# Patient Record
Sex: MALE | Race: WHITE | NOT HISPANIC OR LATINO | Employment: OTHER | URBAN - METROPOLITAN AREA
[De-identification: names, ages, dates, MRNs, and addresses within clinical notes are randomized per-mention and may not be internally consistent; named-entity substitution may affect disease eponyms.]

---

## 2017-02-02 ENCOUNTER — HOSPITAL ENCOUNTER (INPATIENT)
Facility: HOSPITAL | Age: 62
LOS: 2 days | Discharge: HOME | DRG: 190 | End: 2017-02-04
Attending: EMERGENCY MEDICINE | Admitting: INTERNAL MEDICINE
Payer: MEDICARE

## 2017-02-02 DIAGNOSIS — J96.22 ACUTE ON CHRONIC RESPIRATORY FAILURE WITH HYPOXIA AND HYPERCAPNIA: ICD-10-CM

## 2017-02-02 DIAGNOSIS — J96.02 ACUTE RESPIRATORY FAILURE WITH HYPOXIA AND HYPERCAPNIA: ICD-10-CM

## 2017-02-02 DIAGNOSIS — J44.1 COPD WITH ACUTE EXACERBATION: Primary | ICD-10-CM

## 2017-02-02 DIAGNOSIS — R91.1 PULMONARY NODULE: Chronic | ICD-10-CM

## 2017-02-02 DIAGNOSIS — J96.21 ACUTE ON CHRONIC RESPIRATORY FAILURE WITH HYPOXIA AND HYPERCAPNIA: ICD-10-CM

## 2017-02-02 DIAGNOSIS — J96.01 ACUTE RESPIRATORY FAILURE WITH HYPOXIA AND HYPERCAPNIA: ICD-10-CM

## 2017-02-02 DIAGNOSIS — J44.9 COPD, VERY SEVERE: ICD-10-CM

## 2017-02-02 PROBLEM — Z72.0 TOBACCO ABUSE: Status: ACTIVE | Noted: 2017-02-02

## 2017-02-02 LAB
ALBUMIN SERPL BCP-MCNC: 4 G/DL
ALP SERPL-CCNC: 74 U/L
ALT SERPL W/O P-5'-P-CCNC: 27 U/L
ANION GAP SERPL CALC-SCNC: 15 MMOL/L
APTT BLDCRRT: 25.9 SEC
AST SERPL-CCNC: 26 U/L
BASOPHILS # BLD AUTO: 0.08 K/UL
BASOPHILS NFR BLD: 0.9 %
BILIRUB SERPL-MCNC: 0.5 MG/DL
BNP SERPL-MCNC: 35 PG/ML
BUN SERPL-MCNC: 6 MG/DL
CALCIUM SERPL-MCNC: 9.8 MG/DL
CHLORIDE SERPL-SCNC: 104 MMOL/L
CK MB SERPL-MCNC: 7.4 NG/ML
CK MB SERPL-RTO: 3.4 %
CK SERPL-CCNC: 215 U/L
CK SERPL-CCNC: 215 U/L
CO2 SERPL-SCNC: 25 MMOL/L
CREAT SERPL-MCNC: 0.9 MG/DL
DIFFERENTIAL METHOD: ABNORMAL
EOSINOPHIL # BLD AUTO: 1.2 K/UL
EOSINOPHIL NFR BLD: 13.1 %
ERYTHROCYTE [DISTWIDTH] IN BLOOD BY AUTOMATED COUNT: 14.3 %
EST. GFR  (AFRICAN AMERICAN): >60 ML/MIN/1.73 M^2
EST. GFR  (NON AFRICAN AMERICAN): >60 ML/MIN/1.73 M^2
GLUCOSE SERPL-MCNC: 86 MG/DL
HCT VFR BLD AUTO: 47.7 %
HGB BLD-MCNC: 16.8 G/DL
INR PPP: 1
LACTATE SERPL-SCNC: 1.5 MMOL/L
LYMPHOCYTES # BLD AUTO: 1.5 K/UL
LYMPHOCYTES NFR BLD: 16.6 %
MCH RBC QN AUTO: 35.4 PG
MCHC RBC AUTO-ENTMCNC: 35.2 %
MCV RBC AUTO: 100 FL
MONOCYTES # BLD AUTO: 0.7 K/UL
MONOCYTES NFR BLD: 8.1 %
NEUTROPHILS # BLD AUTO: 5.4 K/UL
NEUTROPHILS NFR BLD: 61.3 %
PLATELET # BLD AUTO: 198 K/UL
PMV BLD AUTO: 9.6 FL
POTASSIUM SERPL-SCNC: 4.2 MMOL/L
PROT SERPL-MCNC: 7.8 G/DL
PROTHROMBIN TIME: 9.9 SEC
RBC # BLD AUTO: 4.75 M/UL
SODIUM SERPL-SCNC: 144 MMOL/L
TROPONIN I SERPL DL<=0.01 NG/ML-MCNC: 0.02 NG/ML
WBC # BLD AUTO: 8.84 K/UL

## 2017-02-02 PROCEDURE — 85730 THROMBOPLASTIN TIME PARTIAL: CPT

## 2017-02-02 PROCEDURE — 99285 EMERGENCY DEPT VISIT HI MDM: CPT | Mod: 25

## 2017-02-02 PROCEDURE — 82553 CREATINE MB FRACTION: CPT

## 2017-02-02 PROCEDURE — 25000242 PHARM REV CODE 250 ALT 637 W/ HCPCS: Performed by: EMERGENCY MEDICINE

## 2017-02-02 PROCEDURE — 20000000 HC ICU ROOM

## 2017-02-02 PROCEDURE — 94640 AIRWAY INHALATION TREATMENT: CPT

## 2017-02-02 PROCEDURE — 84484 ASSAY OF TROPONIN QUANT: CPT

## 2017-02-02 PROCEDURE — 83605 ASSAY OF LACTIC ACID: CPT

## 2017-02-02 PROCEDURE — 85610 PROTHROMBIN TIME: CPT

## 2017-02-02 PROCEDURE — 27000190 HC CPAP FULL FACE MASK W/VALVE

## 2017-02-02 PROCEDURE — 93010 ELECTROCARDIOGRAM REPORT: CPT | Mod: ,,, | Performed by: INTERNAL MEDICINE

## 2017-02-02 PROCEDURE — 27000221 HC OXYGEN, UP TO 24 HOURS

## 2017-02-02 PROCEDURE — 83880 ASSAY OF NATRIURETIC PEPTIDE: CPT

## 2017-02-02 PROCEDURE — 94660 CPAP INITIATION&MGMT: CPT

## 2017-02-02 PROCEDURE — 80053 COMPREHEN METABOLIC PANEL: CPT

## 2017-02-02 PROCEDURE — 85025 COMPLETE CBC W/AUTO DIFF WBC: CPT

## 2017-02-02 RX ORDER — ONDANSETRON 2 MG/ML
4 INJECTION INTRAMUSCULAR; INTRAVENOUS EVERY 8 HOURS PRN
Status: CANCELLED | OUTPATIENT
Start: 2017-02-02

## 2017-02-02 RX ORDER — ACETAMINOPHEN 325 MG/1
650 TABLET ORAL EVERY 6 HOURS PRN
Status: DISCONTINUED | OUTPATIENT
Start: 2017-02-02 | End: 2017-02-04 | Stop reason: HOSPADM

## 2017-02-02 RX ORDER — HYDRALAZINE HYDROCHLORIDE 20 MG/ML
10 INJECTION INTRAMUSCULAR; INTRAVENOUS EVERY 6 HOURS PRN
Status: DISCONTINUED | OUTPATIENT
Start: 2017-02-02 | End: 2017-02-04 | Stop reason: HOSPADM

## 2017-02-02 RX ORDER — ROFLUMILAST 500 UG/1
500 TABLET ORAL DAILY
Status: DISCONTINUED | OUTPATIENT
Start: 2017-02-03 | End: 2017-02-04 | Stop reason: HOSPADM

## 2017-02-02 RX ORDER — FOLIC ACID 1 MG/1
1000 TABLET ORAL DAILY
Status: DISCONTINUED | OUTPATIENT
Start: 2017-02-03 | End: 2017-02-04 | Stop reason: HOSPADM

## 2017-02-02 RX ORDER — ONDANSETRON 2 MG/ML
4 INJECTION INTRAMUSCULAR; INTRAVENOUS EVERY 12 HOURS PRN
Status: DISCONTINUED | OUTPATIENT
Start: 2017-02-02 | End: 2017-02-02

## 2017-02-02 RX ORDER — LORAZEPAM 0.5 MG/1
0.5 TABLET ORAL EVERY 6 HOURS PRN
Status: DISCONTINUED | OUTPATIENT
Start: 2017-02-03 | End: 2017-02-04 | Stop reason: HOSPADM

## 2017-02-02 RX ORDER — ENOXAPARIN SODIUM 100 MG/ML
40 INJECTION SUBCUTANEOUS EVERY 24 HOURS
Status: DISCONTINUED | OUTPATIENT
Start: 2017-02-03 | End: 2017-02-04 | Stop reason: HOSPADM

## 2017-02-02 RX ORDER — ACETAMINOPHEN 325 MG/1
650 TABLET ORAL EVERY 6 HOURS PRN
Status: CANCELLED | OUTPATIENT
Start: 2017-02-02

## 2017-02-02 RX ORDER — MOXIFLOXACIN HYDROCHLORIDE 400 MG/250ML
400 INJECTION, SOLUTION INTRAVENOUS
Status: DISCONTINUED | OUTPATIENT
Start: 2017-02-02 | End: 2017-02-04 | Stop reason: HOSPADM

## 2017-02-02 RX ORDER — MAG HYDROX/ALUMINUM HYD/SIMETH 200-200-20
30 SUSPENSION, ORAL (FINAL DOSE FORM) ORAL EVERY 6 HOURS PRN
Status: DISCONTINUED | OUTPATIENT
Start: 2017-02-02 | End: 2017-02-04 | Stop reason: HOSPADM

## 2017-02-02 RX ORDER — GUAIFENESIN 100 MG/5ML
200 SOLUTION ORAL EVERY 4 HOURS PRN
Status: DISCONTINUED | OUTPATIENT
Start: 2017-02-02 | End: 2017-02-04 | Stop reason: HOSPADM

## 2017-02-02 RX ORDER — IPRATROPIUM BROMIDE AND ALBUTEROL SULFATE 2.5; .5 MG/3ML; MG/3ML
3 SOLUTION RESPIRATORY (INHALATION) EVERY 4 HOURS PRN
Status: DISCONTINUED | OUTPATIENT
Start: 2017-02-02 | End: 2017-02-02

## 2017-02-02 RX ORDER — DIPHENHYDRAMINE HCL 25 MG
25 CAPSULE ORAL EVERY 6 HOURS PRN
Status: DISCONTINUED | OUTPATIENT
Start: 2017-02-02 | End: 2017-02-04 | Stop reason: HOSPADM

## 2017-02-02 RX ORDER — ONDANSETRON 2 MG/ML
4 INJECTION INTRAMUSCULAR; INTRAVENOUS EVERY 8 HOURS PRN
Status: DISCONTINUED | OUTPATIENT
Start: 2017-02-03 | End: 2017-02-04 | Stop reason: HOSPADM

## 2017-02-02 RX ORDER — IPRATROPIUM BROMIDE AND ALBUTEROL SULFATE 2.5; .5 MG/3ML; MG/3ML
3 SOLUTION RESPIRATORY (INHALATION)
Status: COMPLETED | OUTPATIENT
Start: 2017-02-02 | End: 2017-02-02

## 2017-02-02 RX ORDER — IPRATROPIUM BROMIDE AND ALBUTEROL SULFATE 2.5; .5 MG/3ML; MG/3ML
3 SOLUTION RESPIRATORY (INHALATION) EVERY 6 HOURS
Status: DISCONTINUED | OUTPATIENT
Start: 2017-02-03 | End: 2017-02-04 | Stop reason: HOSPADM

## 2017-02-02 RX ADMIN — IPRATROPIUM BROMIDE AND ALBUTEROL SULFATE 3 ML: .5; 3 SOLUTION RESPIRATORY (INHALATION) at 09:02

## 2017-02-02 NOTE — IP AVS SNAPSHOT
[address] [place] [address]           Patient Discharge Instructions     Our goal is to set you up for success. This packet includes information on your condition, medications, and your home care. It will help you to care for yourself so you don't get sicker and need to go back to the hospital.     Please ask your nurse if you have any questions.        There are many details to remember when preparing to leave the hospital. Here is what you will need to do:    1. Take your medicine. If you are prescribed medications, review your Medication List in the following pages. You may have new medications to  at the pharmacy and others that you'll need to stop taking. Review the instructions for how and when to take your medications. Talk with your doctor or nurses if you are unsure of what to do.     2. Go to your follow-up appointments. Specific follow-up information is listed in the following pages. Your may be contacted by a transition nurse or clinical provider about future appointments. Be sure we have all of the phone numbers to reach you, if needed. Please contact your provider's office if you are unable to make an appointment.     3. Watch for warning signs. Your doctor or nurse will give you detailed warning signs to watch for and when to call for assistance. These instructions may also include educational information about your condition. If you experience any of warning signs to your health, call your doctor.               ** Verify the list of medication(s) below is accurate and up to date. Carry this with you in case of emergency. If your medications have changed, please notify your healthcare provider.             Medication List      START taking these medications        Additional Info                      levoFLOXacin 500 MG tablet   Commonly known as:  LEVAQUIN   Quantity:  10 tablet   Refills:  0   Dose:  500 mg    Instructions:  Take 1 tablet  (500 mg total) by mouth once daily.     Begin Date    AM    Noon    PM    Bedtime         CONTINUE taking these medications        Additional Info                      * albuterol 0.63 mg/3 mL Nebu   Commonly known as:  ACCUNEB   Quantity:  90 vial   Refills:  11   Dose:  0.63 mg    Instructions:  Take 3 mLs (0.63 mg total) by nebulization 3 (three) times daily as needed.     Begin Date    AM    Noon    PM    Bedtime       * PROAIR HFA 90 mcg/actuation inhaler   Quantity:  18 g   Refills:  11   Generic drug:  albuterol    Instructions:  INHALE ONCE EVERY 4 HOURS AS NEEDED FOR WHEEZING     Begin Date    AM    Noon    PM    Bedtime       folic acid 1 MG tablet   Commonly known as:  FOLVITE   Refills:  0   Dose:  1000 mcg    Last time this was given:  1,000 mcg on 2/4/2017  9:26 AM   Instructions:  Take 1,000 mcg by mouth once daily.     Begin Date    AM    Noon    PM    Bedtime       lorazepam 0.5 MG tablet   Commonly known as:  ATIVAN   Refills:  0    Instructions:  TAKE ONE TABLET BY MOUTH EVERY SIX HOURS.DO NOT TAKE WITH ALCOHOL.     Begin Date    AM    Noon    PM    Bedtime       predniSONE 10 MG tablet   Commonly known as:  DELTASONE   Quantity:  53 tablet   Refills:  0    Instructions:  Please take: 40 mg daily x 5 days; 30 mg daily x 5 days; 20 mg daily x 5 days; 10 mg daily x 5 days; then 5 mg daily x 5 days     Begin Date    AM    Noon    PM    Bedtime       roflumilast 500 mcg Tab   Quantity:  30 tablet   Refills:  0   Dose:  500 mcg    Last time this was given:  500 mcg on 2/4/2017  9:26 AM   Instructions:  Take 1 tablet (500 mcg total) by mouth once daily.     Begin Date    AM    Noon    PM    Bedtime       umeclidinium-vilanterol 62.5-25 mcg/actuation Dsdv   Commonly known as:  ANORO ELLIPTA   Quantity:  30 each   Refills:  11   Dose:  1 puff    Instructions:  Inhale 1 puff into the lungs once daily.     Begin Date    AM    Noon    PM    Bedtime       VITAMIN D3 400 unit Chew   Refills:  0   Generic drug:   cholecalciferol (vitamin D3)    Instructions:  Take by mouth.     Begin Date    AM    Noon    PM    Bedtime       * Notice:  This list has 2 medication(s) that are the same as other medications prescribed for you. Read the directions carefully, and ask your doctor or other care provider to review them with you.         Where to Get Your Medications      These medications were sent to [facility]/pharmacy #[id number] - [place] - [address] [facility]  [address] [address], [place] [address]     Phone:  [phone]     levoFLOXacin 500 MG tablet    predniSONE 10 MG tablet                  Please bring to all follow up appointments:    1. A copy of your discharge instructions.  2. All medicines you are currently taking in their original bottles.  3. Identification and insurance card.    Please arrive 15 minutes ahead of scheduled appointment time.    Please call 24 hours in advance if you must reschedule your appointment and/or time.        Your Scheduled Appointments     Feb 17, 2017  1:00 PM CST   Complete P[facility] with [facility]    [address] [place] [id number] [phone]            Feb 17, 2017  1:40 PM CST   Established Patient Visit with MD [facility]    [address] [place] [id number] [phone]              Follow-up Information     Follow up with Joni Rey MD In 2 days.    Specialty:  Family Medicine    Contact information:    [address] [facility] [address] [phone]          Follow up with Prabhakar Rodriguez MD.    Specialty:  Pulmonary Disease    Why:  as scheduled on 2/17/17 at 1:40pm    Contact information:    [address]  [facility] [address] [phone]          Discharge Instructions     Future Orders    Activity as tolerated     Call MD for:  difficulty breathing or increased cough     Diet general      "Questions:    Total calories:      Fat restriction, if any:      Protein restriction, if any:      Na restriction, if any:      Fluid restriction:      Additional restrictions:          Primary Diagnosis     Your primary diagnosis was:  Chronic Bronchitis      Admission Information     Date & Time Provider Department CSN    2/2/2017  8:43 PM Nereida Quach MD [facility] -  [id number]      Care Providers     Provider Role Specialty Primary office phone    Nereida Quach MD Attending Provider Internal Medicine [phone]    Arvind Aleman MD Consulting Physician  Pulmonary Disease  [phone]    Nereida Quach MD Team Attending  Internal Medicine [phone]      Your Vitals Were     BP Pulse Temp Resp Height Weight    118/74 (BP Location: Left arm, Patient Position: Lying, BP Method: Automatic) 90 98 °F (36.7 °C) (Oral) 18 5' 11" (1.803 m) 87.5 kg (192 lb 14.4 oz)    SpO2 BMI             93% 26.9 kg/m2         Recent Lab Values     No lab values to display.      Allergies as of 2/4/2017     No Known Allergies      [facility]     [facility] Nurse Care Line - 24/7 Assistance  Unless otherwise directed by your provider, please contact [facility], our nurse care line that is available for 24/7 assistance.     Registered nurses in the [facility] provide clinical advisement, health education, appointment booking, and other advisory services.  Call for this free service at [phone].        Advance Directives     An advance directive is a document which, in the event you are no longer able to make decisions for yourself, tells your healthcare team what kind of treatment you do or do not want to receive, or who you would like to make those decisions for you.  If you do not currently have an advance directive, [facility] encourages you to create one.  For more information call:  [phone] ([phone]), [phone] ([phone]),  or log on to [email or web address].      "   Smoking Cessation     If you would like to quit smoking:   You may be eligible for free services if you are a [place] resident and started smoking cigarettes before September 1, 1988.  Call the Smoking Cessation Trust (SCT) toll free at (485) 497-0987 or (488) 312-6808.   Call 1-800-QUIT-NOW if you do not meet the above criteria.            Language Assistance Services     ATTENTION: Language assistance services are available, free of charge. Please call [phone].      ATENCIÓN: Si habla ryan, tiene a berrios disposición servicios gratuitos de asistencia lingüística. Llame al [phone].     CHÚ Ý: N?u b?n nói Ti?ng Vi?t, có các d?ch v? h? tr? ngôn ng? mi?n phí dành cho b?n. G?i s? [phone].        [facility] Sign-Up     Activating your [facility] account is as easy as 1-2-3!     1) Visit [email or web address], select Sign Up Now, enter this activation code and your date of birth, then select Next.  [email or web address]  Expires: 3/21/2017  2:49 PM      2) Create a username and password to use when you visit [facility] in the future and select a security question in case you lose your password and select Next.    3) Enter your e-mail address and click Sign Up!    Additional Information  If you have questions, please e-mail [email or web address] or call [phone] to talk to our [facility] staff. Remember, [facility] is NOT to be used for urgent needs. For medical emergencies, dial 911.          [facility] complies with applicable Federal civil rights laws and does not discriminate on the basis of race, color, national origin, age, disability, or sex.

## 2017-02-03 LAB
ALBUMIN SERPL BCP-MCNC: 3.7 G/DL
ALLENS TEST: ABNORMAL
ALP SERPL-CCNC: 62 U/L
ALT SERPL W/O P-5'-P-CCNC: 25 U/L
ANION GAP SERPL CALC-SCNC: 9 MMOL/L
AST SERPL-CCNC: 22 U/L
BASOPHILS # BLD AUTO: 0.01 K/UL
BASOPHILS NFR BLD: 0.2 %
BILIRUB SERPL-MCNC: 0.6 MG/DL
BUN SERPL-MCNC: 8 MG/DL
CALCIUM SERPL-MCNC: 9.8 MG/DL
CHLORIDE SERPL-SCNC: 103 MMOL/L
CO2 SERPL-SCNC: 28 MMOL/L
CREAT SERPL-MCNC: 0.9 MG/DL
DELSYS: ABNORMAL
DIFFERENTIAL METHOD: ABNORMAL
EOSINOPHIL # BLD AUTO: 0 K/UL
EOSINOPHIL NFR BLD: 0.3 %
ERYTHROCYTE [DISTWIDTH] IN BLOOD BY AUTOMATED COUNT: 14.3 %
EST. GFR  (AFRICAN AMERICAN): >60 ML/MIN/1.73 M^2
EST. GFR  (NON AFRICAN AMERICAN): >60 ML/MIN/1.73 M^2
FIO2: 32
FLOW: 3
GLUCOSE SERPL-MCNC: 148 MG/DL
HCO3 UR-SCNC: 29.4 MMOL/L (ref 24–28)
HCT VFR BLD AUTO: 45.6 %
HGB BLD-MCNC: 15.5 G/DL
LYMPHOCYTES # BLD AUTO: 0.4 K/UL
LYMPHOCYTES NFR BLD: 6.2 %
MAGNESIUM SERPL-MCNC: 1.9 MG/DL
MCH RBC QN AUTO: 34.4 PG
MCHC RBC AUTO-ENTMCNC: 34 %
MCV RBC AUTO: 101 FL
MODE: ABNORMAL
MONOCYTES # BLD AUTO: 0.1 K/UL
MONOCYTES NFR BLD: 1.3 %
NEUTROPHILS # BLD AUTO: 5.5 K/UL
NEUTROPHILS NFR BLD: 92 %
PCO2 BLDA: 49.6 MMHG (ref 35–45)
PH SMN: 7.38 [PH] (ref 7.35–7.45)
PHOSPHATE SERPL-MCNC: 2.7 MG/DL
PLATELET # BLD AUTO: 197 K/UL
PMV BLD AUTO: 9.9 FL
PO2 BLDA: 64 MMHG (ref 80–100)
POC BE: 4 MMOL/L
POC SATURATED O2: 91 % (ref 95–100)
POTASSIUM SERPL-SCNC: 5 MMOL/L
PROT SERPL-MCNC: 7.2 G/DL
RBC # BLD AUTO: 4.51 M/UL
SAMPLE: ABNORMAL
SITE: ABNORMAL
SODIUM SERPL-SCNC: 140 MMOL/L
WBC # BLD AUTO: 5.93 K/UL

## 2017-02-03 PROCEDURE — 21400001 HC TELEMETRY ROOM

## 2017-02-03 PROCEDURE — 25000003 PHARM REV CODE 250: Performed by: NURSE PRACTITIONER

## 2017-02-03 PROCEDURE — 85025 COMPLETE CBC W/AUTO DIFF WBC: CPT

## 2017-02-03 PROCEDURE — 36600 WITHDRAWAL OF ARTERIAL BLOOD: CPT

## 2017-02-03 PROCEDURE — 27000221 HC OXYGEN, UP TO 24 HOURS

## 2017-02-03 PROCEDURE — 83735 ASSAY OF MAGNESIUM: CPT

## 2017-02-03 PROCEDURE — 94640 AIRWAY INHALATION TREATMENT: CPT

## 2017-02-03 PROCEDURE — 82803 BLOOD GASES ANY COMBINATION: CPT

## 2017-02-03 PROCEDURE — 25000242 PHARM REV CODE 250 ALT 637 W/ HCPCS: Performed by: INTERNAL MEDICINE

## 2017-02-03 PROCEDURE — 84100 ASSAY OF PHOSPHORUS: CPT

## 2017-02-03 PROCEDURE — 63600175 PHARM REV CODE 636 W HCPCS: Performed by: INTERNAL MEDICINE

## 2017-02-03 PROCEDURE — 25000003 PHARM REV CODE 250: Performed by: INTERNAL MEDICINE

## 2017-02-03 PROCEDURE — 63600175 PHARM REV CODE 636 W HCPCS: Performed by: NURSE PRACTITIONER

## 2017-02-03 PROCEDURE — 94660 CPAP INITIATION&MGMT: CPT

## 2017-02-03 PROCEDURE — 80053 COMPREHEN METABOLIC PANEL: CPT

## 2017-02-03 PROCEDURE — 99291 CRITICAL CARE FIRST HOUR: CPT | Mod: ,,, | Performed by: INTERNAL MEDICINE

## 2017-02-03 PROCEDURE — 36415 COLL VENOUS BLD VENIPUNCTURE: CPT

## 2017-02-03 PROCEDURE — 99900035 HC TECH TIME PER 15 MIN (STAT)

## 2017-02-03 RX ORDER — FAMOTIDINE 20 MG/1
20 TABLET, FILM COATED ORAL 2 TIMES DAILY
Status: DISCONTINUED | OUTPATIENT
Start: 2017-02-03 | End: 2017-02-04 | Stop reason: HOSPADM

## 2017-02-03 RX ORDER — BUDESONIDE 0.5 MG/2ML
0.5 INHALANT ORAL 2 TIMES DAILY
Status: DISCONTINUED | OUTPATIENT
Start: 2017-02-03 | End: 2017-02-04 | Stop reason: HOSPADM

## 2017-02-03 RX ORDER — ARFORMOTEROL TARTRATE 15 UG/2ML
15 SOLUTION RESPIRATORY (INHALATION) 2 TIMES DAILY
Status: DISCONTINUED | OUTPATIENT
Start: 2017-02-03 | End: 2017-02-04 | Stop reason: HOSPADM

## 2017-02-03 RX ADMIN — METHYLPREDNISOLONE SODIUM SUCCINATE 125 MG: 125 INJECTION, POWDER, FOR SOLUTION INTRAMUSCULAR; INTRAVENOUS at 05:02

## 2017-02-03 RX ADMIN — IPRATROPIUM BROMIDE AND ALBUTEROL SULFATE 3 ML: .5; 3 SOLUTION RESPIRATORY (INHALATION) at 08:02

## 2017-02-03 RX ADMIN — METHYLPREDNISOLONE SODIUM SUCCINATE 125 MG: 125 INJECTION, POWDER, FOR SOLUTION INTRAMUSCULAR; INTRAVENOUS at 09:02

## 2017-02-03 RX ADMIN — FAMOTIDINE 20 MG: 20 TABLET ORAL at 09:02

## 2017-02-03 RX ADMIN — IPRATROPIUM BROMIDE AND ALBUTEROL SULFATE 3 ML: .5; 3 SOLUTION RESPIRATORY (INHALATION) at 01:02

## 2017-02-03 RX ADMIN — BUDESONIDE 0.5 MG: 0.5 INHALANT RESPIRATORY (INHALATION) at 08:02

## 2017-02-03 RX ADMIN — ENOXAPARIN SODIUM 40 MG: 100 INJECTION SUBCUTANEOUS at 11:02

## 2017-02-03 RX ADMIN — ROFLUMILAST 500 MCG: 500 TABLET ORAL at 08:02

## 2017-02-03 RX ADMIN — FOLIC ACID 1000 MCG: 1 TABLET ORAL at 08:02

## 2017-02-03 RX ADMIN — MOXIFLOXACIN HYDROCHLORIDE 400 MG: 400 INJECTION, SOLUTION INTRAVENOUS at 12:02

## 2017-02-03 RX ADMIN — ARFORMOTEROL TARTRATE 15 MCG: 15 SOLUTION RESPIRATORY (INHALATION) at 08:02

## 2017-02-03 RX ADMIN — MOXIFLOXACIN HYDROCHLORIDE 400 MG: 400 INJECTION, SOLUTION INTRAVENOUS at 11:02

## 2017-02-03 RX ADMIN — METHYLPREDNISOLONE SODIUM SUCCINATE 125 MG: 125 INJECTION, POWDER, FOR SOLUTION INTRAMUSCULAR; INTRAVENOUS at 02:02

## 2017-02-03 NOTE — ED NOTES
Patient is resting comfortably. Reports improvement of sob after receiving breathing treatments. Denies chest pain at present. VSS. Will cont to monitor.

## 2017-02-03 NOTE — PROGRESS NOTES
Problem: Patient Care Overview  Goal: Plan of Care Review  Outcome: Ongoing (interventions implemented as appropriate)  Patient admitted to Tele from ICU. White board updated, dietary procedures explained, tele monitor in place, assessment completed. Bed low and call light within reach. Will continue to monitor.

## 2017-02-03 NOTE — ASSESSMENT & PLAN NOTE
- Home oxygen and CPAP dependant  - Continue Bipap and Solumedrol  - Consider Pulmonary consult ()

## 2017-02-03 NOTE — H&P
[facility] Hospital Medicine  History & Physical    Patient Name: Johnny Perales  MRN: [id number]  Admission Date: 2/2/2017  Attending Physician: Chalino Keita MD  Primary Care Provider: Joni Rey MD         Patient information was obtained from patient, spouse/SO and ER records.     Subjective:     Principal Problem:COPD with acute exacerbation    Chief Complaint:   Chief Complaint   Patient presents with    Shortness of Breath     since yesterday. wheezing. history of COPD.        HPI: Mr. Perales is a 62 y/o  male with h/o severe COPD, chronic hypoxic and hypercapnic respiratory failure, home oxygen and CPAP dependant, continued tobacco user, presents to the ED c/o worsening SOB associated with profuse wheezing since earlier this morning. Multiple rounds of nebulizer therapy therapy at home did not improve his symptoms. He reports compliancy with CPAP at home. He was placed on BiPap in the ED, and given solumedrol 125 mg IV x1 and feels better. Still has profuse wheezing.    Past Medical History   Diagnosis Date    COPD (chronic obstructive pulmonary disease)        History reviewed. No pertinent past surgical history.    Review of patient's allergies indicates:  No Known Allergies    No current facility-administered medications on file prior to encounter.      Current Outpatient Prescriptions on File Prior to Encounter   Medication Sig    albuterol (ACCUNEB) 0.63 mg/3 mL Nebu Take 3 mLs (0.63 mg total) by nebulization 3 (three) times daily as needed.    cholecalciferol, vitamin D3, (VITAMIN D3) 400 unit Chew Take by mouth.    folic acid (FOLVITE) 1 MG tablet Take 1,000 mcg by mouth once daily.    lorazepam (ATIVAN) 0.5 MG tablet TAKE ONE TABLET BY MOUTH EVERY SIX HOURS.DO NOT TAKE WITH ALCOHOL.    predniSONE (DELTASONE) 10 MG tablet Please take: 40 mg daily x 5 days; 30 mg daily x 5 days; 20 mg daily x 5 days; 10 mg daily x 5 days; then 5 mg daily x 5 days    PROAIR  HFA 90 mcg/actuation inhaler INHALE ONCE EVERY 4 HOURS AS NEEDED FOR WHEEZING    roflumilast 500 mcg Tab Take 1 tablet (500 mcg total) by mouth once daily.    umeclidinium-vilanterol (ANORO ELLIPTA) 62.5-25 mcg/actuation DsDv Inhale 1 puff into the lungs once daily.     Family History     Problem Relation (Age of Onset)    Cancer Mother, Father    Heart failure Mother, Father        Social History Main Topics    Smoking status: Current Every Day Smoker     Packs/day: 0.10     Years: 35.00    Smokeless tobacco: Not on file    Alcohol use 1.2 oz/week     2 Cans of beer per week    Drug use: No    Sexual activity: Not on file     Review of Systems   Constitutional: Negative.  Negative for appetite change, fatigue and fever.   HENT: Negative.  Negative for congestion, nosebleeds and sore throat.    Eyes: Negative.  Negative for photophobia, redness and visual disturbance.   Respiratory: Positive for shortness of breath and wheezing. Negative for cough.    Cardiovascular: Negative.  Negative for chest pain, palpitations and leg swelling.   Gastrointestinal: Negative.  Negative for abdominal pain, constipation, diarrhea, nausea and vomiting.   Endocrine: Negative.  Negative for polydipsia, polyphagia and polyuria.   Genitourinary: Negative.  Negative for dysuria, flank pain, frequency and urgency.   Musculoskeletal: Negative.  Negative for arthralgias, back pain and joint swelling.   Skin: Negative.  Negative for color change, pallor and rash.   Allergic/Immunologic: Negative.  Negative for environmental allergies, food allergies and immunocompromised state.   Neurological: Negative.  Negative for dizziness, syncope, weakness, light-headedness, numbness and headaches.   Hematological: Negative.    Psychiatric/Behavioral: Negative for confusion and hallucinations. The patient is nervous/anxious.    All other systems reviewed and are negative.    Objective:     Vital Signs (Most Recent):  Temp: 97.9 °F (36.6 °C)  (02/02/17 2045)  Pulse: (!) 114 (02/02/17 2302)  Resp: 20 (02/02/17 2302)  BP: 126/86 (02/02/17 2302)  SpO2: 95 % (02/02/17 2302) Vital Signs (24h Range):  Temp:  [97.9 °F (36.6 °C)] 97.9 °F (36.6 °C)  Pulse:  [114-120] 114  Resp:  [20-26] 20  SpO2:  [95 %-98 %] 95 %  BP: (126-150)/(80-88) 126/86     Weight: 81.6 kg (180 lb)  Body mass index is 25.1 kg/(m^2).    Physical Exam   Constitutional: He is oriented to person, place, and time. He appears well-developed and well-nourished. He appears distressed.   HENT:   Head: Normocephalic and atraumatic.   Eyes: Conjunctivae and EOM are normal. Pupils are equal, round, and reactive to light. No scleral icterus.   Neck: Normal range of motion. Neck supple. No thyromegaly present.   Cardiovascular: Normal rate, regular rhythm and normal heart sounds.    No murmur heard.  Pulmonary/Chest: He is in respiratory distress. He has wheezes. He exhibits no tenderness.   Abdominal: Soft. Bowel sounds are normal. There is no tenderness.   Musculoskeletal: Normal range of motion. He exhibits no edema or tenderness.   Lymphadenopathy:     He has no cervical adenopathy.   Neurological: He is alert and oriented to person, place, and time. No cranial nerve deficit. He exhibits normal muscle tone. Coordination normal.   Skin: Skin is warm and dry. He is not diaphoretic.   Psychiatric: His behavior is normal. Thought content normal. His mood appears anxious.   Nursing note and vitals reviewed.       Significant Labs:   ABGs: No results for input(s): PH, PCO2, HCO3, POCSATURATED, BE in the last 48 hours.  BMP:   Recent Labs  Lab 02/02/17 2109   GLU 86      K 4.2      CO2 25   BUN 6   CREATININE 0.9   CALCIUM 9.8     CBC:   Recent Labs  Lab 02/02/17 2109   WBC 8.84   HGB 16.8   HCT 47.7        CMP:   Recent Labs  Lab 02/02/17 2109      K 4.2      CO2 25   GLU 86   BUN 6   CREATININE 0.9   CALCIUM 9.8   PROT 7.8   ALBUMIN 4.0   BILITOT 0.5   ALKPHOS 74   AST  26   ALT 27   ANIONGAP 15   EGFRNONAA >60     Cardiac Markers:   Recent Labs  Lab 02/02/17 2109   BNP 35     Troponin:   Recent Labs  Lab 02/02/17 2109   TROPONINI 0.023     Urine Studies: No results for input(s): COLORU, APPEARANCEUA, PHUR, SPECGRAV, PROTEINUA, GLUCUA, KETONESU, BILIRUBINUA, OCCULTUA, NITRITE, UROBILINOGEN, LEUKOCYTESUR, RBCUA, WBCUA, BACTERIA, SQUAMEPITHEL, HYALINECASTS in the last 48 hours.    Invalid input(s): WRIGHTSUR  All pertinent labs within the past 24 hours have been reviewed.    Significant Imaging:   Imaging Results         X-Ray Chest 1 View (Final result) Result time:  02/02/17 22:06:18    Final result by Katelynn Paredes MD (02/02/17 22:06:18)    Impression:     No acute cardiopulmonary disease.            Electronically signed by: KATELYNN PAREDES MD  Date:     02/02/17  Time:    22:06     Narrative:    EXAM:   [id number] CHEST 1 VIEW    CLINICAL HISTORY:  sob    COMPARISON: The December 2016    Findings:     The lungs are clear. The cardiac silhouette is within normal limits.  Bones are stable.                Assessment/Plan:     * COPD with acute exacerbation  - IV steroids  - Bipap  - IV avelox empirically      Acute on chronic respiratory failure with hypoxia and hypercapnia  - Home oxygen and CPAP dependant  - Continue Bipap and Solumedrol  - Consider Pulmonary consult ()          Tobacco abuse  - Nicotine patch  - Uses electronic cigarettes at home      VTE Risk Mitigation     Lovenox        Chalino Keita MD  Department of Hospital Medicine   [facility]

## 2017-02-03 NOTE — ED PROVIDER NOTES
SCRIBE #1 NOTE: I, Benigno Ceron, am scribing for, and in the presence of, Rey Genao MD. I have scribed the entire note.      History      Chief Complaint   Patient presents with    Shortness of Breath     since yesterday. wheezing. history of COPD.       Review of patient's allergies indicates:  No Known Allergies     HPI   HPI    2/2/2017, 9:03 PM   History obtained from the wife and patient      History of Present Illness: Johnny Perales is a 61 y.o. male patient, with PMHx of COPD, who presents to the Emergency Department for shortness of breath which onset gradually 2 days ago. Symptoms are constant and moderate in severity. Wife states the pt has been coughing up a yellow/green sputum. No mitigating or exacerbating factors reported. Associated sxs include diaphoresis, wheezing, and chest tightness. Patient denies any fever, chills, leg swelling, palpitations, n/v/d, HA, lightheadedness, dizziness, abdominal pain, and all other sxs at this time. No further complaints or concerns at this time.         Arrival mode: AASI    PCP: Joni Rey MD       Past Medical History:  Past Medical History   Diagnosis Date    COPD (chronic obstructive pulmonary disease)        Past Surgical History:  History reviewed. No pertinent past surgical history.      Family History:  Family History   Problem Relation Age of Onset    Cancer Mother     Heart failure Mother     Cancer Father     Heart failure Father        Social History:  Social History     Social History Main Topics    Smoking status: Current Every Day Smoker     Packs/day: 0.10     Years: 35.00    Smokeless tobacco: Unknown    Alcohol use 1.2 oz/week     2 Cans of beer per week    Drug use: No    Sexual activity: Unknown       ROS   Review of Systems   Constitutional: Negative for chills and fever.   HENT: Negative for sore throat.    Respiratory: Positive for cough, chest tightness, shortness of breath and wheezing.    Cardiovascular: Negative  for chest pain, palpitations and leg swelling.   Gastrointestinal: Negative for abdominal pain, diarrhea, nausea and vomiting.   Genitourinary: Negative for dysuria.   Musculoskeletal: Negative for back pain.   Skin: Negative for rash.   Neurological: Negative for dizziness, weakness, light-headedness, numbness and headaches.   Hematological: Does not bruise/bleed easily.       Physical Exam    Initial Vitals   BP Pulse Resp Temp SpO2   02/02/17 2045 02/02/17 2045 02/02/17 2045 02/02/17 2045 02/02/17 2045   150/88 114 25 97.9 °F (36.6 °C) 98 %      Physical Exam  Nursing Notes and Vital Signs Reviewed.  Constitutional: Patient is in moderate distress. Awake and alert. Well-developed and well-nourished.  Head: Atraumatic. Normocephalic.  Eyes: PERRL. EOM intact. Conjunctivae are not pale. No scleral icterus.  ENT: Mucous membranes are moist. Oropharynx is clear and symmetric.    Neck: Supple. Full ROM. No lymphadenopathy.  Cardiovascular: Tachycardic. Regular rhythm. No murmurs, rubs, or gallops. Distal pulses are 2+ and symmetric.  Pulmonary/Chest: Tachypnic. Moderate respiratory distress. Coarse breath sounds with diffuse expiatory wheezing.   Abdominal: Soft and non-distended.  There is no tenderness.  No rebound, guarding, or rigidity.  Good bowel sounds.    Musculoskeletal: Moves all extremities. No obvious deformities. No edema. No calf tenderness.  Skin: Warm. Diaphoretic  Neurological:  Alert, awake, and appropriate.  Normal speech.  No acute focal neurological deficits are appreciated.  Psychiatric: Normal affect. Good eye contact. Appropriate in content.    ED Course    Critical Care  Date/Time: 2/2/2017 10:13 PM  Performed by: MARIO BURTON.  Authorized by: MARIO BURTON   Direct patient critical care time: 13 minutes  Additional history critical care time: 8 minutes  Ordering / reviewing critical care time: 7 minutes  Documentation critical care time: 5 minutes  Other critical care time: 6  "minutes  Total critical care time (exclusive of procedural time) : 39 minutes  Critical care time was exclusive of separately billable procedures and treating other patients.  Critical care was necessary to treat or prevent imminent or life-threatening deterioration of the following conditions: respiratory failure.  Critical care was time spent personally by me on the following activities: blood draw for specimens, development of treatment plan with patient or surrogate, interpretation of cardiac output measurements, evaluation of patient's response to treatment, examination of patient, obtaining history from patient or surrogate, ordering and performing treatments and interventions, ordering and review of laboratory studies, ordering and review of radiographic studies, pulse oximetry, re-evaluation of patient's condition and review of old charts.        ED Vital Signs:  Vitals:    02/02/17 2045 02/02/17 2109 02/02/17 2110 02/02/17 2115   BP: (!) 150/88      Pulse: (!) 114 (!) 115 (!) 120 (!) 119   Resp: (!) 25 (!) 26  (!) 26   Temp: 97.9 °F (36.6 °C)      TempSrc: Oral      SpO2: 98% 95%  95%   Weight: 81.6 kg (180 lb)      Height: 5' 11" (1.803 m)       02/02/17 2203 02/02/17 2302 02/02/17 2351 02/03/17 0000   BP: 130/80 126/86 126/73 126/87   Pulse: (!) 115 (!) 114 (!) 115 (!) 115   Resp: 20 20 18 15   Temp:   98 °F (36.7 °C)    TempSrc:   Oral    SpO2: 95% 95% (!) 91% (!) 92%   Weight:   86.8 kg (191 lb 5.8 oz)    Height:        02/03/17 0011 02/03/17 0015 02/03/17 0030 02/03/17 0121   BP:  132/83 134/89    Pulse: 110 110 105 102   Resp: (!) 22 17 20 (!) 26   Temp:       TempSrc:       SpO2: (!) 92% (!) 92% (!) 93% (!) 91%   Weight:       Height:           Abnormal Lab Results:  Labs Reviewed   CBC W/ AUTO DIFFERENTIAL - Abnormal; Notable for the following:        Result Value     (*)     MCH 35.4 (*)     Eos # 1.2 (*)     Lymph% 16.6 (*)     Eosinophil% 13.1 (*)     All other components within normal " limits   CK-MB - Abnormal; Notable for the following:      (*)     CPK MB 7.4 (*)     All other components within normal limits   CK - Abnormal; Notable for the following:      (*)     All other components within normal limits   COMPREHENSIVE METABOLIC PANEL   PROTIME-INR   APTT   TROPONIN I   B-TYPE NATRIURETIC PEPTIDE   LACTIC ACID, PLASMA        All Lab Results:  Results for orders placed or performed during the hospital encounter of 02/02/17   CBC auto differential   Result Value Ref Range    WBC 8.84 3.90 - 12.70 K/uL    RBC 4.75 4.60 - 6.20 M/uL    Hemoglobin 16.8 14.0 - 18.0 g/dL    Hematocrit 47.7 40.0 - 54.0 %     (H) 82 - 98 fL    MCH 35.4 (H) 27.0 - 31.0 pg    MCHC 35.2 32.0 - 36.0 %    RDW 14.3 11.5 - 14.5 %    Platelets 198 150 - 350 K/uL    MPV 9.6 9.2 - 12.9 fL    Gran # 5.4 1.8 - 7.7 K/uL    Lymph # 1.5 1.0 - 4.8 K/uL    Mono # 0.7 0.3 - 1.0 K/uL    Eos # 1.2 (H) 0.0 - 0.5 K/uL    Baso # 0.08 0.00 - 0.20 K/uL    Gran% 61.3 38.0 - 73.0 %    Lymph% 16.6 (L) 18.0 - 48.0 %    Mono% 8.1 4.0 - 15.0 %    Eosinophil% 13.1 (H) 0.0 - 8.0 %    Basophil% 0.9 0.0 - 1.9 %    Differential Method Automated    Comprehensive metabolic panel   Result Value Ref Range    Sodium 144 136 - 145 mmol/L    Potassium 4.2 3.5 - 5.1 mmol/L    Chloride 104 95 - 110 mmol/L    CO2 25 23 - 29 mmol/L    Glucose 86 70 - 110 mg/dL    BUN, Bld 6 6 - 20 mg/dL    Creatinine 0.9 0.5 - 1.4 mg/dL    Calcium 9.8 8.7 - 10.5 mg/dL    Total Protein 7.8 6.0 - 8.4 g/dL    Albumin 4.0 3.5 - 5.2 g/dL    Total Bilirubin 0.5 0.1 - 1.0 mg/dL    Alkaline Phosphatase 74 55 - 135 U/L    AST 26 10 - 40 U/L    ALT 27 10 - 44 U/L    Anion Gap 15 8 - 16 mmol/L    eGFR if African American >60 >60 mL/min/1.73 m^2    eGFR if non African American >60 >60 mL/min/1.73 m^2   Protime-INR   Result Value Ref Range    Prothrombin Time 9.9 9.0 - 12.5 sec    INR 1.0 0.8 - 1.2   APTT   Result Value Ref Range    aPTT 25.9 21.0 - 32.0 sec   Troponin I    Result Value Ref Range    Troponin I 0.023 0.000 - 0.026 ng/mL   Brain natriuretic peptide   Result Value Ref Range    BNP 35 0 - 99 pg/mL   CK-MB   Result Value Ref Range     (H) 20 - 200 U/L    CPK MB 7.4 (H) 0.1 - 6.5 ng/mL    MB% 3.4 0.0 - 5.0 %   CK   Result Value Ref Range     (H) 20 - 200 U/L   Lactic acid, plasma   Result Value Ref Range    Lactate (Lactic Acid) 1.5 0.5 - 2.2 mmol/L         Imaging Results:  Imaging Results         X-Ray Chest 1 View (Final result) Result time:  02/02/17 22:06:18    Final result by Katelynn Paredes MD (02/02/17 22:06:18)    Impression:     No acute cardiopulmonary disease.            Electronically signed by: KATELYNN PAREDES MD  Date:     02/02/17  Time:    22:06     Narrative:    EXAM:   [id number] CHEST 1 VIEW    CLINICAL HISTORY:  sob    COMPARISON: The December 2016    Findings:     The lungs are clear. The cardiac silhouette is within normal limits.  Bones are stable.             The EKG was ordered, reviewed, and independently interpreted by the ED provider.  Interpretation time: 20:53  Rate: 115 BPM  Rhythm: sinus tachycardia  Interpretation: Normal ECG. No STEMI.           The Emergency Provider reviewed the vital signs and test results, which are outlined above.    ED Discussion     9:26 PM: Re-evaluated pt. Pt is tolerating BiPAP. Respirations are decreasing. Work with breathing is decreasing. D/w pt all pertinent results. D/w pt any concerns expressed at this time. Answered all questions. Pt expresses understanding at this time.    10:11 PM: Discussed case with Dr. Keita ([facility]). Dr. Keita agrees with current care and management of pt and accepts admission.   Admitting Service: Hospital medicine   Admitting Physician: Dr. Keita  Admit to: ICU    10:12 PM: Re-evaluated pt. I have discussed test results, shared treatment plan, and the need for admission with patient and family at bedside. Pt and family express understanding  at this time and agree with all information. All questions answered. Pt and family have no further questions or concerns at this time. Pt is ready for admit.          ED Medication(s):  Medications   methylPREDNISolone sod suc(PF) 125 mg/2 mL injection 125 mg (not administered)   folic acid tablet 1,000 mcg (not administered)   guaifenesin 100 mg/5 ml syrup 200 mg (not administered)   aluminum-magnesium hydroxide-simethicone 200-200-20 mg/5 mL suspension 30 mL (not administered)   enoxaparin injection 40 mg (not administered)   diphenhydrAMINE capsule 25 mg (not administered)   acetaminophen tablet 650 mg (not administered)   hydrALAZINE injection 10 mg (not administered)   albuterol-ipratropium 2.5mg-0.5mg/3mL nebulizer solution 3 mL (3 mLs Nebulization Given 2/3/17 0121)   ondansetron injection 4 mg (not administered)   roflumilast tablet 500 mcg (not administered)   lorazepam tablet 0.5 mg (not administered)   moxifloxacin 400 mg/250 mL IVPB 400 mg (400 mg Intravenous New Bag 2/3/17 0002)   albuterol-ipratropium 2.5mg-0.5mg/3mL nebulizer solution 3 mL (3 mLs Nebulization Given 2/2/17 2115)       Current Discharge Medication List                Medical Decision Making    Medical Decision Making:   Clinical Tests:   Lab Tests: Ordered and Reviewed  Radiological Study: Ordered and Reviewed  Medical Tests: Ordered and Reviewed  Other:   I have discussed this case with another health care provider.           Scribe Attestation:   Scribe #1: I performed the above scribed service and the documentation accurately describes the services I performed. I attest to the accuracy of the note.    Attending:   Physician Attestation Statement for Scribe #1: I, Rey Genao MD, personally performed the services described in this documentation, as scribed by Benigno Ceron, in my presence, and it is both accurate and complete.          Clinical Impression       ICD-10-CM ICD-9-CM   1. COPD with acute exacerbation J44.1 491.21   2.  Acute respiratory failure with hypoxia and hypercapnia J96.01 518.81    J96.02    3. COPD, very severe J44.9 496       Disposition:   Disposition: Admitted  Condition: Serious         Rey Genao MD  02/03/17 0135

## 2017-02-03 NOTE — ED NOTES
"Pt resting in bed with eyes closed. States he is "feeling better." Resp e/u. Pulse ox 93% on NC 2L. Tolerating well. Will cont to monitor.    "

## 2017-02-03 NOTE — PLAN OF CARE
Problem: Patient Care Overview  Goal: Plan of Care Review  Outcome: Ongoing (interventions implemented as appropriate)  Patient on 3lnc with a spo2 of 94%. Patient pulled A-line out while sitting in chair. Patient tolerated neb tx well, but seemed a little confused. Will follow.

## 2017-02-03 NOTE — PLAN OF CARE
Problem: Patient Care Overview  Goal: Plan of Care Review  Outcome: Ongoing (interventions implemented as appropriate)  Patient remains on 3lnc with a spo2 of 95%. Patient tolerated bipap during the night with no complaints. Removed and obtained ABG. Will follow.

## 2017-02-03 NOTE — SUBJECTIVE & OBJECTIVE
Past Medical History   Diagnosis Date    COPD (chronic obstructive pulmonary disease)        History reviewed. No pertinent past surgical history.    Review of patient's allergies indicates:  No Known Allergies    No current facility-administered medications on file prior to encounter.      Current Outpatient Prescriptions on File Prior to Encounter   Medication Sig    albuterol (ACCUNEB) 0.63 mg/3 mL Nebu Take 3 mLs (0.63 mg total) by nebulization 3 (three) times daily as needed.    cholecalciferol, vitamin D3, (VITAMIN D3) 400 unit Chew Take by mouth.    folic acid (FOLVITE) 1 MG tablet Take 1,000 mcg by mouth once daily.    lorazepam (ATIVAN) 0.5 MG tablet TAKE ONE TABLET BY MOUTH EVERY SIX HOURS.DO NOT TAKE WITH ALCOHOL.    predniSONE (DELTASONE) 10 MG tablet Please take: 40 mg daily x 5 days; 30 mg daily x 5 days; 20 mg daily x 5 days; 10 mg daily x 5 days; then 5 mg daily x 5 days    PROAIR HFA 90 mcg/actuation inhaler INHALE ONCE EVERY 4 HOURS AS NEEDED FOR WHEEZING    roflumilast 500 mcg Tab Take 1 tablet (500 mcg total) by mouth once daily.    umeclidinium-vilanterol (ANORO ELLIPTA) 62.5-25 mcg/actuation DsDv Inhale 1 puff into the lungs once daily.     Family History     Problem Relation (Age of Onset)    Cancer Mother, Father    Heart failure Mother, Father        Social History Main Topics    Smoking status: Current Every Day Smoker     Packs/day: 0.10     Years: 35.00    Smokeless tobacco: Not on file    Alcohol use 1.2 oz/week     2 Cans of beer per week    Drug use: No    Sexual activity: Not on file     Review of Systems   Constitutional: Negative.  Negative for appetite change, fatigue and fever.   HENT: Negative.  Negative for congestion, nosebleeds and sore throat.    Eyes: Negative.  Negative for photophobia, redness and visual disturbance.   Respiratory: Positive for shortness of breath and wheezing. Negative for cough.    Cardiovascular: Negative.  Negative for chest pain,  palpitations and leg swelling.   Gastrointestinal: Negative.  Negative for abdominal pain, constipation, diarrhea, nausea and vomiting.   Endocrine: Negative.  Negative for polydipsia, polyphagia and polyuria.   Genitourinary: Negative.  Negative for dysuria, flank pain, frequency and urgency.   Musculoskeletal: Negative.  Negative for arthralgias, back pain and joint swelling.   Skin: Negative.  Negative for color change, pallor and rash.   Allergic/Immunologic: Negative.  Negative for environmental allergies, food allergies and immunocompromised state.   Neurological: Negative.  Negative for dizziness, syncope, weakness, light-headedness, numbness and headaches.   Hematological: Negative.    Psychiatric/Behavioral: Negative for confusion and hallucinations. The patient is nervous/anxious.    All other systems reviewed and are negative.    Objective:     Vital Signs (Most Recent):  Temp: 97.9 °F (36.6 °C) (02/02/17 2045)  Pulse: (!) 114 (02/02/17 2302)  Resp: 20 (02/02/17 2302)  BP: 126/86 (02/02/17 2302)  SpO2: 95 % (02/02/17 2302) Vital Signs (24h Range):  Temp:  [97.9 °F (36.6 °C)] 97.9 °F (36.6 °C)  Pulse:  [114-120] 114  Resp:  [20-26] 20  SpO2:  [95 %-98 %] 95 %  BP: (126-150)/(80-88) 126/86     Weight: 81.6 kg (180 lb)  Body mass index is 25.1 kg/(m^2).    Physical Exam   Constitutional: He is oriented to person, place, and time. He appears well-developed and well-nourished. He appears distressed.   HENT:   Head: Normocephalic and atraumatic.   Eyes: Conjunctivae and EOM are normal. Pupils are equal, round, and reactive to light. No scleral icterus.   Neck: Normal range of motion. Neck supple. No thyromegaly present.   Cardiovascular: Normal rate, regular rhythm and normal heart sounds.    No murmur heard.  Pulmonary/Chest: He is in respiratory distress. He has wheezes. He exhibits no tenderness.   Abdominal: Soft. Bowel sounds are normal. There is no tenderness.   Musculoskeletal: Normal range of motion. He  exhibits no edema or tenderness.   Lymphadenopathy:     He has no cervical adenopathy.   Neurological: He is alert and oriented to person, place, and time. No cranial nerve deficit. He exhibits normal muscle tone. Coordination normal.   Skin: Skin is warm and dry. He is not diaphoretic.   Psychiatric: His behavior is normal. Thought content normal. His mood appears anxious.   Nursing note and vitals reviewed.       Significant Labs:   ABGs: No results for input(s): PH, PCO2, HCO3, POCSATURATED, BE in the last 48 hours.  BMP:   Recent Labs  Lab 02/02/17 2109   GLU 86      K 4.2      CO2 25   BUN 6   CREATININE 0.9   CALCIUM 9.8     CBC:   Recent Labs  Lab 02/02/17 2109   WBC 8.84   HGB 16.8   HCT 47.7        CMP:   Recent Labs  Lab 02/02/17 2109      K 4.2      CO2 25   GLU 86   BUN 6   CREATININE 0.9   CALCIUM 9.8   PROT 7.8   ALBUMIN 4.0   BILITOT 0.5   ALKPHOS 74   AST 26   ALT 27   ANIONGAP 15   EGFRNONAA >60     Cardiac Markers:   Recent Labs  Lab 02/02/17 2109   BNP 35     Troponin:   Recent Labs  Lab 02/02/17 2109   TROPONINI 0.023     Urine Studies: No results for input(s): COLORU, APPEARANCEUA, PHUR, SPECGRAV, PROTEINUA, GLUCUA, KETONESU, BILIRUBINUA, OCCULTUA, NITRITE, UROBILINOGEN, LEUKOCYTESUR, RBCUA, WBCUA, BACTERIA, SQUAMEPITHEL, HYALINECASTS in the last 48 hours.    Invalid input(s): WRIGHTSUR  All pertinent labs within the past 24 hours have been reviewed.    Significant Imaging:   Imaging Results         X-Ray Chest 1 View (Final result) Result time:  02/02/17 22:06:18    Final result by Katelynn Paredes MD (02/02/17 22:06:18)    Impression:     No acute cardiopulmonary disease.            Electronically signed by: KATELYNN PAREDES MD  Date:     02/02/17  Time:    22:06     Narrative:    EXAM:   [id number] CHEST 1 VIEW    CLINICAL HISTORY:  sob    COMPARISON: The December 2016    Findings:     The lungs are clear. The cardiac silhouette is within normal  limits.  Bones are stable.

## 2017-02-03 NOTE — PROGRESS NOTES
Mr. Perales is a patient of Dr. Rodriguez in the clinic. He has been seen in the [facility] clinic. He presented to the ER with worsening SOB, wheezing and a productive cough of white sputum. Patient states he is compliant with using his CPAP, oxygen at 3LPM, and breathing medications. He is resting well in bed on 3LPM with a 88% saturation. He still has exp wheezing  Bilateral. He will be started on Pulmicort and Arformoterol nebs today. He will be transferred to tele today.

## 2017-02-03 NOTE — PLAN OF CARE
Discharge plan to home with family discussed with patient. Denies post hospital service needs at this time.  No post hospital needs identified at this time     02/03/17 1512   Discharge Assessment   Assessment Type Discharge Planning Assessment   Confirmed/corrected address and phone number on facesheet? Yes   Assessment information obtained from? Patient   Expected Length of Stay (days) 4   Communicated expected length of stay with patient/caregiver yes   Type of Healthcare Directive Received (none)   If Healthcare Directive is received, is it scanned into Epic? no (comment)   Prior to hospitilization cognitive status: Alert/Oriented   Prior to hospitalization functional status: Independent   Current cognitive status: Alert/Oriented   Current Functional Status: Independent   Arrived From admitted as an inpatient   Lives With sibling(s)   Able to Return to Prior Arrangements yes   Is patient able to care for self after discharge? Yes   How many people do you have in your home that can help with your care after discharge? 2   Patient's perception of discharge disposition admitted as an inpatient;home or selfcare   Readmission Within The Last 30 Days no previous admission in last 30 days   Patient currently being followed by outpatient case management? No   Patient currently receives home health services? No   Equipment Currently Used at Home CPAP;oxygen   Do you have any problems affording any of your prescribed medications? No   Is the patient taking medications as prescribed? yes   Do you have any financial concerns preventing you from receiving the healthcare you need? No   Does the patient have transportation to healthcare appointments? Yes   On Dialysis? No   Does the patient receive services at the Coumadin Clinic? No   Are there any open cases? No   Discharge Plan A Home with family   Discharge Plan B Home with family   Patient/Family In Agreement With Plan yes   .

## 2017-02-03 NOTE — CONSULTS
[facility] -   Critical Care Medicine  Consult Note    Patient Name: Johnny Perales  MRN: [id number]  Admission Date: 2/2/2017  Hospital Length of Stay: 1 days  Code Status: Full Code  Attending Physician: Chalnio Keita MD   Primary Care Provider: Joni Rye MD   Principal Problem: COPD with acute exacerbation    Consults  Subjective: SOB     HPI: Mr. Perales is a 60 y/o  male with h/o severe COPD, chronic hypoxic and hypercapnic respiratory failure, home oxygen and CPAP dependant, continued tobacco user, presents to the ED c/o worsening SOB associated with profuse wheezing after failing outpatient therapy.    Hospital/ICU Course: Improved overnight     Past Medical History   Diagnosis Date    COPD (chronic obstructive pulmonary disease)        History reviewed. No pertinent past surgical history.    Review of patient's allergies indicates:  No Known Allergies    Family History     Problem Relation (Age of Onset)    Cancer Mother, Father    Heart failure Mother, Father          Social History Main Topics    Smoking status: Current Every Day Smoker     Packs/day: 0.10     Years: 35.00    Smokeless tobacco: Not on file    Alcohol use 1.2 oz/week     2 Cans of beer per week    Drug use: No    Sexual activity: Not on file        Review of Systems   Constitutional: Positive for fatigue.   HENT: Positive for hearing loss and postnasal drip.    Respiratory: Positive for cough, chest tightness, shortness of breath and wheezing.    Cardiovascular: Positive for palpitations.     Objective:     Vital Signs (Most Recent):  Temp: 98.2 °F (36.8 °C) (02/03/17 1105)  Pulse: 98 (02/03/17 1317)  Resp: 15 (02/03/17 1317)  BP: 125/80 (02/03/17 1300)  SpO2: (!) 91 % (02/03/17 1317) Vital Signs (24h Range):  Temp:  [97.9 °F (36.6 °C)-98.3 °F (36.8 °C)] 98.2 °F (36.8 °C)  Pulse:  [] 98  Resp:  [15-26] 15  SpO2:  [87 %-98 %] 91 %  BP: (118-150)/(73-96) 125/80     Weight: 87.5 kg (192 lb 14.4  oz)  Body mass index is 26.9 kg/(m^2).      Intake/Output Summary (Last 24 hours) at 02/03/17 1417  Last data filed at 02/03/17 1100   Gross per 24 hour   Intake              250 ml   Output              750 ml   Net             -500 ml       Physical Exam   Constitutional: He is oriented to person, place, and time. He appears well-developed and well-nourished.   HENT:   Head: Normocephalic and atraumatic.   Mouth/Throat: Oropharyngeal exudate present.   Eyes: Conjunctivae are normal. Pupils are equal, round, and reactive to light.   Neck: Neck supple. No JVD present. No tracheal deviation present. No thyromegaly present.   Cardiovascular: Normal rate, regular rhythm and normal heart sounds.    No murmur heard.  Pulmonary/Chest: Effort normal. He has decreased breath sounds. He has wheezes in the right lower field and the left lower field. He has no rhonchi. He has no rales.   Abdominal: Soft. Bowel sounds are normal.   Musculoskeletal: Normal range of motion. He exhibits no edema or tenderness.   Lymphadenopathy:     He has no cervical adenopathy.   Neurological: He is alert and oriented to person, place, and time.   Skin: Skin is warm and dry.   Nursing note and vitals reviewed.      Vents:  Oxygen Concentration (%): 30 (02/03/17 0300)    Lines/Drains/Airways     Peripheral Intravenous Line                 Peripheral IV - Single Lumen 02/02/17 Left Antecubital 1 day         Peripheral IV - Single Lumen 02/02/17 2111 Right Antecubital less than 1 day                Significant Labs:    CBC/Anemia Profile:    Recent Labs  Lab 02/02/17 2109 02/03/17  0403   WBC 8.84 5.93   HGB 16.8 15.5   HCT 47.7 45.6    197   * 101*   RDW 14.3 14.3        Chemistries:    Recent Labs  Lab 02/02/17 2109 02/03/17  0403    140   K 4.2 5.0    103   CO2 25 28   BUN 6 8   CREATININE 0.9 0.9   CALCIUM 9.8 9.8   ALBUMIN 4.0 3.7   PROT 7.8 7.2   BILITOT 0.5 0.6   ALKPHOS 74 62   ALT 27 25   AST 26 22   MG  --  1.9    PHOS  --  2.7       ABGs:   Recent Labs  Lab 02/03/17  0518   PH 7.381   PCO2 49.6*   HCO3 29.4*   POCSATURATED 91*   BE 4     BMP:   Recent Labs  Lab 02/03/17  0403   *      K 5.0      CO2 28   BUN 8   CREATININE 0.9   CALCIUM 9.8   MG 1.9     CMP:   Recent Labs  Lab 02/02/17  2109 02/03/17  0403    140   K 4.2 5.0    103   CO2 25 28   GLU 86 148*   BUN 6 8   CREATININE 0.9 0.9   CALCIUM 9.8 9.8   PROT 7.8 7.2   ALBUMIN 4.0 3.7   BILITOT 0.5 0.6   ALKPHOS 74 62   AST 26 22   ALT 27 25   ANIONGAP 15 9   EGFRNONAA >60 >60       Significant Imaging: CXR: I have reviewed all pertinent results/findings within the past 24 hours and my personal findings are:  HYP    Assessment/Plan:     Active Diagnoses:    Diagnosis Date Noted POA    PRINCIPAL PROBLEM:  COPD with acute exacerbation [J44.1] 02/02/2017 Yes    Tobacco abuse [Z72.0] 02/02/2017 Yes    Acute on chronic respiratory failure with hypoxia and hypercapnia [J96.21, J96.22] 10/30/2016 Yes      Problems Resolved During this Admission:    Diagnosis Date Noted Date Resolved POA          Critical Care Medicine Daily Checklist:    A: Awake: RASS Goal/Actual Goal:    Actual:     B: Spontaneous Breathing Trial Performed?     C: SAT & SBT Coordinated?  na                      D: Delirium: CAM-ICU     E: Early Mobility Performed? Yes   F: Feeding Goal:    Status:     Current Diet Order   Procedures    Diet Adult Regular      AS: Analgesia/Sedation adeauate   T: Thromboembolic Prophylaxis Y   H: HOB > 300 Yes   U: Stress Ulcer Prophylaxis (if needed) Y   G: Glucose Control fair   B: Bowel Function Stool Occurrence: 0   I: Indwelling Catheter (Lines & Watts) Necessity needed   D: De-escalation of Antimicrobials/Pharmacotherapies na    Plan for the day/ETD contineu Noninvasive Positive Pressure Ventilation and jet nebs    Code Status:  Family/Goals of Care: Full Code  discussed     Critical Care Time: 40 min  Critical secondary to Patient has  a condition that poses threat to life and bodily function: Severe Respiratory Distress      Critical care was time spent personally by me on the following activities: development of treatment plan with patient or surrogate and bedside caregivers, discussions with consultants, evaluation of patient's response to treatment, examination of patient, ordering and performing treatments and interventions, ordering and review of laboratory studies, ordering and review of radiographic studies, pulse oximetry, re-evaluation of patient's condition.  This critical care time did not overlap with that of any other provider or involve time for any procedures.    Thank you for your consult. I will follow-up with patient. Please contact us if you have any additional questions.     Arvind Aleman MD  Critical Care Medicine  [facility]

## 2017-02-04 VITALS
HEIGHT: 71 IN | WEIGHT: 192.88 LBS | TEMPERATURE: 98 F | SYSTOLIC BLOOD PRESSURE: 118 MMHG | HEART RATE: 90 BPM | RESPIRATION RATE: 18 BRPM | DIASTOLIC BLOOD PRESSURE: 74 MMHG | BODY MASS INDEX: 27 KG/M2 | OXYGEN SATURATION: 93 %

## 2017-02-04 LAB
ALBUMIN SERPL BCP-MCNC: 3.6 G/DL
ANION GAP SERPL CALC-SCNC: 9 MMOL/L
BASOPHILS # BLD AUTO: 0.01 K/UL
BASOPHILS NFR BLD: 0.1 %
BUN SERPL-MCNC: 18 MG/DL
CALCIUM SERPL-MCNC: 9.9 MG/DL
CHLORIDE SERPL-SCNC: 103 MMOL/L
CO2 SERPL-SCNC: 28 MMOL/L
CREAT SERPL-MCNC: 1 MG/DL
DIFFERENTIAL METHOD: ABNORMAL
EOSINOPHIL # BLD AUTO: 0 K/UL
EOSINOPHIL NFR BLD: 0 %
ERYTHROCYTE [DISTWIDTH] IN BLOOD BY AUTOMATED COUNT: 14.1 %
EST. GFR  (AFRICAN AMERICAN): >60 ML/MIN/1.73 M^2
EST. GFR  (NON AFRICAN AMERICAN): >60 ML/MIN/1.73 M^2
GLUCOSE SERPL-MCNC: 118 MG/DL
HCT VFR BLD AUTO: 48 %
HGB BLD-MCNC: 16.4 G/DL
LYMPHOCYTES # BLD AUTO: 0.6 K/UL
LYMPHOCYTES NFR BLD: 3.4 %
MCH RBC QN AUTO: 34.5 PG
MCHC RBC AUTO-ENTMCNC: 34.2 %
MCV RBC AUTO: 101 FL
MONOCYTES # BLD AUTO: 0.6 K/UL
MONOCYTES NFR BLD: 3.4 %
NEUTROPHILS # BLD AUTO: 16.2 K/UL
NEUTROPHILS NFR BLD: 93.1 %
PHOSPHATE SERPL-MCNC: 2.8 MG/DL
PLATELET # BLD AUTO: 231 K/UL
PMV BLD AUTO: 10.3 FL
POTASSIUM SERPL-SCNC: 4.6 MMOL/L
RBC # BLD AUTO: 4.76 M/UL
SODIUM SERPL-SCNC: 140 MMOL/L
WBC # BLD AUTO: 17.42 K/UL

## 2017-02-04 PROCEDURE — 94640 AIRWAY INHALATION TREATMENT: CPT

## 2017-02-04 PROCEDURE — 25000003 PHARM REV CODE 250: Performed by: NURSE PRACTITIONER

## 2017-02-04 PROCEDURE — 80069 RENAL FUNCTION PANEL: CPT

## 2017-02-04 PROCEDURE — 63600175 PHARM REV CODE 636 W HCPCS: Performed by: NURSE PRACTITIONER

## 2017-02-04 PROCEDURE — 25000003 PHARM REV CODE 250: Performed by: INTERNAL MEDICINE

## 2017-02-04 PROCEDURE — 25500020 PHARM REV CODE 255: Performed by: INTERNAL MEDICINE

## 2017-02-04 PROCEDURE — 27000221 HC OXYGEN, UP TO 24 HOURS

## 2017-02-04 PROCEDURE — 25000242 PHARM REV CODE 250 ALT 637 W/ HCPCS: Performed by: INTERNAL MEDICINE

## 2017-02-04 PROCEDURE — 36415 COLL VENOUS BLD VENIPUNCTURE: CPT

## 2017-02-04 PROCEDURE — 63600175 PHARM REV CODE 636 W HCPCS: Performed by: INTERNAL MEDICINE

## 2017-02-04 PROCEDURE — 85025 COMPLETE CBC W/AUTO DIFF WBC: CPT

## 2017-02-04 RX ORDER — LEVOFLOXACIN 500 MG/1
500 TABLET, FILM COATED ORAL DAILY
Qty: 10 TABLET | Refills: 0 | Status: SHIPPED | OUTPATIENT
Start: 2017-02-04 | End: 2017-02-14

## 2017-02-04 RX ORDER — PREDNISONE 10 MG/1
TABLET ORAL
Qty: 53 TABLET | Refills: 0 | Status: SHIPPED | OUTPATIENT
Start: 2017-02-04 | End: 2017-02-17 | Stop reason: ALTCHOICE

## 2017-02-04 RX ADMIN — ROFLUMILAST 500 MCG: 500 TABLET ORAL at 09:02

## 2017-02-04 RX ADMIN — METHYLPREDNISOLONE SODIUM SUCCINATE 125 MG: 125 INJECTION, POWDER, FOR SOLUTION INTRAMUSCULAR; INTRAVENOUS at 05:02

## 2017-02-04 RX ADMIN — FOLIC ACID 1000 MCG: 1 TABLET ORAL at 09:02

## 2017-02-04 RX ADMIN — IPRATROPIUM BROMIDE AND ALBUTEROL SULFATE 3 ML: .5; 3 SOLUTION RESPIRATORY (INHALATION) at 12:02

## 2017-02-04 RX ADMIN — IOHEXOL 100 ML: 350 INJECTION, SOLUTION INTRAVENOUS at 01:02

## 2017-02-04 RX ADMIN — BUDESONIDE 0.5 MG: 0.5 INHALANT RESPIRATORY (INHALATION) at 07:02

## 2017-02-04 RX ADMIN — ENOXAPARIN SODIUM 40 MG: 100 INJECTION SUBCUTANEOUS at 12:02

## 2017-02-04 RX ADMIN — ARFORMOTEROL TARTRATE 15 MCG: 15 SOLUTION RESPIRATORY (INHALATION) at 07:02

## 2017-02-04 RX ADMIN — IPRATROPIUM BROMIDE AND ALBUTEROL SULFATE 3 ML: .5; 3 SOLUTION RESPIRATORY (INHALATION) at 07:02

## 2017-02-04 RX ADMIN — METHYLPREDNISOLONE SODIUM SUCCINATE 125 MG: 125 INJECTION, POWDER, FOR SOLUTION INTRAMUSCULAR; INTRAVENOUS at 02:02

## 2017-02-04 RX ADMIN — FAMOTIDINE 20 MG: 20 TABLET ORAL at 09:02

## 2017-02-04 NOTE — PLAN OF CARE
Problem: Patient Care Overview  Goal: Plan of Care Review  Outcome: Ongoing (interventions implemented as appropriate)  Patient in bed with eyes opened. Awake and alert. Able to verbally express. No complaints of pain or discomfort at this time. Able to ambulate with assist. Safety maintained. Will continue to monitor.

## 2017-02-04 NOTE — ASSESSMENT & PLAN NOTE
- IV steroids with nebulizer treatments  - Weaned off rescue Bipap.  Should be able to transfer to the floor.  - IV Avelox empirically

## 2017-02-04 NOTE — PROGRESS NOTES
Patient discharge instructions given. Patient verbalized understanding. Patient notified concerning follow up appts, verbalized understanding for proper scheduling. Educated on importance of follow up appts. IV removed, cath tip remains intact. Tele monitor removed and returned. Patient discharged to home.

## 2017-02-04 NOTE — DISCHARGE SUMMARY
[facility] Hospital Medicine  Discharge Summary      Patient Name: Johnny Perales  MRN: [id number]  Admission Date: 2/2/2017  Hospital Length of Stay: 2 days  Discharge Date and Time: 2/4/2017 3:01 PM  Attending Physician: Nereida Quach MD   Discharging Provider: Nereida Quach MD  Primary Care Provider: Joni Rey MD      HPI:   Mr. Perales is a 60 y/o  male with h/o severe COPD, chronic hypoxic and hypercapnic respiratory failure, home oxygen and CPAP dependant, continued tobacco user, presents to the ED c/o worsening SOB associated with profuse wheezing since earlier this morning. Multiple rounds of nebulizer therapy therapy at home did not improve his symptoms. He reports compliancy with CPAP at home. He was placed on BiPap in the ED, and given solumedrol 125 mg IV x1 and feels better. Still has profuse wheezing.    * No surgery found *      Indwelling Lines/Drains at time of discharge:   Lines/Drains/Airways          No matching active lines, drains, or airways        Hospital Course:   Patient was admitted for acute on chronic respiratory failure with hypoxia. He received IV moxifloxacin, IV solumedrol, supplemental oxygen and breathing treatments. CTA showed no evidence of PE (has stable 5mm subpleural nodule); Patient states he is mostly compliant with 3L NC at home. He also uses CPAP. He quit smoking 4 months prior (smoked 2ppd). Encouraged compliance with smoking cessation, oxygen/cpap use.   Patient has outpatient PFT scheduled for 2/17 at 1pm and outpatient follow up with Dr. Rodriguez (Pulmonology) on 2/17 at 1:40pm.   Prescribed long taper of prednisone and levaquin PO for 10 days.     This patient was seen and examined on the date of discharge and determined to be suitable for discharge.      Consults: Pulmonology     Significant Diagnostic Studies:   CXR:  The lungs are clear. The cardiac silhouette is within normal limits.  Bones are stable.    CTA chest:  There is no  evidence of pulmonary embolus. Pulmonary artery caliber is normal. The heart, great vessels, and mediastinal structures are within normal limits. No thoracic adenopathy.  Stable 5 mm subpleural pulmonary nodule in the right lower lobe on axial image 84.  Calcified granulomata in the right upper lobe.  Mild bronchial wall thickening in the bilateral lower lobes. No pleural effusions.  Emphysema.  The upper abdominal visualized organs are within normal limits.  The bones are intact.    Pending Diagnostic Studies:     None        Final Active Diagnoses:    Diagnosis Date Noted POA    PRINCIPAL PROBLEM:  COPD with acute exacerbation [J44.1] 02/02/2017 Yes    Tobacco abuse [Z72.0] 02/02/2017 Yes    Acute on chronic respiratory failure with hypoxia and hypercapnia [J96.21, J96.22] 10/30/2016 Yes      Problems Resolved During this Admission:    Diagnosis Date Noted Date Resolved POA          Discharged Condition: stable    Disposition: Home or Self Care; patient declined  services     Follow Up:  Follow-up Information     Follow up with Joni Rey MD In 2 days.    Specialty:  Family Medicine    Contact information:    [address] [facility] [address] [phone]          Follow up with Prabhakar Rodriguez MD.    Specialty:  Pulmonary Disease    Why:  as scheduled on 2/17/17 at 1:40pm    Contact information:    [address]  [place] [address] [phone]          Patient Instructions:     Diet general     Activity as tolerated     Call MD for:  difficulty breathing or increased cough       Medications:  Reconciled Home Medications:   Current Discharge Medication List      START taking these medications    Details   levoFLOXacin (LEVAQUIN) 500 MG tablet Take 1 tablet (500 mg total) by mouth once daily.  Qty: 10 tablet, Refills: 0         CONTINUE these medications which have CHANGED    Details   predniSONE (DELTASONE) 10 MG tablet Please take: 40 mg daily x 5 days; 30 mg daily x 5 days; 20 mg daily x 5  days; 10 mg daily x 5 days; then 5 mg daily x 5 days  Qty: 53 tablet, Refills: 0         CONTINUE these medications which have NOT CHANGED    Details   albuterol (ACCUNEB) 0.63 mg/3 mL Nebu Take 3 mLs (0.63 mg total) by nebulization 3 (three) times daily as needed.  Qty: 90 vial, Refills: 11    Associated Diagnoses: COPD, very severe      cholecalciferol, vitamin D3, (VITAMIN D3) 400 unit Chew Take by mouth.      folic acid (FOLVITE) 1 MG tablet Take 1,000 mcg by mouth once daily.  Refills: 0      lorazepam (ATIVAN) 0.5 MG tablet TAKE ONE TABLET BY MOUTH EVERY SIX HOURS.DO NOT TAKE WITH ALCOHOL.  Refills: 0      PROAIR HFA 90 mcg/actuation inhaler INHALE ONCE EVERY 4 HOURS AS NEEDED FOR WHEEZING  Qty: 18 g, Refills: 11      roflumilast 500 mcg Tab Take 1 tablet (500 mcg total) by mouth once daily.  Qty: 30 tablet, Refills: 0      umeclidinium-vilanterol (ANORO ELLIPTA) 62.5-25 mcg/actuation DsDv Inhale 1 puff into the lungs once daily.  Qty: 30 each, Refills: 11    Associated Diagnoses: COPD, very severe           Time spent on the discharge of patient: 30 minutes    Nereida Quach MD  Department of Hospital Medicine  [facility]

## 2017-02-04 NOTE — PROGRESS NOTES
[facility] Hospital Medicine  Progress Note    Patient Name: Johnny Perales  MRN: [id number]  Patient Class: IP- Inpatient   Admission Date: 2/2/2017  Length of Stay: 1 days  Attending Physician: Chalino Keita MD  Primary Care Provider: Joni Rey MD        Subjective:     Principal Problem:COPD with acute exacerbation    HPI:  Mr. Perales is a 60 y/o  male with h/o severe COPD, chronic hypoxic and hypercapnic respiratory failure, home oxygen and CPAP dependant, continued tobacco user, presents to the ED c/o worsening SOB associated with profuse wheezing since earlier this morning. Multiple rounds of nebulizer therapy therapy at home did not improve his symptoms. He reports compliancy with CPAP at home. He was placed on BiPap in the ED, and given solumedrol 125 mg IV x1 and feels better. Still has profuse wheezing.    Hospital Course:       Interval History:  Improved since admission last night.  Continues to have intermittent cough and shortness of breath.    Review of Systems   Constitutional: Negative.  Negative for chills and fever.   HENT: Negative.  Negative for congestion and sore throat.    Eyes: Negative.  Negative for visual disturbance.   Respiratory: Positive for cough, shortness of breath and wheezing.    Cardiovascular: Negative.  Negative for chest pain.   Gastrointestinal: Negative for abdominal pain, diarrhea, nausea and vomiting.   Endocrine: Negative.    Genitourinary: Negative.    Musculoskeletal: Negative.  Negative for myalgias and neck stiffness.   Skin: Negative.  Negative for color change and pallor.   Allergic/Immunologic: Negative.    Neurological: Negative.    Hematological: Negative.    Psychiatric/Behavioral: Negative.    All other systems reviewed and are negative.    Objective:     Vital Signs (Most Recent):  Temp: 98.3 °F (36.8 °C) (02/03/17 1505)  Pulse: 97 (02/03/17 1505)  Resp: 20 (02/03/17 1505)  BP: 130/76 (02/03/17 1505)  SpO2: (!) 90 %  (02/03/17 1505) Vital Signs (24h Range):  Temp:  [97.9 °F (36.6 °C)-98.3 °F (36.8 °C)] 98.3 °F (36.8 °C)  Pulse:  [] 97  Resp:  [15-26] 20  SpO2:  [87 %-98 %] 90 %  BP: (118-150)/(73-96) 130/76     Weight: 87.5 kg (192 lb 14.4 oz)  Body mass index is 26.9 kg/(m^2).    Intake/Output Summary (Last 24 hours) at 02/03/17 1818  Last data filed at 02/03/17 1800   Gross per 24 hour   Intake              490 ml   Output             1500 ml   Net            -1010 ml      Physical Exam   Constitutional: He is oriented to person, place, and time. He appears well-developed and well-nourished. No distress.   HENT:   Head: Normocephalic and atraumatic.   Mouth/Throat: Oropharynx is clear and moist.   Eyes: Conjunctivae and EOM are normal. Pupils are equal, round, and reactive to light.   Neck: No JVD present. No thyromegaly present.   Cardiovascular: Normal rate, regular rhythm and normal heart sounds.  Exam reveals no gallop and no friction rub.    No murmur heard.  Pulmonary/Chest: Effort normal. No respiratory distress. He has wheezes. He has no rales.   Reduced breath sounds with wheezing bilaterally.   Abdominal: Soft. Bowel sounds are normal. He exhibits no distension. There is no tenderness. There is no rebound and no guarding.   Musculoskeletal: Normal range of motion. He exhibits no edema or tenderness.   Lymphadenopathy:     He has no cervical adenopathy.   Neurological: He is alert and oriented to person, place, and time. He has normal reflexes. He displays normal reflexes. No cranial nerve deficit.   Skin: Skin is warm and dry. No rash noted. He is not diaphoretic. No erythema.   Psychiatric: He has a normal mood and affect. His behavior is normal. Judgment and thought content normal.       Significant Labs:   CBC:   Recent Labs  Lab 02/02/17 2109 02/03/17  0403   WBC 8.84 5.93   HGB 16.8 15.5   HCT 47.7 45.6    197     CMP:   Recent Labs  Lab 02/02/17 2109 02/03/17  0403    140   K 4.2 5.0   CL  104 103   CO2 25 28   GLU 86 148*   BUN 6 8   CREATININE 0.9 0.9   CALCIUM 9.8 9.8   PROT 7.8 7.2   ALBUMIN 4.0 3.7   BILITOT 0.5 0.6   ALKPHOS 74 62   AST 26 22   ALT 27 25   ANIONGAP 15 9   EGFRNONAA >60 >60       Significant Imaging: I have reviewed all pertinent imaging results/findings within the past 24 hours.    Assessment/Plan:      * COPD with acute exacerbation  - IV steroids with nebulizer treatments  - Weaned off rescue Bipap.  Should be able to transfer to the floor.  - IV Avelox empirically    Acute on chronic respiratory failure with hypoxia and hypercapnia  - Home oxygen and CPAP dependant  - Weaned Bipap overnight.  - Consider Pulmonary consult () depending on course.    Tobacco abuse  - Nicotine patch  - Uses electronic cigarettes at home    VTE Risk Mitigation         Ordered     enoxaparin injection 40 mg  Daily     Route:  Subcutaneous        02/02/17 2348     Medium Risk of VTE  Once      02/02/17 2348     Place sequential compression device  Until discontinued      02/02/17 2348          Bret Pinto MD  Department of Hospital Medicine   [facility]     Critical care time:  30 minutes

## 2017-02-04 NOTE — ASSESSMENT & PLAN NOTE
- Home oxygen and CPAP dependant  - Weaned Bipap overnight.  - Consider Pulmonary consult () depending on course.

## 2017-02-04 NOTE — SUBJECTIVE & OBJECTIVE
Interval History:  Improved since admission last night.  Continues to have intermittent cough and shortness of breath.    Review of Systems   Constitutional: Negative.  Negative for chills and fever.   HENT: Negative.  Negative for congestion and sore throat.    Eyes: Negative.  Negative for visual disturbance.   Respiratory: Positive for cough, shortness of breath and wheezing.    Cardiovascular: Negative.  Negative for chest pain.   Gastrointestinal: Negative for abdominal pain, diarrhea, nausea and vomiting.   Endocrine: Negative.    Genitourinary: Negative.    Musculoskeletal: Negative.  Negative for myalgias and neck stiffness.   Skin: Negative.  Negative for color change and pallor.   Allergic/Immunologic: Negative.    Neurological: Negative.    Hematological: Negative.    Psychiatric/Behavioral: Negative.    All other systems reviewed and are negative.    Objective:     Vital Signs (Most Recent):  Temp: 98.3 °F (36.8 °C) (02/03/17 1505)  Pulse: 97 (02/03/17 1505)  Resp: 20 (02/03/17 1505)  BP: 130/76 (02/03/17 1505)  SpO2: (!) 90 % (02/03/17 1505) Vital Signs (24h Range):  Temp:  [97.9 °F (36.6 °C)-98.3 °F (36.8 °C)] 98.3 °F (36.8 °C)  Pulse:  [] 97  Resp:  [15-26] 20  SpO2:  [87 %-98 %] 90 %  BP: (118-150)/(73-96) 130/76     Weight: 87.5 kg (192 lb 14.4 oz)  Body mass index is 26.9 kg/(m^2).    Intake/Output Summary (Last 24 hours) at 02/03/17 1818  Last data filed at 02/03/17 1800   Gross per 24 hour   Intake              490 ml   Output             1500 ml   Net            -1010 ml      Physical Exam   Constitutional: He is oriented to person, place, and time. He appears well-developed and well-nourished. No distress.   HENT:   Head: Normocephalic and atraumatic.   Mouth/Throat: Oropharynx is clear and moist.   Eyes: Conjunctivae and EOM are normal. Pupils are equal, round, and reactive to light.   Neck: No JVD present. No thyromegaly present.   Cardiovascular: Normal rate, regular rhythm and normal  heart sounds.  Exam reveals no gallop and no friction rub.    No murmur heard.  Pulmonary/Chest: Effort normal. No respiratory distress. He has wheezes. He has no rales.   Reduced breath sounds with wheezing bilaterally.   Abdominal: Soft. Bowel sounds are normal. He exhibits no distension. There is no tenderness. There is no rebound and no guarding.   Musculoskeletal: Normal range of motion. He exhibits no edema or tenderness.   Lymphadenopathy:     He has no cervical adenopathy.   Neurological: He is alert and oriented to person, place, and time. He has normal reflexes. He displays normal reflexes. No cranial nerve deficit.   Skin: Skin is warm and dry. No rash noted. He is not diaphoretic. No erythema.   Psychiatric: He has a normal mood and affect. His behavior is normal. Judgment and thought content normal.       Significant Labs:   CBC:   Recent Labs  Lab 02/02/17 2109 02/03/17  0403   WBC 8.84 5.93   HGB 16.8 15.5   HCT 47.7 45.6    197     CMP:   Recent Labs  Lab 02/02/17 2109 02/03/17  0403    140   K 4.2 5.0    103   CO2 25 28   GLU 86 148*   BUN 6 8   CREATININE 0.9 0.9   CALCIUM 9.8 9.8   PROT 7.8 7.2   ALBUMIN 4.0 3.7   BILITOT 0.5 0.6   ALKPHOS 74 62   AST 26 22   ALT 27 25   ANIONGAP 15 9   EGFRNONAA >60 >60       Significant Imaging: I have reviewed all pertinent imaging results/findings within the past 24 hours.

## 2017-02-17 ENCOUNTER — PROCEDURE VISIT (OUTPATIENT)
Dept: PULMONOLOGY | Facility: CLINIC | Age: 62
End: 2017-02-17
Payer: MEDICARE

## 2017-02-17 ENCOUNTER — OFFICE VISIT (OUTPATIENT)
Dept: PULMONOLOGY | Facility: CLINIC | Age: 62
End: 2017-02-17
Payer: MEDICARE

## 2017-02-17 VITALS
BODY MASS INDEX: 28.42 KG/M2 | DIASTOLIC BLOOD PRESSURE: 70 MMHG | OXYGEN SATURATION: 95 % | RESPIRATION RATE: 18 BRPM | SYSTOLIC BLOOD PRESSURE: 120 MMHG | HEART RATE: 87 BPM | WEIGHT: 203 LBS | HEIGHT: 71 IN

## 2017-02-17 DIAGNOSIS — J96.11 CHRONIC RESPIRATORY FAILURE WITH HYPOXIA: Chronic | ICD-10-CM

## 2017-02-17 DIAGNOSIS — R91.1 PULMONARY NODULE: Chronic | ICD-10-CM

## 2017-02-17 DIAGNOSIS — J44.9 COPD, VERY SEVERE: Primary | Chronic | ICD-10-CM

## 2017-02-17 DIAGNOSIS — J44.9 COPD, VERY SEVERE: Chronic | ICD-10-CM

## 2017-02-17 PROBLEM — Z72.0 TOBACCO ABUSE: Status: RESOLVED | Noted: 2017-02-02 | Resolved: 2017-02-17

## 2017-02-17 PROBLEM — J44.1 COPD WITH ACUTE EXACERBATION: Status: RESOLVED | Noted: 2017-02-02 | Resolved: 2017-02-17

## 2017-02-17 PROCEDURE — 94729 DIFFUSING CAPACITY: CPT | Mod: 26,S$PBB,, | Performed by: INTERNAL MEDICINE

## 2017-02-17 PROCEDURE — 94010 BREATHING CAPACITY TEST: CPT | Mod: 26,S$PBB,, | Performed by: INTERNAL MEDICINE

## 2017-02-17 PROCEDURE — 99999 PR PBB SHADOW E&M-EST. PATIENT-LVL III: CPT | Mod: PBBFAC,,, | Performed by: INTERNAL MEDICINE

## 2017-02-17 PROCEDURE — 94726 PLETHYSMOGRAPHY LUNG VOLUMES: CPT | Mod: 26,S$PBB,, | Performed by: INTERNAL MEDICINE

## 2017-02-17 PROCEDURE — 99215 OFFICE O/P EST HI 40 MIN: CPT | Mod: S$PBB,25,, | Performed by: INTERNAL MEDICINE

## 2017-02-17 PROCEDURE — 99213 OFFICE O/P EST LOW 20 MIN: CPT | Mod: PBBFAC | Performed by: INTERNAL MEDICINE

## 2017-02-17 RX ORDER — BUPROPION HYDROCHLORIDE 150 MG/1
150 TABLET, EXTENDED RELEASE ORAL 2 TIMES DAILY
COMMUNITY
End: 2017-06-08

## 2017-02-17 RX ORDER — BACITRACIN 500 [USP'U]/G
OINTMENT OPHTHALMIC
Refills: 5 | COMMUNITY
Start: 2017-02-01 | End: 2017-06-08

## 2017-02-17 RX ORDER — TIOTROPIUM BROMIDE 18 UG/1
CAPSULE ORAL; RESPIRATORY (INHALATION)
Refills: 4 | COMMUNITY
Start: 2017-02-13 | End: 2017-05-05

## 2017-02-17 NOTE — ASSESSMENT & PLAN NOTE
Continue oxygen supplementation at 3  L/min. DME is [facility]. Continue nocturnal TRIOLOGY ( VIEMED)

## 2017-02-17 NOTE — PATIENT INSTRUCTIONS
Lung Anatomy  Your lungs take air in to give your body oxygen, which the body needs to work. Your lungs, like all the tissues in your body, are made up of billions of tiny specialized cells. Old lung cells die and are replaced by new, identical lung cells. This natural process helps ensure healthy lungs.    Date Last Reviewed: 11/1/2016  © 9715-7064 [employer]. [address] [place] [address]. All rights reserved. This information is not intended as a substitute for professional medical care. Always follow your healthcare professional's instructions.

## 2017-02-17 NOTE — ASSESSMENT & PLAN NOTE
COPD ROS: taking medications as instructed, no medication side effects noted, no significant ongoing wheezing or shortness of breath, using bronchodilator MDI less than twice a week.   New concerns: None.   Exam: appears well, vitals normal, no respiratory distress, acyanotic, normal RR, chest clear to auscultation, no wheezes, rales or rhonchi, symmetric air entry.   Assessment:  COPD reasonably well controlled.   Plan: current treatment plan is effective, no change in therapy. Ambulatory referral to pulmonary rehabilitation. CXR in 6 months.

## 2017-02-17 NOTE — PROGRESS NOTES
Subjective:      Patient ID: Johnny Perales is a 61 y.o. male.    Patient Active Problem List   Diagnosis    COPD, very severe    Pulmonary nodule    Chronic respiratory failure with hypoxia     Problem list has been reviewed.    Chief Complaint: COPD    HPI  The patient does not have symptoms / an exacerbation. He states that he  is at His respiratory baseline and denies any new onset specific pulmonary complaints. He denies  cough,  sputum, hemoptysis, pain with breathing and wheezing .   A full  review of systems, past , family  and social histories was performed except as mentioned in the note above, these are non contributory to the main issues discussed today. His current respiratory regimen: ANORO and ALBUTEROL. : He does use medications compliantly. CAT score today is 32. He is currently on continuous oxygen supplementation at 3  L/min. DME is [employer]. He is on [employer] [facility] home vent. He is complaint with his [employer].    Previous Report Reviewed: office notes, radiology reports and PFT      The following portions of the patient's history were reviewed and updated as appropriate: He  has a past medical history of COPD (chronic obstructive pulmonary disease).  He  has no past surgical history on file.  His family history includes Cancer in his father and mother; Heart failure in his father and mother.  He  reports that he has been smoking Vaping with nicotine.  He has a 3.50 pack-year smoking history. He does not have any smokeless tobacco history on file. He reports that he drinks about 1.2 oz of alcohol per week  He reports that he does not use illicit drugs.  He has a current medication list which includes the following prescription(s): albuterol, bacitracin, bupropion, cholecalciferol (vitamin d3), folic acid, proair hfa, spiriva with handihaler, umeclidinium-vilanterol, and roflumilast.  He has No Known Allergies..    Review of Systems   Constitutional: Positive for fatigue and night  "sweats.   HENT: Negative for nosebleeds and hearing loss.    Eyes: Negative for redness.   Respiratory: Positive for cough, sputum production and dyspnea on extertion. Negative for wheezing and somnolence.    Cardiovascular: Negative for chest pain and leg swelling.   Genitourinary: Negative for hematuria.   Endocrine: Negative for cold intolerance and heat intolerance.    Musculoskeletal: Negative for arthralgias and back pain.   Skin: Negative for rash.   Gastrointestinal: Negative for abdominal pain and abdominal distention.   Neurological: Negative for dizziness and headaches.   Hematological: Negative for adenopathy. Does not bruise/bleed easily.   Psychiatric/Behavioral: The patient is nervous/anxious.    All other systems reviewed and are negative.       Objective:     Visit Vitals    /70    Pulse 87    Resp 18    Ht 5' 11" (1.803 m)    Wt 92.1 kg (203 lb)    SpO2 95%    BMI 28.31 kg/m2     Body mass index is 28.31 kg/(m^2).    Physical Exam   Constitutional: He is oriented to person, place, and time. He appears well-developed. He appears distressed.   HENT:   Head: Normocephalic and atraumatic.   Eyes: EOM are normal. Pupils are equal, round, and reactive to light.   Neck: Normal range of motion. Neck supple.   Cardiovascular: Normal rate and regular rhythm.    Pulmonary/Chest: He has no decreased breath sounds.   Abdominal: Soft. Bowel sounds are normal.   Musculoskeletal: Normal range of motion.   Neurological: He is alert and oriented to person, place, and time.   Skin: Skin is warm and dry.   Psychiatric: He has a normal mood and affect. His behavior is normal.       Personal Diagnostic Review  Pulmonary function tests: FEV1: 1.62  (44 % predicted), FVC:  4.45 (91 % predicted), FEV1/FVC:  36, T.26 (120 % predicted), RV/TLVC: 46 (124 % predicted), DLCO: 14.9 (59 % predicted): Sever obstructive ventilatory defect. Lung volumes reveal both air trapping and hyperventilation. Diffusion " "capacity is moderately reduced.     Assessment:     1. COPD, very severe Stable   2. Chronic respiratory failure with hypoxia Stable   3. Pulmonary nodule Stable     Outpatient Encounter Prescriptions as of 2/17/2017   Medication Sig Dispense Refill    albuterol (ACCUNEB) 0.63 mg/3 mL Nebu Take 3 mLs (0.63 mg total) by nebulization 3 (three) times daily as needed. (Patient taking differently: Take 0.63 mg by nebulization 4 (four) times daily as needed. ) 90 vial 11    bacitracin (AK-TRACIN) ophthalmic ointment APPLY .25" STRIP TO BOTH EYES DAILY AT BEDTIME  5    buPROPion (WELLBUTRIN SR) 150 MG TBSR 12 hr tablet Take 150 mg by mouth 2 (two) times daily.      cholecalciferol, vitamin D3, (VITAMIN D3) 400 unit Chew Take 2 tablets by mouth once daily.       folic acid (FOLVITE) 1 MG tablet Take 1,000 mcg by mouth once daily.  0    PROAIR HFA 90 mcg/actuation inhaler INHALE ONCE EVERY 4 HOURS AS NEEDED FOR WHEEZING 18 g 11    SPIRIVA WITH HANDIHALER 18 mcg inhalation capsule INHALE 1 CAPSULE BY MOUTH ONCE DAILY  4    umeclidinium-vilanterol (ANORO ELLIPTA) 62.5-25 mcg/actuation DsDv Inhale 1 puff into the lungs once daily. 30 each 11    roflumilast 500 mcg Tab Take 1 tablet (500 mcg total) by mouth once daily. 30 tablet 0    [DISCONTINUED] lorazepam (ATIVAN) 0.5 MG tablet TAKE ONE TABLET BY MOUTH EVERY SIX HOURS.DO NOT TAKE WITH ALCOHOL.  0    [DISCONTINUED] predniSONE (DELTASONE) 10 MG tablet Please take: 40 mg daily x 5 days; 30 mg daily x 5 days; 20 mg daily x 5 days; 10 mg daily x 5 days; then 5 mg daily x 5 days 53 tablet 0     No facility-administered encounter medications on file as of 2/17/2017.      Orders Placed This Encounter   Procedures    X-Ray Chest PA And Lateral     Standing Status:   Future     Standing Expiration Date:   3/30/2018    Ambulatory Referral to Pulmonary Rehab     Referral Priority:   Routine     Referral Type:   Consultation     Referral Reason:   Specialty Services Required "     Requested Specialty:   Respiratory Therapy     Number of Visits Requested:   1     Plan:     Discussed diagnosis, its evaluation, treatment and usual course. All questions answered.    Chronic respiratory failure with hypoxia  Continue oxygen supplementation at 3  L/min. DME is [employer]. Continue nocturnal TRIOLOGY ( [facility])    COPD, very severe  COPD ROS: taking medications as instructed, no medication side effects noted, no significant ongoing wheezing or shortness of breath, using bronchodilator MDI less than twice a week.   New concerns: None.   Exam: appears well, vitals normal, no respiratory distress, acyanotic, normal RR, chest clear to auscultation, no wheezes, rales or rhonchi, symmetric air entry.   Assessment:  COPD reasonably well controlled.   Plan: current treatment plan is effective, no change in therapy. Ambulatory referral to pulmonary rehabilitation. CXR in 6 months.    Pulmonary nodule  Radiologic monitoring.       TIME SPENT WITH PATIENT: Time spent: 40 minutes in face to face  discussion concerning diagnosis, prognosis, review of lab and test results, benefits of treatment as well as management of disease, counseling of patient and coordination of care between various health  care providers . Greater than half the time spent was used for coordination of care and counseling of patient.          Return in about 6 months (around 8/17/2017) for COPD, Chronic Respiratory Failure, Lung Nodule.

## 2017-02-21 LAB
PRE DLCO: 14.92 ML/MMHG/MIN (ref 21.2–29.49)
PRE ERV: 1.65 L
PRE FEF 25 75: 0.39 L/S (ref 2.24–3.87)
PRE FET 100: 15.18 S
PRE FEV1 FVC: 36 %
PRE FEV1: 1.62 L (ref 3.32–4.12)
PRE FIF 50: 1.46 L/S
PRE FRC PL: 5.46 L (ref 3.12–4.33)
PRE FVC: 4.45 L (ref 4.44–5.39)
PRE KROGHS K: 2.6 1/MIN
PRE PEF: 5.45 L/S (ref 8.23–10.61)
PRE RV: 3.81 L (ref 2.05–2.84)
PRE SVC: 4.45 L
PRE TLC: 8.26 L (ref 6.4–7.4)
PREDICTED DLCO: 25.35 ML/MMHG/MIN (ref 21.2–29.49)
PREDICTED FEV1 FVC: 75.67 % (ref 70.83–80.51)
PREDICTED FEV1: 3.72 L (ref 3.32–4.12)
PREDICTED FRC N2: 3.72 L (ref 3.12–4.33)
PREDICTED FRC PL: 3.72 L (ref 3.12–4.33)
PREDICTED FVC: 4.92 L (ref 4.44–5.39)
PREDICTED RV: 2.44 L (ref 2.05–2.84)
PREDICTED SVC: 4.62 L
PREDICTED TLC: 6.9 L (ref 6.4–7.4)
PROVOCATION PROTOCOL: ABNORMAL

## 2017-03-15 ENCOUNTER — TELEPHONE (OUTPATIENT)
Dept: PULMONOLOGY | Facility: HOSPITAL | Age: 62
End: 2017-03-15

## 2017-03-18 RX ORDER — PREDNISONE 10 MG/1
TABLET ORAL
Qty: 53 TABLET | Refills: 0 | OUTPATIENT
Start: 2017-03-18

## 2017-04-11 ENCOUNTER — TELEPHONE (OUTPATIENT)
Dept: PULMONOLOGY | Facility: HOSPITAL | Age: 62
End: 2017-04-11

## 2017-04-13 ENCOUNTER — HOSPITAL ENCOUNTER (OUTPATIENT)
Dept: PULMONOLOGY | Facility: HOSPITAL | Age: 62
Discharge: HOME | End: 2017-04-13
Attending: INTERNAL MEDICINE
Payer: MEDICARE

## 2017-04-13 VITALS — WEIGHT: 196.88 LBS | BODY MASS INDEX: 27.56 KG/M2 | HEIGHT: 71 IN

## 2017-04-13 DIAGNOSIS — J44.9 COPD, VERY SEVERE: Chronic | ICD-10-CM

## 2017-04-13 PROCEDURE — G0424 PULMONARY REHAB W EXER: HCPCS

## 2017-04-13 NOTE — PROGRESS NOTES
"[facility]  Pulmonary Rehab  Initial Consult    SUMMARY     Referring Physician: Prabhakar Rodriguez MD       Pt presents to pulmonary rehab appointment for intake appointment. Pt has COPD with shortness of breath on exertion. Pt was referred to rehab by Dr. Rodriguez. Pt has had two recent hospital admissions for COPD exacerbations ( October 2016 and February 2017 ). Pt states that in October he was so short of breath that he turned his oxygen flow to 6 liters per minute.Pt passed out and woke up in the hospital on Bip[facility]. Pt was educated on oxygen safety and encouraged to wear the ordered amount of oxygen. Pt suffers from depression and attributes this to his wife of twelve years leaving him last year. Pt is currently living with his sister. Pt states that he is ready to get strong and wants to learn more about COPD so he can " help himself". Pt appears very motivated. The compliance policy was discussed. Pt is scheduled to begin pulmonary rehab on Wednesday, April 19 at 1300.    Diagnosis:   Problem List as of 4/13/2017  Reviewed: 2/17/2017  2:47 PM by Prabhakar Rodriguez MD       Pulmonary    COPD, very severe    Last Assessment & Plan 2/17/2017 Office Visit Edited 2/17/2017  2:46 PM by Prabhakar Rodriguez MD     COPD ROS: taking medications as instructed, no medication side effects noted, no significant ongoing wheezing or shortness of breath, using bronchodilator MDI less than twice a week.   New concerns: None.   Exam: appears well, vitals normal, no respiratory distress, acyanotic, normal RR, chest clear to auscultation, no wheezes, rales or rhonchi, symmetric air entry.   Assessment:  COPD reasonably well controlled.   Plan: current treatment plan is effective, no change in therapy. Ambulatory referral to pulmonary rehabilitation. CXR in 6 months.         Pulmonary nodule    Last Assessment & Plan 2/17/2017 Office Visit Written 2/17/2017  2:41 PM by Prabhakar Rodriguez MD     Radiologic monitoring.          " "Chronic respiratory failure with hypoxia    Last Assessment & Plan 2/17/2017 Office Visit Edited 2/17/2017  2:45 PM by Prabhakar Rodriguez MD     Continue oxygen supplementation at 3  L/min. DME is [employer]. Continue nocturnal TRIOLOGY ( [facility])               History of Present Illness:     Smoking History:   History   Smoking Status    Current Every Day Smoker    Packs/day: 0.10    Years: 35.00    Types: Vaping with nicotine   Smokeless Tobacco    Not on file       Medications:   Current Outpatient Prescriptions Ordered in [facility]   Medication Sig Dispense Refill    albuterol (ACCUNEB) 0.63 mg/3 mL Nebu Take 3 mLs (0.63 mg total) by nebulization 3 (three) times daily as needed. (Patient taking differently: Take 0.63 mg by nebulization 4 (four) times daily as needed. ) 90 vial 11    bacitracin (AK-TRACIN) ophthalmic ointment APPLY .25" STRIP TO BOTH EYES DAILY AT BEDTIME  5    buPROPion (WELLBUTRIN SR) 150 MG TBSR 12 hr tablet Take 150 mg by mouth 2 (two) times daily.      cholecalciferol, vitamin D3, (VITAMIN D3) 400 unit Chew Take 2 tablets by mouth once daily.       folic acid (FOLVITE) 1 MG tablet Take 1,000 mcg by mouth once daily.  0    PROAIR HFA 90 mcg/actuation inhaler INHALE ONCE EVERY 4 HOURS AS NEEDED FOR WHEEZING 18 g 11    roflumilast 500 mcg Tab Take 1 tablet (500 mcg total) by mouth once daily. 30 tablet 0    SPIRIVA WITH HANDIHALER 18 mcg inhalation capsule INHALE 1 CAPSULE BY MOUTH ONCE DAILY  4    umeclidinium-vilanterol (ANORO ELLIPTA) 62.5-25 mcg/actuation DsDv Inhale 1 puff into the lungs once daily. 30 each 11     No current [facility]-ordered facility-administered medications on file.         Pulmonary History Questionnaire:    Pulmonary History  How many times have you been hospitalized in the last year as a result of lung problems?: 3  How many days were you in the hospital in the last year as a result of breathing difficulty?: 15  How many ER visits have you had in the past year " as a result of breathing difficulty?: 3  Last hospital admission: 02/07/17  Have you ever attended a pulmonary rehabilitation program?: No  Have you ever had any chest injuries or surgeries?: No    Major Symptomatology  What are your symptoms today?: sob on exertion  What were your symptoms last year?: sob on exertion  What were your symptoms 5 years ago?: none  When did you realize that you had lung problems?: 2013    Disease Impact  Do you sleep with your head elevated?: Elevated  If elevated, how high?: 3 pillows  Do you awaken during the night?: No  Do your ankles ever swell up?: No  Do you cough?: Yes  What part of the day?: all day long  Do you cough up sputum?: Yes  When?: all day   Describe: white/clear  Have you ever coughed up blood?: No  Do you use oxygen?: Yes  How often?: all day  Liters per minute: 3 l/min  Type of oxygen delivery system: k tanks, concentrator  Supplier: [employer]  Are you on any home respiratory theraphy?: Yes  Type: Nebulizer and CPAP  How do you clean the equipment?: hot water   Do you exercise?: No  Do you have exercise equipment?: No  Has your physician limited your activities?: No    Depression PHQ:    Depression Patient Health Questionnaire  In the last two weeks how often have you had little interest or pleasure in doing things: Not at all  In the last two weeks how often have you felt down, depressed or hopeless: More than half the days  In the last two weeks how often have you had trouble falling or staying asleep, or sleeping too much: Not at all  In the last two weeks how often have you felt tired or having little energy: Several days  In the last two weeks how often have you had a poor appetite or overeating: Not at all  In the last two weeks how often have you felt bad about yourself - or that you are a failure or have let yourself or your family down: Several days  In the last two weeks how often have you had trouble concentrating on things, such as reading the  "newspaper or watching television: Not at all  In the last two weeks how often have you been moving or speaking so slowly that other people could have noticed. Or the opposite - being so fidgety or restless that you have been moving around a lot more than usual.: Not at all  In the last two weeks how often have you had thoughts that you would be better off dead, or of hurting yourself: Not at all  If you checked off any problems, how difficult have these problems made it for you to do your work, take care of things at home or get along with other people?: Somewhat difficult  Total Score: 4    Questionnaire Score:   Pulmonary Knowledge Test: 63  Rate Your Plate: 33    Pulmonary Function Test Data:     Date: 2/17/17 FEV1: 1.62 (44%) FVC: 4.45 (91%) DLCO: 14.9 (59%)    Six Minute Walk    Height: 5' 10.87" (180 cm) Weight: 89.3 kg (196 lb 13.9 oz)  Performing RT: Ashwin Murguia     Phase Oxygen Assessment Supplemental O2 Heart   Rate Blood Pressure Mika Dyspnea Scale Rating   Resting 97 % 3 L/M 86 bpm 119/83 1   Exercise        Minute        1 94 % 3 L/M 98 bpm     2 96 % 3 L/M 99 bpm     3 95 % 3 L/M 100 bpm     4 97 % 3 L/M 101 bpm     5 94 % 3 L/M 106 bpm     6  94 % 3 L/M 106 bpm (!) 142/100 4   Recovery        Minute        1 97 % 3 L/M 100 bpm     2 98 % 3 L/M 99 bpm     3 98 % 3 L/M 93 bpm     4 98 % 3 L/M 90 bpm 129/85 2       Six Minute Walk Summary      Total Time Walked (Calculated): 360 seconds  Total Distance Meters (Calculated): 304.8 meters  Predicted Distance Meters (Calculated): 590.21 meters  Percentage of Predicted (Calculated): 51.64  Peak VO2 (Calculated): 13.12  Mets: 3.75  Symptoms: dyspnea        Fall Risk:  Fall Risk Assessment (every shift)  History Of Fall (W/I 3 Mos): N  Polypharmacy: N  Central Nervous System/Psychotropic Medication: N  Cardiovascular Medication: N  Age Greater Than 65 Years: N  Altered Elimination: N  Cognitive Deficit: N  Sensory Deficit: N  Dizziness/Vertigo: " N  Depression: N  Mobility Deficit/Weakness: N  Male: Y  Fall Risk Score: 1    Individual Goal Review  Individual Goal Review  Are you exercising on a regular basis at home?: No  Are you a diabetic?: No  Are you better able to manage your daily activities, i.e. grooming, bathing, cooking, etc.?: N/A  Are you smoke free?: Yes  Has your knowledge of stress management increased?: N/A  Do you have a positive support system in place?: Yes  Your short term goal was:: Be able to walk further with decreased shortness of breath  Your long term goal was:: Be able to go fishing    Education: Purse lip breathing, nutritional support, oxygen safety, medication compliance    Short Term Goals: Be able to walk further with decreased shortness of breath  Long Term Goals: Be able to go fishing  Dietary Goals: Weight loss      [facility]  Pulmonary Rehab  Exercise Prescription    SUMMARY     General Guidelines     · Record Pulse, SPO2, and Blood Pressure before during and after exercise   · Hold exercise for: Oxygen saturation <90% despite supplemental Oxygen, S/Sx Exacerbation, Blood Pressure >180/95 or <80/60, Resting pulse > Max target heart rate  · Maintain SPO2>90% with exercise    Outpatient Exercises  Days per week: 2 Days (Light to moderate exercise to achieve the heart rate of  beats per minute)  Minutes: 45    Home Exercise  Days per week: 2 Days  Minutes: 45    Exercise Prescription  Initial Exercise Prescription: Yes      Modality  Modality: Arm Ergometer, Hand Free Weights, Nustep, Recumbent Bike, Treadmill            Physical Health Summary     Your Score 39  Your current score indicates that you are below the general population of similar age and gender.   Compared to this population your health is: Well Below the average in:   * Overall physical functioning   * Performance at work, home, or school due to physical problems   * General health   Below the average in:   * Ability to participate in  activities due to bodily pain     Mental Health Summary     Your Score 26  Your current score indicates that you are well below the general population of similar age and gender.   Compared to this population your health is: Well Below the average in:   * Level of energy   * Ability to participate in social activities   * Performance at work, home, or school due to emotional problems   * Emotional well-being   Progress   Self Evaluated Transition   Compared to one year ago, you rated your health in general as: Much worse           Checklist                                                                           Response  Chronic Stable Pulmonary Impairment                             Yes  Medical Regimen Optimized                                             Yes  PFT within 12 months                                                       Yes  Impaired Function due to Pulmonary Disease                  Yes  Motivated and Physically able to Participate                     Yes  Rehab likely to result in improvement                               Yes      Orders: Begin pulmonary rehabilitation 2 times a week, see exercise prescription.    I certify the patient is physically and psychologically able to participate in pulmonary rehabilitation and is medically stable.

## 2017-04-19 ENCOUNTER — HOSPITAL ENCOUNTER (OUTPATIENT)
Dept: PULMONOLOGY | Facility: HOSPITAL | Age: 62
Discharge: HOME | End: 2017-04-19
Attending: INTERNAL MEDICINE
Payer: MEDICARE

## 2017-04-19 VITALS — BODY MASS INDEX: 27.72 KG/M2 | WEIGHT: 198 LBS

## 2017-04-19 PROCEDURE — G0424 PULMONARY REHAB W EXER: HCPCS

## 2017-04-19 NOTE — PROGRESS NOTES
[facility]  Pulmonary Rehab  Session Summary    SUMMARY     Session Data  Session Number: 2  Time In: 1300  Time Out: 1400  Weight: 89.8 kg (197 lb 15.6 oz)  Target Heart Rate Zone: Minimum: 95 bpm  Target Heart Rate Zone: Maximum: 127 bpm  Patient Motivation: Excellent  Patient Effort: Excellent  Is this patient a diabetic?: No      Pre Exercise Vitals  SpO2: 95 %  Supplemental O2?: Yes  O2 Device: nasal cannula  O2 Flow (L/min): 3  Pulse: 88  BP: 136/87  Mika Dyspnea Rating : 5-6      During Exercise Vitals  SpO2: 94 %  Supplemental O2?: Yes  O2 Device: nasal cannula  O2 Flow (L/min): 3  Pulse: 110  BP: 136/88  Mika Dyspnea Rating : 5-6      Post Exercise Vitals  SpO2: 95 %  Supplemental O2?: Yes  O2 Device: nasal cannula  O2 Flow (L/min): 3  Pulse: 110  BP: 140/86  Mika Dyspnea Rating : 3       Modality  Modality: Nustep, Hand Free Weights                           Nustep  Time: 20 minutes  Steps: 1421  Load: 6                           Biceps  lbs: 5 lbs  Sets: 2  Reps: 10      Chest  lbs: 5 lbs  Sets: 2  Reps: 10                  Education  Purse lip breathing, proper heart rate zone, equipment safety    Therapist Notes   Excellent effort on the first day of rehab. Pt tolerated exercise well. Several educational topics discussed. See above.    Dr. Prabhakar Rodriguez immediately available as needed.

## 2017-04-26 ENCOUNTER — HOSPITAL ENCOUNTER (INPATIENT)
Facility: HOSPITAL | Age: 62
LOS: 3 days | Discharge: HOME | DRG: 189 | End: 2017-04-29
Attending: EMERGENCY MEDICINE | Admitting: INTERNAL MEDICINE
Payer: MEDICARE

## 2017-04-26 DIAGNOSIS — J44.1 COPD EXACERBATION: Primary | ICD-10-CM

## 2017-04-26 DIAGNOSIS — R06.02 SOB (SHORTNESS OF BREATH): ICD-10-CM

## 2017-04-26 DIAGNOSIS — J96.22 ACUTE ON CHRONIC RESPIRATORY FAILURE WITH HYPOXIA AND HYPERCAPNIA: ICD-10-CM

## 2017-04-26 DIAGNOSIS — Z72.0 TOBACCO ABUSE: ICD-10-CM

## 2017-04-26 DIAGNOSIS — J96.02 ACUTE HYPERCAPNIC RESPIRATORY FAILURE: ICD-10-CM

## 2017-04-26 DIAGNOSIS — R91.1 PULMONARY NODULE: Chronic | ICD-10-CM

## 2017-04-26 DIAGNOSIS — J44.9 COPD, VERY SEVERE: Chronic | ICD-10-CM

## 2017-04-26 DIAGNOSIS — J96.11 CHRONIC RESPIRATORY FAILURE WITH HYPOXIA: Chronic | ICD-10-CM

## 2017-04-26 DIAGNOSIS — J96.21 ACUTE ON CHRONIC RESPIRATORY FAILURE WITH HYPOXIA AND HYPERCAPNIA: ICD-10-CM

## 2017-04-26 LAB
ALBUMIN SERPL BCP-MCNC: 4 G/DL
ALLENS TEST: ABNORMAL
ALP SERPL-CCNC: 79 U/L
ALT SERPL W/O P-5'-P-CCNC: 48 U/L
ANION GAP SERPL CALC-SCNC: 12 MMOL/L
AST SERPL-CCNC: 45 U/L
BASOPHILS # BLD AUTO: 0.11 K/UL
BASOPHILS NFR BLD: 1.4 %
BILIRUB SERPL-MCNC: 0.6 MG/DL
BILIRUB UR QL STRIP: NEGATIVE
BNP SERPL-MCNC: <10 PG/ML
BUN SERPL-MCNC: 8 MG/DL
CALCIUM SERPL-MCNC: 9.5 MG/DL
CHLORIDE SERPL-SCNC: 102 MMOL/L
CK MB SERPL-MCNC: 5.8 NG/ML
CK MB SERPL-RTO: 3.7 %
CK SERPL-CCNC: 155 U/L
CK SERPL-CCNC: 155 U/L
CLARITY UR: CLEAR
CO2 SERPL-SCNC: 27 MMOL/L
COLOR UR: YELLOW
CREAT SERPL-MCNC: 1 MG/DL
D DIMER PPP IA.FEU-MCNC: 0.37 MG/L FEU
DELSYS: ABNORMAL
DIFFERENTIAL METHOD: ABNORMAL
EOSINOPHIL # BLD AUTO: 0.9 K/UL
EOSINOPHIL NFR BLD: 11.1 %
ERYTHROCYTE [DISTWIDTH] IN BLOOD BY AUTOMATED COUNT: 13.4 %
EST. GFR  (AFRICAN AMERICAN): >60 ML/MIN/1.73 M^2
EST. GFR  (NON AFRICAN AMERICAN): >60 ML/MIN/1.73 M^2
FIO2: 32
FLOW: 3
GLUCOSE SERPL-MCNC: 102 MG/DL
GLUCOSE UR QL STRIP: NEGATIVE
HCO3 UR-SCNC: 27.8 MMOL/L (ref 24–28)
HCT VFR BLD AUTO: 49.1 %
HGB BLD-MCNC: 17.3 G/DL
HGB UR QL STRIP: NEGATIVE
KETONES UR QL STRIP: NEGATIVE
LEUKOCYTE ESTERASE UR QL STRIP: NEGATIVE
LYMPHOCYTES # BLD AUTO: 1.4 K/UL
LYMPHOCYTES NFR BLD: 18 %
MCH RBC QN AUTO: 34.7 PG
MCHC RBC AUTO-ENTMCNC: 35.2 %
MCV RBC AUTO: 98 FL
MODE: ABNORMAL
MONOCYTES # BLD AUTO: 0.7 K/UL
MONOCYTES NFR BLD: 8.3 %
NEUTROPHILS # BLD AUTO: 4.9 K/UL
NEUTROPHILS NFR BLD: 61.2 %
NITRITE UR QL STRIP: NEGATIVE
PCO2 BLDA: 54.9 MMHG (ref 35–45)
PH SMN: 7.31 [PH] (ref 7.35–7.45)
PH UR STRIP: 6 [PH] (ref 5–8)
PLATELET # BLD AUTO: 181 K/UL
PMV BLD AUTO: 9.9 FL
PO2 BLDA: 79 MMHG (ref 80–100)
POC BE: 2 MMOL/L
POC SATURATED O2: 94 % (ref 95–100)
POTASSIUM SERPL-SCNC: 4.7 MMOL/L
PROT SERPL-MCNC: 7.7 G/DL
PROT UR QL STRIP: NEGATIVE
RBC # BLD AUTO: 4.99 M/UL
SAMPLE: ABNORMAL
SITE: ABNORMAL
SODIUM SERPL-SCNC: 141 MMOL/L
SP GR UR STRIP: <=1.005 (ref 1–1.03)
TROPONIN I SERPL DL<=0.01 NG/ML-MCNC: 0.01 NG/ML
TSH SERPL DL<=0.005 MIU/L-ACNC: 2.42 UIU/ML
URN SPEC COLLECT METH UR: ABNORMAL
UROBILINOGEN UR STRIP-ACNC: NEGATIVE EU/DL
WBC # BLD AUTO: 7.95 K/UL

## 2017-04-26 PROCEDURE — 84484 ASSAY OF TROPONIN QUANT: CPT

## 2017-04-26 PROCEDURE — 36600 WITHDRAWAL OF ARTERIAL BLOOD: CPT

## 2017-04-26 PROCEDURE — 93005 ELECTROCARDIOGRAM TRACING: CPT

## 2017-04-26 PROCEDURE — 85025 COMPLETE CBC W/AUTO DIFF WBC: CPT

## 2017-04-26 PROCEDURE — 25000242 PHARM REV CODE 250 ALT 637 W/ HCPCS: Performed by: EMERGENCY MEDICINE

## 2017-04-26 PROCEDURE — 99285 EMERGENCY DEPT VISIT HI MDM: CPT | Mod: 25

## 2017-04-26 PROCEDURE — 83880 ASSAY OF NATRIURETIC PEPTIDE: CPT

## 2017-04-26 PROCEDURE — 25000003 PHARM REV CODE 250: Performed by: EMERGENCY MEDICINE

## 2017-04-26 PROCEDURE — 80053 COMPREHEN METABOLIC PANEL: CPT

## 2017-04-26 PROCEDURE — 25000003 PHARM REV CODE 250: Performed by: NURSE PRACTITIONER

## 2017-04-26 PROCEDURE — 96374 THER/PROPH/DIAG INJ IV PUSH: CPT

## 2017-04-26 PROCEDURE — 94640 AIRWAY INHALATION TREATMENT: CPT

## 2017-04-26 PROCEDURE — 93010 ELECTROCARDIOGRAM REPORT: CPT | Mod: ,,, | Performed by: INTERNAL MEDICINE

## 2017-04-26 PROCEDURE — 82553 CREATINE MB FRACTION: CPT

## 2017-04-26 PROCEDURE — 25000242 PHARM REV CODE 250 ALT 637 W/ HCPCS: Performed by: NURSE PRACTITIONER

## 2017-04-26 PROCEDURE — 85379 FIBRIN DEGRADATION QUANT: CPT

## 2017-04-26 PROCEDURE — 27000221 HC OXYGEN, UP TO 24 HOURS

## 2017-04-26 PROCEDURE — 96361 HYDRATE IV INFUSION ADD-ON: CPT

## 2017-04-26 PROCEDURE — 99900035 HC TECH TIME PER 15 MIN (STAT)

## 2017-04-26 PROCEDURE — 81003 URINALYSIS AUTO W/O SCOPE: CPT

## 2017-04-26 PROCEDURE — 82803 BLOOD GASES ANY COMBINATION: CPT

## 2017-04-26 PROCEDURE — 63600175 PHARM REV CODE 636 W HCPCS: Performed by: EMERGENCY MEDICINE

## 2017-04-26 PROCEDURE — 21400001 HC TELEMETRY ROOM

## 2017-04-26 PROCEDURE — 84443 ASSAY THYROID STIM HORMONE: CPT

## 2017-04-26 PROCEDURE — 63600175 PHARM REV CODE 636 W HCPCS: Performed by: NURSE PRACTITIONER

## 2017-04-26 RX ORDER — DIPHENHYDRAMINE HYDROCHLORIDE 50 MG/ML
6.25 INJECTION INTRAMUSCULAR; INTRAVENOUS EVERY 4 HOURS PRN
Status: DISCONTINUED | OUTPATIENT
Start: 2017-04-26 | End: 2017-04-29 | Stop reason: HOSPADM

## 2017-04-26 RX ORDER — ONDANSETRON 2 MG/ML
4 INJECTION INTRAMUSCULAR; INTRAVENOUS EVERY 8 HOURS PRN
Status: DISCONTINUED | OUTPATIENT
Start: 2017-04-26 | End: 2017-04-29 | Stop reason: HOSPADM

## 2017-04-26 RX ORDER — CHOLECALCIFEROL (VITAMIN D3) 10 MCG
400 TABLET ORAL DAILY
Status: DISCONTINUED | OUTPATIENT
Start: 2017-04-26 | End: 2017-04-26

## 2017-04-26 RX ORDER — SODIUM CHLORIDE 9 MG/ML
INJECTION, SOLUTION INTRAVENOUS CONTINUOUS
Status: DISCONTINUED | OUTPATIENT
Start: 2017-04-26 | End: 2017-04-27

## 2017-04-26 RX ORDER — MOXIFLOXACIN HYDROCHLORIDE 400 MG/250ML
400 INJECTION, SOLUTION INTRAVENOUS
Status: DISCONTINUED | OUTPATIENT
Start: 2017-04-26 | End: 2017-04-29 | Stop reason: HOSPADM

## 2017-04-26 RX ORDER — ACETAMINOPHEN 325 MG/1
650 TABLET ORAL EVERY 6 HOURS PRN
Status: DISCONTINUED | OUTPATIENT
Start: 2017-04-26 | End: 2017-04-29 | Stop reason: HOSPADM

## 2017-04-26 RX ORDER — FAMOTIDINE 20 MG/1
20 TABLET, FILM COATED ORAL 2 TIMES DAILY
Status: DISCONTINUED | OUTPATIENT
Start: 2017-04-26 | End: 2017-04-29 | Stop reason: HOSPADM

## 2017-04-26 RX ORDER — BUPROPION HYDROCHLORIDE 150 MG/1
150 TABLET, EXTENDED RELEASE ORAL 2 TIMES DAILY
Status: DISCONTINUED | OUTPATIENT
Start: 2017-04-26 | End: 2017-04-29 | Stop reason: HOSPADM

## 2017-04-26 RX ORDER — ENOXAPARIN SODIUM 100 MG/ML
40 INJECTION SUBCUTANEOUS EVERY 24 HOURS
Status: DISCONTINUED | OUTPATIENT
Start: 2017-04-26 | End: 2017-04-29 | Stop reason: HOSPADM

## 2017-04-26 RX ORDER — IPRATROPIUM BROMIDE AND ALBUTEROL SULFATE 2.5; .5 MG/3ML; MG/3ML
3 SOLUTION RESPIRATORY (INHALATION)
Status: COMPLETED | OUTPATIENT
Start: 2017-04-26 | End: 2017-04-26

## 2017-04-26 RX ORDER — IPRATROPIUM BROMIDE AND ALBUTEROL SULFATE 2.5; .5 MG/3ML; MG/3ML
3 SOLUTION RESPIRATORY (INHALATION) EVERY 4 HOURS
Status: DISCONTINUED | OUTPATIENT
Start: 2017-04-26 | End: 2017-04-26

## 2017-04-26 RX ORDER — MAG HYDROX/ALUMINUM HYD/SIMETH 200-200-20
30 SUSPENSION, ORAL (FINAL DOSE FORM) ORAL EVERY 6 HOURS PRN
Status: DISCONTINUED | OUTPATIENT
Start: 2017-04-26 | End: 2017-04-29 | Stop reason: HOSPADM

## 2017-04-26 RX ORDER — IPRATROPIUM BROMIDE AND ALBUTEROL SULFATE 2.5; .5 MG/3ML; MG/3ML
3 SOLUTION RESPIRATORY (INHALATION) EVERY 4 HOURS PRN
Status: DISCONTINUED | OUTPATIENT
Start: 2017-04-26 | End: 2017-04-27

## 2017-04-26 RX ORDER — ROFLUMILAST 500 UG/1
500 TABLET ORAL DAILY
Status: DISCONTINUED | OUTPATIENT
Start: 2017-04-26 | End: 2017-04-29 | Stop reason: HOSPADM

## 2017-04-26 RX ORDER — HYDROCODONE BITARTRATE AND ACETAMINOPHEN 5; 325 MG/1; MG/1
1 TABLET ORAL EVERY 6 HOURS PRN
Status: DISCONTINUED | OUTPATIENT
Start: 2017-04-26 | End: 2017-04-29 | Stop reason: HOSPADM

## 2017-04-26 RX ORDER — METHYLPREDNISOLONE SOD SUCC 125 MG
80 VIAL (EA) INJECTION EVERY 8 HOURS
Status: DISCONTINUED | OUTPATIENT
Start: 2017-04-26 | End: 2017-04-29 | Stop reason: HOSPADM

## 2017-04-26 RX ORDER — HYDRALAZINE HYDROCHLORIDE 20 MG/ML
10 INJECTION INTRAMUSCULAR; INTRAVENOUS EVERY 6 HOURS PRN
Status: DISCONTINUED | OUTPATIENT
Start: 2017-04-26 | End: 2017-04-29 | Stop reason: HOSPADM

## 2017-04-26 RX ORDER — FOLIC ACID 1 MG/1
1000 TABLET ORAL DAILY
Status: DISCONTINUED | OUTPATIENT
Start: 2017-04-26 | End: 2017-04-29 | Stop reason: HOSPADM

## 2017-04-26 RX ADMIN — BUPROPION HYDROCHLORIDE 150 MG: 150 TABLET, FILM COATED, EXTENDED RELEASE ORAL at 10:04

## 2017-04-26 RX ADMIN — IPRATROPIUM BROMIDE AND ALBUTEROL SULFATE 3 ML: .5; 3 SOLUTION RESPIRATORY (INHALATION) at 08:04

## 2017-04-26 RX ADMIN — IPRATROPIUM BROMIDE AND ALBUTEROL SULFATE 3 ML: .5; 3 SOLUTION RESPIRATORY (INHALATION) at 09:04

## 2017-04-26 RX ADMIN — SODIUM CHLORIDE 1000 ML: 0.9 INJECTION, SOLUTION INTRAVENOUS at 09:04

## 2017-04-26 RX ADMIN — METHYLPREDNISOLONE SODIUM SUCCINATE 80 MG: 125 INJECTION, POWDER, FOR SOLUTION INTRAMUSCULAR; INTRAVENOUS at 01:04

## 2017-04-26 RX ADMIN — SODIUM CHLORIDE: 0.9 INJECTION, SOLUTION INTRAVENOUS at 10:04

## 2017-04-26 RX ADMIN — ROFLUMILAST 500 MCG: 500 TABLET ORAL at 10:04

## 2017-04-26 RX ADMIN — IPRATROPIUM BROMIDE AND ALBUTEROL SULFATE 3 ML: .5; 3 SOLUTION RESPIRATORY (INHALATION) at 10:04

## 2017-04-26 RX ADMIN — SODIUM CHLORIDE: 0.9 INJECTION, SOLUTION INTRAVENOUS at 01:04

## 2017-04-26 RX ADMIN — ENOXAPARIN SODIUM 40 MG: 100 INJECTION SUBCUTANEOUS at 04:04

## 2017-04-26 RX ADMIN — METHYLPREDNISOLONE SODIUM SUCCINATE 80 MG: 125 INJECTION, POWDER, FOR SOLUTION INTRAMUSCULAR; INTRAVENOUS at 10:04

## 2017-04-26 RX ADMIN — IPRATROPIUM BROMIDE AND ALBUTEROL SULFATE 3 ML: .5; 3 SOLUTION RESPIRATORY (INHALATION) at 03:04

## 2017-04-26 RX ADMIN — IPRATROPIUM BROMIDE AND ALBUTEROL SULFATE 3 ML: .5; 3 SOLUTION RESPIRATORY (INHALATION) at 07:04

## 2017-04-26 RX ADMIN — IPRATROPIUM BROMIDE AND ALBUTEROL SULFATE 3 ML: .5; 3 SOLUTION RESPIRATORY (INHALATION) at 12:04

## 2017-04-26 RX ADMIN — MOXIFLOXACIN HYDROCHLORIDE 400 MG: 400 INJECTION, SOLUTION INTRAVENOUS at 02:04

## 2017-04-26 RX ADMIN — FOLIC ACID 1000 MCG: 1 TABLET ORAL at 10:04

## 2017-04-26 RX ADMIN — FAMOTIDINE 20 MG: 20 TABLET ORAL at 10:04

## 2017-04-26 NOTE — SUBJECTIVE & OBJECTIVE
"Past Medical History:   Diagnosis Date    COPD (chronic obstructive pulmonary disease)        History reviewed. No pertinent surgical history.    Review of patient's allergies indicates:  No Known Allergies    No current facility-administered medications on file prior to encounter.      Current Outpatient Prescriptions on File Prior to Encounter   Medication Sig    albuterol (ACCUNEB) 0.63 mg/3 mL Nebu Take 3 mLs (0.63 mg total) by nebulization 3 (three) times daily as needed. (Patient taking differently: Take 0.63 mg by nebulization 4 (four) times daily as needed. )    buPROPion (WELLBUTRIN SR) 150 MG TBSR 12 hr tablet Take 150 mg by mouth 2 (two) times daily.    cholecalciferol, vitamin D3, (VITAMIN D3) 400 unit Chew Take 2 tablets by mouth once daily.     folic acid (FOLVITE) 1 MG tablet Take 1,000 mcg by mouth once daily.    PROAIR HFA 90 mcg/actuation inhaler INHALE ONCE EVERY 4 HOURS AS NEEDED FOR WHEEZING    roflumilast 500 mcg Tab Take 1 tablet (500 mcg total) by mouth once daily.    SPIRIVA WITH HANDIHALER 18 mcg inhalation capsule INHALE 1 CAPSULE BY MOUTH ONCE DAILY    umeclidinium-vilanterol (ANORO ELLIPTA) 62.5-25 mcg/actuation DsDv Inhale 1 puff into the lungs once daily.    bacitracin (AK-TRACIN) ophthalmic ointment APPLY .25" STRIP TO BOTH EYES DAILY AT BEDTIME     Family History     Problem Relation (Age of Onset)    Cancer Mother, Father    Heart failure Mother, Father        Social History Main Topics    Smoking status: Current Every Day Smoker     Packs/day: 0.10     Years: 35.00     Types: Vaping with nicotine    Smokeless tobacco: Not on file    Alcohol use 1.2 oz/week     2 Cans of beer per week    Drug use: No    Sexual activity: Not on file     Review of Systems   Constitutional: Positive for diaphoresis. Negative for appetite change, chills, fatigue and fever.   HENT: Negative for congestion, ear pain, postnasal drip, rhinorrhea, sinus pressure, sneezing and sore throat.  "   Eyes: Negative for pain, redness and itching.   Respiratory: Positive for cough, chest tightness, shortness of breath and wheezing.    Cardiovascular: Positive for chest pain (right sided intermittent ache) and palpitations. Negative for leg swelling.   Gastrointestinal: Negative for abdominal pain, constipation, diarrhea, nausea and vomiting.   Genitourinary: Negative for decreased urine volume, dysuria, frequency and hematuria.   Musculoskeletal: Positive for back pain (occasional). Negative for arthralgias, gait problem and myalgias.   Skin: Negative for pallor, rash and wound.   Neurological: Positive for dizziness, light-headedness and headaches. Negative for syncope, facial asymmetry and weakness.   Psychiatric/Behavioral: Negative for confusion.     Objective:     Vital Signs (Most Recent):  Temp: 96.5 °F (35.8 °C) (04/26/17 0841)  Pulse: (!) 119 (04/26/17 1244)  Resp: (!) 24 (04/26/17 1244)  BP: (!) 145/90 (04/26/17 1147)  SpO2: (!) 94 % (04/26/17 1244) Vital Signs (24h Range):  Temp:  [96.5 °F (35.8 °C)] 96.5 °F (35.8 °C)  Pulse:  [112-127] 119  Resp:  [20-28] 24  SpO2:  [94 %-100 %] 94 %  BP: (135-149)/() 145/90        There is no height or weight on file to calculate BMI.    Physical Exam   Constitutional: He is oriented to person, place, and time. He appears well-developed and well-nourished. He appears distressed (mildly distress).   HENT:   Head: Normocephalic and atraumatic.   Eyes: Conjunctivae and EOM are normal. No scleral icterus.   Neck: Normal range of motion. Neck supple.   Cardiovascular: Regular rhythm and normal heart sounds.  Tachycardia present.    No murmur heard.  Pulmonary/Chest: Accessory muscle usage present. He is in respiratory distress. He has wheezes.   Increased work of breathing      Increased SOB when speaking    Harsh wheezing throughout posterior lung fields   Abdominal: Soft. Bowel sounds are normal.   Musculoskeletal: Normal range of motion. He exhibits no edema or  tenderness.   Lymphadenopathy:     He has no cervical adenopathy.   Neurological: He is alert and oriented to person, place, and time. No cranial nerve deficit. He exhibits normal muscle tone.   Skin: Skin is warm and dry.   Psychiatric: He has a normal mood and affect. His behavior is normal.   Nursing note and vitals reviewed.       Significant Labs:   CBC:   Recent Labs  Lab 04/26/17  0908   WBC 7.95   HGB 17.3   HCT 49.1        CMP:   Recent Labs  Lab 04/26/17  0908      K 4.7      CO2 27      BUN 8   CREATININE 1.0   CALCIUM 9.5   PROT 7.7   ALBUMIN 4.0   BILITOT 0.6   ALKPHOS 79   AST 45*   ALT 48*   ANIONGAP 12   EGFRNONAA >60     All pertinent labs within the past 24 hours have been reviewed.    Significant Imaging: I have reviewed all pertinent imaging results/findings within the past 24 hours.

## 2017-04-26 NOTE — H&P
[facility] Hospital Medicine  History & Physical    Patient Name: Johnny Perales  MRN: [id number]  Admission Date: 4/26/2017  Attending Physician: Emile Mcnulty,*   Primary Care Provider: Joni Rey MD         Patient information was obtained from patient, relative(s), past medical records and ER records.     Subjective:     Principal Problem:COPD exacerbation    Chief Complaint:   Chief Complaint   Patient presents with    Shortness of Breath     wheezing        HPI: Johnny Perales is a 61 yo male with end stage COPD, chronic respiratory failure, + smoker/+ vapor (nicotine) who presents with sudden onset of worsened SOB, GANDHI, wheezing and increased sputum from cough noted last night. Despite 3 L NC oxygen and increased frequency of neb treatments pt presented for evaluation. His respirations are labored, he is SOB when speaking, oxygen per NC increased to 5 L is noted in the ED. ABG notes hypercapnic respiratory failure. CXR is negative for acute findings. CBC is unremarkable; CMP shows increased eosinophils. Pt denies recent steroid and ABX prescription.     Past Medical History:   Diagnosis Date    COPD (chronic obstructive pulmonary disease)        History reviewed. No pertinent surgical history.    Review of patient's allergies indicates:  No Known Allergies    No current facility-administered medications on file prior to encounter.      Current Outpatient Prescriptions on File Prior to Encounter   Medication Sig    albuterol (ACCUNEB) 0.63 mg/3 mL Nebu Take 3 mLs (0.63 mg total) by nebulization 3 (three) times daily as needed. (Patient taking differently: Take 0.63 mg by nebulization 4 (four) times daily as needed. )    buPROPion (WELLBUTRIN SR) 150 MG TBSR 12 hr tablet Take 150 mg by mouth 2 (two) times daily.    cholecalciferol, vitamin D3, (VITAMIN D3) 400 unit Chew Take 2 tablets by mouth once daily.     folic acid (FOLVITE) 1 MG tablet Take 1,000 mcg by mouth once  "daily.    PROAIR HFA 90 mcg/actuation inhaler INHALE ONCE EVERY 4 HOURS AS NEEDED FOR WHEEZING    roflumilast 500 mcg Tab Take 1 tablet (500 mcg total) by mouth once daily.    SPIRIVA WITH HANDIHALER 18 mcg inhalation capsule INHALE 1 CAPSULE BY MOUTH ONCE DAILY    umeclidinium-vilanterol (ANORO ELLIPTA) 62.5-25 mcg/actuation DsDv Inhale 1 puff into the lungs once daily.    bacitracin (AK-TRACIN) ophthalmic ointment APPLY .25" STRIP TO BOTH EYES DAILY AT BEDTIME     Family History     Problem Relation (Age of Onset)    Cancer Mother, Father    Heart failure Mother, Father        Social History Main Topics    Smoking status: Current Every Day Smoker     Packs/day: 0.10     Years: 35.00     Types: Vaping with nicotine    Smokeless tobacco: Not on file    Alcohol use 1.2 oz/week     2 Cans of beer per week    Drug use: No    Sexual activity: Not on file     Review of Systems   Constitutional: Positive for diaphoresis. Negative for appetite change, chills, fatigue and fever.   HENT: Negative for congestion, ear pain, postnasal drip, rhinorrhea, sinus pressure, sneezing and sore throat.    Eyes: Negative for pain, redness and itching.   Respiratory: Positive for cough, chest tightness, shortness of breath and wheezing.    Cardiovascular: Positive for chest pain (right sided intermittent ache) and palpitations. Negative for leg swelling.   Gastrointestinal: Negative for abdominal pain, constipation, diarrhea, nausea and vomiting.   Genitourinary: Negative for decreased urine volume, dysuria, frequency and hematuria.   Musculoskeletal: Positive for back pain (occasional). Negative for arthralgias, gait problem and myalgias.   Skin: Negative for pallor, rash and wound.   Neurological: Positive for dizziness, light-headedness and headaches. Negative for syncope, facial asymmetry and weakness.   Psychiatric/Behavioral: Negative for confusion.     Objective:     Vital Signs (Most Recent):  Temp: 96.5 °F (35.8 °C) " (04/26/17 0841)  Pulse: (!) 119 (04/26/17 1244)  Resp: (!) 24 (04/26/17 1244)  BP: (!) 145/90 (04/26/17 1147)  SpO2: (!) 94 % (04/26/17 1244) Vital Signs (24h Range):  Temp:  [96.5 °F (35.8 °C)] 96.5 °F (35.8 °C)  Pulse:  [112-127] 119  Resp:  [20-28] 24  SpO2:  [94 %-100 %] 94 %  BP: (135-149)/() 145/90        There is no height or weight on file to calculate BMI.    Physical Exam   Constitutional: He is oriented to person, place, and time. He appears well-developed and well-nourished. He appears distressed (mildly distress).   HENT:   Head: Normocephalic and atraumatic.   Eyes: Conjunctivae and EOM are normal. No scleral icterus.   Neck: Normal range of motion. Neck supple.   Cardiovascular: Regular rhythm and normal heart sounds.  Tachycardia present.    No murmur heard.  Pulmonary/Chest: Accessory muscle usage present. He is in respiratory distress. He has wheezes.   Increased work of breathing      Increased SOB when speaking    Harsh wheezing throughout posterior lung fields   Abdominal: Soft. Bowel sounds are normal.   Musculoskeletal: Normal range of motion. He exhibits no edema or tenderness.   Lymphadenopathy:     He has no cervical adenopathy.   Neurological: He is alert and oriented to person, place, and time. No cranial nerve deficit. He exhibits normal muscle tone.   Skin: Skin is warm and dry.   Psychiatric: He has a normal mood and affect. His behavior is normal.   Nursing note and vitals reviewed.       Significant Labs:   CBC:   Recent Labs  Lab 04/26/17  0908   WBC 7.95   HGB 17.3   HCT 49.1        CMP:   Recent Labs  Lab 04/26/17  0908      K 4.7      CO2 27      BUN 8   CREATININE 1.0   CALCIUM 9.5   PROT 7.7   ALBUMIN 4.0   BILITOT 0.6   ALKPHOS 79   AST 45*   ALT 48*   ANIONGAP 12   EGFRNONAA >60     All pertinent labs within the past 24 hours have been reviewed.    Significant Imaging: I have reviewed all pertinent imaging results/findings within the past 24  hours.    Assessment/Plan:     * COPD exacerbation  oxygen therapy to maintain sats > 89 %  Budesonide and formoterol treatments  Solumedrol 80 mg IV every 8 hours  Avelox  Duo Nebs  Daliresp      Acute hypercapnic respiratory failure  Neuro checks  Maintain O2 sat > 89 % per NC  Resume Triology vent at night      Tobacco abuse  Will smoke approx 3 cigs per day  Smokes vaporized nicotine  Advised continued cessation and Wellbutrin      VTE Risk Mitigation         Ordered     enoxaparin injection 40 mg  Daily     Route:  Subcutaneous        04/26/17 1156     Medium Risk of VTE  Once      04/26/17 1310        Alma Hernández NP  Department of Hospital Medicine   [facility]

## 2017-04-26 NOTE — PLAN OF CARE
Problem: Patient Care Overview  Goal: Plan of Care Review  Outcome: Ongoing (interventions implemented as appropriate)  Patient remains free from falls, fall precaution in place. Bed rest.   VS stable. Denies pain. SOB on exertion. Tachypnea. 4L NC, nebs. IV infusing.  No other C/O at this time.Call bell and belongings within reach, reminded to call for assistance.

## 2017-04-26 NOTE — IP AVS SNAPSHOT
[address] [place] [address]           Patient Discharge Instructions   Our goal is to set you up for success. This packet includes information on your condition, medications, and your home care.  It will help you care for yourself to prevent having to return to the hospital.     Please ask your nurse if you have any questions.      There are many details to remember when preparing to leave the hospital. Here is what you will need to do:    1. Take your medicine. If you are prescribed medications, review your Medication List on the following pages. You may have new medications to  at the pharmacy and others that you'll need to stop taking. Review the instructions for how and when to take your medications. Talk with your doctor or nurses if you are unsure of what to do.     2. Go to your follow-up appointments. Specific follow-up information is listed in the following pages. Your may be contacted by a nurse or clinical provider about future appointments. Be sure we have all of the phone numbers to reach you. Please contact your provider's office if you are unable to make an appointment.     3. Watch for warning signs. Your doctor or nurse will give you detailed warning signs to watch for and when to call for assistance. These instructions may also include educational information about your condition. If you experience any of warning signs to your health, call your doctor.               ** Verify the list of medication(s) below is accurate and up to date. Carry this with you in case of emergency. If your medications have changed, please notify your healthcare provider.             Medication List      START taking these medications        Additional Info                      hydrocodone-acetaminophen 5-325mg 5-325 mg per tablet   Commonly known as:  NORCO   Quantity:  12 tablet   Refills:  0   Dose:  1 tablet    Last time this was given:  1 tablet on 4/28/2017  "11:17 AM   Instructions:  Take 1 tablet by mouth every 6 (six) hours as needed.     Begin Date    AM    Noon    PM    Bedtime       levoFLOXacin 500 MG tablet   Commonly known as:  LEVAQUIN   Quantity:  10 tablet   Refills:  0   Dose:  500 mg    Instructions:  Take 1 tablet (500 mg total) by mouth once daily.     Begin Date    AM    Noon    PM    Bedtime       predniSONE 10 MG tablet   Commonly known as:  DELTASONE   Quantity:  60 tablet   Refills:  0   Dose:  10 mg    Instructions:  Take 1 tablet (10 mg total) by mouth once daily. Take 50 mg daily for 4 days, then take 40 mg daily for 4 days, then take 30 mg daily for 4 days, then take 20 mg daily for 4 days, then take 10 mg daily for 4 days, then stop.     Begin Date    AM    Noon    PM    Bedtime         CHANGE how you take these medications        Additional Info                      * albuterol 0.63 mg/3 mL Nebu   Commonly known as:  ACCUNEB   Quantity:  90 vial   Refills:  11   Dose:  0.63 mg   What changed:  when to take this    Instructions:  Take 3 mLs (0.63 mg total) by nebulization 3 (three) times daily as needed.     Begin Date    AM    Noon    PM    Bedtime       * PROAIR HFA 90 mcg/actuation inhaler   Quantity:  18 g   Refills:  11   What changed:  Another medication with the same name was changed. Make sure you understand how and when to take each.   Generic drug:  albuterol    Instructions:  INHALE ONCE EVERY 4 HOURS AS NEEDED FOR WHEEZING     Begin Date    AM    Noon    PM    Bedtime       * Notice:  This list has 2 medication(s) that are the same as other medications prescribed for you. Read the directions carefully, and ask your doctor or other care provider to review them with you.      CONTINUE taking these medications        Additional Info                      bacitracin ophthalmic ointment   Commonly known as:  AK-TRACIN   Refills:  5    Instructions:  APPLY .25" STRIP TO BOTH EYES DAILY AT BEDTIME     Begin Date    AM    Noon    PM    " Bedtime       buPROPion 150 MG TBSR 12 hr tablet   Commonly known as:  WELLBUTRIN SR   Refills:  0   Dose:  150 mg    Last time this was given:  150 mg on 4/29/2017  9:40 AM   Instructions:  Take 150 mg by mouth 2 (two) times daily.     Begin Date    AM    Noon    PM    Bedtime       folic acid 1 MG tablet   Commonly known as:  FOLVITE   Refills:  0   Dose:  1000 mcg    Last time this was given:  1,000 mcg on 4/29/2017  9:41 AM   Instructions:  Take 1,000 mcg by mouth once daily.     Begin Date    AM    Noon    PM    Bedtime       roflumilast 500 mcg Tab   Quantity:  30 tablet   Refills:  0   Dose:  500 mcg    Last time this was given:  500 mcg on 4/29/2017  9:40 AM   Instructions:  Take 1 tablet (500 mcg total) by mouth once daily.     Begin Date    AM    Noon    PM    Bedtime       SPIRIVA WITH HANDIHALER 18 mcg inhalation capsule   Refills:  4   Generic drug:  tiotropium    Instructions:  INHALE 1 CAPSULE BY MOUTH ONCE DAILY     Begin Date    AM    Noon    PM    Bedtime       umeclidinium-vilanterol 62.5-25 mcg/actuation Dsdv   Commonly known as:  ANORO ELLIPTA   Quantity:  30 each   Refills:  11   Dose:  1 puff    Instructions:  Inhale 1 puff into the lungs once daily.     Begin Date    AM    Noon    PM    Bedtime       VITAMIN D3 400 unit Chew   Refills:  0   Dose:  2 tablet   Generic drug:  cholecalciferol (vitamin D3)    Instructions:  Take 2 tablets by mouth once daily.     Begin Date    AM    Noon    PM    Bedtime            Where to Get Your Medications      These medications were sent to [facility] [address] [place] [facility] [place] - [address]  [address], [place] [address]     Phone:  [phone]     levoFLOXacin 500 MG tablet         You can get these medications from any pharmacy     Bring a paper prescription for each of these medications     hydrocodone-acetaminophen 5-325mg 5-325 mg per tablet    predniSONE 10 MG tablet                  Please bring to all follow up  appointments:    1. A copy of your discharge instructions.  2. All medicines you are currently taking in their original bottles.  3. Identification and insurance card.    Please arrive 15 minutes ahead of scheduled appointment time.    Please call 24 hours in advance if you must reschedule your appointment and/or time.        Your Scheduled Appointments     May 01, 2017  1:00 PM CDT   Pulmonary Rehab Session with [facility]    [address] [place] [id number]   [phone]            May 03, 2017  1:00 PM CDT   Pulmonary Rehab Session with PULMONARY RE[facility]AB, [facility]    [address] [place] [address]   [phone]            May 08, 2017  1:00 PM CDT   Pulmonary Rehab Session with PULMONARY REHAB, [facility]    [address] [place] [address]   [phone]            May 10, 2017  1:00 PM CDT   Pulmonary Rehab Session with PULMONARY REHAB, [facility]    [address] [place] [address]   [phone]            May 15, 2017  1:00 PM CDT   Pulmonary Rehab Session with PULMONARY RE[facility]AB, [facility]    [address] [place] [address]   [phone]              Follow-up Information     Follow up with Joni Rey MD. Schedule an appointment as soon as possible for a visit in 3 days.    Specialty:  Family Medicine    Contact information:    [address] [facility] [address] [phone]          Follow up with Prabhakar Rodriguez MD. Schedule an appointment as soon as possible for a visit in 2 weeks.    Specialty:  Pulmonary Disease    Contact information:    [address]  [place] [address] [phone]          Discharge Instructions     Future Orders    Activity as  "tolerated     Diet general     Questions:    Total calories:      Fat restriction, if any:      Protein restriction, if any:      Na restriction, if any:      Fluid restriction:      Additional restrictions:          Primary Diagnosis     Your primary diagnosis was:  Chronic Bronchitis      Admission Information     Date & Time Provider Department CSN    4/26/2017  8:36 AM Sujit Chacon MD [facility] -  [id number]      Care Providers     Provider Role Specialty Primary office phone    Sujit Chacon MD Attending Provider General Practice [phone]    Sujit Chacon MD Team Attending  General Practice [phone]      Your Vitals Were     BP Pulse Temp Resp Height Weight    133/76 (BP Location: Right arm, Patient Position: Lying, BP Method: Automatic) 88 97.6 °F (36.4 °C) (Oral) 22 5' 11" (1.803 m) 91.7 kg (202 lb 3 oz)    SpO2 BMI             93% 28.2 kg/m2         Recent Lab Values     No lab values to display.      Pending Labs     Order Current Status    Blood culture Preliminary result    Blood culture Preliminary result      Allergies as of 4/29/2017     No Known Allergies      [facility]     [facility] Nurse Care Line - 24/7 Assistance  Unless otherwise directed by your provider, please contact [facility], our nurse care line that is available for 24/7 assistance.     Registered nurses in the [facility] provide clinical advisement, health education, appointment booking, and other advisory services.  Call for this free service at [phone].        Advance Directives     An advance directive is a document which, in the event you are no longer able to make decisions for yourself, tells your healthcare team what kind of treatment you do or do not want to receive, or who you would like to make those decisions for you.  If you do not currently have an advance directive, [facility] encourages you to create one.  For more information call:  [phone] ([phone]), " [phone] ([phone]),  or log on to [email or web address].        Smoking Cessation     If you would like to quit smoking:   You may be eligible for free services if you are a [place] resident and started smoking cigarettes before September 1, 1988.  Call the Smoking Cessation Trust (SCT) toll free at (429) 072-9200 or (058) 574-7069.   Call 0-800-QUIT-NOW if you do not meet the above criteria.   Contact us via email: [email or web address]   View our website for more information: [email or web address]        Language Assistance Services     ATTENTION: Language assistance services are available, free of charge. Please call [phone].      ATENCIÓN: Si habla español, tiene a berrios disposición servicios gratuitos de asistencia lingüística. Llame al [phone].     CHÚ Ý: N?u b?n nói Ti?ng Vi?t, có các d?ch v? h? tr? ngôn ng? mi?n phí dành cho b?n. G?i s? [phone].        [facility] Sign-Up     Activating your [facility] account is as easy as 1-2-3!     1) Visit [email or web address], select Sign Up Now, enter this activation code and your date of birth, then select Next.  [email or web address]  Expires: 6/13/2017 11:33 AM      2) Create a username and password to use when you visit [facility] in the future and select a security question in case you lose your password and select Next.    3) Enter your e-mail address and click Sign Up!    Additional Information  If you have questions, please e-mail [email or web address] or call [phone] to talk to our [facility] staff. Remember, [facility] is NOT to be used for urgent needs. For medical emergencies, dial 911.          [facility] complies with applicable Federal civil rights laws and does not discriminate on the basis of race, color, national origin, age, disability, or sex.

## 2017-04-26 NOTE — ASSESSMENT & PLAN NOTE
Will smoke approx 3 cigs per day  Smokes vaporized nicotine  Advised continued cessation and Wellbutrin

## 2017-04-26 NOTE — ASSESSMENT & PLAN NOTE
oxygen therapy to maintain sats > 92 %  Budesonide and formoterol treatments  Solumedrol 80 mg IV every 8 hours  Avelox  Duo Nebs

## 2017-04-26 NOTE — ED PROVIDER NOTES
SCRIBE #1 NOTE: I, Jose Reaves, am scribing for, and in the presence of, Emile Mcnulty MD. I have scribed the entire note.      History      Chief Complaint   Patient presents with    Shortness of Breath     wheezing       Review of patient's allergies indicates:  No Known Allergies     HPI   HPI    4/26/2017, 8:56 AM   History obtained from the patient and family at bedside      History of Present Illness: Johnny Perales is a 62 y.o. male patient who presents to the Emergency Department for SOB which onset gradually this AM. Symptoms are constant and moderate in severity. Sx are exacerbated by nothing and relieved by nothing. Associated sxs include wheezing and CP. Limited ROS secondary to pt's condition. Pt received solumedrol en route to ED via EMS. Pt reports current everyday smoker. No further complaints or concerns at this time.     Arrival mode: EMS    PCP: Joni Rey MD       Past Medical History:  Past Medical History:   Diagnosis Date    COPD (chronic obstructive pulmonary disease)        Past Surgical History:  History reviewed. No pertinent surgical history.      Family History:  Family History   Problem Relation Age of Onset    Cancer Mother     Heart failure Mother     Cancer Father     Heart failure Father        Social History:  Social History     Social History Main Topics    Smoking status: Current Every Day Smoker     Packs/day: 0.10     Years: 35.00     Types: Vaping with nicotine    Smokeless tobacco: Not on file    Alcohol use 1.2 oz/week     2 Cans of beer per week    Drug use: No    Sexual activity: Not on file       ROS   Review of Systems   Respiratory: Positive for shortness of breath and wheezing.    Cardiovascular: Positive for chest pain.   Limited ROS secondary to moderate respiratory distress    Physical Exam    Initial Vitals   BP Pulse Resp Temp SpO2   04/26/17 0837 04/26/17 0837 04/26/17 0837 04/26/17 0841 04/26/17 0837   146/78 118 22 96.5 °F  (35.8 °C) 100 %      Physical Exam  Nursing Notes and Vital Signs Reviewed.  Constitutional: Patient is in moderate distress. Awake and alert. Well-developed and well-nourished.  Head: Atraumatic. Normocephalic.  Eyes: PERRL. EOM intact. Conjunctivae are not pale. No scleral icterus.  ENT: Mucous membranes are moist. Oropharynx is clear and symmetric.    Neck: Supple. Full ROM. No lymphadenopathy.  Cardiovascular: Tachycardic, normal heart sounds. No murmurs, rubs, or gallops. Distal pulses are 2+ and symmetric.  Pulmonary/Chest: Moderate respiratory distress, tachypneic. Wheezing noted bilaterally with retractions. No rales or rhonchi.  Abdominal: Soft and non-distended.  There is no tenderness.  No rebound, guarding, or rigidity. Good bowel sounds.  Musculoskeletal: Moves all extremities. No obvious deformities. No edema. No calf tenderness.  Skin: Warm and dry.  Neurological:  Alert, awake, and appropriate.  Normal speech.  No acute focal neurological deficits are appreciated.  Psychiatric: Normal affect. Good eye contact. Appropriate in content.    ED Course    Procedures  ED Vital Signs:  Vitals:    04/26/17 0837 04/26/17 0841 04/26/17 0843 04/26/17 0848   BP: (!) 146/78 (!) 149/104     Pulse: (!) 118 (!) 125 (!) 124 (!) 125   Resp: (!) 22 (!) 28  (!) 28   Temp:  96.5 °F (35.8 °C)     TempSrc:  Axillary     SpO2: 100% 100%  100%    04/26/17 0857 04/26/17 0902 04/26/17 0932 04/26/17 1017   BP:  (!) 138/96 135/86 (!) 140/85   Pulse: (!) 127 (!) 123 (!) 120 (!) 116   Resp: (!) 26 (!) 22 (!) 21 20   Temp:       TempSrc:       SpO2: 98% 99% 98% 99%       Abnormal Lab Results:  Labs Reviewed   CBC W/ AUTO DIFFERENTIAL - Abnormal; Notable for the following:        Result Value    MCH 34.7 (*)     Eos # 0.9 (*)     Eosinophil% 11.1 (*)     All other components within normal limits   COMPREHENSIVE METABOLIC PANEL - Abnormal; Notable for the following:     AST 45 (*)     ALT 48 (*)     All other components within normal  limits   ISTAT PROCEDURE - Abnormal; Notable for the following:     POC PH 7.313 (*)     POC PCO2 54.9 (*)     POC PO2 79 (*)     POC SATURATED O2 94 (*)     All other components within normal limits   D DIMER, QUANTITATIVE   CK   CK-MB   TROPONIN I   TSH   B-TYPE NATRIURETIC PEPTIDE   URINALYSIS        All Lab Results:  Results for orders placed or performed during the hospital encounter of 04/26/17   CBC auto differential   Result Value Ref Range    WBC 7.95 3.90 - 12.70 K/uL    RBC 4.99 4.60 - 6.20 M/uL    Hemoglobin 17.3 14.0 - 18.0 g/dL    Hematocrit 49.1 40.0 - 54.0 %    MCV 98 82 - 98 fL    MCH 34.7 (H) 27.0 - 31.0 pg    MCHC 35.2 32.0 - 36.0 %    RDW 13.4 11.5 - 14.5 %    Platelets 181 150 - 350 K/uL    MPV 9.9 9.2 - 12.9 fL    Gran # 4.9 1.8 - 7.7 K/uL    Lymph # 1.4 1.0 - 4.8 K/uL    Mono # 0.7 0.3 - 1.0 K/uL    Eos # 0.9 (H) 0.0 - 0.5 K/uL    Baso # 0.11 0.00 - 0.20 K/uL    Gran% 61.2 38.0 - 73.0 %    Lymph% 18.0 18.0 - 48.0 %    Mono% 8.3 4.0 - 15.0 %    Eosinophil% 11.1 (H) 0.0 - 8.0 %    Basophil% 1.4 0.0 - 1.9 %    Differential Method Automated    Comprehensive metabolic panel   Result Value Ref Range    Sodium 141 136 - 145 mmol/L    Potassium 4.7 3.5 - 5.1 mmol/L    Chloride 102 95 - 110 mmol/L    CO2 27 23 - 29 mmol/L    Glucose 102 70 - 110 mg/dL    BUN, Bld 8 8 - 23 mg/dL    Creatinine 1.0 0.5 - 1.4 mg/dL    Calcium 9.5 8.7 - 10.5 mg/dL    Total Protein 7.7 6.0 - 8.4 g/dL    Albumin 4.0 3.5 - 5.2 g/dL    Total Bilirubin 0.6 0.1 - 1.0 mg/dL    Alkaline Phosphatase 79 55 - 135 U/L    AST 45 (H) 10 - 40 U/L    ALT 48 (H) 10 - 44 U/L    Anion Gap 12 8 - 16 mmol/L    eGFR if African American >60 >60 mL/min/1.73 m^2    eGFR if non African American >60 >60 mL/min/1.73 m^2   D dimer, quantitative   Result Value Ref Range    D-Dimer 0.37 <0.50 mg/L FEU   CK   Result Value Ref Range     20 - 200 U/L   CK-MB   Result Value Ref Range     20 - 200 U/L    CPK MB 5.8 0.1 - 6.5 ng/mL    MB% 3.7 0.0  - 5.0 %   Troponin I   Result Value Ref Range    Troponin I 0.007 0.000 - 0.026 ng/mL   TSH   Result Value Ref Range    TSH 2.421 0.400 - 4.000 uIU/mL   Brain natriuretic peptide   Result Value Ref Range    BNP <10 0 - 99 pg/mL   ISTAT PROCEDURE   Result Value Ref Range    POC PH 7.313 (L) 7.35 - 7.45    POC PCO2 54.9 (H) 35 - 45 mmHg    POC PO2 79 (L) 80 - 100 mmHg    POC HCO3 27.8 24 - 28 mmol/L    POC BE 2 -2 to 2 mmol/L    POC SATURATED O2 94 (L) 95 - 100 %    Sample ARTERIAL     Site LR     Allens Test Pass     DelSys Nasal Can     Mode SPONT     Flow 3     FiO2 32          Imaging Results:  Imaging Results         X-Ray Chest 1 View (Final result) Result time:  04/26/17 09:15:19    Final result by Yosi Rider MD (04/26/17 09:15:19)    Impression:     No acute cardiopulmonary disease.            Electronically signed by: YOSI RIDER MD  Date:     04/26/17  Time:    09:15     Narrative:    Exam: XR CHEST 1 VIEW    Clinical History: Shortness of breath.     Findings:     The lungs are clear. The cardiac silhouette is within normal limits.               The EKG was ordered, reviewed, and independently interpreted by the ED provider.  Interpretation time: 0900  Rate: 123 BPM  Rhythm: sinus tachycardia  Interpretation: Possible left atrial enlargement. No STEMI.           The Emergency Provider reviewed the vital signs and test results, which are outlined above.    ED Discussion     11:48 AM: Re-evaluated pt. I have discussed test results, shared treatment plan, and the need for admission with patient and family at bedside. Pt and family express understanding at this time and agree with all information. All questions answered. Pt and family have no further questions or concerns at this time. Pt is ready for admit.    11:35 AM: Discussed case with Alma ([facility]). Dr. Soliz agrees with current care and management of pt and accepts admission.   Admitting Service: Hospital medicine   Admitting  Physician: Dr. Soliz  Admit to: Tele    ED Medication(s):  Medications   albuterol-ipratropium 2.5mg-0.5mg/3mL nebulizer solution 3 mL (3 mLs Nebulization Given by Other 4/26/17 0902)   sodium chloride 0.9% bolus 1,000 mL (0 mLs Intravenous Stopped 4/26/17 1015)       New Prescriptions    No medications on file             Medical Decision Making    Medical Decision Making:   Clinical Tests:   Lab Tests: Reviewed and Ordered  Radiological Study: Ordered and Reviewed  Medical Tests: Reviewed and Ordered     Additional MDM:   Smoking Cessation: The patient was counseled on tobacco related  health complications.        Scribe Attestation:   Scribe #1: I performed the above scribed service and the documentation accurately describes the services I performed. I attest to the accuracy of the note.    Attending:   Physician Attestation Statement for Scribe #1: I, Emile Mcnulty MD, personally performed the services described in this documentation, as scribed by Jose Reaves, in my presence, and it is both accurate and complete.          Clinical Impression       ICD-10-CM ICD-9-CM   1. COPD exacerbation J44.1 491.21   2. SOB (shortness of breath) R06.02 786.05       Disposition:   Disposition: Admitted (Tele)  Condition: Fair         Emile Mcnulty MD  05/01/17 [id number]

## 2017-04-26 NOTE — PLAN OF CARE
Problem: Patient Care Overview  Goal: Plan of Care Review  Outcome: Ongoing (interventions implemented as appropriate)  Pt on O2 tolerating well. Breathing treatment given w/mask & tolerated well.

## 2017-04-26 NOTE — ED NOTES
Pt resting in ER stretcher. Pt remains on cardiac monitor with vss noted. Bed low and locked, call light in reach, side rails up x2. Pt verbalized understanding of status and POC; denies further needs. Will continue to monitor.

## 2017-04-27 LAB
ALBUMIN SERPL BCP-MCNC: 3.5 G/DL
ALP SERPL-CCNC: 60 U/L
ALT SERPL W/O P-5'-P-CCNC: 33 U/L
ANION GAP SERPL CALC-SCNC: 9 MMOL/L
AST SERPL-CCNC: 24 U/L
BASOPHILS # BLD AUTO: 0 K/UL
BASOPHILS NFR BLD: 0 %
BILIRUB SERPL-MCNC: 0.4 MG/DL
BUN SERPL-MCNC: 13 MG/DL
CALCIUM SERPL-MCNC: 9.3 MG/DL
CHLORIDE SERPL-SCNC: 103 MMOL/L
CO2 SERPL-SCNC: 26 MMOL/L
CREAT SERPL-MCNC: 0.9 MG/DL
DIFFERENTIAL METHOD: ABNORMAL
EOSINOPHIL # BLD AUTO: 0 K/UL
EOSINOPHIL NFR BLD: 0 %
ERYTHROCYTE [DISTWIDTH] IN BLOOD BY AUTOMATED COUNT: 13.2 %
EST. GFR  (AFRICAN AMERICAN): >60 ML/MIN/1.73 M^2
EST. GFR  (NON AFRICAN AMERICAN): >60 ML/MIN/1.73 M^2
GLUCOSE SERPL-MCNC: 146 MG/DL
HCT VFR BLD AUTO: 44.6 %
HGB BLD-MCNC: 15.6 G/DL
LYMPHOCYTES # BLD AUTO: 0.6 K/UL
LYMPHOCYTES NFR BLD: 6.2 %
MCH RBC QN AUTO: 34.4 PG
MCHC RBC AUTO-ENTMCNC: 35 %
MCV RBC AUTO: 98 FL
MONOCYTES # BLD AUTO: 0.4 K/UL
MONOCYTES NFR BLD: 3.9 %
NEUTROPHILS # BLD AUTO: 8.8 K/UL
NEUTROPHILS NFR BLD: 89.9 %
PLATELET # BLD AUTO: 177 K/UL
PMV BLD AUTO: 10.3 FL
POTASSIUM SERPL-SCNC: 4.4 MMOL/L
PROT SERPL-MCNC: 6.5 G/DL
RBC # BLD AUTO: 4.54 M/UL
SODIUM SERPL-SCNC: 138 MMOL/L
WBC # BLD AUTO: 9.8 K/UL

## 2017-04-27 PROCEDURE — 63600175 PHARM REV CODE 636 W HCPCS: Performed by: NURSE PRACTITIONER

## 2017-04-27 PROCEDURE — 63600175 PHARM REV CODE 636 W HCPCS: Performed by: EMERGENCY MEDICINE

## 2017-04-27 PROCEDURE — 21400001 HC TELEMETRY ROOM

## 2017-04-27 PROCEDURE — 80053 COMPREHEN METABOLIC PANEL: CPT

## 2017-04-27 PROCEDURE — 27000221 HC OXYGEN, UP TO 24 HOURS

## 2017-04-27 PROCEDURE — 25000242 PHARM REV CODE 250 ALT 637 W/ HCPCS: Performed by: NURSE PRACTITIONER

## 2017-04-27 PROCEDURE — 36415 COLL VENOUS BLD VENIPUNCTURE: CPT

## 2017-04-27 PROCEDURE — 94660 CPAP INITIATION&MGMT: CPT

## 2017-04-27 PROCEDURE — 27000190 HC CPAP FULL FACE MASK W/VALVE

## 2017-04-27 PROCEDURE — 94640 AIRWAY INHALATION TREATMENT: CPT

## 2017-04-27 PROCEDURE — 25000003 PHARM REV CODE 250: Performed by: EMERGENCY MEDICINE

## 2017-04-27 PROCEDURE — 85025 COMPLETE CBC W/AUTO DIFF WBC: CPT

## 2017-04-27 PROCEDURE — 25000003 PHARM REV CODE 250: Performed by: NURSE PRACTITIONER

## 2017-04-27 RX ORDER — IPRATROPIUM BROMIDE AND ALBUTEROL SULFATE 2.5; .5 MG/3ML; MG/3ML
3 SOLUTION RESPIRATORY (INHALATION) EVERY 4 HOURS
Status: DISCONTINUED | OUTPATIENT
Start: 2017-04-27 | End: 2017-04-29 | Stop reason: HOSPADM

## 2017-04-27 RX ADMIN — ROFLUMILAST 500 MCG: 500 TABLET ORAL at 09:04

## 2017-04-27 RX ADMIN — IPRATROPIUM BROMIDE AND ALBUTEROL SULFATE 3 ML: .5; 3 SOLUTION RESPIRATORY (INHALATION) at 08:04

## 2017-04-27 RX ADMIN — IPRATROPIUM BROMIDE AND ALBUTEROL SULFATE 3 ML: .5; 3 SOLUTION RESPIRATORY (INHALATION) at 03:04

## 2017-04-27 RX ADMIN — HYDROCODONE BITARTRATE AND ACETAMINOPHEN 1 TABLET: 5; 325 TABLET ORAL at 09:04

## 2017-04-27 RX ADMIN — ENOXAPARIN SODIUM 40 MG: 100 INJECTION SUBCUTANEOUS at 05:04

## 2017-04-27 RX ADMIN — MOXIFLOXACIN HYDROCHLORIDE 400 MG: 400 INJECTION, SOLUTION INTRAVENOUS at 02:04

## 2017-04-27 RX ADMIN — BUPROPION HYDROCHLORIDE 150 MG: 150 TABLET, FILM COATED, EXTENDED RELEASE ORAL at 09:04

## 2017-04-27 RX ADMIN — IPRATROPIUM BROMIDE AND ALBUTEROL SULFATE 3 ML: .5; 3 SOLUTION RESPIRATORY (INHALATION) at 11:04

## 2017-04-27 RX ADMIN — FOLIC ACID 1000 MCG: 1 TABLET ORAL at 09:04

## 2017-04-27 RX ADMIN — METHYLPREDNISOLONE SODIUM SUCCINATE 80 MG: 125 INJECTION, POWDER, FOR SOLUTION INTRAMUSCULAR; INTRAVENOUS at 02:04

## 2017-04-27 RX ADMIN — FAMOTIDINE 20 MG: 20 TABLET ORAL at 09:04

## 2017-04-27 RX ADMIN — IPRATROPIUM BROMIDE AND ALBUTEROL SULFATE 3 ML: .5; 3 SOLUTION RESPIRATORY (INHALATION) at 01:04

## 2017-04-27 RX ADMIN — IPRATROPIUM BROMIDE AND ALBUTEROL SULFATE 3 ML: .5; 3 SOLUTION RESPIRATORY (INHALATION) at 06:04

## 2017-04-27 RX ADMIN — METHYLPREDNISOLONE SODIUM SUCCINATE 80 MG: 125 INJECTION, POWDER, FOR SOLUTION INTRAMUSCULAR; INTRAVENOUS at 06:04

## 2017-04-27 RX ADMIN — SODIUM CHLORIDE: 0.9 INJECTION, SOLUTION INTRAVENOUS at 07:04

## 2017-04-27 RX ADMIN — METHYLPREDNISOLONE SODIUM SUCCINATE 80 MG: 125 INJECTION, POWDER, FOR SOLUTION INTRAMUSCULAR; INTRAVENOUS at 09:04

## 2017-04-27 NOTE — PLAN OF CARE
Problem: Patient Care Overview  Goal: Plan of Care Review  Outcome: Ongoing (interventions implemented as appropriate)  Patient remains free from falls and injury. Receiving scheduled and PRN breathing treatments. VS stable. No complaints of pain. POC reviewed. Will continue to monitor.

## 2017-04-27 NOTE — PLAN OF CARE
Met with patient and sister, Lashawn Gaines. 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 No anticipated discharge needs.      04/27/17 1003   Discharge Assessment   Assessment Type Discharge Planning Assessment   Confirmed/corrected address and phone number on facesheet? Yes   Assessment information obtained from? Patient;Medical Record   Prior to hospitilization cognitive status: Alert/Oriented   Prior to hospitalization functional status: Assistive Equipment   Current cognitive status: Alert/Oriented   Current Functional Status: Assistive Equipment   Arrived From home or self-care   Lives With sibling(s)   Able to Return to Prior Arrangements yes   Is patient able to care for self after discharge? Yes   Patient's perception of discharge disposition home or selfcare   Readmission Within The Last 30 Days no previous admission in last 30 days   Patient currently being followed by outpatient case management? No   Patient currently receives home health services? No   Does the patient currently use HME? Yes   Name and contact number for HME provider: [facility] through [facility], [facility] through [employer]   Patient currently receives private duty nursing? No   Equipment Currently Used at Home ventilator;oxygen  ([facility])   Do you have any problems affording any of your prescribed medications? No   Is the patient taking medications as prescribed? yes   Do you have any financial concerns preventing you from receiving the healthcare you need? No   Does the patient have transportation to healthcare appointments? Yes   On Dialysis? No   Discharge Plan A Home with family   Patient/Family In Agreement With Plan yes

## 2017-04-27 NOTE — ASSESSMENT & PLAN NOTE
-Pt admitted to Telemetry Unit  oxygen therapy to maintain sats > 92 %  Budesonide and formoterol treatments  Solumedrol 80 mg IV every 8 hours  Avelox  Duo Nebs

## 2017-04-27 NOTE — PROGRESS NOTES
[facility] Hospital Medicine  Progress Note    Patient Name: Johnny Perales  MRN: [id number]  Patient Class: IP- Inpatient   Admission Date: 4/26/2017  Length of Stay: 1 days  Attending Physician: Roverto Soliz MD  Primary Care Provider: Joni Rey MD        Subjective:     Principal Problem:COPD exacerbation    HPI:  Johnny Perales is a 61 yo male with end stage COPD, chronic respiratory failure, + smoker/+ vapor (nicotine) who presents with sudden onset of worsened SOB, GANDHI, wheezing and increased sputum from cough noted last night. Despite 3 L NC oxygen and increased frequency of neb treatments pt presented for evaluation. His respirations are labored, he is SOB when speaking, oxygen per NC increased to 5 L is noted in the ED. ABG notes hypercapnic respiratory failure. CXR is negative for acute findings. CBC is unremarkable; CMP shows increased eosinophils. Pt denies recent steroid and ABX prescription.     Hospital Course:  Pt admitted to Telemetry Unit for COPD exacerbation.  Pt treated with IV antibiotics, IV steroids, Duonebs prn, and supplemental oxygen.  Blood culture and sputum culture results pending.     Interval History: pt states that he is feeling much better today and reports symptom relief.  Plan of care discussed with patient.     Review of Systems   Constitutional: Negative for appetite change, chills, diaphoresis, fatigue and fever.   HENT: Negative for congestion, ear pain, postnasal drip, rhinorrhea, sinus pressure, sneezing and sore throat.    Eyes: Negative for pain, redness and itching.   Respiratory: Positive for cough, chest tightness, shortness of breath and wheezing.    Cardiovascular: Negative for chest pain, palpitations and leg swelling.   Gastrointestinal: Negative for abdominal pain, constipation, diarrhea, nausea and vomiting.   Genitourinary: Negative for decreased urine volume, dysuria, frequency and hematuria.   Musculoskeletal: Negative for  arthralgias, back pain, gait problem and myalgias.   Skin: Negative for pallor, rash and wound.   Neurological: Negative for dizziness, syncope, facial asymmetry, weakness, light-headedness and headaches.   Psychiatric/Behavioral: Negative for confusion.     Objective:     Vital Signs (Most Recent):  Temp: 97.6 °F (36.4 °C) (04/27/17 1209)  Pulse: 81 (04/27/17 1307)  Resp: 20 (04/27/17 1307)  BP: (!) 145/78 (04/27/17 1209)  SpO2: (!) 94 % (04/27/17 1209) Vital Signs (24h Range):  Temp:  [97.2 °F (36.2 °C)-98.8 °F (37.1 °C)] 97.6 °F (36.4 °C)  Pulse:  [] 81  Resp:  [18-24] 20  SpO2:  [92 %-97 %] 94 %  BP: (134-152)/(78-93) 145/78     Weight: 91.7 kg (202 lb 3 oz)  Body mass index is 28.2 kg/(m^2).    Intake/Output Summary (Last 24 hours) at 04/27/17 1609  Last data filed at 04/27/17 1022   Gross per 24 hour   Intake              560 ml   Output              600 ml   Net              -40 ml      Physical Exam   Constitutional: He is oriented to person, place, and time. He appears well-developed and well-nourished. No distress.   HENT:   Head: Normocephalic and atraumatic.   Eyes: Conjunctivae and EOM are normal. No scleral icterus.   Neck: Normal range of motion. Neck supple.   Cardiovascular: Normal rate, regular rhythm and normal heart sounds.    No murmur heard.  Pulmonary/Chest: No accessory muscle usage. No respiratory distress. He has wheezes.   Abdominal: Soft. Bowel sounds are normal.   Musculoskeletal: Normal range of motion. He exhibits no edema or tenderness.   Lymphadenopathy:     He has no cervical adenopathy.   Neurological: He is alert and oriented to person, place, and time. No cranial nerve deficit. He exhibits normal muscle tone.   Skin: Skin is warm and dry.   Psychiatric: He has a normal mood and affect. His behavior is normal.   Nursing note and vitals reviewed.      Significant Labs:   CBC:   Recent Labs  Lab 04/26/17  0908 04/27/17  0607   WBC 7.95 9.80   HGB 17.3 15.6   HCT 49.1 44.6     177     CMP:   Recent Labs  Lab 04/26/17  0908 04/27/17  0607    138   K 4.7 4.4    103   CO2 27 26    146*   BUN 8 13   CREATININE 1.0 0.9   CALCIUM 9.5 9.3   PROT 7.7 6.5   ALBUMIN 4.0 3.5   BILITOT 0.6 0.4   ALKPHOS 79 60   AST 45* 24   ALT 48* 33   ANIONGAP 12 9   EGFRNONAA >60 >60       Significant Imaging:   Imaging Results         X-Ray Chest 1 View (Final result) Result time:  04/26/17 09:15:19    Final result by Yosi Rider MD (04/26/17 09:15:19)    Impression:     No acute cardiopulmonary disease.            Electronically signed by: YOSI RIDER MD  Date:     04/26/17  Time:    09:15     Narrative:    Exam: XR CHEST 1 VIEW    Clinical History: Shortness of breath.     Findings:     The lungs are clear. The cardiac silhouette is within normal limits.            Assessment/Plan:      * COPD exacerbation  -Pt admitted to Telemetry Unit  oxygen therapy to maintain sats > 92 %  Budesonide and formoterol treatments  Solumedrol 80 mg IV every 8 hours  Avelox  Duo Nebs      Acute hypercapnic respiratory failure  Neuro checks  Maintain O2 sat > 89 % per NC  Resume Triology vent at night      Tobacco abuse  Will smoke approx 3 cigs per day  Smokes vaporized nicotine  Advised continued cessation and Wellbutrin      VTE Risk Mitigation         Ordered     enoxaparin injection 40 mg  Daily     Route:  Subcutaneous        04/26/17 1156     Medium Risk of VTE  Once      04/26/17 1310          Thea Scott NP  Department of Hospital Medicine   [facility]

## 2017-04-27 NOTE — PLAN OF CARE
Problem: Breathing Pattern Ineffective (Adult)  Goal: Identify Related Risk Factors and Signs and Symptoms  Related risk factors and signs and symptoms are identified upon initiation of Human Response Clinical Practice Guideline (CPG)   Outcome: Ongoing (interventions implemented as appropriate)  o2 sat on nc 4l/m=94%; tolerates txs well; wearing cpap at night.

## 2017-04-27 NOTE — SUBJECTIVE & OBJECTIVE
Interval History: pt states that he is feeling much better today and reports symptom relief.  Plan of care discussed with patient.     Review of Systems   Constitutional: Negative for appetite change, chills, diaphoresis, fatigue and fever.   HENT: Negative for congestion, ear pain, postnasal drip, rhinorrhea, sinus pressure, sneezing and sore throat.    Eyes: Negative for pain, redness and itching.   Respiratory: Positive for cough, chest tightness, shortness of breath and wheezing.    Cardiovascular: Negative for chest pain, palpitations and leg swelling.   Gastrointestinal: Negative for abdominal pain, constipation, diarrhea, nausea and vomiting.   Genitourinary: Negative for decreased urine volume, dysuria, frequency and hematuria.   Musculoskeletal: Negative for arthralgias, back pain, gait problem and myalgias.   Skin: Negative for pallor, rash and wound.   Neurological: Negative for dizziness, syncope, facial asymmetry, weakness, light-headedness and headaches.   Psychiatric/Behavioral: Negative for confusion.     Objective:     Vital Signs (Most Recent):  Temp: 97.6 °F (36.4 °C) (04/27/17 1209)  Pulse: 81 (04/27/17 1307)  Resp: 20 (04/27/17 1307)  BP: (!) 145/78 (04/27/17 1209)  SpO2: (!) 94 % (04/27/17 1209) Vital Signs (24h Range):  Temp:  [97.2 °F (36.2 °C)-98.8 °F (37.1 °C)] 97.6 °F (36.4 °C)  Pulse:  [] 81  Resp:  [18-24] 20  SpO2:  [92 %-97 %] 94 %  BP: (134-152)/(78-93) 145/78     Weight: 91.7 kg (202 lb 3 oz)  Body mass index is 28.2 kg/(m^2).    Intake/Output Summary (Last 24 hours) at 04/27/17 1609  Last data filed at 04/27/17 1022   Gross per 24 hour   Intake              560 ml   Output              600 ml   Net              -40 ml      Physical Exam   Constitutional: He is oriented to person, place, and time. He appears well-developed and well-nourished. No distress.   HENT:   Head: Normocephalic and atraumatic.   Eyes: Conjunctivae and EOM are normal. No scleral icterus.   Neck: Normal  range of motion. Neck supple.   Cardiovascular: Normal rate, regular rhythm and normal heart sounds.    No murmur heard.  Pulmonary/Chest: No accessory muscle usage. No respiratory distress. He has wheezes.   Abdominal: Soft. Bowel sounds are normal.   Musculoskeletal: Normal range of motion. He exhibits no edema or tenderness.   Lymphadenopathy:     He has no cervical adenopathy.   Neurological: He is alert and oriented to person, place, and time. No cranial nerve deficit. He exhibits normal muscle tone.   Skin: Skin is warm and dry.   Psychiatric: He has a normal mood and affect. His behavior is normal.   Nursing note and vitals reviewed.      Significant Labs:   CBC:   Recent Labs  Lab 04/26/17  0908 04/27/17  0607   WBC 7.95 9.80   HGB 17.3 15.6   HCT 49.1 44.6    177     CMP:   Recent Labs  Lab 04/26/17  0908 04/27/17  0607    138   K 4.7 4.4    103   CO2 27 26    146*   BUN 8 13   CREATININE 1.0 0.9   CALCIUM 9.5 9.3   PROT 7.7 6.5   ALBUMIN 4.0 3.5   BILITOT 0.6 0.4   ALKPHOS 79 60   AST 45* 24   ALT 48* 33   ANIONGAP 12 9   EGFRNONAA >60 >60       Significant Imaging:   Imaging Results         X-Ray Chest 1 View (Final result) Result time:  04/26/17 09:15:19    Final result by Yosi Rider MD (04/26/17 09:15:19)    Impression:     No acute cardiopulmonary disease.            Electronically signed by: YOSI RIDER MD  Date:     04/26/17  Time:    09:15     Narrative:    Exam: XR CHEST 1 VIEW    Clinical History: Shortness of breath.     Findings:     The lungs are clear. The cardiac silhouette is within normal limits.

## 2017-04-28 LAB
ALBUMIN SERPL BCP-MCNC: 3.4 G/DL
ALP SERPL-CCNC: 54 U/L
ALT SERPL W/O P-5'-P-CCNC: 35 U/L
ANION GAP SERPL CALC-SCNC: 8 MMOL/L
AST SERPL-CCNC: 24 U/L
BASOPHILS # BLD AUTO: 0 K/UL
BASOPHILS NFR BLD: 0 %
BILIRUB SERPL-MCNC: 0.4 MG/DL
BUN SERPL-MCNC: 14 MG/DL
CALCIUM SERPL-MCNC: 9.3 MG/DL
CHLORIDE SERPL-SCNC: 103 MMOL/L
CO2 SERPL-SCNC: 28 MMOL/L
CREAT SERPL-MCNC: 1 MG/DL
DIFFERENTIAL METHOD: ABNORMAL
EOSINOPHIL # BLD AUTO: 0 K/UL
EOSINOPHIL NFR BLD: 0 %
ERYTHROCYTE [DISTWIDTH] IN BLOOD BY AUTOMATED COUNT: 13.3 %
EST. GFR  (AFRICAN AMERICAN): >60 ML/MIN/1.73 M^2
EST. GFR  (NON AFRICAN AMERICAN): >60 ML/MIN/1.73 M^2
GLUCOSE SERPL-MCNC: 125 MG/DL
HCT VFR BLD AUTO: 45.7 %
HGB BLD-MCNC: 15.9 G/DL
LYMPHOCYTES # BLD AUTO: 0.5 K/UL
LYMPHOCYTES NFR BLD: 3.3 %
MCH RBC QN AUTO: 34.1 PG
MCHC RBC AUTO-ENTMCNC: 34.8 %
MCV RBC AUTO: 98 FL
MONOCYTES # BLD AUTO: 0.7 K/UL
MONOCYTES NFR BLD: 4.2 %
NEUTROPHILS # BLD AUTO: 15.4 K/UL
NEUTROPHILS NFR BLD: 92.5 %
PLATELET # BLD AUTO: 181 K/UL
PMV BLD AUTO: 10 FL
POTASSIUM SERPL-SCNC: 4.4 MMOL/L
PROT SERPL-MCNC: 6.4 G/DL
RBC # BLD AUTO: 4.66 M/UL
SODIUM SERPL-SCNC: 139 MMOL/L
WBC # BLD AUTO: 16.58 K/UL

## 2017-04-28 PROCEDURE — 94640 AIRWAY INHALATION TREATMENT: CPT

## 2017-04-28 PROCEDURE — 63600175 PHARM REV CODE 636 W HCPCS: Performed by: NURSE PRACTITIONER

## 2017-04-28 PROCEDURE — 25000242 PHARM REV CODE 250 ALT 637 W/ HCPCS: Performed by: NURSE PRACTITIONER

## 2017-04-28 PROCEDURE — 94660 CPAP INITIATION&MGMT: CPT

## 2017-04-28 PROCEDURE — 87040 BLOOD CULTURE FOR BACTERIA: CPT

## 2017-04-28 PROCEDURE — 99900035 HC TECH TIME PER 15 MIN (STAT)

## 2017-04-28 PROCEDURE — 21400001 HC TELEMETRY ROOM

## 2017-04-28 PROCEDURE — 85025 COMPLETE CBC W/AUTO DIFF WBC: CPT

## 2017-04-28 PROCEDURE — 80053 COMPREHEN METABOLIC PANEL: CPT

## 2017-04-28 PROCEDURE — 27000221 HC OXYGEN, UP TO 24 HOURS

## 2017-04-28 PROCEDURE — 36415 COLL VENOUS BLD VENIPUNCTURE: CPT

## 2017-04-28 PROCEDURE — 25000003 PHARM REV CODE 250: Performed by: NURSE PRACTITIONER

## 2017-04-28 PROCEDURE — 63600175 PHARM REV CODE 636 W HCPCS: Performed by: EMERGENCY MEDICINE

## 2017-04-28 RX ADMIN — FAMOTIDINE 20 MG: 20 TABLET ORAL at 09:04

## 2017-04-28 RX ADMIN — ROFLUMILAST 500 MCG: 500 TABLET ORAL at 08:04

## 2017-04-28 RX ADMIN — IPRATROPIUM BROMIDE AND ALBUTEROL SULFATE 3 ML: .5; 3 SOLUTION RESPIRATORY (INHALATION) at 04:04

## 2017-04-28 RX ADMIN — METHYLPREDNISOLONE SODIUM SUCCINATE 80 MG: 125 INJECTION, POWDER, FOR SOLUTION INTRAMUSCULAR; INTRAVENOUS at 05:04

## 2017-04-28 RX ADMIN — IPRATROPIUM BROMIDE AND ALBUTEROL SULFATE 3 ML: .5; 3 SOLUTION RESPIRATORY (INHALATION) at 11:04

## 2017-04-28 RX ADMIN — METHYLPREDNISOLONE SODIUM SUCCINATE 80 MG: 125 INJECTION, POWDER, FOR SOLUTION INTRAMUSCULAR; INTRAVENOUS at 01:04

## 2017-04-28 RX ADMIN — IPRATROPIUM BROMIDE AND ALBUTEROL SULFATE 3 ML: .5; 3 SOLUTION RESPIRATORY (INHALATION) at 07:04

## 2017-04-28 RX ADMIN — IPRATROPIUM BROMIDE AND ALBUTEROL SULFATE 3 ML: .5; 3 SOLUTION RESPIRATORY (INHALATION) at 08:04

## 2017-04-28 RX ADMIN — BUPROPION HYDROCHLORIDE 150 MG: 150 TABLET, FILM COATED, EXTENDED RELEASE ORAL at 08:04

## 2017-04-28 RX ADMIN — FAMOTIDINE 20 MG: 20 TABLET ORAL at 08:04

## 2017-04-28 RX ADMIN — FOLIC ACID 1000 MCG: 1 TABLET ORAL at 08:04

## 2017-04-28 RX ADMIN — HYDROCODONE BITARTRATE AND ACETAMINOPHEN 1 TABLET: 5; 325 TABLET ORAL at 11:04

## 2017-04-28 RX ADMIN — METHYLPREDNISOLONE SODIUM SUCCINATE 80 MG: 125 INJECTION, POWDER, FOR SOLUTION INTRAMUSCULAR; INTRAVENOUS at 10:04

## 2017-04-28 RX ADMIN — IPRATROPIUM BROMIDE AND ALBUTEROL SULFATE 3 ML: .5; 3 SOLUTION RESPIRATORY (INHALATION) at 03:04

## 2017-04-28 RX ADMIN — MOXIFLOXACIN HYDROCHLORIDE 400 MG: 400 INJECTION, SOLUTION INTRAVENOUS at 01:04

## 2017-04-28 RX ADMIN — BUPROPION HYDROCHLORIDE 150 MG: 150 TABLET, FILM COATED, EXTENDED RELEASE ORAL at 09:04

## 2017-04-28 RX ADMIN — ENOXAPARIN SODIUM 40 MG: 100 INJECTION SUBCUTANEOUS at 05:04

## 2017-04-28 NOTE — PLAN OF CARE
Problem: Patient Care Overview  Goal: Plan of Care Review  Outcome: Ongoing (interventions implemented as appropriate)  Patient doing well this shift. Free from falls and injury. Vitals stable. No complaints of pain or s/s of distress. Reports being able to breathe much better. Will continue to monitor.

## 2017-04-28 NOTE — PLAN OF CARE
Problem: Patient Care Overview  Goal: Plan of Care Review  Outcome: Ongoing (interventions implemented as appropriate)  Scheduled nebs tolerated well.  On home dose NC

## 2017-04-28 NOTE — SUBJECTIVE & OBJECTIVE
Interval History: pt verbalized improvement of SOB.  Continues to report pleuritic pain with cough and wheezing. WBC elevated noted likely due to steroid use.  Pt stability to be evaluated for discharge in am.     Review of Systems   Constitutional: Negative for appetite change, chills, diaphoresis, fatigue and fever.   HENT: Negative for congestion, ear pain, postnasal drip, rhinorrhea, sinus pressure, sneezing and sore throat.    Eyes: Negative for pain, redness and itching.   Respiratory: Positive for cough, chest tightness, shortness of breath and wheezing.    Cardiovascular: Negative for chest pain, palpitations and leg swelling.   Gastrointestinal: Negative for abdominal pain, constipation, diarrhea, nausea and vomiting.   Genitourinary: Negative for decreased urine volume, dysuria, frequency and hematuria.   Musculoskeletal: Negative for arthralgias, back pain, gait problem and myalgias.   Skin: Negative for pallor, rash and wound.   Neurological: Negative for dizziness, syncope, facial asymmetry, weakness, light-headedness and headaches.   Psychiatric/Behavioral: Negative for confusion.     Objective:     Vital Signs (Most Recent):  Temp: 98.2 °F (36.8 °C) (04/28/17 1200)  Pulse: 91 (04/28/17 1200)  Resp: 18 (04/28/17 1200)  BP: 119/79 (04/28/17 1200)  SpO2: (!) 92 % (04/28/17 1200) Vital Signs (24h Range):  Temp:  [97.7 °F (36.5 °C)-98.2 °F (36.8 °C)] 98.2 °F (36.8 °C)  Pulse:  [] 91  Resp:  [18-22] 18  SpO2:  [90 %-96 %] 92 %  BP: (119-147)/(62-88) 119/79     Weight: 91.7 kg (202 lb 3 oz)  Body mass index is 28.2 kg/(m^2).    Intake/Output Summary (Last 24 hours) at 04/28/17 1523  Last data filed at 04/28/17 0500   Gross per 24 hour   Intake             1435 ml   Output             1500 ml   Net              -65 ml      Physical Exam   Constitutional: He is oriented to person, place, and time. He appears well-developed and well-nourished. No distress.   HENT:   Head: Normocephalic and atraumatic.    Eyes: Conjunctivae and EOM are normal. No scleral icterus.   Neck: Normal range of motion. Neck supple.   Cardiovascular: Normal rate, regular rhythm and normal heart sounds.    No murmur heard.  Pulmonary/Chest: No accessory muscle usage. No respiratory distress. He has wheezes.   Abdominal: Soft. Bowel sounds are normal.   Musculoskeletal: Normal range of motion. He exhibits no edema or tenderness.   Lymphadenopathy:     He has no cervical adenopathy.   Neurological: He is alert and oriented to person, place, and time. No cranial nerve deficit. He exhibits normal muscle tone.   Skin: Skin is warm and dry.   Psychiatric: He has a normal mood and affect. His behavior is normal.   Nursing note and vitals reviewed.      Significant Labs:   CBC:   Recent Labs  Lab 04/27/17  0607 04/28/17  0657   WBC 9.80 16.58*   HGB 15.6 15.9   HCT 44.6 45.7    181     CMP:   Recent Labs  Lab 04/27/17  0607 04/28/17  0657    139   K 4.4 4.4    103   CO2 26 28   * 125*   BUN 13 14   CREATININE 0.9 1.0   CALCIUM 9.3 9.3   PROT 6.5 6.4   ALBUMIN 3.5 3.4*   BILITOT 0.4 0.4   ALKPHOS 60 54*   AST 24 24   ALT 33 35   ANIONGAP 9 8   EGFRNONAA >60 >60       Significant Imaging:   Imaging Results         X-Ray Chest 1 View (Final result) Result time:  04/26/17 09:15:19    Final result by Yosi Rider MD (04/26/17 09:15:19)    Impression:     No acute cardiopulmonary disease.            Electronically signed by: YOSI RIDER MD  Date:     04/26/17  Time:    09:15     Narrative:    Exam: XR CHEST 1 VIEW    Clinical History: Shortness of breath.     Findings:     The lungs are clear. The cardiac silhouette is within normal limits.

## 2017-04-28 NOTE — PROGRESS NOTES
[facility] Hospital Medicine  Progress Note    Patient Name: Johnny Perales  MRN: [id number]  Patient Class: IP- Inpatient   Admission Date: 4/26/2017  Length of Stay: 2 days  Attending Physician: Roverto Soliz MD  Primary Care Provider: Joni Rey MD        Subjective:     Principal Problem:COPD exacerbation    HPI:  Johnny Perales is a 61 yo male with end stage COPD, chronic respiratory failure, + smoker/+ vapor (nicotine) who presents with sudden onset of worsened SOB, GANDHI, wheezing and increased sputum from cough noted last night. Despite 3 L NC oxygen and increased frequency of neb treatments pt presented for evaluation. His respirations are labored, he is SOB when speaking, oxygen per NC increased to 5 L is noted in the ED. ABG notes hypercapnic respiratory failure. CXR is negative for acute findings. CBC is unremarkable; CMP shows increased eosinophils. Pt denies recent steroid and ABX prescription.     Hospital Course:  Pt admitted to Telemetry Unit for COPD exacerbation.  Pt treated with IV antibiotics, IV steroids, Duonebs prn, and supplemental oxygen. Chest xray shows no acute pulmonary disease.  Blood culture and sputum culture results pending.     Interval History: pt verbalized improvement of SOB.  Continues to report pleuritic pain with cough and wheezing. WBC elevated noted likely due to steroid use.  Pt stability to be evaluated for discharge in am.     Review of Systems   Constitutional: Negative for appetite change, chills, diaphoresis, fatigue and fever.   HENT: Negative for congestion, ear pain, postnasal drip, rhinorrhea, sinus pressure, sneezing and sore throat.    Eyes: Negative for pain, redness and itching.   Respiratory: Positive for cough, chest tightness, shortness of breath and wheezing.    Cardiovascular: Negative for chest pain, palpitations and leg swelling.   Gastrointestinal: Negative for abdominal pain, constipation, diarrhea, nausea and vomiting.    Genitourinary: Negative for decreased urine volume, dysuria, frequency and hematuria.   Musculoskeletal: Negative for arthralgias, back pain, gait problem and myalgias.   Skin: Negative for pallor, rash and wound.   Neurological: Negative for dizziness, syncope, facial asymmetry, weakness, light-headedness and headaches.   Psychiatric/Behavioral: Negative for confusion.     Objective:     Vital Signs (Most Recent):  Temp: 98.2 °F (36.8 °C) (04/28/17 1200)  Pulse: 91 (04/28/17 1200)  Resp: 18 (04/28/17 1200)  BP: 119/79 (04/28/17 1200)  SpO2: (!) 92 % (04/28/17 1200) Vital Signs (24h Range):  Temp:  [97.7 °F (36.5 °C)-98.2 °F (36.8 °C)] 98.2 °F (36.8 °C)  Pulse:  [] 91  Resp:  [18-22] 18  SpO2:  [90 %-96 %] 92 %  BP: (119-147)/(62-88) 119/79     Weight: 91.7 kg (202 lb 3 oz)  Body mass index is 28.2 kg/(m^2).    Intake/Output Summary (Last 24 hours) at 04/28/17 1523  Last data filed at 04/28/17 0500   Gross per 24 hour   Intake             1435 ml   Output             1500 ml   Net              -65 ml      Physical Exam   Constitutional: He is oriented to person, place, and time. He appears well-developed and well-nourished. No distress.   HENT:   Head: Normocephalic and atraumatic.   Eyes: Conjunctivae and EOM are normal. No scleral icterus.   Neck: Normal range of motion. Neck supple.   Cardiovascular: Normal rate, regular rhythm and normal heart sounds.    No murmur heard.  Pulmonary/Chest: No accessory muscle usage. No respiratory distress. He has wheezes.   Abdominal: Soft. Bowel sounds are normal.   Musculoskeletal: Normal range of motion. He exhibits no edema or tenderness.   Lymphadenopathy:     He has no cervical adenopathy.   Neurological: He is alert and oriented to person, place, and time. No cranial nerve deficit. He exhibits normal muscle tone.   Skin: Skin is warm and dry.   Psychiatric: He has a normal mood and affect. His behavior is normal.   Nursing note and vitals  reviewed.      Significant Labs:   CBC:   Recent Labs  Lab 04/27/17  0607 04/28/17  0657   WBC 9.80 16.58*   HGB 15.6 15.9   HCT 44.6 45.7    181     CMP:   Recent Labs  Lab 04/27/17  0607 04/28/17  0657    139   K 4.4 4.4    103   CO2 26 28   * 125*   BUN 13 14   CREATININE 0.9 1.0   CALCIUM 9.3 9.3   PROT 6.5 6.4   ALBUMIN 3.5 3.4*   BILITOT 0.4 0.4   ALKPHOS 60 54*   AST 24 24   ALT 33 35   ANIONGAP 9 8   EGFRNONAA >60 >60       Significant Imaging:   Imaging Results         X-Ray Chest 1 View (Final result) Result time:  04/26/17 09:15:19    Final result by Yosi Rider MD (04/26/17 09:15:19)    Impression:     No acute cardiopulmonary disease.            Electronically signed by: YOSI RIDER MD  Date:     04/26/17  Time:    09:15     Narrative:    Exam: XR CHEST 1 VIEW    Clinical History: Shortness of breath.     Findings:     The lungs are clear. The cardiac silhouette is within normal limits.            Assessment/Plan:      * COPD exacerbation  -Pt admitted to Telemetry Unit  oxygen therapy to maintain sats > 92 %  Budesonide and formoterol treatments  Solumedrol 80 mg IV every 8 hours  Avelox  Duo Nebs      Acute hypercapnic respiratory failure  Neuro checks  Maintain O2 sat > 89 % per NC  Resume Triology vent at night      Tobacco abuse  Will smoke approx 3 cigs per day  Smokes vaporized nicotine  Advised continued cessation and Wellbutrin      VTE Risk Mitigation         Ordered     enoxaparin injection 40 mg  Daily     Route:  Subcutaneous        04/26/17 1156     Medium Risk of VTE  Once      04/26/17 1310          Thea Scott NP  Department of Hospital Medicine   [facility]

## 2017-04-28 NOTE — PLAN OF CARE
Problem: Patient Care Overview  Goal: Plan of Care Review  Outcome: Ongoing (interventions implemented as appropriate)  Dx copd  poc  Instructed on prn neb tx. Instruct ed on purse lip breathing. Instructed to call for needs. Call lighter, water and phone in reach. Instructed on fall precautions and risk. No falls. Instructed on pain management and meds

## 2017-04-29 VITALS
DIASTOLIC BLOOD PRESSURE: 76 MMHG | SYSTOLIC BLOOD PRESSURE: 133 MMHG | HEART RATE: 99 BPM | HEIGHT: 71 IN | OXYGEN SATURATION: 93 % | TEMPERATURE: 98 F | WEIGHT: 202.19 LBS | RESPIRATION RATE: 22 BRPM | BODY MASS INDEX: 28.31 KG/M2

## 2017-04-29 PROBLEM — J44.1 COPD EXACERBATION: Status: RESOLVED | Noted: 2017-04-26 | Resolved: 2017-04-29

## 2017-04-29 LAB
ALBUMIN SERPL BCP-MCNC: 3.5 G/DL
ALP SERPL-CCNC: 53 U/L
ALT SERPL W/O P-5'-P-CCNC: 48 U/L
ANION GAP SERPL CALC-SCNC: 9 MMOL/L
AST SERPL-CCNC: 30 U/L
BASOPHILS # BLD AUTO: 0 K/UL
BASOPHILS NFR BLD: 0 %
BILIRUB SERPL-MCNC: 0.5 MG/DL
BUN SERPL-MCNC: 15 MG/DL
CALCIUM SERPL-MCNC: 9.4 MG/DL
CHLORIDE SERPL-SCNC: 102 MMOL/L
CO2 SERPL-SCNC: 28 MMOL/L
CREAT SERPL-MCNC: 1 MG/DL
DIFFERENTIAL METHOD: ABNORMAL
EOSINOPHIL # BLD AUTO: 0 K/UL
EOSINOPHIL NFR BLD: 0 %
ERYTHROCYTE [DISTWIDTH] IN BLOOD BY AUTOMATED COUNT: 13.2 %
EST. GFR  (AFRICAN AMERICAN): >60 ML/MIN/1.73 M^2
EST. GFR  (NON AFRICAN AMERICAN): >60 ML/MIN/1.73 M^2
GLUCOSE SERPL-MCNC: 124 MG/DL
HCT VFR BLD AUTO: 46.9 %
HGB BLD-MCNC: 16.4 G/DL
LYMPHOCYTES # BLD AUTO: 0.5 K/UL
LYMPHOCYTES NFR BLD: 4.1 %
MCH RBC QN AUTO: 34.2 PG
MCHC RBC AUTO-ENTMCNC: 35 %
MCV RBC AUTO: 98 FL
MONOCYTES # BLD AUTO: 0.3 K/UL
MONOCYTES NFR BLD: 3 %
NEUTROPHILS # BLD AUTO: 10.3 K/UL
NEUTROPHILS NFR BLD: 92.9 %
PLATELET # BLD AUTO: 190 K/UL
PMV BLD AUTO: 10.2 FL
POTASSIUM SERPL-SCNC: 4.4 MMOL/L
PROT SERPL-MCNC: 6.6 G/DL
RBC # BLD AUTO: 4.79 M/UL
SODIUM SERPL-SCNC: 139 MMOL/L
WBC # BLD AUTO: 11.08 K/UL

## 2017-04-29 PROCEDURE — 27000221 HC OXYGEN, UP TO 24 HOURS

## 2017-04-29 PROCEDURE — 80053 COMPREHEN METABOLIC PANEL: CPT

## 2017-04-29 PROCEDURE — 25000003 PHARM REV CODE 250: Performed by: NURSE PRACTITIONER

## 2017-04-29 PROCEDURE — 85025 COMPLETE CBC W/AUTO DIFF WBC: CPT

## 2017-04-29 PROCEDURE — 94640 AIRWAY INHALATION TREATMENT: CPT

## 2017-04-29 PROCEDURE — 63600175 PHARM REV CODE 636 W HCPCS: Performed by: EMERGENCY MEDICINE

## 2017-04-29 PROCEDURE — 25000242 PHARM REV CODE 250 ALT 637 W/ HCPCS: Performed by: NURSE PRACTITIONER

## 2017-04-29 PROCEDURE — 36415 COLL VENOUS BLD VENIPUNCTURE: CPT

## 2017-04-29 RX ORDER — LEVOFLOXACIN 500 MG/1
500 TABLET, FILM COATED ORAL DAILY
Qty: 10 TABLET | Refills: 0 | Status: SHIPPED | OUTPATIENT
Start: 2017-04-29 | End: 2017-05-09 | Stop reason: ALTCHOICE

## 2017-04-29 RX ORDER — HYDROCODONE BITARTRATE AND ACETAMINOPHEN 5; 325 MG/1; MG/1
1 TABLET ORAL EVERY 6 HOURS PRN
Qty: 12 TABLET | Refills: 0 | Status: SHIPPED | OUTPATIENT
Start: 2017-04-29 | End: 2017-10-02

## 2017-04-29 RX ORDER — PREDNISONE 10 MG/1
10 TABLET ORAL DAILY
Qty: 60 TABLET | Refills: 0 | Status: SHIPPED | OUTPATIENT
Start: 2017-04-29 | End: 2017-05-09

## 2017-04-29 RX ADMIN — FAMOTIDINE 20 MG: 20 TABLET ORAL at 09:04

## 2017-04-29 RX ADMIN — IPRATROPIUM BROMIDE AND ALBUTEROL SULFATE 3 ML: .5; 3 SOLUTION RESPIRATORY (INHALATION) at 04:04

## 2017-04-29 RX ADMIN — IPRATROPIUM BROMIDE AND ALBUTEROL SULFATE 3 ML: .5; 3 SOLUTION RESPIRATORY (INHALATION) at 11:04

## 2017-04-29 RX ADMIN — METHYLPREDNISOLONE SODIUM SUCCINATE 80 MG: 125 INJECTION, POWDER, FOR SOLUTION INTRAMUSCULAR; INTRAVENOUS at 05:04

## 2017-04-29 RX ADMIN — IPRATROPIUM BROMIDE AND ALBUTEROL SULFATE 3 ML: .5; 3 SOLUTION RESPIRATORY (INHALATION) at 08:04

## 2017-04-29 RX ADMIN — ROFLUMILAST 500 MCG: 500 TABLET ORAL at 09:04

## 2017-04-29 RX ADMIN — BUPROPION HYDROCHLORIDE 150 MG: 150 TABLET, FILM COATED, EXTENDED RELEASE ORAL at 09:04

## 2017-04-29 RX ADMIN — FOLIC ACID 1000 MCG: 1 TABLET ORAL at 09:04

## 2017-04-29 NOTE — DISCHARGE SUMMARY
[facility] Hospital Medicine  Discharge Summary      Patient Name: Johnny Perales  MRN: [id number]  Admission Date: 4/26/2017  Hospital Length of Stay: 3 days  Discharge Date and Time: 4/29/2017  3:30 PM  Attending Physician: No att. providers found   Discharging Provider: Genaro Larose NP  Primary Care Provider: Joni Rey MD      HPI:   Johnny Perales is a 63 yo male with end stage COPD, chronic respiratory failure, + smoker/+ vapor (nicotine) who presents with sudden onset of worsened SOB, GANDHI, wheezing and increased sputum from cough noted last night. Despite 3 L NC oxygen and increased frequency of neb treatments pt presented for evaluation. His respirations are labored, he is SOB when speaking, oxygen per NC increased to 5 L is noted in the ED. ABG notes hypercapnic respiratory failure. CXR is negative for acute findings. CBC is unremarkable; CMP shows increased eosinophils. Pt denies recent steroid and ABX prescription.     * No surgery found *      Indwelling Lines/Drains at time of discharge:   Lines/Drains/Airways          No matching active lines, drains, or airways        Hospital Course:   Pt admitted to Telemetry Unit for COPD exacerbation.  Pt treated with IV antibiotics, IV steroids, Duonebs prn, and supplemental oxygen. Chest xray shows no acute pulmonary disease.  Blood culture and sputum culture results pending.   4/29- The patient reports improvement in symptoms back to his baseline. The patient was seen and examined today and deemed stable for discharge. The patient will be discharged home on a coarse of Levaquin and with follow up with his PCP and with his Pulmonologist.        Consults:   Consults         Status Ordering Provider     Inpatient consult to Internal Medicine  Once     Provider:  (Not yet assigned)    Acknowledged TATA SIMS     Inpatient consult to Respiratory Care  Once     Provider:  (Not yet assigned)    Acknowledged NAIMA SCHREIBER  TRAY          Significant Diagnostic Studies:   Imaging Results         X-Ray Chest 1 View (Final result) Result time:  04/26/17 09:15:19    Final result by Yosi Rider MD (04/26/17 09:15:19)    Impression:     No acute cardiopulmonary disease.            Electronically signed by: YOSI RIDER MD  Date:     04/26/17  Time:    09:15     Narrative:    Exam: XR CHEST 1 VIEW    Clinical History: Shortness of breath.     Findings:     The lungs are clear. The cardiac silhouette is within normal limits.               Pending Diagnostic Studies:     None        Final Active Diagnoses:    Diagnosis Date Noted POA    Tobacco abuse [Z72.0] 04/26/2017 Yes      Problems Resolved During this Admission:    Diagnosis Date Noted Date Resolved POA    PRINCIPAL PROBLEM:  COPD exacerbation [J44.1] 04/26/2017 04/29/2017 Yes    Acute on chronic respiratory failure with hypoxia and hypercapnia [J96.21, J96.22] 10/30/2016 02/17/2017 Yes      No new Assessment & Plan notes have been filed under this hospital service since the last note was generated.  Service: Hospital Medicine      Discharged Condition: stable    Disposition: Home or Self Care    Follow Up:  Follow-up Information     Follow up with Joni Rey MD. Schedule an appointment as soon as possible for a visit in 3 days.    Specialty:  Family Medicine    Contact information:    [address] [facility] [address] [phone]          Follow up with Prabhakar Rodriguez MD. Schedule an appointment as soon as possible for a visit in 2 weeks.    Specialty:  Pulmonary Disease    Contact information:    [address]  [place] [address] [phone]          Patient Instructions:     Diet general     Activity as tolerated       Medications:  Reconciled Home Medications:   Discharge Medication List as of 4/29/2017 11:35 AM      START taking these medications    Details   hydrocodone-acetaminophen 5-325mg (NORCO) 5-325 mg per tablet Take 1 tablet by mouth every 6 (six) hours as  "needed., Starting 4/29/2017, Until Discontinued, Print      levoFLOXacin (LEVAQUIN) 500 MG tablet Take 1 tablet (500 mg total) by mouth once daily., Starting 4/29/2017, Until Tue 5/9/17, Normal      predniSONE (DELTASONE) 10 MG tablet Take 1 tablet (10 mg total) by mouth once daily. Take 50 mg daily for 4 days, then take 40 mg daily for 4 days, then take 30 mg daily for 4 days, then take 20 mg daily for 4 days, then take 10 mg daily for 4 days, then stop., Starting 4/29/2017, Until Tu e 5/9/17, Print         CONTINUE these medications which have NOT CHANGED    Details   albuterol (ACCUNEB) 0.63 mg/3 mL Nebu Take 3 mLs (0.63 mg total) by nebulization 3 (three) times daily as needed., Starting 10/7/2016, Until Sat 10/7/17, Normal      bacitracin (AK-TRACIN) ophthalmic ointment APPLY .25" STRIP TO BOTH EYES DAILY AT BEDTIME, Historical Med      buPROPion (WELLBUTRIN SR) 150 MG TBSR 12 hr tablet Take 150 mg by mouth 2 (two) times daily., Until Discontinued, Historical Med      cholecalciferol, vitamin D3, (VITAMIN D3) 400 unit Chew Take 2 tablets by mouth once daily. , Until Discontinued, Historical Med      folic acid (FOLVITE) 1 MG tablet Take 1,000 mcg by mouth once daily., Starting 7/26/2016, Until Discontinued, Historical Med      PROAIR HFA 90 mcg/actuation inhaler INHALE ONCE EVERY 4 HOURS AS NEEDED FOR WHEEZING, Print      roflumilast 500 mcg Tab Take 1 tablet (500 mcg total) by mouth once daily., Starting 11/3/2016, Until Discontinued, Normal      SPIRIVA WITH HANDIHALER 18 mcg inhalation capsule INHALE 1 CAPSULE BY MOUTH ONCE DAILY, Historical Med      umeclidinium-vilanterol (ANORO ELLIPTA) 62.5-25 mcg/actuation DsDv Inhale 1 puff into the lungs once daily., Starting 11/3/2016, Until Discontinued, Print           Time spent on the discharge of patient: > 30 minutes    Genaro Larose NP  Department of Hospital Medicine  [facility]  "

## 2017-04-29 NOTE — PLAN OF CARE
Problem: Patient Care Overview  Goal: Plan of Care Review  Outcome: Ongoing (interventions implemented as appropriate)  DX: COPD EXACERBATION  PATIENT REMAINS FREE FROM FALLS, FALL PRECAUTIONS IN PLACE.  VS STABLE  SR-ST ON TELE  NO OTHER C/O AT THIS TIME  CALL BELL AND BELONGINGS WITHIN REACH, REMINDED TO CALL FOR ASSISTANCE.

## 2017-04-29 NOTE — PLAN OF CARE
Problem: Patient Care Overview  Goal: Plan of Care Review  Outcome: Ongoing (interventions implemented as appropriate)  POC discussed w/patient, verbalized understanding. NSR on monitor. VSS. Voids per BRP. No signs of acute distress. No c/o pain throughout shift.  Patient turns independently in bed. Fall precautions in place, bed alarm on.

## 2017-04-29 NOTE — PROGRESS NOTES
Discharge instructions given to patient. Reviewed with patient when to take next medication dose. Pt instructed to follow up with PCP Dr. Joni Rey within three days(unable to schedule appointment within [facility]).  Pt verbalized understanding. Pt instructed to follow up with Dr. Rodriguez within next two weeks. Pt does have pulmonary rehab appointments scheduled next week. Pt verbalized understanding. Pt given two new paper prescriptions, Norco 5-325 mg and Prednisone (tapering instructions included within sig) One prescription for Levaquin sent electronically to [facility] on [facility]. IV d/c'ed, catheter intact.Telemetry box removed. Pt discharged to home with personal belongings.

## 2017-05-01 ENCOUNTER — HOSPITAL ENCOUNTER (OUTPATIENT)
Dept: PULMONOLOGY | Facility: HOSPITAL | Age: 62
Discharge: HOME | End: 2017-05-01
Attending: INTERNAL MEDICINE
Payer: MEDICARE

## 2017-05-01 VITALS — WEIGHT: 200.81 LBS | BODY MASS INDEX: 28.01 KG/M2

## 2017-05-01 PROCEDURE — G0424 PULMONARY REHAB W EXER: HCPCS

## 2017-05-01 NOTE — PROGRESS NOTES
[facility]  Pulmonary Rehab  Session Summary    SUMMARY     Session Data  Session Number: 3  Time In: 1300  Time Out: 1400  Weight: 91.1 kg (200 lb 13.4 oz)  Target Heart Rate Zone: Minimum: 95 bpm  Target Heart Rate Zone: Maximum: 127 bpm  Patient Motivation: Good  Patient Effort: Good  Is this patient a diabetic?: No      Pre Exercise Vitals  SpO2: 96 %  Supplemental O2?: Yes  O2 Device: nasal cannula  O2 Flow (L/min): 3  Pulse: 112  BP: 138/86  Mika Dyspnea Rating : 2      During Exercise Vitals  SpO2: 96 %  Supplemental O2?: Yes  O2 Device: nasal cannula  O2 Flow (L/min): 3  Pulse: 114  BP: 138/86  Mika Dyspnea Rating : 2      Post Exercise Vitals  SpO2: 95 %  Supplemental O2?: Yes  O2 Flow (L/min): 3  Pulse: 110  BP: 140/86  Mika Dyspnea Rating : 4       Modality  Modality: Nustep                           Nustep  Time: 20 minutes  Steps: -137  Load: 6                                                     Education  Purse lip breathing review      Therapist Notes   Good effort in rehab. Pt recovering from COPD exacerbation in which he was admitted to hospital. Pt will call today to schedule hospital follow up. Pt still on prednisone and antibiotic    Dr. Prabhakar Rodriguez immediately available as needed.

## 2017-05-02 ENCOUNTER — PATIENT OUTREACH (OUTPATIENT)
Dept: ADMINISTRATIVE | Facility: CLINIC | Age: 62
End: 2017-05-02
Payer: MEDICARE

## 2017-05-02 NOTE — PATIENT INSTRUCTIONS
Discharge Instructions: COPD  You have been diagnosed with chronic obstructive pulmonary disease (COPD). This is a name given to a group of diseases that limit the flow of air in and out of your lungs. This makes it harder to breathe. With COPD, you are also more likely to get lung infections. COPD includes chronic bronchitis and emphysema. COPD is most often caused by heavy, long-term cigarette smoking.  Home care  Quit smoking  · If you smoke, quit. It is the best thing you can do for your COPD and your overall health.  · Join a stop-smoking program. There are even telephone, text message, and Internet programs to help you quit.  · Ask your healthcare provider about medicines or other methods to help you quit.  · Ask family members to quit smoking as well.  · Don't allow people to smoke in your home, in your car, or when they are around you.  Protect yourself from infection  · Wash your hands often. Do your best to keep your hands away from your face. Most germs are spread from your hands to your mouth.  · Get a flu shot every year. Also ask your provider about pneumonia vaccines.  · Avoid crowds. It's especially important to do this in the winter when more people have colds and flu.  · To stay healthy, get enough sleep, exercise regularly, and eat a balanced diet. You should:  ¨ Get about 8 hours of sleep every night.  ¨ Try to exercise for at least 30 minutes on most days.  ¨ Have healthy foods including fruits and vegetables, 100% whole grains, lean meats and fish, and low-fat dairy products. Try to stay away from foods high in fats and sugar.  Take your medicines  Take your medicines exactly as directed. Don't skip doses.  Manage your stress  Stress can make COPD worse. Use this stress management technique:  · Find a quiet place and sit or lie in a comfortable position.  · Close your eyes and perform breathing exercises for several minutes. Ask your provider about the best way to breathe.  Pulmonary  rehabilitation  · Pulmonary rehab can help you feel better. These programs include exercise, breathing techniques, information about COPD, counseling, and help for smokers.  · Ask your provider or your local hospital about programs in your area.  When to call your healthcare provider  Call your provider immediately if you have any of the following:  · Shortness of breath, wheezing, or coughing  · Increased mucus  · Yellow, green, bloody, or smelly mucus  · Fever or chills  · Tightness in your chest that does not go away with rest or medicine  · An irregular heartbeat or a feeling that your heart is beating very fast  · Swollen ankles   Date Last Reviewed: 5/1/2016  © 7623-2473 [employer]. [address] [place] [address], [place] [address]. All rights reserved. This information is not intended as a substitute for professional medical care. Always follow your healthcare professional's instructions.

## 2017-05-03 LAB
BACTERIA BLD CULT: NORMAL
BACTERIA BLD CULT: NORMAL

## 2017-05-04 ENCOUNTER — HOSPITAL ENCOUNTER (OUTPATIENT)
Dept: PULMONOLOGY | Facility: HOSPITAL | Age: 62
Discharge: HOME | End: 2017-05-04
Attending: INTERNAL MEDICINE
Payer: MEDICARE

## 2017-05-04 VITALS — WEIGHT: 201.5 LBS | BODY MASS INDEX: 28.1 KG/M2

## 2017-05-04 PROCEDURE — G0424 PULMONARY REHAB W EXER: HCPCS

## 2017-05-04 NOTE — PROGRESS NOTES
[facility]  Pulmonary Rehab  Session Summary    SUMMARY     Session Data  Session Number: 4  Time In: 1300  Time Out: 1400  Weight: 91.4 kg (201 lb 8 oz)  Target Heart Rate Zone: Minimum: 95 bpm  Target Heart Rate Zone: Maximum: 127 bpm  Patient Motivation: Excellent  Patient Effort: Excellent  Is this patient a diabetic?: No      Pre Exercise Vitals  SpO2: 97 %  Supplemental O2?: Yes  O2 Device: nasal cannula  O2 Flow (L/min): 2  Pulse: 100  BP: 114/69  Mika Dyspnea Rating : 2      During Exercise Vitals  SpO2: 95 %  Supplemental O2?: Yes  O2 Device: nasal cannula  O2 Flow (L/min): 2  Pulse: 120  BP: 130/82  Mika Dyspnea Rating : 2      Post Exercise Vitals  SpO2: 96 %  Supplemental O2?: Yes  O2 Device: nasal cannula  O2 Flow (L/min): 2  Pulse: 118  BP: 132/86  Mika Dyspnea Rating : 2       Modality  Modality: Nustep, Arm Ergometer, Hand Free Weights             Arm Ergometer  Time: 8 minutes  Level: 3             Nustep  Time: 20 minutes  Steps: 1300  Load: 6                           Biceps  lbs: 5 lbs  Sets: 2  Reps: 10      Chest  lbs: 5 lbs  Sets: 2  Reps: 10                  Education  Purse lip breathing review      Therapist Notes   Good effort in rehab. Pt tolerated exercise well. Pt introduced to the arm ergometer. Pt has follow up appointment tomorrow with Pulmonary to follow up from hospital admission for COPD exacerbation. Pt has had the shakes from prednisone. Pt is feeling better overall. Pt will see new PCP on 5/9/17 to establish care.    Dr. Prabhakar Rodriguez immediately available as needed.

## 2017-05-05 ENCOUNTER — OFFICE VISIT (OUTPATIENT)
Dept: PULMONOLOGY | Facility: CLINIC | Age: 62
End: 2017-05-05
Payer: MEDICARE

## 2017-05-05 VITALS
SYSTOLIC BLOOD PRESSURE: 120 MMHG | RESPIRATION RATE: 18 BRPM | HEART RATE: 92 BPM | DIASTOLIC BLOOD PRESSURE: 60 MMHG | BODY MASS INDEX: 28.98 KG/M2 | HEIGHT: 71 IN | OXYGEN SATURATION: 96 % | WEIGHT: 207 LBS

## 2017-05-05 DIAGNOSIS — J44.9 COPD, VERY SEVERE: Chronic | ICD-10-CM

## 2017-05-05 DIAGNOSIS — R91.1 PULMONARY NODULE: Chronic | ICD-10-CM

## 2017-05-05 DIAGNOSIS — J96.11 CHRONIC RESPIRATORY FAILURE WITH HYPOXIA: Primary | Chronic | ICD-10-CM

## 2017-05-05 PROBLEM — Z72.0 TOBACCO ABUSE: Status: RESOLVED | Noted: 2017-04-26 | Resolved: 2017-05-05

## 2017-05-05 PROBLEM — J96.02 ACUTE HYPERCAPNIC RESPIRATORY FAILURE: Status: RESOLVED | Noted: 2017-04-26 | Resolved: 2017-05-05

## 2017-05-05 PROCEDURE — 99215 OFFICE O/P EST HI 40 MIN: CPT | Mod: S$PBB,,, | Performed by: INTERNAL MEDICINE

## 2017-05-05 PROCEDURE — 99213 OFFICE O/P EST LOW 20 MIN: CPT | Mod: PBBFAC | Performed by: INTERNAL MEDICINE

## 2017-05-05 PROCEDURE — 99999 PR PBB SHADOW E&M-EST. PATIENT-LVL III: CPT | Mod: PBBFAC,,, | Performed by: INTERNAL MEDICINE

## 2017-05-05 RX ORDER — PREDNISONE 10 MG/1
TABLET ORAL
Qty: 60 TABLET | Refills: 0 | Status: SHIPPED | OUTPATIENT
Start: 2017-05-05 | End: 2017-05-09 | Stop reason: SDUPTHER

## 2017-05-05 RX ORDER — CHOLECALCIFEROL (VITAMIN D3) 10 MCG
TABLET ORAL
Refills: 5 | COMMUNITY
Start: 2017-02-14 | End: 2017-11-06 | Stop reason: SDUPTHER

## 2017-05-05 RX ORDER — AZITHROMYCIN 500 MG/1
500 TABLET, FILM COATED ORAL DAILY
Qty: 10 TABLET | Refills: 0 | Status: SHIPPED | OUTPATIENT
Start: 2017-05-05 | End: 2017-06-07 | Stop reason: SDUPTHER

## 2017-05-05 NOTE — ASSESSMENT & PLAN NOTE
COPD ROS: taking medications as instructed, no medication side effects noted, no significant ongoing wheezing or shortness of breath, using bronchodilator MDI less than twice a week.   New concerns: None.   Exam: appears well, vitals normal, no respiratory distress, acyanotic, normal RR, chest clear to auscultation, no wheezes, rales or rhonchi, symmetric air entry.   Assessment:  COPD reasonably well controlled.   Plan: ACCUNEB, PROAIR, ANORO, DALIRESP. Stop PROAIR. Continue all other current treatment.

## 2017-05-05 NOTE — PATIENT INSTRUCTIONS
Lung Anatomy  Your lungs take air in to give your body oxygen, which the body needs to work. Your lungs, like all the tissues in your body, are made up of billions of tiny specialized cells. Old lung cells die and are replaced by new, identical lung cells. This natural process helps ensure healthy lungs.    Date Last Reviewed: 11/1/2016  © 0969-6946 [employer]. [address] [place] [address]. All rights reserved. This information is not intended as a substitute for professional medical care. Always follow your healthcare professional's instructions.

## 2017-05-05 NOTE — ASSESSMENT & PLAN NOTE
Continue oxygen supplementation at 3  L/min. DME is [facility].   Continue nocturnal TRIOLOGY ( VIEMED)

## 2017-05-05 NOTE — PROGRESS NOTES
"Post hospital discharge pulmonary follow up note      Johnny Perales is a 62 y.o. male    ADMIT DATE: 4/26/17    DISCHARGE DATE: 4/29/17     ADMISSION DIAGNOSIS: Acute on chronic respiratory failure secondary to COPD exacerbation    Treatment: Oxygen supplementation, IV steroids, IV antibiotics,  Inhaled bronchodilators. He was discharged on oral Levaquin and oral Prednisone taper.      Johnny Perales   Home Medication Instructions COMPA:[id number]    Printed on:05/05/17 4491   Medication Information                      albuterol (ACCUNEB) 0.63 mg/3 mL Nebu  Take 3 mLs (0.63 mg total) by nebulization 3 (three) times daily as needed.             bacitracin (AK-TRACIN) ophthalmic ointment  APPLY .25" STRIP TO BOTH EYES DAILY AT BEDTIME             buPROPion (WELLBUTRIN SR) 150 MG TBSR 12 hr tablet  Take 150 mg by mouth 2 (two) times daily.             cholecalciferol, vitamin D3, (VITAMIN D3) 400 unit Chew  Take 2 tablets by mouth once daily.              CHOLECALCIFEROL, VITAMIN D3, ORAL  Take by mouth.             folic acid (FOLVITE) 1 MG tablet  Take 1,000 mcg by mouth once daily.             hydrocodone-acetaminophen 5-325mg (NORCO) 5-325 mg per tablet  Take 1 tablet by mouth every 6 (six) hours as needed.             levoFLOXacin (LEVAQUIN) 500 MG tablet  Take 1 tablet (500 mg total) by mouth once daily.             OXYGEN-AIR DELIVERY SYSTEMS MISC  by [facility].(Non-Drug; Combo Route) route.             predniSONE (DELTASONE) 10 MG tablet  Take 1 tablet (10 mg total) by mouth once daily. Take 50 mg daily for 4 days, then take 40 mg daily for 4 days, then take 30 mg daily for 4 days, then take 20 mg daily for 4 days, then take 10 mg daily for 4 days, then stop.             PROAIR HFA 90 mcg/actuation inhaler  INHALE ONCE EVERY 4 HOURS AS NEEDED FOR WHEEZING             roflumilast 500 mcg Tab  Take 1 tablet (500 mcg total) by mouth once daily.             SPIRIVA WITH HANDIHALER 18 mcg inhalation " capsule  INHALE 1 CAPSULE BY MOUTH ONCE DAILY             umeclidinium-vilanterol (ANORO ELLIPTA) 62.5-25 mcg/actuation DsDv  Inhale 1 puff into the lungs once daily.             VITAMIN D3 400 unit tablet  TAKE 2 TABLETS BY MOUTH DAILY.                  Today he reports that he is 75% improved.     Problem list     Patient Active Problem List   Diagnosis    COPD, very severe    Pulmonary nodule    Chronic respiratory failure with hypoxia       Problem list has been reviewed.    Subjective:          HPI  The patient does not have symptoms / an exacerbation. He states that he  is at His respiratory baseline and denies any new onset specific pulmonary complaints. he reports chronic dyspnea with exertion which is stable and unchanged. His chronic productive cough is stable.   He denies hemoptysis, pain with breathing and wheezing .   A full  review of systems, past , family  and social histories was performed except as mentioned in the note above, these are non contributory to the main issues discussed today. His current respiratory regimen: ACCUNEB, PROAIR, ANORO, DALIRESP. He does use medications compliantly. He reports that PROAIR does not help. CAT score today is 28. He is currently enrolled in pulmonary rehabilitation. He reports that he finds pulmonary rehabilitation beneficial. He is currently on oxygen supplementation at 3 L / min. He is on home  Triology NIPPV with sleep and as needed during the day    Review of Systems   Constitutional: Positive for malaise/fatigue. Negative for chills and weight loss.   HENT: Negative for hearing loss and tinnitus.    Eyes: Negative for blurred vision and double vision.   Respiratory: Positive for cough, sputum production and wheezing. Negative for hemoptysis.    Cardiovascular: Negative for chest pain and palpitations.   Gastrointestinal: Negative for heartburn and nausea.   Genitourinary: Negative for dysuria and urgency.   Musculoskeletal: Negative for myalgias and  "neck pain.   Skin: Negative for rash.   Neurological: Positive for dizziness. Negative for tingling and headaches.   Endo/Heme/Allergies: Bruises/bleeds easily.   Psychiatric/Behavioral: Negative for depression and suicidal ideas. The patient is nervous/anxious.        Objective:      Vitals:    05/05/17 1047   BP: 120/60   Pulse: 92   Resp: 18   SpO2: 96%   Weight: 93.9 kg (207 lb 0.2 oz)   Height: 5' 11" (1.803 m)   PainSc: 0-No pain     Body mass index is 28.87 kg/(m^2).    Physical Exam   Constitutional: He is oriented to person, place, and time. He appears well-developed. He appears distressed.   HENT:   Head: Normocephalic and atraumatic.   Eyes: EOM are normal. Pupils are equal, round, and reactive to light.   Neck: Normal range of motion. Neck supple.   Cardiovascular: Normal rate and regular rhythm.    Pulmonary/Chest: He has no decreased breath sounds.   Abdominal: Soft. Bowel sounds are normal.   Musculoskeletal: Normal range of motion.   Neurological: He is alert and oriented to person, place, and time.   Skin: Skin is warm and dry.   Psychiatric: He has a normal mood and affect. His behavior is normal.            Lab Review   Lab Results   Component Value Date     04/29/2017    K 4.4 04/29/2017     04/29/2017    CO2 28 04/29/2017    BUN 15 04/29/2017    CREATININE 1.0 04/29/2017    CALCIUM 9.4 04/29/2017     Lab Results   Component Value Date    TROPONINI 0.007 04/26/2017     Lab Results   Component Value Date    WBC 11.08 04/29/2017    HGB 16.4 04/29/2017    HCT 46.9 04/29/2017    MCV 98 04/29/2017     04/29/2017     X-Ray Chest 1 View 4/26/2017:  The lungs are clear. The cardiac silhouette is within normal limits.    Assessment / Plan :     1. Chronic respiratory failure with hypoxia Stable   2. COPD, very severe Stable   3. Pulmonary nodule Stable       Discussed diagnosis, its evaluation, treatment and usual course. All questions answered.    COPD, very severe  COPD ROS: taking " medications as instructed, no medication side effects noted, no significant ongoing wheezing or shortness of breath, using bronchodilator MDI less than twice a week.   New concerns: None.   Exam: appears well, vitals normal, no respiratory distress, acyanotic, normal RR, chest clear to auscultation, no wheezes, rales or rhonchi, symmetric air entry.   Assessment:  COPD reasonably well controlled.   Plan: ACCUNEB, PROAIR, ANORO, DALIRESP. Stop PROAIR. Continue all other current treatment.     Pulmonary nodule  Radiologic monitoring.     Chronic respiratory failure with hypoxia  Continue oxygen supplementation at 3  L/min. DME is Stop Being Watched.   Continue nocturnal TRIOLOGY ( VIEMED)        TIME SPENT WITH PATIENT: Time spent: 40 minutes in face to face  discussion concerning diagnosis, prognosis, review of lab and test results, benefits of treatment as well as management of disease, counseling of patient and coordination of care between various health  care providers . Greater than half the time spent was used for coordination of care and counseling of patient.     Return in about 3 months (around 8/17/2017) for COPD, Chronic Respiratory Failure, Lung Nodule.

## 2017-05-09 ENCOUNTER — OFFICE VISIT (OUTPATIENT)
Dept: INTERNAL MEDICINE | Facility: CLINIC | Age: 62
End: 2017-05-09
Payer: MEDICARE

## 2017-05-09 VITALS
HEART RATE: 99 BPM | DIASTOLIC BLOOD PRESSURE: 84 MMHG | BODY MASS INDEX: 28.21 KG/M2 | SYSTOLIC BLOOD PRESSURE: 146 MMHG | HEIGHT: 71 IN | OXYGEN SATURATION: 95 % | TEMPERATURE: 98 F | WEIGHT: 201.5 LBS

## 2017-05-09 DIAGNOSIS — F17.210 CIGARETTE NICOTINE DEPENDENCE WITHOUT COMPLICATION: ICD-10-CM

## 2017-05-09 DIAGNOSIS — J44.9 COPD, VERY SEVERE: Chronic | ICD-10-CM

## 2017-05-09 DIAGNOSIS — R53.82 CHRONIC FATIGUE: ICD-10-CM

## 2017-05-09 DIAGNOSIS — Z83.42 FAMILY HISTORY OF HYPERCHOLESTEROLEMIA: ICD-10-CM

## 2017-05-09 DIAGNOSIS — H91.93 BILATERAL HEARING LOSS, UNSPECIFIED HEARING LOSS TYPE: ICD-10-CM

## 2017-05-09 DIAGNOSIS — Z76.89 ESTABLISHING CARE WITH NEW DOCTOR, ENCOUNTER FOR: Primary | ICD-10-CM

## 2017-05-09 DIAGNOSIS — R91.1 PULMONARY NODULE: Chronic | ICD-10-CM

## 2017-05-09 DIAGNOSIS — Z12.5 SCREENING FOR PROSTATE CANCER: ICD-10-CM

## 2017-05-09 PROCEDURE — 99213 OFFICE O/P EST LOW 20 MIN: CPT | Mod: PBBFAC,PO | Performed by: FAMILY MEDICINE

## 2017-05-09 PROCEDURE — 99999 PR PBB SHADOW E&M-EST. PATIENT-LVL III: CPT | Mod: PBBFAC,,, | Performed by: FAMILY MEDICINE

## 2017-05-09 PROCEDURE — 99495 TRANSJ CARE MGMT MOD F2F 14D: CPT | Mod: PBBFAC,PO | Performed by: FAMILY MEDICINE

## 2017-05-09 NOTE — MR AVS SNAPSHOT
[facility]  [address]  [place] [address]  Phone: [phone]  Fax: [phone]                  Johnny Perales   2017 1:00 PM   Office Visit    Description:  Male : 1955   Provider:  John Yap MD   Department:  [facility]           Reason for Visit     Establish Care     Follow-up           Diagnoses this Visit        Comments    Establishing care with new doctor, encounter for    -  Primary     COPD, very severe         Screening for prostate cancer         Family history of hypercholesterolemia         Chronic fatigue         Bilateral hearing loss, unspecified hearing loss type         Pulmonary nodule                To Do List           Future Appointments        Provider Department Dept Phone    5/10/2017 1:00 PM PULMONARY REHAB, [facility] [phone]    2017 8:00 AM PRIMARY CARE NURSE, [facility] [phone]    5/15/2017 1:00 PM PULMONARY REHAB, [facility] [phone]    2017 1:00 PM PULMONARY REHAB, [facility] [phone]    2017 1:00 PM PULMONARY REHAB, [facility] [phone]      Goals (5 Years of Data)     None      Follow-Up and Disposition     Return in about 1 month (around 2017).      [facility]     [facility] Care Line -  Assistance  Unless otherwise directed by your provider, please contact [facility], our nurse care line that is available for  assistance.     Registered nurses in the [facility] provide: appointment scheduling, clinical advisement, health education, and other advisory services.  Call: [phone] (toll free)               Medications           Message regarding Medications     Verify the changes and/or additions to your medication regime listed below are the same as discussed with your clinician today.  If any of these changes or  "additions are incorrect, please notify your healthcare provider.        STOP taking these medications     levoFLOXacin (LEVAQUIN) 500 MG tablet Take 1 tablet (500 mg total) by mouth once daily.           Verify that the below list of medications is an accurate representation of the medications you are currently taking.  If none reported, the list may be blank. If incorrect, please contact your healthcare provider. Carry this list with you in case of emergency.           Current Medications     albuterol (ACCUNEB) 0.63 mg/3 mL Nebu Take 3 mLs (0.63 mg total) by nebulization 3 (three) times daily as needed.    azithromycin (ZITHROMAX) 500 MG tablet Take 1 tablet (500 mg total) by mouth once daily.    bacitracin (AK-TRACIN) ophthalmic ointment APPLY .25" STRIP TO BOTH EYES DAILY AT BEDTIME    buPROPion (WELLBUTRIN SR) 150 MG TBSR 12 hr tablet Take 150 mg by mouth 2 (two) times daily.    folic acid (FOLVITE) 1 MG tablet Take 1,000 mcg by mouth once daily.    hydrocodone-acetaminophen 5-325mg (NORCO) 5-325 mg per tablet Take 1 tablet by mouth every 6 (six) hours as needed.    OXYGEN-AIR DELIVERY SYSTEMS MISC by Misc.(Non-Drug; Combo Route) route.    predniSONE (DELTASONE) 10 MG tablet Take 1 tablet (10 mg total) by mouth once daily. Take 50 mg daily for 4 days, then take 40 mg daily for 4 days, then take 30 mg daily for 4 days, then take 20 mg daily for 4 days, then take 10 mg daily for 4 days, then stop.    roflumilast 500 mcg Tab Take 1 tablet (500 mcg total) by mouth once daily.    umeclidinium-vilanterol (ANORO ELLIPTA) 62.5-25 mcg/actuation DsDv Inhale 1 puff into the lungs once daily.    VITAMIN D3 400 unit tablet TAKE 2 TABLETS BY MOUTH DAILY.           Clinical Reference Information           Your Vitals Were     BP Pulse Temp Height Weight SpO2    146/84 99 98 °F (36.7 °C) (Oral) 5' 11" (1.803 m) 91.4 kg (201 lb 8 oz) 95%    BMI                28.1 kg/m2          Blood Pressure          Most Recent Value    BP  " (!)  146/84      Allergies as of 5/9/2017     No Known Allergies      Immunizations Administered on Date of Encounter - 5/9/2017     None      Orders Placed During Today's Visit      Normal Orders This Visit    Ambulatory referral to ENT     Future Labs/Procedures Expected by Expires    CBC auto differential  5/9/2017 7/8/2018    Comprehensive metabolic panel  5/9/2017 7/8/2018    Lipid panel  5/9/2017 7/8/2018    PSA, Screening  5/9/2017 7/8/2018    TSH  5/9/2017 7/8/2018    Urinalysis  5/9/2017 7/8/2018      [facility] Sign-Up     Activating your [facility] account is as easy as 1-2-3!     1) Visit [email or web address], select Sign Up Now, enter this activation code and your date of birth, then select Next.  [email or web address]  Expires: 6/13/2017 11:33 AM      2) Create a username and password to use when you visit [facility] in the future and select a security question in case you lose your password and select Next.    3) Enter your e-mail address and click Sign Up!    Additional Information  If you have questions, please e-mail [email or web address] or call [phone] to talk to our [facility] staff. Remember, [facility] is NOT to be used for urgent needs. For medical emergencies, dial 911.         Smoking Cessation     If you would like to quit smoking:   You may be eligible for free services if you are a [place] resident and started smoking cigarettes before September 1, 1988.  Call the [employer]oking [employer]essation [employer] ([employer]) toll free at (922) 407-7355 or (261) 380-2488.   Call 1-800-QUIT-NOW if you do not meet the above criteria.   Contact us via email: [email or web address]   View our website for more information: [email or web address]        Language Assistance Services     ATTENTION: Language assistance services are available, free of charge. Please call [phone].      ATENCIÓN: Si habla español, tiene a berrios disposición servicios gratuitos de asistencia lingüística. Llame al [phone].     MANUEL Ý: N?u  b?n nói Ti?ng Vi?t, có các d?ch v? h? tr? ngôn ng? mi?n phí dành cho b?n. G?i s? [phone].         [facility] complies with applicable Federal civil rights laws and does not discriminate on the basis of race, color, national origin, age, disability, or sex.

## 2017-05-09 NOTE — PROGRESS NOTES
Subjective:       Patient ID: Johnny Perales is a 62 y.o. male.    Chief Complaint: Establish Care and Follow-up (ER/hospital)    HPI Comments: Establish care and posthospital discharge follow-up.  He was hospitalized 7-10 days ago for his COPD acute exacerbation.  He was treated with IV steroids and IV antibiotics and inhalation therapy and oxygen.  His symptoms improved.  He continues with usual cough and shortness of breath and is using continuous nasal O2.  He had a recent pulm evaluation.  There are no changes in his medical regimen.  He recently started pulmonary rehabilitation.  2 months ago he was  smoking one pack cigarettes a day.  He is now using nicotine vapor and trying to discontinue this.    Review of Systems   Constitutional: Positive for fatigue (ecent onset of fatigue.). Negative for activity change, appetite change and unexpected weight change.   HENT: Positive for hearing loss. Negative for congestion, dental problem, ear pain, sneezing, sore throat, tinnitus and trouble swallowing.         He was diagnosed with bilateral cholesteatomas.  He requested an ENT follow-up for hearing loss   Eyes: Negative for pain, redness and visual disturbance.   Respiratory: Positive for cough, shortness of breath and wheezing. Negative for chest tightness.    Cardiovascular: Negative for chest pain, palpitations and leg swelling.   Gastrointestinal: Negative for abdominal distention, abdominal pain, blood in stool, constipation, diarrhea, nausea, rectal pain and vomiting.   Genitourinary: Negative for difficulty urinating, dysuria, frequency, hematuria and urgency.   Musculoskeletal: Negative for arthralgias, back pain, joint swelling, myalgias, neck pain and neck stiffness.   Skin: Negative for rash and wound.   Neurological: Negative for dizziness, tremors, syncope, light-headedness, numbness and headaches.   Hematological: Negative for adenopathy. Does not bruise/bleed easily.   Psychiatric/Behavioral:  Negative for agitation, confusion, dysphoric mood and sleep disturbance. The patient is not nervous/anxious.        Objective:      Physical Exam   Constitutional: He is oriented to person, place, and time. He appears well-developed and well-nourished.   Continuous nasal O2.   HENT:   Right Ear: External ear normal.   Left Ear: External ear normal.   Nose: Nose normal.   Mouth/Throat: Oropharynx is clear and moist.   He has a magnifier his left ear.   Eyes: Conjunctivae and EOM are normal. Pupils are equal, round, and reactive to light.   Neck: Normal range of motion. Neck supple. No JVD present. No thyromegaly present.   Cardiovascular: Normal rate, regular rhythm, normal heart sounds and intact distal pulses.  Exam reveals no gallop and no friction rub.    No murmur heard.  Pulmonary/Chest: Effort normal. No respiratory distress.   History lives decreased breath sounds.  He has occasional rhonchi and occasional wheeze posteriorly   Abdominal: Soft. Bowel sounds are normal. He exhibits no distension and no mass. There is no tenderness.   Musculoskeletal: Normal range of motion. He exhibits no edema or tenderness.   Lymphadenopathy:     He has no cervical adenopathy.   Neurological: He is alert and oriented to person, place, and time. He has normal reflexes. He displays normal reflexes. No cranial nerve deficit. He exhibits normal muscle tone. Coordination normal.   Skin: Skin is warm and dry. No rash noted.   Psychiatric: He has a normal mood and affect. His behavior is normal. Judgment and thought content normal.       Admission on 04/26/2017, Discharged on 04/29/2017   Component Date Value Ref Range Status    WBC 04/26/2017 7.95  3.90 - 12.70 K/uL Final    RBC 04/26/2017 4.99  4.60 - 6.20 M/uL Final    Hemoglobin 04/26/2017 17.3  14.0 - 18.0 g/dL Final    Hematocrit 04/26/2017 49.1  40.0 - 54.0 % Final    MCV 04/26/2017 98  82 - 98 fL Final    MCH 04/26/2017 34.7* 27.0 - 31.0 pg Final    MCHC 04/26/2017  35.2  32.0 - 36.0 % Final    RDW 04/26/2017 13.4  11.5 - 14.5 % Final    Platelets 04/26/2017 181  150 - 350 K/uL Final    MPV 04/26/2017 9.9  9.2 - 12.9 fL Final    Gran # 04/26/2017 4.9  1.8 - 7.7 K/uL Final    Lymph # 04/26/2017 1.4  1.0 - 4.8 K/uL Final    Mono # 04/26/2017 0.7  0.3 - 1.0 K/uL Final    Eos # 04/26/2017 0.9* 0.0 - 0.5 K/uL Final    Baso # 04/26/2017 0.11  0.00 - 0.20 K/uL Final    Gran% 04/26/2017 61.2  38.0 - 73.0 % Final    Lymph% 04/26/2017 18.0  18.0 - 48.0 % Final    Mono% 04/26/2017 8.3  4.0 - 15.0 % Final    Eosinophil% 04/26/2017 11.1* 0.0 - 8.0 % Final    Basophil% 04/26/2017 1.4  0.0 - 1.9 % Final    Differential Method 04/26/2017 Automated   Final    Sodium 04/26/2017 141  136 - 145 mmol/L Final    Potassium 04/26/2017 4.7  3.5 - 5.1 mmol/L Final    Chloride 04/26/2017 102  95 - 110 mmol/L Final    CO2 04/26/2017 27  23 - 29 mmol/L Final    Glucose 04/26/2017 102  70 - 110 mg/dL Final    BUN, Bld 04/26/2017 8  8 - 23 mg/dL Final    Creatinine 04/26/2017 1.0  0.5 - 1.4 mg/dL Final    Calcium 04/26/2017 9.5  8.7 - 10.5 mg/dL Final    Total Protein 04/26/2017 7.7  6.0 - 8.4 g/dL Final    Albumin 04/26/2017 4.0  3.5 - 5.2 g/dL Final    Total Bilirubin 04/26/2017 0.6  0.1 - 1.0 mg/dL Final    Alkaline Phosphatase 04/26/2017 79  55 - 135 U/L Final    AST 04/26/2017 45* 10 - 40 U/L Final    ALT 04/26/2017 48* 10 - 44 U/L Final    Anion Gap 04/26/2017 12  8 - 16 mmol/L Final    eGFR if  04/26/2017 >60  >60 mL/min/1.73 m^2 Final    eGFR if non African American 04/26/2017 >60  >60 mL/min/1.73 m^2 Final    D-Dimer 04/26/2017 0.37  <0.50 mg/L FEU Final    CPK 04/26/2017 155  20 - 200 U/L Final    CPK 04/26/2017 155  20 - 200 U/L Final    CPK MB 04/26/2017 5.8  0.1 - 6.5 ng/mL Final    MB% 04/26/2017 3.7  0.0 - 5.0 % Final    Troponin I 04/26/2017 0.007  0.000 - 0.026 ng/mL Final    TSH 04/26/2017 2.421  0.400 - 4.000 uIU/mL Final    Specimen  UA 04/26/2017 Urine, Clean Catch   Final    Color, UA 04/26/2017 Yellow  Yellow, Straw, Dalila Final    Appearance, UA 04/26/2017 Clear  Clear Final    pH, UA 04/26/2017 6.0  5.0 - 8.0 Final    Specific Gravity, UA 04/26/2017 <=1.005* 1.005 - 1.030 Final    Protein, UA 04/26/2017 Negative  Negative Final    Glucose, UA 04/26/2017 Negative  Negative Final    Ketones, UA 04/26/2017 Negative  Negative Final    Bilirubin (UA) 04/26/2017 Negative  Negative Final    Occult Blood UA 04/26/2017 Negative  Negative Final    Nitrite, UA 04/26/2017 Negative  Negative Final    Urobilinogen, UA 04/26/2017 Negative  <2.0 EU/dL Final    Leukocytes, UA 04/26/2017 Negative  Negative Final    BNP 04/26/2017 <10  0 - 99 pg/mL Final    POC PH 04/26/2017 7.313* 7.35 - 7.45 Final    POC PCO2 04/26/2017 54.9* 35 - 45 mmHg Final    POC PO2 04/26/2017 79* 80 - 100 mmHg Final    POC HCO3 04/26/2017 27.8  24 - 28 mmol/L Final    POC BE 04/26/2017 2  -2 to 2 mmol/L Final    POC SATURATED O2 04/26/2017 94* 95 - 100 % Final    Sample 04/26/2017 ARTERIAL   Final    Site 04/26/2017 LR   Final    Allens Test 04/26/2017 Pass   Final    DelSys 04/26/2017 Nasal Can   Final    Mode 04/26/2017 SPONT   Final    Flow 04/26/2017 3   Final    FiO2 04/26/2017 32   Final    WBC 04/27/2017 9.80  3.90 - 12.70 K/uL Final    RBC 04/27/2017 4.54* 4.60 - 6.20 M/uL Final    Hemoglobin 04/27/2017 15.6  14.0 - 18.0 g/dL Final    Hematocrit 04/27/2017 44.6  40.0 - 54.0 % Final    MCV 04/27/2017 98  82 - 98 fL Final    MCH 04/27/2017 34.4* 27.0 - 31.0 pg Final    MCHC 04/27/2017 35.0  32.0 - 36.0 % Final    RDW 04/27/2017 13.2  11.5 - 14.5 % Final    Platelets 04/27/2017 177  150 - 350 K/uL Final    MPV 04/27/2017 10.3  9.2 - 12.9 fL Final    Gran # 04/27/2017 8.8* 1.8 - 7.7 K/uL Final    Lymph # 04/27/2017 0.6* 1.0 - 4.8 K/uL Final    Mono # 04/27/2017 0.4  0.3 - 1.0 K/uL Final    Eos # 04/27/2017 0.0  0.0 - 0.5 K/uL Final    Baso  # 04/27/2017 0.00  0.00 - 0.20 K/uL Final    Gran% 04/27/2017 89.9* 38.0 - 73.0 % Final    Lymph% 04/27/2017 6.2* 18.0 - 48.0 % Final    Mono% 04/27/2017 3.9* 4.0 - 15.0 % Final    Eosinophil% 04/27/2017 0.0  0.0 - 8.0 % Final    Basophil% 04/27/2017 0.0  0.0 - 1.9 % Final    Differential Method 04/27/2017 Automated   Final    Sodium 04/27/2017 138  136 - 145 mmol/L Final    Potassium 04/27/2017 4.4  3.5 - 5.1 mmol/L Final    Chloride 04/27/2017 103  95 - 110 mmol/L Final    CO2 04/27/2017 26  23 - 29 mmol/L Final    Glucose 04/27/2017 146* 70 - 110 mg/dL Final    BUN, Bld 04/27/2017 13  8 - 23 mg/dL Final    Creatinine 04/27/2017 0.9  0.5 - 1.4 mg/dL Final    Calcium 04/27/2017 9.3  8.7 - 10.5 mg/dL Final    Total Protein 04/27/2017 6.5  6.0 - 8.4 g/dL Final    Albumin 04/27/2017 3.5  3.5 - 5.2 g/dL Final    Total Bilirubin 04/27/2017 0.4  0.1 - 1.0 mg/dL Final    Alkaline Phosphatase 04/27/2017 60  55 - 135 U/L Final    AST 04/27/2017 24  10 - 40 U/L Final    ALT 04/27/2017 33  10 - 44 U/L Final    Anion Gap 04/27/2017 9  8 - 16 mmol/L Final    eGFR if  04/27/2017 >60  >60 mL/min/1.73 m^2 Final    eGFR if non African American 04/27/2017 >60  >60 mL/min/1.73 m^2 Final    WBC 04/28/2017 16.58* 3.90 - 12.70 K/uL Final    RBC 04/28/2017 4.66  4.60 - 6.20 M/uL Final    Hemoglobin 04/28/2017 15.9  14.0 - 18.0 g/dL Final    Hematocrit 04/28/2017 45.7  40.0 - 54.0 % Final    MCV 04/28/2017 98  82 - 98 fL Final    MCH 04/28/2017 34.1* 27.0 - 31.0 pg Final    MCHC 04/28/2017 34.8  32.0 - 36.0 % Final    RDW 04/28/2017 13.3  11.5 - 14.5 % Final    Platelets 04/28/2017 181  150 - 350 K/uL Final    MPV 04/28/2017 10.0  9.2 - 12.9 fL Final    Gran # 04/28/2017 15.4* 1.8 - 7.7 K/uL Final    Lymph # 04/28/2017 0.5* 1.0 - 4.8 K/uL Final    Mono # 04/28/2017 0.7  0.3 - 1.0 K/uL Final    Eos # 04/28/2017 0.0  0.0 - 0.5 K/uL Final    Baso # 04/28/2017 0.00  0.00 - 0.20 K/uL Final     Gran% 04/28/2017 92.5* 38.0 - 73.0 % Final    Lymph% 04/28/2017 3.3* 18.0 - 48.0 % Final    Mono% 04/28/2017 4.2  4.0 - 15.0 % Final    Eosinophil% 04/28/2017 0.0  0.0 - 8.0 % Final    Basophil% 04/28/2017 0.0  0.0 - 1.9 % Final    Differential Method 04/28/2017 Automated   Final    Sodium 04/28/2017 139  136 - 145 mmol/L Final    Potassium 04/28/2017 4.4  3.5 - 5.1 mmol/L Final    Chloride 04/28/2017 103  95 - 110 mmol/L Final    CO2 04/28/2017 28  23 - 29 mmol/L Final    Glucose 04/28/2017 125* 70 - 110 mg/dL Final    BUN, Bld 04/28/2017 14  8 - 23 mg/dL Final    Creatinine 04/28/2017 1.0  0.5 - 1.4 mg/dL Final    Calcium 04/28/2017 9.3  8.7 - 10.5 mg/dL Final    Total Protein 04/28/2017 6.4  6.0 - 8.4 g/dL Final    Albumin 04/28/2017 3.4* 3.5 - 5.2 g/dL Final    Total Bilirubin 04/28/2017 0.4  0.1 - 1.0 mg/dL Final    Alkaline Phosphatase 04/28/2017 54* 55 - 135 U/L Final    AST 04/28/2017 24  10 - 40 U/L Final    ALT 04/28/2017 35  10 - 44 U/L Final    Anion Gap 04/28/2017 8  8 - 16 mmol/L Final    eGFR if  04/28/2017 >60  >60 mL/min/1.73 m^2 Final    eGFR if non African American 04/28/2017 >60  >60 mL/min/1.73 m^2 Final    Blood Culture, Routine 04/28/2017 No growth after 5 days.   Final    Blood Culture, Routine 04/28/2017 No growth after 5 days.   Final    WBC 04/29/2017 11.08  3.90 - 12.70 K/uL Final    RBC 04/29/2017 4.79  4.60 - 6.20 M/uL Final    Hemoglobin 04/29/2017 16.4  14.0 - 18.0 g/dL Final    Hematocrit 04/29/2017 46.9  40.0 - 54.0 % Final    MCV 04/29/2017 98  82 - 98 fL Final    MCH 04/29/2017 34.2* 27.0 - 31.0 pg Final    MCHC 04/29/2017 35.0  32.0 - 36.0 % Final    RDW 04/29/2017 13.2  11.5 - 14.5 % Final    Platelets 04/29/2017 190  150 - 350 K/uL Final    MPV 04/29/2017 10.2  9.2 - 12.9 fL Final    Gran # 04/29/2017 10.3* 1.8 - 7.7 K/uL Final    Lymph # 04/29/2017 0.5* 1.0 - 4.8 K/uL Final    Mono # 04/29/2017 0.3  0.3 - 1.0 K/uL Final     Eos # 04/29/2017 0.0  0.0 - 0.5 K/uL Final    Baso # 04/29/2017 0.00  0.00 - 0.20 K/uL Final    Gran% 04/29/2017 92.9* 38.0 - 73.0 % Final    Lymph% 04/29/2017 4.1* 18.0 - 48.0 % Final    Mono% 04/29/2017 3.0* 4.0 - 15.0 % Final    Eosinophil% 04/29/2017 0.0  0.0 - 8.0 % Final    Basophil% 04/29/2017 0.0  0.0 - 1.9 % Final    Differential Method 04/29/2017 Automated   Final    Sodium 04/29/2017 139  136 - 145 mmol/L Final    Potassium 04/29/2017 4.4  3.5 - 5.1 mmol/L Final    Chloride 04/29/2017 102  95 - 110 mmol/L Final    CO2 04/29/2017 28  23 - 29 mmol/L Final    Glucose 04/29/2017 124* 70 - 110 mg/dL Final    BUN, Bld 04/29/2017 15  8 - 23 mg/dL Final    Creatinine 04/29/2017 1.0  0.5 - 1.4 mg/dL Final    Calcium 04/29/2017 9.4  8.7 - 10.5 mg/dL Final    Total Protein 04/29/2017 6.6  6.0 - 8.4 g/dL Final    Albumin 04/29/2017 3.5  3.5 - 5.2 g/dL Final    Total Bilirubin 04/29/2017 0.5  0.1 - 1.0 mg/dL Final    Alkaline Phosphatase 04/29/2017 53* 55 - 135 U/L Final    AST 04/29/2017 30  10 - 40 U/L Final    ALT 04/29/2017 48* 10 - 44 U/L Final    Anion Gap 04/29/2017 9  8 - 16 mmol/L Final    eGFR if African American 04/29/2017 >60  >60 mL/min/1.73 m^2 Final    eGFR if non African American 04/29/2017 >60  >60 mL/min/1.73 m^2 Final     Assessment:       1. Establishing care with new doctor, encounter for    2. COPD, very severe    3. Screening for prostate cancer    4. Family history of hypercholesterolemia     5. Chronic fatigue     6. Bilateral hearing loss, unspecified hearing loss type    7. Pulmonary nodule        Plan:   Medications were reviewed.  He requested a routine lab.  CBC urinalysis CMP lipids TSH PSA was ordered.  Follow-up in one month.  Discussed Zostavax immunization at that time. ENT referral for hearing loss.  Discuss nicotine usage.      Establishing care with new doctor, encounter for  -     Urinalysis; Future; Expected date: 5/9/17  -     Comprehensive metabolic  panel; Future; Expected date: 5/9/17    COPD, very severe  -     CBC auto differential; Future; Expected date: 5/9/17  -     Lipid panel; Future; Expected date: 5/9/17  -     TSH; Future; Expected date: 5/9/17    Screening for prostate cancer  -     PSA, Screening; Future; Expected date: 5/9/17    Family history of hypercholesterolemia   -     Lipid panel; Future; Expected date: 5/9/17    Chronic fatigue   -     TSH; Future; Expected date: 5/9/17    Bilateral hearing loss, unspecified hearing loss type  -     Ambulatory referral to ENT    Pulmonary nodule

## 2017-05-09 NOTE — PROGRESS NOTES
Transitional Care Note  Subjective:       Patient ID: Johnny Perales is a 62 y.o. male.  Chief Complaint: Establish Care and Follow-up (ER/hospital)    Family and/or Caretaker present at visit?   Yes sister  Diagnostic tests reviewed/disposition: cbc cmp cxr   Disease/illness education: discussed nicotine cessation  Home health/community services discussion/referrals: continue pulmonary rehab.   Establishment or re-establishment of referral orders for community resources:   Discussion with other health care providers: recent pulm visit reviewed  HPI  Review of Systems    Objective:      Physical Exam    Assessment:       1. Establishing care with new doctor, encounter for    2. COPD, very severe    3. Screening for prostate cancer    4. Family history of hypercholesterolemia     5. Chronic fatigue     6. Bilateral hearing loss, unspecified hearing loss type    7. Pulmonary nodule    8. Cigarette nicotine dependence without complication        Plan:

## 2017-05-10 ENCOUNTER — HOSPITAL ENCOUNTER (OUTPATIENT)
Dept: PULMONOLOGY | Facility: HOSPITAL | Age: 62
Discharge: HOME | End: 2017-05-10
Attending: INTERNAL MEDICINE
Payer: MEDICARE

## 2017-05-10 VITALS — BODY MASS INDEX: 27.95 KG/M2 | WEIGHT: 200.38 LBS

## 2017-05-10 PROCEDURE — G0424 PULMONARY REHAB W EXER: HCPCS

## 2017-05-10 NOTE — PROGRESS NOTES
"[facility]  Pulmonary Rehab  Session Summary    SUMMARY     Session Data  Session Number: 5  Time In: 1300  Time Out: 1400  Weight: 90.9 kg (200 lb 6.4 oz)  Target Heart Rate Zone: Minimum: 95 bpm  Target Heart Rate Zone: Maximum: 127 bpm  Patient Motivation: Excellent  Patient Effort: Excellent  Is this patient a diabetic?: No      Pre Exercise Vitals  SpO2: 95 %  Supplemental O2?: Yes  O2 Device: nasal cannula  O2 Flow (L/min): 2  Pulse: 95  BP: 140/86  Mika Dyspnea Rating : 2      During Exercise Vitals  SpO2: 96 %  Supplemental O2?: Yes  O2 Device: nasal cannula  O2 Flow (L/min): 2  Pulse: 111  BP: 128/78  Mika Dyspnea Rating : 3      Post Exercise Vitals  SpO2: 97 %  Supplemental O2?: Yes  O2 Device: nasal cannula  O2 Flow (L/min): 2  Pulse: 105  BP: 128/80  Mika Dyspnea Rating : 3       Modality  Modality: Nustep, Arm Ergometer, Hand Free Weights             Arm Ergometer  Time: 10 minutes  Level: 3             Nustep  Time: 20 minutes  Steps: 1583  Load: 6                           Biceps  lbs: 4 lbs  Sets: 2  Reps: 10      Chest  lbs: 4 lbs  Sets: 2  Reps: 10                  Education  Purse lip breathing review      Therapist Notes   Great effort in rehab. Pt tolerated exercise well. Pt had new appointment with new PCP. Pt is happy with new PCP.  Pt saw Pulmonary on 5/5/17. Pt is recovering from COPD exacerbation and states that he is feeling " much better ".  Dr. Prabhakar Rodriguez immediately available as needed.          "

## 2017-05-11 ENCOUNTER — CLINICAL SUPPORT (OUTPATIENT)
Dept: INTERNAL MEDICINE | Facility: CLINIC | Age: 62
End: 2017-05-11
Payer: MEDICARE

## 2017-05-11 DIAGNOSIS — R53.82 CHRONIC FATIGUE: ICD-10-CM

## 2017-05-11 DIAGNOSIS — J44.9 COPD, VERY SEVERE: Chronic | ICD-10-CM

## 2017-05-11 DIAGNOSIS — Z12.5 SCREENING FOR PROSTATE CANCER: ICD-10-CM

## 2017-05-11 DIAGNOSIS — Z83.42 FAMILY HISTORY OF HYPERCHOLESTEROLEMIA: ICD-10-CM

## 2017-05-11 DIAGNOSIS — Z76.89 ESTABLISHING CARE WITH NEW DOCTOR, ENCOUNTER FOR: ICD-10-CM

## 2017-05-11 LAB
ALBUMIN SERPL BCP-MCNC: 3.8 G/DL
ALP SERPL-CCNC: 65 U/L
ALT SERPL W/O P-5'-P-CCNC: 20 U/L
ANION GAP SERPL CALC-SCNC: 13 MMOL/L
AST SERPL-CCNC: 20 U/L
BASOPHILS # BLD AUTO: 0.04 K/UL
BASOPHILS NFR BLD: 0.6 %
BILIRUB SERPL-MCNC: 0.4 MG/DL
BUN SERPL-MCNC: 18 MG/DL
CALCIUM SERPL-MCNC: 9.5 MG/DL
CHLORIDE SERPL-SCNC: 101 MMOL/L
CHOLEST/HDLC SERPL: 2.5 {RATIO}
CO2 SERPL-SCNC: 29 MMOL/L
COMPLEXED PSA SERPL-MCNC: 0.91 NG/ML
CREAT SERPL-MCNC: 1.2 MG/DL
DIFFERENTIAL METHOD: ABNORMAL
EOSINOPHIL # BLD AUTO: 0.2 K/UL
EOSINOPHIL NFR BLD: 3 %
ERYTHROCYTE [DISTWIDTH] IN BLOOD BY AUTOMATED COUNT: 13.5 %
EST. GFR  (AFRICAN AMERICAN): >60 ML/MIN/1.73 M^2
EST. GFR  (NON AFRICAN AMERICAN): >60 ML/MIN/1.73 M^2
GLUCOSE SERPL-MCNC: 88 MG/DL
HCT VFR BLD AUTO: 48 %
HDL/CHOLESTEROL RATIO: 40.7 %
HDLC SERPL-MCNC: 105 MG/DL
HDLC SERPL-MCNC: 258 MG/DL
HGB BLD-MCNC: 16.1 G/DL
LDLC SERPL CALC-MCNC: 113.2 MG/DL
LYMPHOCYTES # BLD AUTO: 2.4 K/UL
LYMPHOCYTES NFR BLD: 33.8 %
MCH RBC QN AUTO: 33.6 PG
MCHC RBC AUTO-ENTMCNC: 33.5 %
MCV RBC AUTO: 100 FL
MONOCYTES # BLD AUTO: 0.4 K/UL
MONOCYTES NFR BLD: 5.7 %
NEUTROPHILS # BLD AUTO: 3.9 K/UL
NEUTROPHILS NFR BLD: 56.9 %
NONHDLC SERPL-MCNC: 153 MG/DL
PLATELET # BLD AUTO: 224 K/UL
PMV BLD AUTO: 9.7 FL
POTASSIUM SERPL-SCNC: 5.6 MMOL/L
PROT SERPL-MCNC: 7.1 G/DL
RBC # BLD AUTO: 4.79 M/UL
SODIUM SERPL-SCNC: 143 MMOL/L
T4 FREE SERPL-MCNC: 1.15 NG/DL
TRIGL SERPL-MCNC: 199 MG/DL
TSH SERPL DL<=0.005 MIU/L-ACNC: 5.38 UIU/ML
WBC # BLD AUTO: 7.02 K/UL

## 2017-05-11 PROCEDURE — 84153 ASSAY OF PSA TOTAL: CPT

## 2017-05-11 PROCEDURE — 80053 COMPREHEN METABOLIC PANEL: CPT

## 2017-05-11 PROCEDURE — 80061 LIPID PANEL: CPT

## 2017-05-11 PROCEDURE — 99211 OFF/OP EST MAY X REQ PHY/QHP: CPT | Mod: PBBFAC,PO

## 2017-05-11 PROCEDURE — 85025 COMPLETE CBC W/AUTO DIFF WBC: CPT

## 2017-05-11 PROCEDURE — 99999 PR PBB SHADOW E&M-EST. PATIENT-LVL I: CPT | Mod: PBBFAC,,,

## 2017-05-11 PROCEDURE — 84443 ASSAY THYROID STIM HORMONE: CPT

## 2017-05-11 PROCEDURE — 84439 ASSAY OF FREE THYROXINE: CPT

## 2017-05-15 ENCOUNTER — INITIAL CONSULT (OUTPATIENT)
Dept: OTOLARYNGOLOGY | Facility: CLINIC | Age: 62
End: 2017-05-15
Payer: MEDICARE

## 2017-05-15 ENCOUNTER — CLINICAL SUPPORT (OUTPATIENT)
Dept: AUDIOLOGY | Facility: CLINIC | Age: 62
End: 2017-05-15
Payer: MEDICARE

## 2017-05-15 VITALS
SYSTOLIC BLOOD PRESSURE: 135 MMHG | DIASTOLIC BLOOD PRESSURE: 88 MMHG | WEIGHT: 206.81 LBS | BODY MASS INDEX: 28.84 KG/M2 | HEART RATE: 88 BPM | TEMPERATURE: 99 F

## 2017-05-15 DIAGNOSIS — H90.6 MIXED HEARING LOSS, BILATERAL: Primary | ICD-10-CM

## 2017-05-15 DIAGNOSIS — H95.193 POST MASTOIDECTOMY SEQUELAE, BILATERAL: Primary | ICD-10-CM

## 2017-05-15 DIAGNOSIS — H90.6 HEARING LOSS, MIXED, BILATERAL: ICD-10-CM

## 2017-05-15 PROCEDURE — 99204 OFFICE O/P NEW MOD 45 MIN: CPT | Mod: 25,S$PBB,, | Performed by: ORTHOPAEDIC SURGERY

## 2017-05-15 PROCEDURE — 69220 CLEAN OUT MASTOID CAVITY: CPT | Mod: 50,S$PBB,, | Performed by: ORTHOPAEDIC SURGERY

## 2017-05-15 PROCEDURE — 99999 PR PBB SHADOW E&M-EST. PATIENT-LVL III: CPT | Mod: PBBFAC,,, | Performed by: ORTHOPAEDIC SURGERY

## 2017-05-15 PROCEDURE — 69220 CLEAN OUT MASTOID CAVITY: CPT | Mod: 50,PBBFAC | Performed by: ORTHOPAEDIC SURGERY

## 2017-05-15 PROCEDURE — 99213 OFFICE O/P EST LOW 20 MIN: CPT | Mod: PBBFAC,25 | Performed by: ORTHOPAEDIC SURGERY

## 2017-05-15 RX ORDER — OFLOXACIN 3 MG/ML
5 SOLUTION AURICULAR (OTIC) 2 TIMES DAILY
Qty: 5 ML | Refills: 0 | Status: SHIPPED | OUTPATIENT
Start: 2017-05-15 | End: 2017-05-25

## 2017-05-15 NOTE — PROGRESS NOTES
Johnny Perales was seen 05/15/2017 for an audiological evaluation.  Patient has history of bilateral cholesteatomas and multiple ear surgeries - 3 surgeries for right ear and 2 for the left ear.  His last surgery and hearing [place].  He has concern about cholesteatoma return in his right ear, noting blood several times in his ear canal.  He tried hearing aids three times with no success.  His last hearing aid was obtained in 1997.  A Baha was discussed at that time but he declined, concerned about the abutment and more surgery.  He is using an OTC amplifier and notes hearing better with that than any hearing aid.   He has bilateral tinnitus, described as crickets and ringing.  He has COPD.     Results reveal a profound mixed hearing loss 250-8000 Hz for the right ear, and  severe-to-profound mixed hearing loss 250-8000 Hz for the left ear.   Speech Reception Thresholds were  95 dBHL for the right ear and 80 dBHL for the left ear.   Word recognition scores were poor for the right ear and good for the left ear.   Tympanograms were unable to obtain seal for the right ear and Type B large volume for the left ear consistent with bilateral CWD mastoidectomy.    Patient was counseled on the above findings.    Recommendations:  1. ENT  2. Amplification, left ear.  I discussed he could trial hearing aid again, with today's new technology.  Due to severity of sensorineural hearing loss, I do not believe he is a good candidate for Baha.

## 2017-05-15 NOTE — MR AVS SNAPSHOT
[facility]  [address] [address]  Phone: [phone]  Fax: [phone]                  Johnny Perales   5/15/2017 11:00 AM   Initial consult    Description:  Male : 1955   Provider:  Jenny Rodriguez MD   Department:  [facility] Oto[facility]           Reason for Visit     Hearing Loss                To Do List           Future Appointments        Provider Department Dept Phone    5/15/2017 1:00 PM PULMONARY REHAB, [facility] [phone]    2017 1:00 PM PULMONARY REHAB, [facility] [phone]    2017 1:00 PM PULMONARY REHAB, [facility] [phone]    2017 1:00 PM PULMONARY REHAB, [facility] [phone]    2017 1:00 PM PULMONARY REHAB, [facility] [phone]      Goals (5 Years of Data)     None       These Medications        Disp Refills Start End    ofloxacin (FLOXIN) 0.3 % otic solution 5 mL 0 5/15/2017 2017    Place 5 drops into the right ear 2 (two) times daily. - Right Ear    Pharmacy: [facility] [id number] [facility] - [place], [place] -  [facility] #: [phone]         [facility]     [facility] Care Line -  Assistance  Unless otherwise directed by your provider, please contact [facility], our nurse care line that is available for  assistance.     Registered nurses in the [facility] provide: appointment scheduling, clinical advisement, health education, and other advisory services.  Call: [phone] (toll free)               Medications           Message regarding Medications     Verify the changes and/or additions to your medication regime listed below are the same as discussed with your clinician today.  If any of these changes or additions are incorrect, please notify your healthcare provider.        START taking these NEW medications   "      Refills    ofloxacin (FLOXIN) 0.3 % otic solution 0    Sig: Place 5 drops into the right ear 2 (two) times daily.    Class: Normal    Route: Right Ear           Verify that the below list of medications is an accurate representation of the medications you are currently taking.  If none reported, the list may be blank. If incorrect, please contact your healthcare provider. Carry this list with you in case of emergency.           Current Medications     albuterol (ACCUNEB) 0.63 mg/3 mL Nebu Take 3 mLs (0.63 mg total) by nebulization 3 (three) times daily as needed.    azithromycin (ZITHROMAX) 500 MG tablet Take 1 tablet (500 mg total) by mouth once daily.    bacitracin (AK-TRACIN) ophthalmic ointment APPLY .25" STRIP TO BOTH EYES DAILY AT BEDTIME    buPROPion (WELLBUTRIN SR) 150 MG TBSR 12 hr tablet Take 150 mg by mouth 2 (two) times daily.    folic acid (FOLVITE) 1 MG tablet Take 1,000 mcg by mouth once daily.    hydrocodone-acetaminophen 5-325mg (NORCO) 5-325 mg per tablet Take 1 tablet by mouth every 6 (six) hours as needed.    ofloxacin (FLOXIN) 0.3 % otic solution Place 5 drops into the right ear 2 (two) times daily.    OXYGEN-AIR DELIVERY SYSTEMS MISC by Misc.(Non-Drug; Combo Route) route.    roflumilast 500 mcg Tab Take 1 tablet (500 mcg total) by mouth once daily.    umeclidinium-vilanterol (ANORO ELLIPTA) 62.5-25 mcg/actuation DsDv Inhale 1 puff into the lungs once daily.    VITAMIN D3 400 unit tablet TAKE 2 TABLETS BY MOUTH DAILY.           Clinical Reference Information           Your Vitals Were     BP Pulse Temp Weight BMI    135/88 (BP Location: Left arm, Patient Position: Sitting, BP Method: Automatic) 88 99.1 °F (37.3 °C) (Tympanic) 93.8 kg (206 lb 12.7 oz) 28.84 kg/m2      Blood Pressure          Most Recent Value    BP  135/88      Allergies as of 5/15/2017     No Known Allergies      Immunizations Administered on Date of Encounter - 5/15/2017     None      [facility] Sign-Up     Activating your " [facility] account is as easy as 1-2-3!     1) Visit [email or web address], select Sign Up Now, enter this activation code and your date of birth, then select Next.  [email or web address]  Expires: 6/13/2017 11:33 AM      2) Create a username and password to use when you visit [facility] in the future and select a security question in case you lose your password and select Next.    3) Enter your e-mail address and click Sign Up!    Additional Information  If you have questions, please e-mail [email or web address] or call [phone] to talk to our [facility] staff. Remember, [facility] is NOT to be used for urgent needs. For medical emergencies, dial 911.         Language Assistance Services     ATTENTION: Language assistance services are available, free of charge. Please call [phone].      ATENCIÓN: Si habla codiañol, tiene a berrios disposición servicios gratuitos de asistencia lingüística. Llame al [phone].     CHÚ Ý: N?u b?n nói Ti?ng Vi?t, có các d?ch v? h? tr? ngôn ng? mi?n phí dành cho b?n. G?i s? [phone].         [facility] complies with applicable Federal civil rights laws and does not discriminate on the basis of race, color, national origin, age, disability, or sex.

## 2017-05-15 NOTE — LETTER
May 17, 2017      John Yap MD  [address] [place] [address] Dr Anna MCCOY [address]           [facility] Otohinolaryngology  [address] [place] [address]  [place] [address]  Phone: [phone]  Fax: [phone]          Patient: Johnny Perales   MR Number: [id number]   YOB: 1955   Date of Visit: 5/15/2017       Dear Dr. John Yap:    Thank you for referring Johnny Perales to me for evaluation. Attached you will find relevant portions of my assessment and plan of care.    If you have questions, please do not hesitate to call me. I look forward to following Johnny Perales along with you.    Sincerely,    Jenny Rodriguez MD    Enclosure  CC:  No Recipients    If you would like to receive this communication electronically, please contact [email or web address] or [phone] to request more information on [facility] Link access.    For providers and/or their staff who would like to refer a patient to [facility], please contact us through our one-stop-shop provider referral line, [facility], at [phone].    If you feel you have received this communication in error or would no longer like to receive these types of communications, please e-mail [email or web address]

## 2017-05-15 NOTE — PROGRESS NOTES
Subjective:       Patient ID: Johnny Perales is a 62 y.o. male.    Chief Complaint: Hearing Loss    HPI Comments: Patient is a very pleasant 62 year old male here to see me today for the first time in consultation at the request of Dr. Yap for evaluation of hearing loss.  Patient has history of bilateral cholesteatomas and multiple ear surgeries - 3 surgeries for right ear and 2 for the left ear. His last surgery and hearing test was in 1997 at [facility].  He reports hearing loss that has been gradually progressing over the last several years, but worse over past 3-4 months.  He wears an amplifier in the left ear.  He has noted constant tinnitus in both ears for years.  He has not had any recent issues with ear pain but has noted bloody drainage on Q-tip in right ear for 3-4 months.  Denies ear drainage onto pillow.   He denies any history of significant loud noise exposure. He admits to issues with dizziness if he stands too fast or overexerts himself.  He tried hearing aids three times with no success. His last hearing aid was obtained in 1997. A Baha was discussed at that time but he declined, concerned about the abutment and more surgery. He is using an OTC amplifier and notes hearing better with that than any hearing aid.         Review of Systems   Constitutional: Negative for activity change, appetite change and fever.   HENT: Positive for hearing loss and tinnitus. Negative for congestion, ear discharge, ear pain, nosebleeds, rhinorrhea, sinus pressure and sore throat.    Eyes: Negative for discharge.   Respiratory: Positive for cough (COPD) and wheezing. Negative for choking and shortness of breath.    Cardiovascular: Positive for palpitations (occasional with exertion). Negative for chest pain.   Gastrointestinal: Negative for diarrhea and vomiting.   Musculoskeletal: Positive for arthralgias. Negative for neck pain.   Allergic/Immunologic: Negative for food allergies.   Neurological: Positive for  dizziness (if stands too fast) and headaches (occasional). Negative for light-headedness.   Hematological: Negative for adenopathy.       Objective:      Physical Exam   Constitutional: He is oriented to person, place, and time. He appears well-developed and well-nourished.   HENT:   Head: Normocephalic and atraumatic.   Right Ear: External ear normal. Decreased hearing is noted.   Left Ear: External ear normal. Decreased hearing is noted.   Nose: Nose normal. No mucosal edema, rhinorrhea, nasal deformity or septal deviation. Right sinus exhibits no maxillary sinus tenderness and no frontal sinus tenderness. Left sinus exhibits no maxillary sinus tenderness and no frontal sinus tenderness.   Mouth/Throat: Uvula is midline, oropharynx is clear and moist and mucous membranes are normal. Mucous membranes are not pale and not dry. He has dentures. No trismus in the jaw. No uvula swelling. No oropharyngeal exudate or posterior oropharyngeal erythema.   Wears oxygen via nasal cannula; right CWD mastoidectomy with large cerumen impaction and bloody debris, left CWD mastoidectomy but mastoid bowl is much smaller   Eyes: Conjunctivae, EOM and lids are normal. Pupils are equal, round, and reactive to light. Right eye exhibits no chemosis. Left eye exhibits no chemosis. Right conjunctiva is not injected. Left conjunctiva is not injected. No scleral icterus. Right eye exhibits normal extraocular motion and no nystagmus. Left eye exhibits normal extraocular motion and no nystagmus.   Wears eyeglasses   Neck: Trachea normal and phonation normal. No tracheal tenderness present. No tracheal deviation present. No thyroid mass and no thyromegaly present.   Cardiovascular: Intact distal pulses.    Pulmonary/Chest: Effort normal. No stridor. No respiratory distress.   Abdominal: He exhibits no distension.   Lymphadenopathy:        Head (right side): No submental, no submandibular, no preauricular, no posterior auricular and no  occipital adenopathy present.        Head (left side): No submental, no submandibular, no preauricular, no posterior auricular and no occipital adenopathy present.     He has no cervical adenopathy.   Neurological: He is alert and oriented to person, place, and time. No cranial nerve deficit.   Skin: Skin is warm and dry. No rash noted. No erythema.   Psychiatric: He has a normal mood and affect. His speech is normal and behavior is normal.   Vitals reviewed.        AUDIOGRAM today:  Results reveal a profound mixed hearing loss 250-8000 Hz for the right ear, and severe-to-profound mixed hearing loss 250-8000 Hz for the left ear. Speech Reception Thresholds were 95 dBHL for the right ear and 80 dBHL for the left ear. Word recognition scores were poor for the right ear and good for the left ear. Tympanograms were unable to obtain seal for the right ear and Type B large volume for the left ear.       Preprocedure diagnosis:  History of bilateral mastoidectomy  Postprocedure diagnosis:   Same  Procedure:  Mastoid debridement and cleaning  Complications:    None    Procedure in detail:  The patient was seated in the exam chair, and the binocular microscope was used to examine the right ear.  He has previously had a canal wall down mastoidectomy.  Using a combination of suction as well as otologic instrumentation including a right angle, curette, and alligator a large amount of debris and cerumen was removed from the mastoid cavity.  The mastoid cavity was healthy, and there was no underlying infection.      I then examined the left ear, which also has had a CWD mastoidectomy but with a smaller cavity.  Using a combination of suction as well as otologic instrumentation including a right angle, curette, and alligator a large amount of debris and cerumen was removed from the mastoid cavity.  The mastoid cavity was healthy, and there was no underlying infection.     The patient tolerated the procedure  well.        Assessment:       1. Post mastoidectomy sequelae, bilateral    2. Hearing loss, mixed, bilateral        Plan:       1.  Postmastoidectomy bilateral:  Discussed with the patient that he has had extensive otologic surgery, and will need cleaning of his mastoids on a regular basis.  Return to clinic in six months for routine cleaning, call for sooner appointment if needed.  2.  Mixed hearing loss:  He has had hearing aids many years ago, but was frustrated with their fit and sound.  He is currently using an amplifier, I do think he would do better with hearing aids if and when he is ready.  Call for hearing aid consult when ready, otherwise continue with annual audiograms.

## 2017-05-22 ENCOUNTER — HOSPITAL ENCOUNTER (OUTPATIENT)
Dept: PULMONOLOGY | Facility: HOSPITAL | Age: 62
Discharge: HOME | End: 2017-05-22
Attending: INTERNAL MEDICINE
Payer: MEDICARE

## 2017-05-22 VITALS — BODY MASS INDEX: 28.01 KG/M2 | WEIGHT: 200.81 LBS

## 2017-05-22 PROCEDURE — G0424 PULMONARY REHAB W EXER: HCPCS

## 2017-05-22 NOTE — PROGRESS NOTES
[facility]  Pulmonary Rehab  Session Summary    SUMMARY     Session Data  Session Number: 7  Time In: 1300  Time Out: 1400  Weight: 91.1 kg (200 lb 13.4 oz)  Target Heart Rate Zone: Minimum: 95 bpm  Target Heart Rate Zone: Maximum: 127 bpm  Patient Motivation: Excellent  Patient Effort: Excellent      Pre Exercise Vitals  SpO2: 97 %  Supplemental O2?: Yes  O2 Device: nasal cannula  O2 Flow (L/min): 2  Pulse: 97  BP: 125/86  Mika Dyspnea Rating : 1      During Exercise Vitals  SpO2: 96 %  Supplemental O2?: Yes  O2 Device: nasal cannula  O2 Flow (L/min): 2  Pulse: 115  Mika Dyspnea Rating : 2      Post Exercise Vitals  SpO2: 96 %  Supplemental O2?: Yes  O2 Device: nasal cannula  O2 Flow (L/min): 2  Pulse: 113  Mika Dyspnea Rating : 2       Modality  Modality: Hand Free Weights, Nustep, Recumbent Bike      Nustep  Time: 20 minutes  Steps: 1510  Load: 6      Recumbent Bike  Time:  (20:30 minutes)  RPM:  (3.48 miles)  Level: 1      Biceps  lbs: 5 lbs (dumbbells)  Sets: 2  Reps: 10      Chest  lbs: 5 lbs (dumbbells)  Sets: 2  Reps: 10      Education  Exercise plateau  Oxygen is a drug    Therapist Notes   Increased dumbbells to 5 lbs.  Pt tolerated change well.  Introduced pt to the recumbent bike today.  Pt was able to do 20:30 minutes on level 1 reaching 3.48 miles.  Pt tolerated exercise well.  Will work with pt on increasing his strength and endurance during rehab.    Dr. Prabhakar Rodriguez immediately available as needed.

## 2017-05-25 ENCOUNTER — PATIENT OUTREACH (OUTPATIENT)
Dept: ADMINISTRATIVE | Facility: HOSPITAL | Age: 62
End: 2017-05-25

## 2017-05-25 DIAGNOSIS — Z11.59 NEED FOR HEPATITIS C SCREENING TEST: Primary | ICD-10-CM

## 2017-05-25 NOTE — LETTER
May 25, 2017    Johnny Queen Diaz  [address]  [facility] [address]             [facility]  [address]  [facility] [address]  Phone: [phone] Dear Mr. Perales:    [facility] is committed to your overall health.  To help you get the most out of each of your visits, we will review your information to make sure you are up to date on all of your recommended tests and/or procedures.      John Yap MD has found that you may be due for   Health Maintenance Due   Topic    Hepatitis C Screening     TETANUS VACCINE     Colonoscopy     Zoster Vaccine       If you have had any of the above done at another facility, please bring the records or information with you so that your record at [facility] will be complete.    If you are currently taking medication, please bring it with you to your appointment for review.    We will be happy to assist you with scheduling any necessary appointments or you may contact the [facility] appointment desk at [phone] to schedule at your convenience.     Thank you for choosing [facility] for your healthcare needs.  If you have any questions or concerns, please don't hesitate to call.    Sincerely,  Pinky CUEVAS LPN Care Coordinator  [facility]  [phone]

## 2017-05-27 PROCEDURE — 99495 TRANSJ CARE MGMT MOD F2F 14D: CPT | Mod: S$PBB,,, | Performed by: FAMILY MEDICINE

## 2017-06-03 DIAGNOSIS — J96.11 CHRONIC RESPIRATORY FAILURE WITH HYPOXIA: Chronic | ICD-10-CM

## 2017-06-03 RX ORDER — PREDNISONE 10 MG/1
TABLET ORAL
Qty: 60 TABLET | Refills: 0 | Status: SHIPPED | OUTPATIENT
Start: 2017-06-03 | End: 2017-06-19 | Stop reason: SDUPTHER

## 2017-06-05 ENCOUNTER — TELEPHONE (OUTPATIENT)
Dept: INTERNAL MEDICINE | Facility: CLINIC | Age: 62
End: 2017-06-05

## 2017-06-05 NOTE — TELEPHONE ENCOUNTER
----- Message from Jossie Alvares sent at 6/5/2017 11:37 AM CDT -----  Contact: Patient  Patient states he received a letter asking him joanne complete a series of tests, such as colonoscopy, several alb tests, but the only order seen is a Hep C antibody, please call patient back at [phone]. Thank you

## 2017-06-07 ENCOUNTER — HOSPITAL ENCOUNTER (OUTPATIENT)
Dept: PULMONOLOGY | Facility: HOSPITAL | Age: 62
Discharge: HOME | End: 2017-06-07
Attending: INTERNAL MEDICINE
Payer: MEDICARE

## 2017-06-07 VITALS — WEIGHT: 203 LBS | BODY MASS INDEX: 28.31 KG/M2

## 2017-06-07 DIAGNOSIS — J96.11 CHRONIC RESPIRATORY FAILURE WITH HYPOXIA: Chronic | ICD-10-CM

## 2017-06-07 PROCEDURE — G0424 PULMONARY REHAB W EXER: HCPCS

## 2017-06-07 RX ORDER — AZITHROMYCIN 500 MG/1
500 TABLET, FILM COATED ORAL DAILY
Qty: 10 TABLET | Refills: 0 | Status: SHIPPED | OUTPATIENT
Start: 2017-06-07 | End: 2017-06-08

## 2017-06-07 RX ORDER — IPRATROPIUM BROMIDE 0.5 MG/2.5ML
500 SOLUTION RESPIRATORY (INHALATION)
Qty: 540 VIAL | Refills: 11 | Status: SHIPPED | OUTPATIENT
Start: 2017-06-07 | End: 2017-06-08

## 2017-06-07 NOTE — PROGRESS NOTES
[facility]  Pulmonary Rehab  Session Summary    SUMMARY     Session Data  Session Number: 8  Time In: 1300  Time Out: 1400  Weight: 92.1 kg (203 lb)  Target Heart Rate Zone: Minimum: 95 bpm  Target Heart Rate Zone: Maximum: 127 bpm  Patient Motivation: Excellent  Patient Effort: Excellent      Pre Exercise Vitals  SpO2: 97 %  Supplemental O2?: Yes  O2 Device: nasal cannula  O2 Flow (L/min): 2  Pulse: 105  BP: 126/81  Mika Dyspnea Rating : 5-6      During Exercise Vitals  SpO2: 96 %  Supplemental O2?: Yes  O2 Device: nasal cannula  O2 Flow (L/min): 2  Pulse: 120  BP: 124/84  Mika Dyspnea Rating : 5-6      Post Exercise Vitals  SpO2: 95 %  Supplemental O2?: Yes  O2 Device: nasal cannula  O2 Flow (L/min): 2  Pulse: 122  BP: 120/86  Mika Dyspnea Rating : 5-6       Modality  Modality: Arm Ergometer, Hand Free Weights, Nustep      Arm Ergometer  Time: 10 minutes  Level: 3      Nustep  Time: 20 minutes  Steps: 1605  Load: 7 (dropped to load 6 after 5 minutes)      Biceps  lbs: 5 lbs (dumbbells)  Sets: 2  Reps: 10      Chest  lbs: 5 lbs (dumbbells)  Sets: 2  Reps: 10      Education  Nutritional guidelines for COPD    Therapist Notes   Pt first day back from being sick.  Pt is on steroids and antibiotics.  He said that he is feeling better but still gets SOB.  Increased load to 7 on the nustep for 5 minutes reaching 369 steps, then dropped to load 6 for 15 minutes.  Pt tolerated change well.  Will continue to educate and motivate pt during rehab.    Dr. Prabhakar Rodriguez immediately available as needed.

## 2017-06-08 ENCOUNTER — OFFICE VISIT (OUTPATIENT)
Dept: INTERNAL MEDICINE | Facility: CLINIC | Age: 62
End: 2017-06-08
Payer: MEDICARE

## 2017-06-08 VITALS
TEMPERATURE: 97 F | DIASTOLIC BLOOD PRESSURE: 75 MMHG | HEART RATE: 89 BPM | SYSTOLIC BLOOD PRESSURE: 122 MMHG | OXYGEN SATURATION: 96 % | BODY MASS INDEX: 28.39 KG/M2 | WEIGHT: 202.81 LBS | HEIGHT: 71 IN

## 2017-06-08 DIAGNOSIS — R79.89 ELEVATED TSH: ICD-10-CM

## 2017-06-08 DIAGNOSIS — J44.9 COPD, VERY SEVERE: Primary | Chronic | ICD-10-CM

## 2017-06-08 DIAGNOSIS — Z28.39 IMMUNIZATION DEFICIENCY: ICD-10-CM

## 2017-06-08 DIAGNOSIS — Z12.11 SCREENING FOR COLON CANCER: ICD-10-CM

## 2017-06-08 DIAGNOSIS — Z99.81 OXYGEN DEPENDENT: ICD-10-CM

## 2017-06-08 DIAGNOSIS — Z11.59 NEED FOR HEPATITIS C SCREENING TEST: ICD-10-CM

## 2017-06-08 DIAGNOSIS — E87.5 HYPERKALEMIA: ICD-10-CM

## 2017-06-08 DIAGNOSIS — F32.A DEPRESSION, UNSPECIFIED DEPRESSION TYPE: ICD-10-CM

## 2017-06-08 DIAGNOSIS — R45.84 ANHEDONIA: ICD-10-CM

## 2017-06-08 DIAGNOSIS — R71.8 ELEVATED MCV: ICD-10-CM

## 2017-06-08 DIAGNOSIS — Z76.89 ESTABLISHING CARE WITH NEW DOCTOR, ENCOUNTER FOR: ICD-10-CM

## 2017-06-08 LAB
BILIRUB UR QL STRIP: NEGATIVE
CAOX CRY UR QL COMP ASSIST: ABNORMAL
CLARITY UR REFRACT.AUTO: CLEAR
COLOR UR AUTO: YELLOW
FOLATE SERPL-MCNC: 24.6 NG/ML
GLUCOSE UR QL STRIP: NEGATIVE
HGB UR QL STRIP: NEGATIVE
HYALINE CASTS UR QL AUTO: 8 /LPF
KETONES UR QL STRIP: NEGATIVE
LEUKOCYTE ESTERASE UR QL STRIP: ABNORMAL
MICROSCOPIC COMMENT: ABNORMAL
NITRITE UR QL STRIP: NEGATIVE
PH UR STRIP: 6 [PH] (ref 5–8)
POTASSIUM SERPL-SCNC: 4.2 MMOL/L
PROT UR QL STRIP: NEGATIVE
RBC #/AREA URNS AUTO: 1 /HPF (ref 0–4)
SP GR UR STRIP: 1.01 (ref 1–1.03)
SQUAMOUS #/AREA URNS AUTO: 0 /HPF
T4 FREE SERPL-MCNC: 1.14 NG/DL
TSH SERPL DL<=0.005 MIU/L-ACNC: 2.89 UIU/ML
URN SPEC COLLECT METH UR: ABNORMAL
UROBILINOGEN UR STRIP-ACNC: NEGATIVE EU/DL
VIT B12 SERPL-MCNC: 325 PG/ML
WBC #/AREA URNS AUTO: 4 /HPF (ref 0–5)

## 2017-06-08 PROCEDURE — 84132 ASSAY OF SERUM POTASSIUM: CPT

## 2017-06-08 PROCEDURE — 99999 PR PBB SHADOW E&M-EST. PATIENT-LVL V: CPT | Mod: PBBFAC,,, | Performed by: FAMILY MEDICINE

## 2017-06-08 PROCEDURE — 90472 IMMUNIZATION ADMIN EACH ADD: CPT | Mod: PBBFAC,PO

## 2017-06-08 PROCEDURE — 81001 URINALYSIS AUTO W/SCOPE: CPT

## 2017-06-08 PROCEDURE — 82746 ASSAY OF FOLIC ACID SERUM: CPT

## 2017-06-08 PROCEDURE — 99215 OFFICE O/P EST HI 40 MIN: CPT | Mod: PBBFAC,PO | Performed by: FAMILY MEDICINE

## 2017-06-08 PROCEDURE — 82607 VITAMIN B-12: CPT

## 2017-06-08 PROCEDURE — 99214 OFFICE O/P EST MOD 30 MIN: CPT | Mod: S$PBB,,, | Performed by: FAMILY MEDICINE

## 2017-06-08 PROCEDURE — 90736 HZV VACCINE LIVE SUBQ: CPT | Mod: PBBFAC,PO

## 2017-06-08 PROCEDURE — 84439 ASSAY OF FREE THYROXINE: CPT

## 2017-06-08 PROCEDURE — 84443 ASSAY THYROID STIM HORMONE: CPT

## 2017-06-08 PROCEDURE — 86803 HEPATITIS C AB TEST: CPT

## 2017-06-08 RX ORDER — CITALOPRAM 20 MG/1
20 TABLET, FILM COATED ORAL DAILY
Qty: 30 TABLET | Refills: 11 | Status: SHIPPED | OUTPATIENT
Start: 2017-06-08 | End: 2017-11-02 | Stop reason: SDUPTHER

## 2017-06-08 NOTE — PROGRESS NOTES
Subjective:       Patient ID: Johnny Perales is a 62 y.o. male.    Chief Complaint: Follow-up    Follow-up COPD with hypoxemia and depression.  He is in pulmonary rehabilitation and feels improved.  He had a recent viral upper respiratory infection that has resolved.  Wellbutrin 150 mg twice a day is not helping with depression.      Review of Systems   Constitutional: Negative for appetite change, fatigue and unexpected weight change.   HENT: Negative for congestion.    Respiratory: Positive for shortness of breath. Negative for cough and wheezing.    Cardiovascular: Negative for chest pain, palpitations and leg swelling.   Gastrointestinal: Negative for abdominal pain.   Genitourinary: Negative for difficulty urinating.   Neurological: Negative for headaches.   Psychiatric/Behavioral: Positive for dysphoric mood.       Objective:      Physical Exam   Constitutional: He is oriented to person, place, and time. He appears well-developed and well-nourished. No distress.   Neck: Neck supple. No thyromegaly present.   Cardiovascular: Normal rate, regular rhythm and normal heart sounds.    No murmur heard.  Pulmonary/Chest: Effort normal and breath sounds normal. No respiratory distress. He has no wheezes.   Continuous nasal O2 in place   Abdominal: Soft. Bowel sounds are normal. He exhibits no mass. There is no tenderness.   Lymphadenopathy:     He has no cervical adenopathy.   Neurological: He is alert and oriented to person, place, and time.   Psychiatric: He has a normal mood and affect. His behavior is normal. Judgment and thought content normal.       Clinical Support on 05/11/2017   Component Date Value Ref Range Status    WBC 05/11/2017 7.02  3.90 - 12.70 K/uL Final    RBC 05/11/2017 4.79  4.60 - 6.20 M/uL Final    Hemoglobin 05/11/2017 16.1  14.0 - 18.0 g/dL Final    Hematocrit 05/11/2017 48.0  40.0 - 54.0 % Final    MCV 05/11/2017 100* 82 - 98 fL Final    MCH 05/11/2017 33.6* 27.0 - 31.0 pg Final     MCHC 05/11/2017 33.5  32.0 - 36.0 % Final    RDW 05/11/2017 13.5  11.5 - 14.5 % Final    Platelets 05/11/2017 224  150 - 350 K/uL Final    MPV 05/11/2017 9.7  9.2 - 12.9 fL Final    Gran # 05/11/2017 3.9  1.8 - 7.7 K/uL Final    Lymph # 05/11/2017 2.4  1.0 - 4.8 K/uL Final    Mono # 05/11/2017 0.4  0.3 - 1.0 K/uL Final    Eos # 05/11/2017 0.2  0.0 - 0.5 K/uL Final    Baso # 05/11/2017 0.04  0.00 - 0.20 K/uL Final    Gran% 05/11/2017 56.9  38.0 - 73.0 % Final    Lymph% 05/11/2017 33.8  18.0 - 48.0 % Final    Mono% 05/11/2017 5.7  4.0 - 15.0 % Final    Eosinophil% 05/11/2017 3.0  0.0 - 8.0 % Final    Basophil% 05/11/2017 0.6  0.0 - 1.9 % Final    Differential Method 05/11/2017 Automated   Final    Sodium 05/11/2017 143  136 - 145 mmol/L Final    Potassium 05/11/2017 5.6* 3.5 - 5.1 mmol/L Final    Chloride 05/11/2017 101  95 - 110 mmol/L Final    CO2 05/11/2017 29  23 - 29 mmol/L Final    Glucose 05/11/2017 88  70 - 110 mg/dL Final    BUN, Bld 05/11/2017 18  8 - 23 mg/dL Final    Creatinine 05/11/2017 1.2  0.5 - 1.4 mg/dL Final    Calcium 05/11/2017 9.5  8.7 - 10.5 mg/dL Final    Total Protein 05/11/2017 7.1  6.0 - 8.4 g/dL Final    Albumin 05/11/2017 3.8  3.5 - 5.2 g/dL Final    Total Bilirubin 05/11/2017 0.4  0.1 - 1.0 mg/dL Final    Alkaline Phosphatase 05/11/2017 65  55 - 135 U/L Final    AST 05/11/2017 20  10 - 40 U/L Final    ALT 05/11/2017 20  10 - 44 U/L Final    Anion Gap 05/11/2017 13  8 - 16 mmol/L Final    eGFR if African American 05/11/2017 >60.0  >60 mL/min/1.73 m^2 Final    eGFR if non African American 05/11/2017 >60.0  >60 mL/min/1.73 m^2 Final    Cholesterol 05/11/2017 258* 120 - 199 mg/dL Final    Triglycerides 05/11/2017 199* 30 - 150 mg/dL Final    HDL 05/11/2017 105* 40 - 75 mg/dL Final    LDL Cholesterol 05/11/2017 113.2  63.0 - 159.0 mg/dL Final    HDL/Chol Ratio 05/11/2017 40.7  20.0 - 50.0 % Final    Total Cholesterol/HDL Ratio 05/11/2017 2.5  2.0 - 5.0  Final    Non-HDL Cholesterol 05/11/2017 153  mg/dL Final    TSH 05/11/2017 5.375* 0.400 - 4.000 uIU/mL Final    PSA, SCREEN 05/11/2017 0.91  0.00 - 4.00 ng/mL Final    Free T4 05/11/2017 1.15  0.71 - 1.51 ng/dL Final     Assessment:       No diagnosis found.     recent lab was reviewed with him.  MCV is elevated, potassium is elevated, TSH is elevated.  Lab being repeated.  Health maintenance reviewed.  Adacel Zostavax given today.  He is interested in colonoscopy and will refer to GI.  His pulmonary status is stable at this time.  Discontinue Wellbutrin and start Celexa 20 mg a day.  Follow-up in 3 months.    There are no diagnoses linked to this encounter.

## 2017-06-09 LAB — HCV AB SERPL QL IA: NEGATIVE

## 2017-06-09 NOTE — PROGRESS NOTES
[facility]  Pulmonary Rehab  30 Day Evaluation and Recertification    SUMMARY       Summary of Progress: Johnny Perales has been doing good in rehab.  Pt was out for 2 weeks due to an upper respiratory infection.  Compliance discussed with pt and he is interested in making up some of his sessions.  Pt is motivated in rehab.  He says that he can already tell a difference in his strength.  He is increasing his exercise tolerance and will continue to benefit from Pulmonary Rehab.    Referring Provider: Dr. Rordiguez    Start Date: 4/13/17    Attends:     Patient attends 2 days a week and has completed 8 visits.  The patient's current compliance is 50%.    Problems this Certification Period:   Pt sick with upper respiratory infection - out for 2 weeks, pt wants to make up sessions    Exercise Capacity Summary    Nustep Date:     5/3/17 Time Load Steps Date:     6/7/17 Time Load Steps     20 6 1300  20 7/6  (5/15 min) 1605   Recumbent Bike Date:      Time Level Date:  Time Level            Treadmill Date:  Time Speed Grade Date:  Time Speed Grade              Arm Ergometer Date:     5/3/17 Time Level Date:     6/7/17 Time Level     8 3  10 3   Free Weights Date:     5/3/17 Bicep Curls   Chest Press Lbs Sets Reps Date:     6/7/17  (dumbbells) Bicep Curls   Chest Press lbs Sets Reps      5 2 10   5 2 10       Education:  Pursed lip breathing review  Nutritional guidelines for COPD    Goals:  Goals met  Try treadmill    Updated Exercise Prescription:  Endurance Training: treadmill 10 minutes at 1.5 mph  Strength Training: increase level to 4 on the arm ergometer for 10 minutes, increase level to 3 on the recumbent bike for 10 minutes, increase load to 7 on the nustep for 20 minutes    I certify that the patient is making progress in pulmonary rehabilitation, is physically able to participate, medically stable and remains motivated.

## 2017-06-12 ENCOUNTER — HOSPITAL ENCOUNTER (OUTPATIENT)
Dept: PULMONOLOGY | Facility: HOSPITAL | Age: 62
Discharge: HOME | End: 2017-06-12
Attending: INTERNAL MEDICINE
Payer: MEDICARE

## 2017-06-12 PROCEDURE — G0424 PULMONARY REHAB W EXER: HCPCS

## 2017-06-13 ENCOUNTER — TELEPHONE (OUTPATIENT)
Dept: INTERNAL MEDICINE | Facility: CLINIC | Age: 62
End: 2017-06-13

## 2017-06-13 VITALS — WEIGHT: 200.63 LBS | BODY MASS INDEX: 27.98 KG/M2

## 2017-06-13 NOTE — TELEPHONE ENCOUNTER
----- Message from John Yap MD sent at 6/12/2017  9:30 AM CDT -----  Thyroid test, B12 folic acid,urinalysis,hepatitis C,potassium were all normal.  Follow-up as planned

## 2017-06-13 NOTE — PROGRESS NOTES
[facility]  Pulmonary Rehab  Session Summary    SUMMARY     Session Data  Session Number: 9  Time In: 1300  Time Out: 1400  Weight: 91 kg (200 lb 9.9 oz)  Target Heart Rate Zone: Minimum: 95 bpm  Target Heart Rate Zone: Maximum: 127 bpm  Patient Motivation: Excellent  Patient Effort: Excellent      Pre Exercise Vitals  SpO2: 95 %  Supplemental O2?: Yes  O2 Device: nasal cannula  O2 Flow (L/min): 2  Pulse: 102  BP: 148/86  Mika Dyspnea Rating : 2      During Exercise Vitals  SpO2: 96 %  Supplemental O2?: Yes  O2 Device: nasal cannula  O2 Flow (L/min): 2  Pulse: 110  BP: (!) 143/94  Mika Dyspnea Rating : 4      Post Exercise Vitals  SpO2: 96 %  Supplemental O2?: Yes  O2 Device: nasal cannula  O2 Flow (L/min): 2  Pulse: 129  BP: 126/81  Mika Dyspnea Rating : 4       Modality  Modality: Arm Ergometer, Hand Free Weights, Recumbent Bike, Treadmill      Arm Ergometer  Time: 10 minutes  RPM:  (1.75 miles)  Level: 4      Recumbent Bike  Time: 10 minutes  RPM:  (2.14 miles)  Level: 4      Treadmill  Time: 10 minutes  MPH: 2 MPH  stGstrstastdstest:st st1st Biceps  lbs: 5 lbs (dumbbells)  Sets: 2  Reps: 10      Chest  lbs: 5 lbs (dumbbells)  Sets: 2  Reps: 10      Education  COPD action plan    Therapist Notes   Introduced the pt to the treadmill today.  Pt was able to walk 10 minutes at 2.0 mph with 0 incline.  Increased level to 4 on the recumbent bike for 10 minutes reaching 2.14 miles.  Increased level to 4 on the arm ergometer for 10 minutes reaching 1.75 miles.  Pt tolerated changes well.  Pt continues to stay motivated in rehab when he comes and is feeling well.  Working with pt on increasing his strength and endurance during rehab.    Dr. Prabhakar Rodriguez immediately available as needed.

## 2017-06-14 ENCOUNTER — HOSPITAL ENCOUNTER (OUTPATIENT)
Dept: PULMONOLOGY | Facility: HOSPITAL | Age: 62
Discharge: HOME | End: 2017-06-14
Attending: INTERNAL MEDICINE
Payer: MEDICARE

## 2017-06-14 ENCOUNTER — OFFICE VISIT (OUTPATIENT)
Dept: GASTROENTEROLOGY | Facility: CLINIC | Age: 62
End: 2017-06-14
Payer: MEDICARE

## 2017-06-14 VITALS
HEART RATE: 68 BPM | HEIGHT: 70 IN | WEIGHT: 206.38 LBS | SYSTOLIC BLOOD PRESSURE: 132 MMHG | BODY MASS INDEX: 29.54 KG/M2 | DIASTOLIC BLOOD PRESSURE: 70 MMHG

## 2017-06-14 VITALS — WEIGHT: 200.88 LBS | BODY MASS INDEX: 28.83 KG/M2

## 2017-06-14 DIAGNOSIS — J44.9 COPD, VERY SEVERE: Chronic | ICD-10-CM

## 2017-06-14 DIAGNOSIS — Z80.0 FAMILY HISTORY OF COLON CANCER: ICD-10-CM

## 2017-06-14 DIAGNOSIS — Z12.11 COLON CANCER SCREENING: Primary | ICD-10-CM

## 2017-06-14 PROCEDURE — 99213 OFFICE O/P EST LOW 20 MIN: CPT | Mod: PBBFAC,25 | Performed by: PHYSICIAN ASSISTANT

## 2017-06-14 PROCEDURE — G0424 PULMONARY REHAB W EXER: HCPCS

## 2017-06-14 PROCEDURE — 99203 OFFICE O/P NEW LOW 30 MIN: CPT | Mod: S$PBB,,, | Performed by: PHYSICIAN ASSISTANT

## 2017-06-14 PROCEDURE — 99999 PR PBB SHADOW E&M-EST. PATIENT-LVL III: CPT | Mod: PBBFAC,,, | Performed by: PHYSICIAN ASSISTANT

## 2017-06-14 RX ORDER — POLYETHYLENE GLYCOL 3350, SODIUM CHLORIDE, SODIUM BICARBONATE, POTASSIUM CHLORIDE 420; 11.2; 5.72; 1.48 G/4L; G/4L; G/4L; G/4L
POWDER, FOR SOLUTION ORAL
Qty: 4000 ML | Refills: 0 | Status: ON HOLD | OUTPATIENT
Start: 2017-06-14 | End: 2017-07-12

## 2017-06-14 RX ORDER — IPRATROPIUM BROMIDE 0.5 MG/2.5ML
500 SOLUTION RESPIRATORY (INHALATION)
Qty: 180 VIAL | Refills: 11 | Status: SHIPPED | OUTPATIENT
Start: 2017-06-14 | End: 2018-07-07 | Stop reason: SDUPTHER

## 2017-06-14 NOTE — LETTER
June 14, 2017      John Yap MD  [address] [place] [address] Dr Anna MCCOY [address]           On license of [facility]  [place] [address]  Phone: [phone]  Fax: [phone]          Patient: Johnny Perales   MR Number: [id number]   YOB: 1955   Date of Visit: 6/14/2017       Dear Dr. John Yap:    Thank you for referring Johnny Perales to me for evaluation. Attached you will find relevant portions of my assessment and plan of care.    If you have questions, please do not hesitate to call me. I look forward to following Johnny Perales along with you.    Sincerely,    Henry Ty PA-C    Enclosure  CC:  No Recipients    If you would like to receive this communication electronically, please contact [email or web address] or [phone] to request more information on [employer] Link access.    For providers and/or their staff who would like to refer a patient to [facility], please contact us through our one-stop-shop provider referral line, [facility], at [phone].    If you feel you have received this communication in error or would no longer like to receive these types of communications, please e-mail [email or web address]

## 2017-06-14 NOTE — PROGRESS NOTES
Subjective:       Patient ID: Johnny Perales is a 62 y.o. male.    Chief Complaint: Colon Cancer Screening    HPI   The patient presents to the GI clinic today for initial evaluation. He is here to discuss colon cancer screening. He has occasional constipation which responds to stool softeners. The patient denies hematochezia, melena, change in bowel habits, weight loss, change in appetite, abdominal pain, nausea, vomiting, heartburn or dysphagia.     Review of Systems   Constitutional: Negative for fatigue and fever.   HENT: Positive for hearing loss.    Eyes: Positive for visual disturbance.   Respiratory: Positive for cough and shortness of breath.    Cardiovascular: Positive for chest pain. Negative for palpitations.   Gastrointestinal: Abdominal pain: chest wall pain from frequent coughing.        As per HPI.   Genitourinary: Negative for difficulty urinating, dysuria, frequency and hematuria.   Musculoskeletal: Negative for arthralgias and back pain.   Skin: Negative for color change and rash.   Neurological: Negative for dizziness, seizures, syncope, weakness, numbness and headaches.   Hematological: Bruises/bleeds easily.   Psychiatric/Behavioral: The patient is not nervous/anxious.        Objective:      Physical Exam   Constitutional: He is oriented to person, place, and time. He appears well-developed and well-nourished.   HENT:   Head: Normocephalic and atraumatic.   Eyes: EOM are normal.   Neck: Normal range of motion. Neck supple.   Cardiovascular: Normal rate, regular rhythm and normal heart sounds.    No murmur heard.  Pulmonary/Chest: Effort normal and breath sounds normal. No respiratory distress. He has no wheezes.   Patient said he had a breathing treatment before he came.    Abdominal: Soft. Bowel sounds are normal. He exhibits no distension and no mass. There is no hepatomegaly. There is no tenderness.   Musculoskeletal: He exhibits no edema.   Neurological: He is alert and oriented to  person, place, and time. Cranial nerve deficit: hearing impaired. Gait normal.   Skin: Skin is warm and dry. No rash noted.   Psychiatric: He has a normal mood and affect.       Assessment:       1. Colon cancer screening    2. Family history of colon cancer - 2nd degree relative <59 yo   3. COPD, very severe        Plan:       1. Colonoscopy to be scheduled, but I will send a note to Dr. Rodriguez because of h is COPD. We discussed that he would be at increased risk of pulmonary complications. He reported understanding.   The risks, benefits, alternatives and possible complications of the above procedure(s) were discussed with the patient to include but not limited to infection, bleeding, heart complications, respiratory complications, or perforation which may require surgical intervention. The patient's questions were answered. The patient verbally reported understanding of the discussion.  2. Further recommendations after above.   3. Follow up with PCP as previously scheduled.     Thank you for the opportunity to participate in the care of this patient. This consult was designated to me by my supervising physician. A report will be sent to the referring provider.   Henry Ty PA-C.

## 2017-06-14 NOTE — PROGRESS NOTES
[facility]  Pulmonary Rehab  Session Summary    SUMMARY     Session Data  Session Number: 10  Time In: 1300  Time Out: 1400  Weight: 91.1 kg (200 lb 14.4 oz)  Target Heart Rate Zone: Minimum: 95 bpm  Target Heart Rate Zone: Maximum: 127 bpm  Patient Motivation: Excellent  Patient Effort: Excellent      Pre Exercise Vitals  SpO2: 95 %  Supplemental O2?: Yes  O2 Device: nasal cannula  O2 Flow (L/min): 2  Pulse: 95  BP: 116/75  Mika Dyspnea Rating : 3      During Exercise Vitals  SpO2: 95 %  Supplemental O2?: Yes  O2 Device: nasal cannula  O2 Flow (L/min): 2  Pulse: 104  Mika Dyspnea Rating : 4      Post Exercise Vitals  SpO2: 96 %  Supplemental O2?: Yes  O2 Device: nasal cannula  O2 Flow (L/min): 2  Pulse: 129  BP: 109/74  Mika Dyspnea Rating : 3       Modality  Modality: Arm Ergometer, Hand Free Weights, Nustep, Treadmill      Arm Ergometer  Time: 10 minutes  RPM:  (1.88 miles)  Level: 5      Nustep  Time: 5 minutes  Steps: 332  Load: 7      Treadmill  Time: 15 minutes  MPH: 2 MPH  stGstrstastdstest:st st1st Biceps  lbs: 5 lbs (dumbbells)  Sets: 2  Reps: 10      Chest  lbs: 5 lbs (dumbbells)  Sets: 2  Reps: 10      Education  COPD action plan    Therapist Notes   Increased time on treadmill to 15 minutes at 2.0 mph with 0 incline.  Increased level on the arm ergometer to 5 for 10 minutes reaching 1.88 miles.  Pt tolerated changes well.  Will continue to educate and motivate pt during rehab.    Dr. Prabhakar Rodriguez immediately available as needed.

## 2017-06-14 NOTE — PATIENT INSTRUCTIONS
Colonoscopy     A camera attached to a flexible tube with a viewing lens is used to take video pictures.     Colonoscopy is a test to view the inside of your lower digestive tract (colon and rectum). Sometimes it can show the last part of the small intestine (ileum). During the test, small pieces of tissue may be removed for testing. This is called a biopsy. Small growths, such as polyps, may also be removed.   Why is colonoscopy done?  The test is done to help look for colon cancer. And it can help find the source of abdominal pain, bleeding, and changes in bowel habits. It may be needed once a year, depending on factors such as your:  · Age  · Health history  · Family health history  · Symptoms  · Results from any prior colonoscopy  Risks and possible complications  These include:  · Bleeding               · A puncture or tear in the colon   · Risks of anesthesia  · A cancer lesion not being seen  Getting ready   To prepare for the test:  · Talk with your healthcare provider about the risks of the test (see below). Also ask your healthcare provider about alternatives to the test.  · Tell your healthcare provider about any medicines you take. Also tell him or her about any health conditions you may have.  · Make sure your rectum and colon are empty for the test. Follow the diet and bowel prep instructions exactly. If you dont, the test may need to be rescheduled.  · Plan for a friend or family member to drive you home after the test.     Colonoscopy provides an inside view of the entire colon.     You may discuss the results with your doctor right away or at a future visit.  During the test   The test is usually done in the hospital on an outpatient basis. This means you go home the same day. The procedure takes about 30 minutes. During that time:  · You are given relaxing (sedating) medicine through an IV line. You may be drowsy, or fully asleep.  · The healthcare provider will first give you a physical exam to  check for anal and rectal problems.  · Then the anus is lubricated and the scope inserted.  · If you are awake, you may have a feeling similar to needing to have a bowel movement. You may also feel pressure as air is pumped into the colon. Its OK to pass gas during the procedure.  · Biopsy, polyp removal, or other treatments may be done during the test.  After the test   You may have gas right after the test. It can help to try to pass it to help prevent later bloating. Your healthcare provider may discuss the results with you right away. Or you may need to schedule a follow-up visit to talk about the results. After the test, you can go back to your normal eating and other activities. You may be tired from the sedation and need to rest for a few hours.  Date Last Reviewed: 11/1/2016  © 1488-6987 [employer]. [address] [place] [address], [place] [address]. All rights reserved. This information is not intended as a substitute for professional medical care. Always follow your healthcare professional's instructions.

## 2017-06-19 ENCOUNTER — HOSPITAL ENCOUNTER (OUTPATIENT)
Dept: PULMONOLOGY | Facility: HOSPITAL | Age: 62
Discharge: HOME | End: 2017-06-19
Attending: INTERNAL MEDICINE
Payer: MEDICARE

## 2017-06-19 DIAGNOSIS — J96.11 CHRONIC RESPIRATORY FAILURE WITH HYPOXIA: Chronic | ICD-10-CM

## 2017-06-19 PROCEDURE — G0424 PULMONARY REHAB W EXER: HCPCS

## 2017-06-19 RX ORDER — AZITHROMYCIN 500 MG/1
TABLET, FILM COATED ORAL
Qty: 10 TABLET | Refills: 0 | OUTPATIENT
Start: 2017-06-19

## 2017-06-19 RX ORDER — PREDNISONE 10 MG/1
TABLET ORAL
Qty: 60 TABLET | Refills: 0 | Status: SHIPPED | OUTPATIENT
Start: 2017-06-19 | End: 2017-09-05

## 2017-06-20 NOTE — PROGRESS NOTES
[facility]  Pulmonary Rehab  Session Summary    SUMMARY     Session Data  Session Number: 11  Time In: 1300  Time Out: 1400  Target Heart Rate Zone: Minimum: 95 bpm  Target Heart Rate Zone: Maximum: 127 bpm  Patient Motivation: Excellent  Patient Effort: Excellent      Pre Exercise Vitals  SpO2: 96 %  Supplemental O2?: Yes  O2 Device: nasal cannula  O2 Flow (L/min): 2  Pulse: 97  BP: 131/84  Mika Dyspnea Rating : 5-6      During Exercise Vitals  SpO2: 96 %  Supplemental O2?: Yes  O2 Device: nasal cannula  O2 Flow (L/min): 2  Pulse: 116  BP: 122/80  Mika Dyspnea Rating : 7-8      Post Exercise Vitals  SpO2: 97 %  Supplemental O2?: Yes  O2 Device: nasal cannula  O2 Flow (L/min): 2  Pulse: 125  BP: 135/89  Mika Dyspnea Rating : 4       Modality  Modality: Hand Free Weights, Nustep, Recumbent Bike      Nustep  Time: 20 minutes  Steps: 1452  Load: 7 (dropped to load 6 after 10 minutes)      Recumbent Bike  Time: 10 minutes  RPM:  (2.31 miles)  Level: 6      Biceps  lbs: 5 lbs (dumbbells)  Sets: 2  Reps: 10      Chest  lbs: 5 lbs (dumbbells)  Sets: 2  Reps: 10      Education  COPD action plan  Importance of completing course of antibiotics    Therapist Notes   Pt still feeling SOB.  He said that he worked in the yard all weekend.  Said that he started taking a few antibiotics.  Discussed with pt the importance of finishing his course of antibiotics.  Pt verbalized understanding.  Will continue to educate and motivate pt during rehab.    Dr. Prabhakar Rodriguez immediately available as needed.

## 2017-06-21 ENCOUNTER — HOSPITAL ENCOUNTER (OUTPATIENT)
Dept: PULMONOLOGY | Facility: HOSPITAL | Age: 62
Discharge: HOME | End: 2017-06-21
Attending: INTERNAL MEDICINE
Payer: MEDICARE

## 2017-06-21 PROCEDURE — G0424 PULMONARY REHAB W EXER: HCPCS

## 2017-06-21 NOTE — PROGRESS NOTES
[facility]  Pulmonary Rehab  Session Summary    SUMMARY     Session Data  Session Number: 12  Time In: 1300  Time Out: 1400  Target Heart Rate Zone: Minimum: 95 bpm  Target Heart Rate Zone: Maximum: 127 bpm  Patient Motivation: Excellent  Patient Effort: Excellent      Pre Exercise Vitals  SpO2: 96 %  Supplemental O2?: Yes  O2 Device: nasal cannula  O2 Flow (L/min): 2  Pulse: 101  BP: 130/85  Mika Dyspnea Rating : 3      During Exercise Vitals  SpO2: 95 %  Supplemental O2?: Yes  O2 Device: nasal cannula  O2 Flow (L/min): 2  Pulse: 115  BP: 133/74  Mika Dyspnea Rating : 5-6      Post Exercise Vitals  SpO2: 96 %  Supplemental O2?: Yes  O2 Device: nasal cannula  O2 Flow (L/min): 2  Pulse: 134  BP: 125/70  Mika Dyspnea Rating : 5-6       Modality  Modality: Arm Ergometer, Hand Free Weights, Nustep, Treadmill      Arm Ergometer  Time: 10 minutes  RPM:  (1.72 miles)  Level: 5      Nustep  Time: 10 minutes  Steps: 678  Load: 8      Treadmill  Time: 20 minutes  MPH: 2 MPH  ndGndrndanddndend:nd nd2nd Biceps  lbs: 6 lbs (dumbbells)  Sets: 2  Reps: 10      Chest  lbs: 6 lbs (dumbbells)  Sets: 2  Reps: 10      Education  COPD action  plan    Therapist Notes   Increased dumbbells to 6 lbs.  Increase time and incline on the treadmill to 2 mph, 20 minutes on incline of 1.  Increased load on the nustep to 8 for 10 minutes reaching 678 steps.  Pt tolerated changes well.  Pt continues to stay motivated in rehab.  He is increasing his exercise tolerance with each session.  Will continue to educate and motivate pt during rehab.    Dr. Prabhakar Rodriguez immediately available as needed.

## 2017-06-23 DIAGNOSIS — J96.11 CHRONIC RESPIRATORY FAILURE WITH HYPOXIA: Chronic | ICD-10-CM

## 2017-06-23 RX ORDER — AZITHROMYCIN 500 MG/1
TABLET, FILM COATED ORAL
Qty: 10 TABLET | Refills: 0 | OUTPATIENT
Start: 2017-06-23

## 2017-06-26 ENCOUNTER — HOSPITAL ENCOUNTER (OUTPATIENT)
Dept: PULMONOLOGY | Facility: HOSPITAL | Age: 62
Discharge: HOME | End: 2017-06-26
Attending: INTERNAL MEDICINE
Payer: MEDICARE

## 2017-06-26 VITALS — BODY MASS INDEX: 28.53 KG/M2 | WEIGHT: 198.88 LBS

## 2017-06-26 PROCEDURE — G0424 PULMONARY REHAB W EXER: HCPCS

## 2017-06-26 NOTE — PROGRESS NOTES
[facility]  Pulmonary Rehab  Session Summary    SUMMARY     Session Data  Session Number: 13  Time In: 1300  Time Out: 1400  Weight: 90.2 kg (198 lb 13.7 oz)  Target Heart Rate Zone: Minimum: 95 bpm  Target Heart Rate Zone: Maximum: 127 bpm  Patient Motivation: Excellent  Patient Effort: Excellent      Pre Exercise Vitals  SpO2: 96 %  Supplemental O2?: Yes  O2 Device: nasal cannula  O2 Flow (L/min): 3  Pulse: 101  BP: 130/85  Mika Dyspnea Rating : 3      During Exercise Vitals  SpO2: 95 %  Supplemental O2?: Yes  O2 Device: nasal cannula  O2 Flow (L/min): 3  Pulse: 115  BP: 133/74  Mika Dyspnea Rating : 5-6      Post Exercise Vitals  SpO2: 96 %  Supplemental O2?: Yes  O2 Device: nasal cannula  O2 Flow (L/min): 3  Pulse: 134  BP: 125/70  Mika Dyspnea Rating : 5-6       Modality  Modality: Arm Ergometer, Hand Free Weights, Nustep, Recumbent Bike      Arm Ergometer  Time: 10 minutes  RPM:  (1.68 miles)  Level: 5.5      Nustep  Time: 10 minutes  Steps: 621  Load: 8      Recumbent Bike  Time: 10 minutes  RPM:  (2.26 miles)  Level: 6      Biceps  lbs: 6 lbs (dumbbells)  Sets: 2  Reps: 20      Chest  lbs: 6 lbs (dumbbells)  Sets: 2  Reps: 20      Education  Using antibiotics    Therapist Notes   Increased level on the arm ergometer to 5.5 for 10 minutes reaching 1.68 miles.  Pt had a fall over the weekend off a ladder and then the ladder fell on top of him.  Pts eye is bruised and there are scratches around his eye.  Pt said that he did not go to get checked out because he felt fine.  Encouraged pt to be seen.  Pt is doing well in rehab and is increasing his exercise tolerance with each session.    Dr. Prabhakar Rodriguez immediately available as needed.

## 2017-06-28 ENCOUNTER — HOSPITAL ENCOUNTER (OUTPATIENT)
Dept: PULMONOLOGY | Facility: HOSPITAL | Age: 62
Discharge: HOME | End: 2017-06-28
Attending: INTERNAL MEDICINE
Payer: MEDICARE

## 2017-06-28 PROCEDURE — G0424 PULMONARY REHAB W EXER: HCPCS

## 2017-06-29 VITALS — WEIGHT: 198.31 LBS | BODY MASS INDEX: 28.45 KG/M2

## 2017-06-29 NOTE — PROGRESS NOTES
[facility]  Pulmonary Rehab  Session Summary    SUMMARY     Session Data  Session Number: 14  Time In: 1300  Time Out: 1400  Weight: 89.9 kg (198 lb 4.9 oz)  Target Heart Rate Zone: Minimum: 95 bpm  Target Heart Rate Zone: Maximum: 127 bpm  Patient Motivation: Excellent  Patient Effort: Excellent  Is this patient a diabetic?: No      Pre Exercise Vitals  SpO2: 96 %  Supplemental O2?: Yes  O2 Device: nasal cannula  O2 Flow (L/min): 3  Pulse: 97  BP: 135/83  Mika Dyspnea Rating : 2      During Exercise Vitals  SpO2: 97 %  Supplemental O2?: Yes  O2 Device: nasal cannula  O2 Flow (L/min): 3  Pulse: 122  BP: 108/65  Mika Dyspnea Rating : 5-6      Post Exercise Vitals  SpO2: 96 %  Supplemental O2?: Yes  O2 Device: nasal cannula  O2 Flow (L/min): 3  Pulse: 130  BP: 113/75  Mika Dyspnea Rating : 3       Modality  Modality: Arm Ergometer, Hand Free Weights, Nustep, Recumbent Bike      Arm Ergometer  Time: 10 minutes  RPM:  (1.75 miles)  Level: 5.5      Nustep  Time: 10 minutes  Steps: 668  Load: 8      Recumbent Bike  Time: 10 minutes  RPM:  (2.33 miles)  Level: 7      Biceps  lbs: 6 lbs (dumbbells)  Sets: 2  Reps: 10      Chest  lbs: 6 lbs (dumbbells)  Sets: 2  Reps: 10      Education  Using antibiotics    Therapist Notes   Increased level to 7 on the recumbent bike for 10 minutes reaching 2.33 miles.  Pt tolerated change well.   Pt went to urgent care following his recent fall.  Pt was cleared.  Will continue to educate and motivate pt during rehab.    Dr. Prabhakar Rodriguez immediately available as needed.

## 2017-07-03 ENCOUNTER — HOSPITAL ENCOUNTER (OUTPATIENT)
Dept: PULMONOLOGY | Facility: HOSPITAL | Age: 62
Discharge: HOME | End: 2017-07-03
Attending: INTERNAL MEDICINE
Payer: MEDICARE

## 2017-07-03 PROCEDURE — G0424 PULMONARY REHAB W EXER: HCPCS

## 2017-07-05 NOTE — PROGRESS NOTES
[facility]  Pulmonary Rehab  Session Summary    SUMMARY     Session Data  Session Number: 15  Time In: 1300  Time Out: 1400  Target Heart Rate Zone: Minimum: 95 bpm  Target Heart Rate Zone: Maximum: 127 bpm  Patient Motivation: Excellent  Patient Effort: Excellent      Pre Exercise Vitals  SpO2: 96 %  Supplemental O2?: Yes  O2 Device: nasal cannula  O2 Flow (L/min): 3  Pulse: 89  BP: 135/80  Mika Dyspnea Rating : 3      During Exercise Vitals  SpO2: 96 %  Supplemental O2?: Yes  O2 Device: nasal cannula  O2 Flow (L/min): 3  Pulse: 122  BP: 129/82  Mika Dyspnea Rating : 5-6      Post Exercise Vitals  SpO2: 93 %  Supplemental O2?: Yes  O2 Device: nasal cannula  O2 Flow (L/min): 3  Pulse: 117  BP: 134/75  Mika Dyspnea Rating : 5-6       Modality  Modality: Arm Ergometer, Hand Free Weights, Recumbent Bike, Treadmill      Arm Ergometer  Time: 10 minutes  RPM:  (1.75 miles)  Level: 5.5      Recumbent Bike  Time: 10 minutes  RPM:  (2.45 miles)  Level: 8      Treadmill  Time: 10 minutes  MPH: 2 MPH  stGstrstastdstest:st st1st Distance: 0.33 Miles      Biceps  lbs: 6 lbs (dumbbells)  Sets: 2  Reps: 10      Chest  lbs: 6 lbs (dumbbells)  Sets: 2  Reps: 10      Education  Pursed lip breathing review    Therapist Notes   Increased level to 8 on the recumbent bike reaching 2.45 miles.  Increased incline to 2 on the treadmill walking at 2.0 mph for 10 minutes.  Pt tolerated changes well.  Will continue to educate and motivate pt during rehab.    Dr. Mireles immediately available as needed.

## 2017-07-12 ENCOUNTER — ANESTHESIA (OUTPATIENT)
Dept: ENDOSCOPY | Facility: HOSPITAL | Age: 62
End: 2017-07-12
Payer: MEDICARE

## 2017-07-12 ENCOUNTER — HOSPITAL ENCOUNTER (OUTPATIENT)
Facility: HOSPITAL | Age: 62
Discharge: HOME | End: 2017-07-12
Attending: INTERNAL MEDICINE | Admitting: INTERNAL MEDICINE
Payer: MEDICARE

## 2017-07-12 ENCOUNTER — ANESTHESIA EVENT (OUTPATIENT)
Dept: ENDOSCOPY | Facility: HOSPITAL | Age: 62
End: 2017-07-12
Payer: MEDICARE

## 2017-07-12 ENCOUNTER — SURGERY (OUTPATIENT)
Age: 62
End: 2017-07-12

## 2017-07-12 VITALS — RESPIRATION RATE: 26 BRPM

## 2017-07-12 DIAGNOSIS — Z12.11 SCREEN FOR COLON CANCER: ICD-10-CM

## 2017-07-12 PROCEDURE — 27201089 HC SNARE, DISP (ANY): Performed by: INTERNAL MEDICINE

## 2017-07-12 PROCEDURE — 88305 TISSUE EXAM BY PATHOLOGIST: CPT | Performed by: PATHOLOGY

## 2017-07-12 PROCEDURE — 63600175 PHARM REV CODE 636 W HCPCS: Performed by: NURSE ANESTHETIST, CERTIFIED REGISTERED

## 2017-07-12 PROCEDURE — 45380 COLONOSCOPY AND BIOPSY: CPT | Performed by: INTERNAL MEDICINE

## 2017-07-12 PROCEDURE — 25000003 PHARM REV CODE 250: Performed by: NURSE ANESTHETIST, CERTIFIED REGISTERED

## 2017-07-12 PROCEDURE — 37000008 HC ANESTHESIA 1ST 15 MINUTES: Performed by: INTERNAL MEDICINE

## 2017-07-12 PROCEDURE — 88305 TISSUE EXAM BY PATHOLOGIST: CPT | Mod: 26,,, | Performed by: PATHOLOGY

## 2017-07-12 PROCEDURE — 45385 COLONOSCOPY W/LESION REMOVAL: CPT | Performed by: INTERNAL MEDICINE

## 2017-07-12 PROCEDURE — 45385 COLONOSCOPY W/LESION REMOVAL: CPT | Mod: PT,,, | Performed by: INTERNAL MEDICINE

## 2017-07-12 PROCEDURE — 37000009 HC ANESTHESIA EA ADD 15 MINS: Performed by: INTERNAL MEDICINE

## 2017-07-12 PROCEDURE — 25000003 PHARM REV CODE 250: Performed by: INTERNAL MEDICINE

## 2017-07-12 PROCEDURE — 45380 COLONOSCOPY AND BIOPSY: CPT | Mod: 59,,, | Performed by: INTERNAL MEDICINE

## 2017-07-12 PROCEDURE — 27201012 HC FORCEPS, HOT/COLD, DISP: Performed by: INTERNAL MEDICINE

## 2017-07-12 RX ORDER — LIDOCAINE HCL/PF 100 MG/5ML
SYRINGE (ML) INTRAVENOUS
Status: DISCONTINUED | OUTPATIENT
Start: 2017-07-12 | End: 2017-07-12

## 2017-07-12 RX ORDER — SODIUM CHLORIDE, SODIUM LACTATE, POTASSIUM CHLORIDE, CALCIUM CHLORIDE 600; 310; 30; 20 MG/100ML; MG/100ML; MG/100ML; MG/100ML
INJECTION, SOLUTION INTRAVENOUS CONTINUOUS
Status: DISCONTINUED | OUTPATIENT
Start: 2017-07-12 | End: 2017-07-12 | Stop reason: HOSPADM

## 2017-07-12 RX ORDER — PROPOFOL 10 MG/ML
INJECTION, EMULSION INTRAVENOUS
Status: DISCONTINUED | OUTPATIENT
Start: 2017-07-12 | End: 2017-07-12

## 2017-07-12 RX ADMIN — PROPOFOL 20 MG: 10 INJECTION, EMULSION INTRAVENOUS at 09:07

## 2017-07-12 RX ADMIN — PROPOFOL 30 MG: 10 INJECTION, EMULSION INTRAVENOUS at 09:07

## 2017-07-12 RX ADMIN — PROPOFOL 10 MG: 10 INJECTION, EMULSION INTRAVENOUS at 09:07

## 2017-07-12 RX ADMIN — LIDOCAINE HYDROCHLORIDE 40 MG: 20 INJECTION, SOLUTION INTRAVENOUS at 08:07

## 2017-07-12 RX ADMIN — PROPOFOL 30 MG: 10 INJECTION, EMULSION INTRAVENOUS at 08:07

## 2017-07-12 RX ADMIN — PROPOFOL 20 MG: 10 INJECTION, EMULSION INTRAVENOUS at 08:07

## 2017-07-12 RX ADMIN — SODIUM CHLORIDE, SODIUM LACTATE, POTASSIUM CHLORIDE, AND CALCIUM CHLORIDE: .6; .31; .03; .02 INJECTION, SOLUTION INTRAVENOUS at 08:07

## 2017-07-12 RX ADMIN — PROPOFOL 50 MG: 10 INJECTION, EMULSION INTRAVENOUS at 08:07

## 2017-07-12 NOTE — ANESTHESIA POSTPROCEDURE EVALUATION
"Anesthesia Post Evaluation    Patient: Johnny Perales    Procedure(s) Performed: Procedure(s) (LRB):  COLONOSCOPY (N/A)    Final Anesthesia Type: MAC  Patient location during evaluation: GI PACU  Patient participation: Yes- Able to Participate  Level of consciousness: awake and alert, awake and oriented  Post-procedure vital signs: reviewed and stable  Pain management: adequate  Airway patency: patent    Anesthetic complications: no      Cardiovascular status: blood pressure returned to baseline  Respiratory status: unassisted, spontaneous ventilation and nasal cannula  Hydration status: euvolemic  Follow-up not needed.        Visit Vitals  /80 (BP Location: Left arm, Patient Position: Lying, BP Method: Automatic)   Pulse 78   Temp 36.6 °C (97.9 °F) (Oral)   Resp 18   Ht 5' 10" (1.778 m)   Wt 88.9 kg (196 lb)   SpO2 100%   BMI 28.12 kg/m²       Pain/Yulisa Score: Pain Assessment Performed: Yes (7/12/2017  7:58 AM)  Presence of Pain: denies (7/12/2017  7:58 AM)      "

## 2017-07-12 NOTE — DISCHARGE INSTRUCTIONS
Hemorrhoids    Hemorrhoids are swollen and inflamed veins inside the rectum and near the anus. The rectum is the last several inches of the colon. The anus is the passage between the rectum and the outside of the body.  Causes  The veins can become swollen due to increased pressure in them. This is most often caused by:  · Chronic constipation or diarrhea  · Straining when having a bowel movement  · Sitting too long on the toilet  · A low-fiber diet  · Pregnancy  Symptoms  · Bleeding from the rectum (this may be noticeable after bowel movements)  · Lump near the anus  · Itching around the anus  · Pain around the anus  There are different types of hemorrhoids. Depending on the type you have and the severity, you may be able to treat yourself at home. In some cases, a procedure may be the best treatment option. Your healthcare provider can tell you more about this, if needed.  Home care  General care  · To get relief from pain or itching, try:  ¨ Topical products. Your healthcare provider may prescribe or recommend creams, ointments, or pads that can be applied to the hemorrhoid. Use these exactly as directed.  ¨ Medicines. Your healthcare provider may recommend stool softeners, suppositories, or laxatives to help manage constipation. Use these exactly as directed.  ¨ Sitz baths. A sitz bath involves sitting in a few inches of warm bath water. Be careful not to make the water so hot that you burn yourself--test it before sitting in it. Soak for about 10 to 15 minutes a few times a day. This may help relieve pain.  Tips to help prevent hemorrhoids  · Eat more fiber. Fiber adds bulk to stool and absorbs water as it moves through your colon. This makes stool softer and easier to pass.  ¨ Increase the fiber in your diet with more fiber-rich foods. These include fresh fruit, vegetables, and whole grains.  ¨ Take a fiber supplement or bulking agent, if advised to by your provider. These include products such as psyllium  or methylcellulose.  · Drink plenty of water, if directed to by your provider. This can help keep stool soft.  · Be more active. Frequent exercise aids digestion and helps prevent constipation. It may also help make bowel movements more regular.  · Dont strain during bowel movements. This can make hemorrhoids more likely. Also, dont sit on the toilet for long periods of time.  Follow-up care  Follow up with your healthcare provider, or as advised. If a culture or imaging tests were done, you will be notified of the results when they are ready. This may take a few days or longer.  When to seek medical advice  Call your healthcare provider right away if any of these occur:  · Increased bleeding from the rectum  · Increased pain around the rectum or anus  · Weakness or dizziness  Call 911  Call 911 or return to the emergency department right away if any of these occur:  · Trouble breathing or swallowing  · Fainting or loss of consciousness  · Unusually fast heart rate  · Vomiting blood  · Large amounts of blood in stool  Date Last Reviewed: 6/22/2015 © 2000-2016 [employer]. [address] [place] [address]. All rights reserved. This information is not intended as a substitute for professional medical care. Always follow your healthcare professional's instructions.        Diverticulosis    Diverticulosis means that small pouches have formed in the wall of your large intestine (colon). Most often, this problem causes no symptoms and is common as people age. But the pouches in the colon are at risk of becoming infected. When this happens, the condition is called diverticulitis. Although most people with diverticulosis never develop diverticulitis, it is still not uncommon. Rectal bleeding can also occur and in less common situations, a type of colon inflammation called colitis.  While most people do not have symptoms, some people with diverticulosis may have:  · Abdominal cramps and  pain  · Bloating  · Constipation  · Change in bowel habits  Causes  The exact cause of diverticulosis (and diverticulitis) has not been proved, but a few things are associated with the condition:  · Low-fiber diet  · Constipation  · Lack of exercise  Your healthcare provider will talk with you about how to manage your condition. Diet changes may be all that are needed to help control diverticulosis and prevent progression to diverticulitis. If you develop diverticulitis, you will likely need other treatments.  Home care  You may be told to take fiber supplements daily. Fiber adds bulk to the stool so that it passes through the colon more easily. Stool softeners may be recommended. You may also be given medications for pain relief. Be sure to take all medications as directed.  In the past, people were told to avoid corn, nuts, and seeds. This is no longer necessary.  Follow these guidelines when caring for yourself at home:  · Eat unprocessed foods that are high in fiber. Whole grains, fruits, and vegetables are good choices.  · Drink 6 to 8 glasses of water every day unless your healthcare provider has you limit how much fluid you should have.  · Watch for changes in your bowel movements. Tell your provider if you notice any changes.  · Begin an exercise program. Ask your provider how to get started. Generally, walking is the best.  · Get plenty of rest and sleep.  Follow-up care  Follow up with your healthcare provider, or as advised. Regular visits may be needed to check on your health. Sometimes special procedures such as colonoscopy, are needed after an episode of diverticulitis or blooding. Be sure to keep all your appointments.  If a stool sample was taken, or cultures were done, you should be told if they are positive, or if your treatment needs to be changed. You can call as directed for the results.  If X-rays were done, a radiologist will look at them. You will be told if there is a change in your  treatment.  If antibiotics were prescribed, be sure to finish them all.  When to seek medical advice  Call your healthcare provider right away if any of these occur:  · Fever of 100.4°F (38°C) or higher, or as directed by your healthcare provider  · Severe cramps in the lower left side of the abdomen or pain that is getting worse  · Tenderness in the lower left side of the abdomen or worsening pain throughout the abdomen  · Diarrhea or constipation that doesn't get better within 24 hours  · Nausea and vomiting  · Bleeding from the rectum  Call 911  Call emergency services if any of the following occur:  · Trouble breathing  · Confusion  · Very drowsy or trouble awakening  · Fainting or loss of consciousness  · Rapid heart rate  · Chest pain  Date Last Reviewed: 12/30/2015 © 2000-2016 [employer] (ABAM). [address] [place] [address], [place] [address]. All rights reserved. This information is not intended as a substitute for professional medical care. Always follow your healthcare professional's instructions.        Understanding Colon and Rectal Polyps    The colon (also called the large intestine) is a muscular tube that forms the last part of the digestive tract. It absorbs water and stores food waste. The colon is about 4 to 6 feet long. The rectum is the last 6 inches of the colon. The colon and rectum have a smooth lining composed of millions of cells. Changes in these cells can lead to growths in the colon that can become cancerous and should be removed. Multiple tests are available to screen for colon cancer, but the colonoscopy is the most recommended test. During colonoscopy, these polyps can be removed. How often you need this test depends on many things including your condition, your family history, symptoms, and what the findings were at the previous colonoscopy.   When the colon lining changes  Changes that happen in the cells that line the colon or rectum can lead to growths called polyps. Over a  period of years, polyps can turn cancerous. Removing polyps early may prevent cancer from ever forming.  Polyps  Polyps are fleshy clumps of tissue that form on the lining of the colon or rectum. Small polyps are usually benign (not cancerous). However, over time, cells in a polyp can change and become cancerous. Certain types of polyps known as adenomatous polyps are premalignant. The risk for invasive cancer increases with the size of the polyp and certain cell and gene features. This means that they can become cancerous if they're not removed. Hyperplastic polyps are benign. They can grow quite large and not turn cancerous.   Cancer  Almost all colorectal cancers start when polyp cells begin growing abnormally. As a cancerous tumor grows, it may involve more and more of the colon or rectum. In time, cancer can also grow beyond the colon or rectum and spread to nearby organs or to glands called lymph nodes. The cells can also travel to other parts of the body. This is known as metastasis. The earlier a cancerous tumor is removed, the better the chance of preventing its spread.    Date Last Reviewed: 8/1/2016  © 1395-4427 [employer], [employer]. [address] [place] [address], [place] [address]. All rights reserved. This information is not intended as a substitute for professional medical care. Always follow your healthcare professional's instructions.

## 2017-07-12 NOTE — ANESTHESIA PREPROCEDURE EVALUATION
07/12/2017  Johnny Perales is a 62 y.o., male.    Anesthesia Evaluation    I have reviewed the Patient Summary Reports.    I have reviewed the Nursing Notes.   I have reviewed the Medications.   Steroids Taken In Past Year: Prednisone    Review of Systems  Anesthesia Hx:  No problems with previous Anesthesia    Social:  Former Smoker    Pulmonary:   COPD, severe Sleep Apnea, CPAP Home O2 4L/min  Denies MARLON, uses CPAP 2nd to COPD Chronic Obstructive Pulmonary Disease (COPD):  is secondary to smoking. Inhaler use is rescue inhaler PRN, rescue inhaler daily and maintenance inhaler daily. Oral/Intravenous steroid use is current steroid Rx.    Psych:   Psychiatric History          Physical Exam  General:  Well nourished    Airway/Jaw/Neck:  Airway Findings: Mouth Opening: Normal Mallampati: II  Jaw/Neck Findings:  Neck ROM: Normal ROM       Chest/Lungs:  Chest/Lungs Findings: Clear to auscultation, Normal Respiratory Rate     Heart/Vascular:  Heart Findings: Rate: Normal  Rhythm: Regular Rhythm  Sounds: Normal        Mental Status:  Mental Status Findings:  Cooperative, Alert and Oriented         Anesthesia Plan  Type of Anesthesia, risks & benefits discussed:  Anesthesia Type:  MAC  Patient's Preference:   Intra-op Monitoring Plan: standard ASA monitors  Intra-op Monitoring Plan Comments:   Post Op Pain Control Plan:   Post Op Pain Control Plan Comments:   Induction:   IV  Beta Blocker:  Patient is not currently on a Beta-Blocker (No further documentation required).       Informed Consent: Patient understands risks and agrees with Anesthesia plan.  Questions answered. Anesthesia consent signed with patient.  ASA Score: 3     Day of Surgery Review of History & Physical: I have interviewed and examined the patient. I have reviewed the patient's H&P dated:  There are no significant changes.          Ready For  Surgery From Anesthesia Perspective.

## 2017-07-13 VITALS
HEIGHT: 70 IN | DIASTOLIC BLOOD PRESSURE: 90 MMHG | HEART RATE: 71 BPM | OXYGEN SATURATION: 100 % | SYSTOLIC BLOOD PRESSURE: 137 MMHG | WEIGHT: 196 LBS | RESPIRATION RATE: 18 BRPM | BODY MASS INDEX: 28.06 KG/M2 | TEMPERATURE: 98 F

## 2017-07-17 ENCOUNTER — HOSPITAL ENCOUNTER (OUTPATIENT)
Dept: PULMONOLOGY | Facility: HOSPITAL | Age: 62
Discharge: HOME | End: 2017-07-17
Attending: INTERNAL MEDICINE
Payer: MEDICARE

## 2017-07-17 VITALS — BODY MASS INDEX: 28.3 KG/M2 | WEIGHT: 197.19 LBS

## 2017-07-17 PROCEDURE — G0424 PULMONARY REHAB W EXER: HCPCS

## 2017-07-17 NOTE — PROGRESS NOTES
[facility]  Pulmonary Rehab  Session Summary    SUMMARY     Session Data  Session Number: 16  Time In: 1300  Time Out: 1400  Weight: 89.4 kg (197 lb 3.2 oz)  Target Heart Rate Zone: Minimum: 95 bpm  Target Heart Rate Zone: Maximum: 127 bpm  Patient Motivation: Excellent  Patient Effort: Excellent      Pre Exercise Vitals  SpO2: 95 %  Supplemental O2?: Yes  O2 Device: nasal cannula  O2 Flow (L/min): 2  Pulse: 93  BP: 123/81  Mika Dyspnea Rating : 2      During Exercise Vitals  SpO2: 96 %  Supplemental O2?: Yes  O2 Device: nasal cannula  O2 Flow (L/min): 2  Pulse: 114  Mika Dyspnea Rating : 4      Post Exercise Vitals  SpO2: 97 %  Supplemental O2?: Yes  O2 Device: nasal cannula  O2 Flow (L/min): 2  Pulse: 127  BP: 118/83  Mika Dyspnea Rating : 4       Modality  Modality: Arm Ergometer, Hand Free Weights, Nustep, Treadmill      Arm Ergometer  Time: 10 minutes  RPM:  (1.66 miles)  Level: 5.5      Nustep  Time: 10 minutes  Steps: 646  Load: 8      Treadmill  Time: 15 minutes  MPH: 2 MPH  thGthrthathdtheth:th th4th Biceps  lbs: 6 lbs (dumbbells)  Sets: 2  Reps: 10      Chest  lbs: 6 lbs (dumbbells)  Sets: 2  Reps: 10      Education  Pursed lip breathing review  Compliance     Therapist Notes   Increased incline to 3 on the treadmill for 15 minutes at 2.0 mph.  Pt tolerated change well.  Compliance discussed with pt and pt verbalized understanding.  Pt is set to make up a session this Friday.  Will continue to work with pt on increasing his strength and endurance during rehab.    Dr. Prabhakar Rodriguez immediately available as needed.

## 2017-07-19 ENCOUNTER — HOSPITAL ENCOUNTER (OUTPATIENT)
Dept: PULMONOLOGY | Facility: HOSPITAL | Age: 62
Discharge: HOME | End: 2017-07-19
Attending: INTERNAL MEDICINE
Payer: MEDICARE

## 2017-07-19 PROCEDURE — G0424 PULMONARY REHAB W EXER: HCPCS

## 2017-07-20 VITALS — WEIGHT: 199.13 LBS | BODY MASS INDEX: 28.57 KG/M2

## 2017-07-20 NOTE — PROGRESS NOTES
[facility]  Pulmonary Rehab  Session Summary    SUMMARY     Session Data  Session Number: 17  Time In: 1300  Time Out: 1400  Weight: 90.3 kg (199 lb 1.6 oz)  Target Heart Rate Zone: Minimum: 95 bpm  Target Heart Rate Zone: Maximum: 127 bpm  Patient Motivation: Excellent  Patient Effort: Excellent      Pre Exercise Vitals  SpO2: 94 %  Supplemental O2?: Yes  O2 Device: nasal cannula  O2 Flow (L/min): 3  Pulse: 104  BP: 101/71  Mika Dyspnea Rating : 4      During Exercise Vitals  SpO2: 96 %  Supplemental O2?: Yes  O2 Device: nasal cannula  O2 Flow (L/min): 3  Pulse: 115  BP: 117/68  Mika Dyspnea Rating : 5-6      Post Exercise Vitals  SpO2: 96 %  Supplemental O2?: Yes  O2 Device: nasal cannula  O2 Flow (L/min): 3  Pulse: 122  BP: 132/90  Mika Dyspnea Rating : 4       Modality  Modality: Arm Ergometer, Nustep, Treadmill, Hand Free Weights      Arm Ergometer  Time: 10 minutes  RPM:  (1.70 miles)  Level: 6      Nustep  Time: 5 minutes  Steps: 301  Load: 9      Treadmill  Time: 20 minutes  MPH: 2 MPH  rdGrdrrdarddrderd:rd rd3rd Biceps  lbs: 6 lbs (dumbbells)  Sets: 2  Reps: 20      Chest  lbs: 6 lbs (dumbbells)  Sets: 2  Reps: 20      Education  Pursed lip breathing review  Compliance    Therapist Notes   Increased level to 6 on the arm ergometer for 10 minutes reaching 1.70 miles.  Increased incline to 4 on the treadmill walking at 2.0 mph for 20 minutes.  Pt tolerated changes well.  Compliance discussed with pt.  Pt is going to do a makeup session on Friday.  Will continue to educate and motivate pt during rehab.    Dr. Prabhakar Rodriguez immediately available as needed.

## 2017-07-21 ENCOUNTER — OFFICE VISIT (OUTPATIENT)
Dept: PULMONOLOGY | Facility: CLINIC | Age: 62
End: 2017-07-21
Payer: MEDICARE

## 2017-07-21 ENCOUNTER — HOSPITAL ENCOUNTER (OUTPATIENT)
Dept: PULMONOLOGY | Facility: HOSPITAL | Age: 62
Discharge: HOME | End: 2017-07-21
Attending: INTERNAL MEDICINE
Payer: MEDICARE

## 2017-07-21 ENCOUNTER — HOSPITAL ENCOUNTER (OUTPATIENT)
Dept: RADIOLOGY | Facility: HOSPITAL | Age: 62
Discharge: HOME | End: 2017-07-21
Attending: INTERNAL MEDICINE
Payer: MEDICARE

## 2017-07-21 VITALS
DIASTOLIC BLOOD PRESSURE: 80 MMHG | SYSTOLIC BLOOD PRESSURE: 120 MMHG | HEIGHT: 70 IN | OXYGEN SATURATION: 94 % | BODY MASS INDEX: 28.2 KG/M2 | HEART RATE: 89 BPM | WEIGHT: 197 LBS | RESPIRATION RATE: 18 BRPM

## 2017-07-21 DIAGNOSIS — J44.1 COPD WITH ACUTE EXACERBATION: Primary | ICD-10-CM

## 2017-07-21 DIAGNOSIS — J44.9 COPD, VERY SEVERE: Chronic | ICD-10-CM

## 2017-07-21 DIAGNOSIS — J44.9 CHRONIC OBSTRUCTIVE PULMONARY DISEASE, UNSPECIFIED COPD TYPE: Primary | ICD-10-CM

## 2017-07-21 DIAGNOSIS — J96.11 CHRONIC RESPIRATORY FAILURE WITH HYPOXIA: Chronic | ICD-10-CM

## 2017-07-21 DIAGNOSIS — R91.1 PULMONARY NODULE: Chronic | ICD-10-CM

## 2017-07-21 PROCEDURE — 99999 PR PBB SHADOW E&M-EST. PATIENT-LVL III: CPT | Mod: PBBFAC,,, | Performed by: INTERNAL MEDICINE

## 2017-07-21 PROCEDURE — G0424 PULMONARY REHAB W EXER: HCPCS

## 2017-07-21 PROCEDURE — 71020 XR CHEST PA AND LATERAL: CPT | Mod: 26,,, | Performed by: RADIOLOGY

## 2017-07-21 PROCEDURE — 99215 OFFICE O/P EST HI 40 MIN: CPT | Mod: S$PBB,,, | Performed by: INTERNAL MEDICINE

## 2017-07-21 PROCEDURE — 99213 OFFICE O/P EST LOW 20 MIN: CPT | Mod: PBBFAC,25 | Performed by: INTERNAL MEDICINE

## 2017-07-21 PROCEDURE — 96372 THER/PROPH/DIAG INJ SC/IM: CPT | Mod: PBBFAC

## 2017-07-21 RX ORDER — PREDNISONE 10 MG/1
TABLET ORAL
Qty: 60 TABLET | Refills: 0 | Status: SHIPPED | OUTPATIENT
Start: 2017-07-21 | End: 2017-09-05

## 2017-07-21 RX ORDER — LIDOCAINE HYDROCHLORIDE 10 MG/ML
1 INJECTION INFILTRATION; PERINEURAL
Status: DISCONTINUED | OUTPATIENT
Start: 2017-07-21 | End: 2017-07-21

## 2017-07-21 RX ORDER — TRIAMCINOLONE ACETONIDE 40 MG/ML
80 INJECTION, SUSPENSION INTRA-ARTICULAR; INTRAMUSCULAR
Status: COMPLETED | OUTPATIENT
Start: 2017-07-21 | End: 2017-07-21

## 2017-07-21 RX ORDER — AZITHROMYCIN 500 MG/1
500 TABLET, FILM COATED ORAL DAILY
Qty: 10 TABLET | Refills: 0 | Status: SHIPPED | OUTPATIENT
Start: 2017-07-21 | End: 2017-07-31

## 2017-07-21 RX ADMIN — TRIAMCINOLONE ACETONIDE 80 MG: 40 INJECTION, SUSPENSION INTRA-ARTICULAR; INTRAMUSCULAR at 09:07

## 2017-07-21 NOTE — ASSESSMENT & PLAN NOTE
Continue oxygen supplementation at 3  L/min. DME is [facility].   Continue nocturnal TRIOLOGY ( VIEMED)

## 2017-07-21 NOTE — PROGRESS NOTES
[facility]  Pulmonary Rehab  Session Summary    SUMMARY     Session Data  Session Number: 18  Time In: 0800  Time Out: 0900  Target Heart Rate Zone: Minimum: 95 bpm  Target Heart Rate Zone: Maximum: 127 bpm  Patient Motivation: Excellent  Patient Effort: Excellent      Pre Exercise Vitals  SpO2: 95 %  Supplemental O2?: No  Pulse: 92  BP: 135/85  Mika Dyspnea Rating : 7-8      During Exercise Vitals  SpO2: 96 %  Supplemental O2?: No  O2 Device: nasal cannula  O2 Flow (L/min): 3  Pulse: 116  BP: 133/89  Mika Dyspnea Rating : 5-6      Post Exercise Vitals  SpO2: 95 %  Supplemental O2?: No  O2 Device: nasal cannula  O2 Flow (L/min): 3  Pulse: 117  BP: 118/77  Mika Dyspnea Rating : 5-6       Modality  Modality: Hand Free Weights, Recumbent Bike, Arm Ergometer      Arm Ergometer  Time: 10 minutes  RPM:  (1.56 miles.)  Level: 6      Recumbent Bike  Time: 10 minutes  RPM:  (2.30 miles)  Level: 8      Biceps  lbs: 6 lbs  Sets: 2  Reps: 10      Chest  lbs: 6 lbs  Sets: 2  Reps: 10      Education  Purse Lip Breathing      Therapist Notes   Pt. Here for makeup day, seen Dr. Rodriguez and light exercise was performed. Will continue to educate and motivate patient to achieve his goals. Pt. Tolerated exercise well.    Dr. Prabhakar Rodriguez available immediately as needed.

## 2017-07-21 NOTE — ASSESSMENT & PLAN NOTE
COPD ROS: taking medications as instructed, no medication side effects noted, no significant ongoing wheezing or shortness of breath, using bronchodilator MDI less than twice a week.   New concerns: None.   Exam: Bilateral wheezes and mild respiratory distress.   Assessment:  COPD reasonably well controlled and loss of control due to intercurrent illness.   Plan: Plan as above. Continue  ACCUNEB, PROAIR, ANORO, DALIRESP.

## 2017-07-21 NOTE — ASSESSMENT & PLAN NOTE
-     triamcinolone acetonide injection 80 mg; Inject 2 mLs (80 mg total) into the muscle one time.  -     lidocaine HCL 10 mg/ml (1%) injection 1 mL; Inject 1 mL into the muscle one time.  -     predniSONE (DELTASONE) 10 MG tablet; 40 mg PO QD X 3 days then 30 mg PO QD x 3 days then 20 mg PO QD X 3 days then 10 mg PO QD X 3 days then 5 mg PO QD x 3 days then stop.  Dispense: 60 tablet; Refill: 0  -     azithromycin (ZITHROMAX) 500 MG tablet; Take 1 tablet (500 mg total) by mouth once daily.  Dispense: 10 tablet; Refill: 0

## 2017-07-21 NOTE — PROGRESS NOTES
Subjective:      Patient ID: Johnny Perales is a 62 y.o. male.  Patient Active Problem List   Diagnosis    COPD, very severe    Pulmonary nodule    COPD with acute exacerbation    Chronic respiratory failure with hypoxia    Establishing care with new doctor, encounter for    Screening for colon cancer    Family history of hypercholesterolemia     Chronic fatigue     Bilateral hearing loss    Elevated MCV    Depression    Immunization deficiency    Hyperkalemia    Elevated TSH    Anhedonia     Screen for colon cancer     Problem list has been reviewed.    Chief Complaint: COPD very severe; Shortness of Breath; and chronic respiratory failure  with hypoxia    HPI    He presents with an acute flare up of his COPD. Symptoms started early this morning. He used his nebulizer this morning. He reports that used 4 vials of ACCUNEB before he started to feel better. He denies any fevers or chills. He denies increasing oxygen requirements.      Previous Report Reviewed: office notes     The following portions of the patient's history were reviewed and updated as appropriate: He  has a past medical history of COPD (chronic obstructive pulmonary disease) and Hearing impairment.  He  has a past surgical history that includes Inner ear surgery (Bilateral) and Colonoscopy (N/A, 7/12/2017).  His family history includes Cancer in his father and mother; Colon cancer in his maternal uncle; Heart failure in his father and mother.  He  reports that he has quit smoking. His smoking use included Vaping with nicotine. He has a 3.50 pack-year smoking history. He has never used smokeless tobacco. He reports that he drinks about 7.2 oz of alcohol per week . He reports that he does not use drugs.  He has a current medication list which includes the following prescription(s): albuterol, citalopram, folic acid, hydrocodone-acetaminophen 5-325mg, ipratropium, oxygen-air delivery systems, prednisone, umeclidinium-vilanterol,  "vitamin d3, azithromycin, and prednisone, and the following Facility-Administered Medications: lidocaine hcl 10 mg/ml (1%) and triamcinolone acetonide.  He has No Known Allergies..    Review of Systems   Constitutional: Positive for fatigue and night sweats.   HENT: Negative for nosebleeds and hearing loss.    Eyes: Negative for redness.   Respiratory: Positive for cough, sputum production, wheezing and dyspnea on extertion. Negative for somnolence.    Cardiovascular: Negative for chest pain and leg swelling.   Genitourinary: Negative for hematuria.   Endocrine: Negative for cold intolerance and heat intolerance.    Musculoskeletal: Negative for arthralgias and back pain.   Skin: Negative for rash.   Gastrointestinal: Negative for abdominal pain and abdominal distention.   Neurological: Negative for dizziness and headaches.   Hematological: Negative for adenopathy. Does not bruise/bleed easily.   Psychiatric/Behavioral: The patient is nervous/anxious.    All other systems reviewed and are negative.     Objective:   /80   Pulse 89   Resp 18   Ht 5' 10" (1.778 m)   Wt 89.4 kg (196 lb 15.7 oz)   SpO2 (!) 94% Comment: pt on 3 liters oxygen  BMI 28.26 kg/m²   Body mass index is 28.26 kg/m².    Physical Exam   Constitutional: He is oriented to person, place, and time. He appears well-developed and well-nourished.   HENT:   Head: Normocephalic and atraumatic.   Eyes: EOM are normal. Pupils are equal, round, and reactive to light.   Neck: Normal range of motion. Neck supple.   Cardiovascular: Normal rate and regular rhythm.    Pulmonary/Chest: He is in respiratory distress. He has wheezes.   Abdominal: Soft. Bowel sounds are normal.   Musculoskeletal: Normal range of motion.   Neurological: He is alert and oriented to person, place, and time.   Skin: Skin is warm.   Psychiatric: He has a normal mood and affect.       Personal Diagnostic Review  Chest x-ray: The lungs are clear and free of infiltrate.  No pleural " effusion or pneumothorax is identified.  The heart is not enlarged.The lungs are hyperexpanded    Assessment:     1. COPD with acute exacerbation Acute   2. COPD, very severe Active   3. Chronic respiratory failure with hypoxia Active   4. Pulmonary nodule Stable     Outpatient Encounter Prescriptions as of 7/21/2017   Medication Sig Dispense Refill    albuterol (ACCUNEB) 0.63 mg/3 mL Nebu Take 3 mLs (0.63 mg total) by nebulization 3 (three) times daily as needed. (Patient taking differently: Take 0.63 mg by nebulization every 4 (four) hours as needed. ) 90 vial 11    citalopram (CELEXA) 20 MG tablet Take 1 tablet (20 mg total) by mouth once daily. 30 tablet 11    folic acid (FOLVITE) 1 MG tablet Take 1,000 mcg by mouth once daily.  0    hydrocodone-acetaminophen 5-325mg (NORCO) 5-325 mg per tablet Take 1 tablet by mouth every 6 (six) hours as needed. 12 tablet 0    ipratropium (ATROVENT) 0.02 % nebulizer solution Take 2.5 mLs (500 mcg total) by nebulization every 4 to 6 hours as needed for Wheezing. Rescue 180 vial 11    OXYGEN-AIR DELIVERY SYSTEMS MISC by Misc.(Non-Drug; Combo Route) route.      predniSONE (DELTASONE) 10 MG tablet TAKE 4 TABS BY MOUTH DAILY FOR 3 DAYS,3 FOR 3 DAYS,2 FOR 3 DAYS,1 FOR 3 DAYS THEN 1/2 FOR 3 DAYS. 60 tablet 0    umeclidinium-vilanterol (ANORO ELLIPTA) 62.5-25 mcg/actuation DsDv Inhale 1 puff into the lungs once daily. 30 each 11    VITAMIN D3 400 unit tablet TAKE 2 TABLETS BY MOUTH DAILY.  5    azithromycin (ZITHROMAX) 500 MG tablet Take 1 tablet (500 mg total) by mouth once daily. 10 tablet 0    predniSONE (DELTASONE) 10 MG tablet 40 mg PO QD X 3 days then 30 mg PO QD x 3 days then 20 mg PO QD X 3 days then 10 mg PO QD X 3 days then 5 mg PO QD x 3 days then stop. 60 tablet 0     Facility-Administered Encounter Medications as of 7/21/2017   Medication Dose Route Frequency Provider Last Rate Last Dose    lidocaine HCL 10 mg/ml (1%) injection 1 mL  1 mL Intramuscular 1 time  in Clinic/HOD Prabhakar Rodriguez MD        triamcinolone acetonide injection 80 mg  80 mg Intramuscular 1 time in Clinic/HOD Prabhakar Rodriguez MD         No orders of the defined types were placed in this encounter.    Plan:     Discussed diagnosis, its evaluation, treatment and usual course. All questions answered.    COPD with acute exacerbation    -     triamcinolone acetonide injection 80 mg; Inject 2 mLs (80 mg total) into the muscle one time.  -     lidocaine HCL 10 mg/ml (1%) injection 1 mL; Inject 1 mL into the muscle one time.  -     predniSONE (DELTASONE) 10 MG tablet; 40 mg PO QD X 3 days then 30 mg PO QD x 3 days then 20 mg PO QD X 3 days then 10 mg PO QD X 3 days then 5 mg PO QD x 3 days then stop.  Dispense: 60 tablet; Refill: 0  -     azithromycin (ZITHROMAX) 500 MG tablet; Take 1 tablet (500 mg total) by mouth once daily.  Dispense: 10 tablet; Refill: 0        COPD, very severe  COPD ROS: taking medications as instructed, no medication side effects noted, no significant ongoing wheezing or shortness of breath, using bronchodilator MDI less than twice a week.   New concerns: None.   Exam: Bilateral wheezes and mild respiratory distress.   Assessment:  COPD reasonably well controlled and loss of control due to intercurrent illness.   Plan: Plan as above. Continue  ACCUNEB, PROAIR, ANORO, DALIRESP.     Chronic respiratory failure with hypoxia  Continue oxygen supplementation at 3  L/min. DME is [facility].   Continue nocturnal TRIOLOGY ( VIEMED)    Pulmonary nodule  Radiologic monitoring.       TIME SPENT WITH PATIENT: Time spent: 40 minutes in face to face  discussion concerning diagnosis, prognosis, review of lab and test results, benefits of treatment as well as management of disease, counseling of patient and coordination of care between various health  care providers . Greater than half the time spent was used for coordination of care and counseling of patient.          Return in about 1 month  (around 8/21/2017) for COPD, Chronic Respiratory Failure, Lung Nodule.

## 2017-07-21 NOTE — PATIENT INSTRUCTIONS
Lung Anatomy  Your lungs take air in to give your body oxygen, which the body needs to work. Your lungs, like all the tissues in your body, are made up of billions of tiny specialized cells. Old lung cells die and are replaced by new, identical lung cells. This natural process helps ensure healthy lungs.    Date Last Reviewed: 11/1/2016  © 8785-9514 [employer]. [address] [place] [address]. All rights reserved. This information is not intended as a substitute for professional medical care. Always follow your healthcare professional's instructions.

## 2017-07-26 ENCOUNTER — HOSPITAL ENCOUNTER (OUTPATIENT)
Dept: PULMONOLOGY | Facility: HOSPITAL | Age: 62
Discharge: HOME | End: 2017-07-26
Attending: INTERNAL MEDICINE
Payer: MEDICARE

## 2017-07-26 PROCEDURE — G0424 PULMONARY REHAB W EXER: HCPCS

## 2017-07-26 NOTE — PROGRESS NOTES
[facility]  Pulmonary Rehab  Session Summary    SUMMARY     Session Data  Session Number: 19  Time In: 1300  Time Out: 1400  Target Heart Rate Zone: Minimum: 95 bpm  Target Heart Rate Zone: Maximum: 127 bpm  Patient Motivation: Excellent  Patient Effort: Excellent      Pre Exercise Vitals  SpO2: 94 %  Supplemental O2?: Yes  O2 Device: nasal cannula  O2 Flow (L/min): 3  Pulse: 104  BP: (!) 145/94  Mika Dyspnea Rating : 2      During Exercise Vitals  SpO2: 99 %  Supplemental O2?: Yes  O2 Device: nasal cannula  O2 Flow (L/min): 3  Pulse: 137  BP: (!) 116/94  Mika Dyspnea Rating : 3      Post Exercise Vitals  SpO2: 96 %  Supplemental O2?: Yes  O2 Device: nasal cannula  O2 Flow (L/min): 3  Pulse: 119  BP: 122/78  Mika Dyspnea Rating : 3       Modality  Modality: Hand Free Weights, Recumbent Bike, Arm Ergometer      Arm Ergometer  Time: 10 minutes  RPM:  (1.67 miles)  Level: 6      Recumbent Bike  Time: 10 minutes  Level: 8  Mets:  (2.32 miles)      Biceps  lbs: 6 lbs (dumbbells)  Sets: 2  Reps: 10      Chest  lbs: 6 lbs  Sets: 2  Reps: 10 (dumbbells)    Education  Purse Lip Breathing, hand washing, preventing lung infections and encouraging patient to achieve goals.      Therapist Notes   Pt.used the recumbent bike for 10 minutes for level 8 at 2.32 miles, arm ergometer 10 minutes, level 6 with 1.67 miles. Pt. Tolerated treatment well.    Dr. Prabhakar Rodriguez immediately available as needed.

## 2017-07-31 ENCOUNTER — HOSPITAL ENCOUNTER (OUTPATIENT)
Dept: PULMONOLOGY | Facility: HOSPITAL | Age: 62
Discharge: HOME | End: 2017-07-31
Attending: INTERNAL MEDICINE
Payer: MEDICARE

## 2017-07-31 PROCEDURE — G0424 PULMONARY REHAB W EXER: HCPCS

## 2017-08-01 VITALS — WEIGHT: 196.63 LBS | BODY MASS INDEX: 28.21 KG/M2

## 2017-08-01 NOTE — PROGRESS NOTES
[facility]  Pulmonary Rehab  Session Summary    SUMMARY     Session Data  Session Number: 20  Time In: 1300  Time Out: 1400  Weight: 89.2 kg (196 lb 9.6 oz)  Target Heart Rate Zone: Minimum: 95 bpm  Target Heart Rate Zone: Maximum: 127 bpm  Patient Motivation: Excellent  Patient Effort: Excellent      Pre Exercise Vitals  SpO2: 96 %  Supplemental O2?: Yes  O2 Device: nasal cannula  O2 Flow (L/min): 3  Pulse: 92  BP: (!) 127/91  Mika Dyspnea Rating : 3      During Exercise Vitals  SpO2: 97 %  Supplemental O2?: Yes  O2 Device: nasal cannula  O2 Flow (L/min): 3  Pulse: 122  BP: 139/85  Mika Dyspnea Rating : 4      Post Exercise Vitals  SpO2: 94 %  Supplemental O2?: Yes  O2 Device: nasal cannula  O2 Flow (L/min): 3  Pulse: 114  BP: 153/88  Mika Dyspnea Rating : 4       Modality  Modality: Arm Ergometer, Hand Free Weights, Recumbent Bike, Treadmill      Arm Ergometer  Time: 10 minutes  RPM:  (1.57 miles)  Level: 6      Recumbent Bike  Time: 10 minutes  RPM:  (2.38 miles)  Level: 9      Treadmill  Time: 15 minutes  MPH: 2 MPH  thGthrthathdtheth:th th4th Biceps  lbs: 7 lbs (dumbbells)  Sets: 2  Reps: 10      Chest  lbs: 7 lbs (dumbbells)  Sets: 2  Reps: 10      Education  Importance of handwashing    Therapist Notes   Increased dumbbells to 7 lbs.  Increased level to 9 on the recumbent bike for 10 minutes reaching 2.38 miles.  Increased incline to 5 on the treadmill walking at 2.0 mph for 15 minutes.  Pt tolerated changes well.  Will continue to work with pt on increasing his strength and endurance.    Dr. Prabhakar Rodriguez immediately available as needed.

## 2017-08-17 ENCOUNTER — TELEPHONE (OUTPATIENT)
Dept: PULMONOLOGY | Facility: CLINIC | Age: 62
End: 2017-08-17

## 2017-08-17 NOTE — TELEPHONE ENCOUNTER
----- Message from Dashawn Wright sent at 8/17/2017 10:53 AM CDT -----  Contact: [phone]  Pt would like to speak with nurse about appt time that is closer to his xray on 8/29. Please call back at [phone]. x. lj

## 2017-08-21 ENCOUNTER — TELEPHONE (OUTPATIENT)
Dept: PULMONOLOGY | Facility: HOSPITAL | Age: 62
End: 2017-08-21

## 2017-09-05 ENCOUNTER — TELEPHONE (OUTPATIENT)
Dept: PULMONOLOGY | Facility: CLINIC | Age: 62
End: 2017-09-05

## 2017-09-05 ENCOUNTER — HOSPITAL ENCOUNTER (EMERGENCY)
Facility: HOSPITAL | Age: 62
Discharge: HOME | End: 2017-09-05
Attending: EMERGENCY MEDICINE
Payer: MEDICARE

## 2017-09-05 VITALS
OXYGEN SATURATION: 99 % | BODY MASS INDEX: 26.46 KG/M2 | TEMPERATURE: 99 F | HEIGHT: 71 IN | SYSTOLIC BLOOD PRESSURE: 136 MMHG | HEART RATE: 85 BPM | DIASTOLIC BLOOD PRESSURE: 87 MMHG | RESPIRATION RATE: 18 BRPM | WEIGHT: 189 LBS

## 2017-09-05 DIAGNOSIS — R06.02 SHORTNESS OF BREATH: ICD-10-CM

## 2017-09-05 DIAGNOSIS — J44.1 COPD EXACERBATION: Primary | ICD-10-CM

## 2017-09-05 DIAGNOSIS — J44.9 COPD, VERY SEVERE: Chronic | ICD-10-CM

## 2017-09-05 DIAGNOSIS — R06.00 DYSPNEA: ICD-10-CM

## 2017-09-05 LAB
ALBUMIN SERPL BCP-MCNC: 3.6 G/DL
ALP SERPL-CCNC: 128 U/L
ALT SERPL W/O P-5'-P-CCNC: 97 U/L
ANION GAP SERPL CALC-SCNC: 9 MMOL/L
APTT BLDCRRT: 28.1 SEC
AST SERPL-CCNC: 57 U/L
BASOPHILS # BLD AUTO: 0.06 K/UL
BASOPHILS NFR BLD: 0.7 %
BILIRUB SERPL-MCNC: 0.6 MG/DL
BUN SERPL-MCNC: 8 MG/DL
CALCIUM SERPL-MCNC: 9.7 MG/DL
CHLORIDE SERPL-SCNC: 100 MMOL/L
CK SERPL-CCNC: 49 U/L
CO2 SERPL-SCNC: 32 MMOL/L
CREAT SERPL-MCNC: 0.8 MG/DL
D DIMER PPP IA.FEU-MCNC: 0.37 MG/L FEU
DIFFERENTIAL METHOD: ABNORMAL
EOSINOPHIL # BLD AUTO: 0.2 K/UL
EOSINOPHIL NFR BLD: 2.5 %
ERYTHROCYTE [DISTWIDTH] IN BLOOD BY AUTOMATED COUNT: 13.5 %
EST. GFR  (AFRICAN AMERICAN): >60 ML/MIN/1.73 M^2
EST. GFR  (NON AFRICAN AMERICAN): >60 ML/MIN/1.73 M^2
GLUCOSE SERPL-MCNC: 95 MG/DL
HCT VFR BLD AUTO: 45.6 %
HGB BLD-MCNC: 15.5 G/DL
INR PPP: 1
LYMPHOCYTES # BLD AUTO: 1.1 K/UL
LYMPHOCYTES NFR BLD: 13.3 %
MCH RBC QN AUTO: 34.1 PG
MCHC RBC AUTO-ENTMCNC: 34 G/DL
MCV RBC AUTO: 100 FL
MONOCYTES # BLD AUTO: 0.9 K/UL
MONOCYTES NFR BLD: 11.1 %
NEUTROPHILS # BLD AUTO: 5.8 K/UL
NEUTROPHILS NFR BLD: 72.4 %
PLATELET # BLD AUTO: 259 K/UL
PMV BLD AUTO: 9.7 FL
POTASSIUM SERPL-SCNC: 4.6 MMOL/L
PROT SERPL-MCNC: 7 G/DL
PROTHROMBIN TIME: 10.3 SEC
RBC # BLD AUTO: 4.54 M/UL
SODIUM SERPL-SCNC: 141 MMOL/L
TROPONIN I SERPL DL<=0.01 NG/ML-MCNC: <0.006 NG/ML
WBC # BLD AUTO: 8.04 K/UL

## 2017-09-05 PROCEDURE — 96374 THER/PROPH/DIAG INJ IV PUSH: CPT

## 2017-09-05 PROCEDURE — 63600175 PHARM REV CODE 636 W HCPCS: Performed by: EMERGENCY MEDICINE

## 2017-09-05 PROCEDURE — 94640 AIRWAY INHALATION TREATMENT: CPT

## 2017-09-05 PROCEDURE — 85025 COMPLETE CBC W/AUTO DIFF WBC: CPT

## 2017-09-05 PROCEDURE — 27000221 HC OXYGEN, UP TO 24 HOURS

## 2017-09-05 PROCEDURE — 82550 ASSAY OF CK (CPK): CPT

## 2017-09-05 PROCEDURE — 99284 EMERGENCY DEPT VISIT MOD MDM: CPT | Mod: 25

## 2017-09-05 PROCEDURE — 84484 ASSAY OF TROPONIN QUANT: CPT

## 2017-09-05 PROCEDURE — 80053 COMPREHEN METABOLIC PANEL: CPT

## 2017-09-05 PROCEDURE — 85730 THROMBOPLASTIN TIME PARTIAL: CPT

## 2017-09-05 PROCEDURE — 93005 ELECTROCARDIOGRAM TRACING: CPT

## 2017-09-05 PROCEDURE — 85379 FIBRIN DEGRADATION QUANT: CPT

## 2017-09-05 PROCEDURE — 93010 ELECTROCARDIOGRAM REPORT: CPT | Mod: ,,, | Performed by: INTERNAL MEDICINE

## 2017-09-05 PROCEDURE — 85610 PROTHROMBIN TIME: CPT

## 2017-09-05 PROCEDURE — 25000242 PHARM REV CODE 250 ALT 637 W/ HCPCS: Performed by: EMERGENCY MEDICINE

## 2017-09-05 RX ORDER — PREDNISONE 10 MG/1
10 TABLET ORAL DAILY
Qty: 30 TABLET | Refills: 0 | Status: SHIPPED | OUTPATIENT
Start: 2017-09-05 | End: 2017-09-15

## 2017-09-05 RX ORDER — ALBUTEROL SULFATE 0.63 MG/3ML
0.63 SOLUTION RESPIRATORY (INHALATION) EVERY 4 HOURS PRN
Qty: 120 VIAL | Refills: 0 | Status: SHIPPED | OUTPATIENT
Start: 2017-09-05 | End: 2017-10-05

## 2017-09-05 RX ORDER — AZITHROMYCIN 250 MG/1
250 TABLET, FILM COATED ORAL DAILY
Qty: 6 TABLET | Refills: 0 | Status: SHIPPED | OUTPATIENT
Start: 2017-09-05 | End: 2017-10-02

## 2017-09-05 RX ORDER — IPRATROPIUM BROMIDE AND ALBUTEROL SULFATE 2.5; .5 MG/3ML; MG/3ML
3 SOLUTION RESPIRATORY (INHALATION)
Status: COMPLETED | OUTPATIENT
Start: 2017-09-05 | End: 2017-09-05

## 2017-09-05 RX ORDER — ALBUTEROL SULFATE 90 UG/1
1-2 AEROSOL, METERED RESPIRATORY (INHALATION) EVERY 6 HOURS PRN
Qty: 1 INHALER | Refills: 0 | Status: SHIPPED | OUTPATIENT
Start: 2017-09-05 | End: 2017-10-10 | Stop reason: SDUPTHER

## 2017-09-05 RX ADMIN — IPRATROPIUM BROMIDE AND ALBUTEROL SULFATE 3 ML: .5; 3 SOLUTION RESPIRATORY (INHALATION) at 01:09

## 2017-09-05 RX ADMIN — METHYLPREDNISOLONE SODIUM SUCCINATE 125 MG: 125 INJECTION, POWDER, FOR SOLUTION INTRAMUSCULAR; INTRAVENOUS at 03:09

## 2017-09-05 NOTE — TELEPHONE ENCOUNTER
Sister states that patient is unable to breathe when he is not on his trilogy. Instructed her to call an ambulance patient should be evaluated in er

## 2017-09-05 NOTE — ED NOTES
Pt resting in bed, denies any changes in status, NAD, RR 20 even and unlabored, Aware of pending results and plan of care, denies any needs at this time.  Call light in reach, side rails up.

## 2017-09-05 NOTE — TELEPHONE ENCOUNTER
----- Message from Delaney Durbin sent at 9/5/2017 10:25 AM CDT -----  Contact: Pt's sister Lashawn  Pt's sisterLashawn would like to be called back regarding pt's C-pap machine.        Please call pt's sisterLashawn at [phone].        Thanks!

## 2017-09-05 NOTE — ED PROVIDER NOTES
SCRIBE #1 NOTE: I, Cristela Tucker, am scribing for, and in the presence of, Justa Hill DO. I have scribed the entire note.      History      Chief Complaint   Patient presents with    Shortness of Breath     Patient c/o SOB, wheezing and chest pressure for the last 3 days       Review of patient's allergies indicates:  No Known Allergies     HPI   HPI    9/5/2017, 12:37 PM   History obtained from the patient      History of Present Illness: Johnny Perales is a 62 y.o. male patient who presents to the Emergency Department for SOB which onset gradually 3 days ago. Pt states that he has a C- pap machine at home. Pt reports 3 liters of oxygen. Pt states that sxs are progressively worsening. Symptoms are constant and moderate in severity.  No mitigating or exacerbating factors reported. Associated sxs include runny nose,  cough, nausea, brief sharp CP. Patient denies any fever, chills, leg pain/swelling, abd pain, vomiting, HA, recent travel, long car trips, and all other sxs at this time. No prior Tx reported. No further complaints or concerns at this time.         Arrival mode: Personal vehicle      PCP: John Yap MD       Past Medical History:  Past Medical History:   Diagnosis Date    COPD (chronic obstructive pulmonary disease)     Hearing impairment        Past Surgical History:  Past Surgical History:   Procedure Laterality Date    COLONOSCOPY N/A 7/12/2017    Procedure: COLONOSCOPY;  Surgeon: Salvador Lambert MD;  Location: [facility];  Service: Endoscopy;  Laterality: N/A;    INNER EAR SURGERY Bilateral          Family History:  Family History   Problem Relation Age of Onset    Cancer Mother     Heart failure Mother     Cancer Father     Heart failure Father     Colon cancer Maternal Uncle        Social History:  Social History     Social History Main Topics    Smoking status: Former Smoker     Packs/day: 0.10     Years: 35.00     Types: Vaping with nicotine    Smokeless tobacco:  Never Used    Alcohol use 7.2 oz/week     12 Cans of beer per week    Drug use: No    Sexual activity: Not on file       ROS   Review of Systems   Constitutional: Negative for chills and fever.        - Recent travel  -  long car trips   HENT: Positive for rhinorrhea. Negative for sore throat.    Respiratory: Positive for cough and shortness of breath.    Cardiovascular: Positive for chest pain. Negative for leg swelling.   Gastrointestinal: Positive for nausea. Negative for vomiting.   Genitourinary: Negative for dysuria.   Musculoskeletal: Negative for back pain.        - leg pain    Skin: Negative for rash.   Neurological: Negative for weakness and headaches.   Hematological: Does not bruise/bleed easily.       Physical Exam      Initial Vitals [09/05/17 1204]   BP Pulse Resp Temp SpO2   133/87 100 (!) 24 98.5 °F (36.9 °C) (!) 94 %      MAP       102.33          Physical Exam  Nursing Notes and Vital Signs Reviewed.  Constitutional: Patient is in no apparent distress. Well-developed and well-nourished.  Head: Atraumatic. Normocephalic.  Eyes: PERRL. EOM intact. Conjunctivae are not pale. No scleral icterus.  ENT: Mucous membranes are moist. Oropharynx is clear and symmetric.    Neck: Supple. Full ROM. No lymphadenopathy.  Cardiovascular: Regular rate. Regular rhythm. No murmurs, rubs, or gallops. Distal pulses are 2+ and symmetric.  Pulmonary/Chest: No respiratory distress. Diffuse wheezing. No rales or rhonchi.  Abdominal: Soft and non-distended.  There is no tenderness.  No rebound, guarding, or rigidity. Good bowel sounds.  Genitourinary: N CVA tenderness  Musculoskeletal: Moves all extremities. No obvious deformities. No edema. No calf tenderness.  Skin: Warm and dry.  Neurological:  Alert, awake, and appropriate.  Normal speech.  No acute focal neurological deficits are appreciated.  Psychiatric: Normal affect. Good eye contact. Appropriate in content.    ED Course    Procedures  ED Vital Signs:  Vitals:  "   09/05/17 1204 09/05/17 1312 09/05/17 1317 09/05/17 1322   BP: 133/87      Pulse: 100 101 106 94   Resp: (!) 24 (!) 25 (!) 21 (!) 22   Temp: 98.5 °F (36.9 °C)      TempSrc: Oral      SpO2: (!) 94% 98% 98% 98%   Weight: 85.7 kg (189 lb)      Height: 5' 11" (1.803 m)       09/05/17 1347 09/05/17 1352 09/05/17 1357   BP: 135/84 134/79 128/80   Pulse: 90 92 91   Resp: 19 19 17   Temp:      TempSrc:      SpO2: 99% 99% 99%   Weight:      Height:          Abnormal Lab Results:  Labs Reviewed   CBC W/ AUTO DIFFERENTIAL - Abnormal; Notable for the following:        Result Value    RBC 4.54 (*)      (*)     MCH 34.1 (*)     Lymph% 13.3 (*)     All other components within normal limits   COMPREHENSIVE METABOLIC PANEL - Abnormal; Notable for the following:     CO2 32 (*)     AST 57 (*)     ALT 97 (*)     All other components within normal limits   PROTIME-INR   APTT   CK   TROPONIN I   D DIMER, QUANTITATIVE        All Lab Results:  Results for orders placed or performed during the hospital encounter of 09/05/17   CBC auto differential   Result Value Ref Range    WBC 8.04 3.90 - 12.70 K/uL    RBC 4.54 (L) 4.60 - 6.20 M/uL    Hemoglobin 15.5 14.0 - 18.0 g/dL    Hematocrit 45.6 40.0 - 54.0 %     (H) 82 - 98 fL    MCH 34.1 (H) 27.0 - 31.0 pg    MCHC 34.0 32.0 - 36.0 g/dL    RDW 13.5 11.5 - 14.5 %    Platelets 259 150 - 350 K/uL    MPV 9.7 9.2 - 12.9 fL    Gran # 5.8 1.8 - 7.7 K/uL    Lymph # 1.1 1.0 - 4.8 K/uL    Mono # 0.9 0.3 - 1.0 K/uL    Eos # 0.2 0.0 - 0.5 K/uL    Baso # 0.06 0.00 - 0.20 K/uL    Gran% 72.4 38.0 - 73.0 %    Lymph% 13.3 (L) 18.0 - 48.0 %    Mono% 11.1 4.0 - 15.0 %    Eosinophil% 2.5 0.0 - 8.0 %    Basophil% 0.7 0.0 - 1.9 %    Differential Method Automated    Comprehensive metabolic panel   Result Value Ref Range    Sodium 141 136 - 145 mmol/L    Potassium 4.6 3.5 - 5.1 mmol/L    Chloride 100 95 - 110 mmol/L    CO2 32 (H) 23 - 29 mmol/L    Glucose 95 70 - 110 mg/dL    BUN, Bld 8 8 - 23 mg/dL    " Creatinine 0.8 0.5 - 1.4 mg/dL    Calcium 9.7 8.7 - 10.5 mg/dL    Total Protein 7.0 6.0 - 8.4 g/dL    Albumin 3.6 3.5 - 5.2 g/dL    Total Bilirubin 0.6 0.1 - 1.0 mg/dL    Alkaline Phosphatase 128 55 - 135 U/L    AST 57 (H) 10 - 40 U/L    ALT 97 (H) 10 - 44 U/L    Anion Gap 9 8 - 16 mmol/L    eGFR if African American >60 >60 mL/min/1.73 m^2    eGFR if non African American >60 >60 mL/min/1.73 m^2   Protime-INR   Result Value Ref Range    Prothrombin Time 10.3 9.0 - 12.5 sec    INR 1.0 0.8 - 1.2   APTT   Result Value Ref Range    aPTT 28.1 21.0 - 32.0 sec   CPK   Result Value Ref Range    CPK 49 20 - 200 U/L   Troponin I   Result Value Ref Range    Troponin I <0.006 0.000 - 0.026 ng/mL   D dimer, quantitative   Result Value Ref Range    D-Dimer 0.37 <0.50 mg/L FEU         Imaging Results:  Imaging Results          X-Ray Chest AP Portable (Final result)  Result time 09/05/17 13:37:10    Final result by Benigno Clayton III, MD (09/05/17 13:37:10)                 Impression:     Stable chest x-ray. Chronic lung changes. No acute abnormality suggested.      Electronically signed by: BENIGNO CLAYTON MD  Date:     09/05/17  Time:    13:37              Narrative:    One view chest x-ray.    Clinical indication: Dyspnea    Heart size is normal. The lung fields are clear with mild chronic changes suggested. No consolidation or effusion. No detrimental change since July. No acute pulmonary infiltrate.                                  The EKG was ordered, reviewed, and independently interpreted by the ED provider.  Interpretation time: 1226  Rate: 101 BPM  Rhythm: sinus tachycardia  Interpretation: Possible L atrial enlargement. No STEMI.         The Emergency Provider reviewed the vital signs and test results, which are outlined above.    ED Discussion     3:49 PM: Reassessed pt at this time.  Pt states his condition has improved at this time. Discussed with pt all pertinent ED information and results. Discussed pt dx  and plan of tx. Gave pt all f/u and return to the ED instructions. All questions and concerns were addressed at this time. Pt expresses understanding of information and instructions, and is comfortable with plan to discharge. Pt is stable for discharge.    I have discussed with patient and/or family/caretaker chest pain precautions, specifically to return for worsening chest pain, shortness of breath, fever, or any concern.  I have low suspicion for cardiopulmonary, vascular, infectious, respiratory, or other emergent medical condition based on my evaluation in the ED.      ED Medication(s):  Medications   albuterol-ipratropium 2.5mg-0.5mg/3mL nebulizer solution 3 mL (3 mLs Nebulization Given 9/5/17 1322)   methylPREDNISolone sod suc(PF) injection 125 mg (125 mg Intravenous Given 9/5/17 1542)       New Prescriptions    ALBUTEROL 90 MCG/ACTUATION INHALER    Inhale 1-2 puffs into the lungs every 6 (six) hours as needed for Wheezing. Rescue    AZITHROMYCIN (Z-SIVAKUMAR) 250 MG TABLET    Take 1 tablet (250 mg total) by mouth once daily. Take first 2 tablets together, then 1 every day until finished.    PREDNISONE (DELTASONE) 10 MG TABLET    Take 1 tablet (10 mg total) by mouth once daily. Take 4 tabs x 3 days, then take 3 tablets by mouth for 3 days, then  Take 2 tabs x 3 days, then   Take 1 tab x 3 days.       Follow-up Information     Prabhakar Rodriguez MD. Schedule an appointment as soon as possible for a visit in 2 days.    Specialty:  Pulmonary Disease  Why:  Return to the ED for:  chest pain, chest pressure, shortness of breath, difficulty breathing or other concerns.  Contact information:  [address]  [place] [address] [phone]                     Medical Decision Making    Medical Decision Making:   Clinical Tests:   Lab Tests: Reviewed and Ordered  Radiological Study: Reviewed and Ordered  Medical Tests: Reviewed and Ordered           Scribe Attestation:   Scribe #1: I performed the above scribed service and  the documentation accurately describes the services I performed. I attest to the accuracy of the note.    Attending:   Physician Attestation Statement for Scribe #1: I, Justa Hill DO, personally performed the services described in this documentation, as scribed by , in my presence, and it is both accurate and complete.          Clinical Impression     1. COPD exacerbation      Disposition:   Disposition: Discharged  Condition: Stable         Justa Hill DO  09/06/17 1730

## 2017-09-05 NOTE — ED NOTES
Level of Consciousness: Patient is awake, alert, oriented to person, place, time, and situation.   Appearance: Pt on stretcher, minor resp distress. Clothing appropriately placed and clean. Hygiene is appropriate.   Skin: Skin is warm, dry, and intact. Skin turgor is normal/elastic. Mucous membranes moist. Skin color is normal for ethnicity. No skin breakdown noted.   Musculoskeletal: Moves all extremities well. Full active ROM. No deformities noted. Pt c/o general weakness  Respiratory: Airway open and patent.  Resp slightly labored, pt c/o sob.  Lungs sounds decreased lower lobes  Cardiac: Regular rate and rhythm. No peripheral edema noted. Radial and pedal pulses present and normal. Capillary refill is within normal limits. Denies chest pain.   GI: Abdomen soft. Bowel sounds present and active in all quads. Abdomen symmetric with no distention noted. Denies any N/V/D.   Neurological: Symmetrical expressions noted to face. Equal bilateral . Normal sensation reported to all extremities. No obvious neurological deficits noted.   Psychosocial: Speech spontaneous, clear, and coherent.

## 2017-09-06 ENCOUNTER — TELEPHONE (OUTPATIENT)
Dept: PULMONOLOGY | Facility: CLINIC | Age: 62
End: 2017-09-06

## 2017-09-06 NOTE — TELEPHONE ENCOUNTER
----- Message from Elo Ellis sent at 9/6/2017  1:50 PM CDT -----  Contact: pt  Please call pt @ [phone] regarding appts for 10/29, but is not in system, pt need clafity dates.

## 2017-10-02 ENCOUNTER — TELEPHONE (OUTPATIENT)
Dept: PULMONOLOGY | Facility: CLINIC | Age: 62
End: 2017-10-02

## 2017-10-02 ENCOUNTER — OFFICE VISIT (OUTPATIENT)
Dept: INTERNAL MEDICINE | Facility: CLINIC | Age: 62
End: 2017-10-02
Payer: MEDICARE

## 2017-10-02 VITALS
WEIGHT: 187.25 LBS | TEMPERATURE: 98 F | DIASTOLIC BLOOD PRESSURE: 86 MMHG | HEART RATE: 102 BPM | SYSTOLIC BLOOD PRESSURE: 128 MMHG | HEIGHT: 71 IN | OXYGEN SATURATION: 95 % | BODY MASS INDEX: 26.22 KG/M2

## 2017-10-02 DIAGNOSIS — F32.A DEPRESSION, UNSPECIFIED DEPRESSION TYPE: ICD-10-CM

## 2017-10-02 DIAGNOSIS — Z28.39 IMMUNIZATION DEFICIENCY: ICD-10-CM

## 2017-10-02 DIAGNOSIS — J44.9 COPD, VERY SEVERE: Chronic | ICD-10-CM

## 2017-10-02 DIAGNOSIS — G56.03 BILATERAL CARPAL TUNNEL SYNDROME: Primary | ICD-10-CM

## 2017-10-02 PROBLEM — R45.84 ANHEDONIA: Status: RESOLVED | Noted: 2017-06-08 | Resolved: 2017-10-02

## 2017-10-02 PROBLEM — J44.1 COPD WITH ACUTE EXACERBATION: Status: RESOLVED | Noted: 2017-02-02 | Resolved: 2017-10-02

## 2017-10-02 PROBLEM — Z76.89 ESTABLISHING CARE WITH NEW DOCTOR, ENCOUNTER FOR: Status: RESOLVED | Noted: 2017-05-09 | Resolved: 2017-10-02

## 2017-10-02 PROCEDURE — 99214 OFFICE O/P EST MOD 30 MIN: CPT | Mod: S$PBB,,, | Performed by: FAMILY MEDICINE

## 2017-10-02 PROCEDURE — G0008 ADMIN INFLUENZA VIRUS VAC: HCPCS | Mod: PBBFAC,PO

## 2017-10-02 PROCEDURE — 99999 PR PBB SHADOW E&M-EST. PATIENT-LVL III: CPT | Mod: PBBFAC,,, | Performed by: FAMILY MEDICINE

## 2017-10-02 PROCEDURE — 99213 OFFICE O/P EST LOW 20 MIN: CPT | Mod: PBBFAC,PO | Performed by: FAMILY MEDICINE

## 2017-10-02 RX ORDER — BUPROPION HYDROCHLORIDE 100 MG/1
100 TABLET ORAL 2 TIMES DAILY
Qty: 60 TABLET | Refills: 2 | Status: SHIPPED | OUTPATIENT
Start: 2017-10-02 | End: 2017-11-02 | Stop reason: SDUPTHER

## 2017-10-02 NOTE — TELEPHONE ENCOUNTER
----- Message from Ashley Saldivar sent at 10/2/2017  2:41 PM CDT -----  Pt need a refill on folic acid call to [facility] pharmacy at [facility] pharmacy.pt can be reach at [phone].

## 2017-10-02 NOTE — PROGRESS NOTES
Subjective:       Patient ID: Johnny Perales is a 62 y.o. male.    Chief Complaint: Numbness (tingling in fingers)    He has COPD requiring continuous nasal oxygen.  He reports his condition is stable.  He was hospitalized for acute exacerbation about one month ago.  He said 2 weeks duration of numbness of both hands.  The numbness is worse when he first wakes up in the morning.  He is also describing a mild tremor as well as weakness of the hands and the last 2 weeks.  He denies any changes in medication or activity.  Blood sugar done during hospitalization 1 month ago was normal.  He also has depression.  He's been on Celexa 20 mg a day for several months which is not helping.  He has no history of seizures.  He is requesting change or addition of medication.      Review of Systems   Constitutional: Negative for activity change, appetite change, chills and fever.   Respiratory: Positive for chest tightness, shortness of breath and wheezing. Negative for cough.    Cardiovascular: Negative for chest pain, palpitations and leg swelling.        Does not his lightheadedness   Neurological: Positive for tremors, weakness and numbness. Negative for dizziness, seizures, syncope, light-headedness and headaches.   Psychiatric/Behavioral: Positive for dysphoric mood and sleep disturbance. Negative for confusion and decreased concentration. The patient is not nervous/anxious.        Objective:      Physical Exam   Constitutional: He is oriented to person, place, and time. He appears well-developed and well-nourished.   Nasal O2 in place   Eyes: Conjunctivae are normal.   Cardiovascular: Normal rate, regular rhythm and normal heart sounds.    No murmur heard.  Pulmonary/Chest: Effort normal. He has no rales.   Decreased breath sounds throughout with occasional mild expiratory wheeze anteriorly   Lymphadenopathy:     He has no cervical adenopathy.   Neurological: He is alert and oriented to person, place, and time. He  displays normal reflexes. No cranial nerve deficit. He exhibits normal muscle tone. Coordination normal.   Positive Tinel's bilaterally.  No tremors good  strength   Skin: He is not diaphoretic.       Admission on 09/05/2017, Discharged on 09/05/2017   Component Date Value Ref Range Status    WBC 09/05/2017 8.04  3.90 - 12.70 K/uL Final    RBC 09/05/2017 4.54* 4.60 - 6.20 M/uL Final    Hemoglobin 09/05/2017 15.5  14.0 - 18.0 g/dL Final    Hematocrit 09/05/2017 45.6  40.0 - 54.0 % Final    MCV 09/05/2017 100* 82 - 98 fL Final    MCH 09/05/2017 34.1* 27.0 - 31.0 pg Final    MCHC 09/05/2017 34.0  32.0 - 36.0 g/dL Final    RDW 09/05/2017 13.5  11.5 - 14.5 % Final    Platelets 09/05/2017 259  150 - 350 K/uL Final    MPV 09/05/2017 9.7  9.2 - 12.9 fL Final    Gran # 09/05/2017 5.8  1.8 - 7.7 K/uL Final    Lymph # 09/05/2017 1.1  1.0 - 4.8 K/uL Final    Mono # 09/05/2017 0.9  0.3 - 1.0 K/uL Final    Eos # 09/05/2017 0.2  0.0 - 0.5 K/uL Final    Baso # 09/05/2017 0.06  0.00 - 0.20 K/uL Final    Gran% 09/05/2017 72.4  38.0 - 73.0 % Final    Lymph% 09/05/2017 13.3* 18.0 - 48.0 % Final    Mono% 09/05/2017 11.1  4.0 - 15.0 % Final    Eosinophil% 09/05/2017 2.5  0.0 - 8.0 % Final    Basophil% 09/05/2017 0.7  0.0 - 1.9 % Final    Differential Method 09/05/2017 Automated   Final    Sodium 09/05/2017 141  136 - 145 mmol/L Final    Potassium 09/05/2017 4.6  3.5 - 5.1 mmol/L Final    Chloride 09/05/2017 100  95 - 110 mmol/L Final    CO2 09/05/2017 32* 23 - 29 mmol/L Final    Glucose 09/05/2017 95  70 - 110 mg/dL Final    BUN, Bld 09/05/2017 8  8 - 23 mg/dL Final    Creatinine 09/05/2017 0.8  0.5 - 1.4 mg/dL Final    Calcium 09/05/2017 9.7  8.7 - 10.5 mg/dL Final    Total Protein 09/05/2017 7.0  6.0 - 8.4 g/dL Final    Albumin 09/05/2017 3.6  3.5 - 5.2 g/dL Final    Total Bilirubin 09/05/2017 0.6  0.1 - 1.0 mg/dL Final    Alkaline Phosphatase 09/05/2017 128  55 - 135 U/L Final    AST 09/05/2017  57* 10 - 40 U/L Final    ALT 09/05/2017 97* 10 - 44 U/L Final    Anion Gap 09/05/2017 9  8 - 16 mmol/L Final    eGFR if African American 09/05/2017 >60  >60 mL/min/1.73 m^2 Final    eGFR if non African American 09/05/2017 >60  >60 mL/min/1.73 m^2 Final    Prothrombin Time 09/05/2017 10.3  9.0 - 12.5 sec Final    INR 09/05/2017 1.0  0.8 - 1.2 Final    aPTT 09/05/2017 28.1  21.0 - 32.0 sec Final    CPK 09/05/2017 49  20 - 200 U/L Final    Troponin I 09/05/2017 <0.006  0.000 - 0.026 ng/mL Final    D-Dimer 09/05/2017 0.37  <0.50 mg/L FEU Final     Assessment:       1. COPD, very severe    2. Depression, unspecified depression type        Plan:     Health maintenance reviewed.  Flu vaccine is given.  Discussed carpal tunnel syndrome.  Recommend wearing bilateral wrist splints at night for one month.  Follow-up in one month.  Current depression we'll continue Celexa 20 mg and start Wellbutrin 100 mg twice a day.  COPD is stable.  form completed documenting continued need for oxygen    COPD, very severe  -     Influenza - Quadrivalent (3 years & older) (PF)    Depression, unspecified depression type  -     buPROPion (WELLBUTRIN) 100 MG tablet; Take 1 tablet (100 mg total) by mouth 2 (two) times daily.  Dispense: 60 tablet; Refill: 2

## 2017-10-10 ENCOUNTER — OFFICE VISIT (OUTPATIENT)
Dept: PULMONOLOGY | Facility: CLINIC | Age: 62
End: 2017-10-10
Payer: MEDICARE

## 2017-10-10 ENCOUNTER — HOSPITAL ENCOUNTER (OUTPATIENT)
Dept: RADIOLOGY | Facility: HOSPITAL | Age: 62
Discharge: HOME | End: 2017-10-10
Attending: INTERNAL MEDICINE
Payer: MEDICARE

## 2017-10-10 VITALS
HEIGHT: 71 IN | WEIGHT: 185.06 LBS | RESPIRATION RATE: 18 BRPM | SYSTOLIC BLOOD PRESSURE: 115 MMHG | DIASTOLIC BLOOD PRESSURE: 72 MMHG | HEART RATE: 80 BPM | BODY MASS INDEX: 25.91 KG/M2 | OXYGEN SATURATION: 95 %

## 2017-10-10 DIAGNOSIS — J44.9 COPD, VERY SEVERE: Primary | Chronic | ICD-10-CM

## 2017-10-10 DIAGNOSIS — J96.11 CHRONIC RESPIRATORY FAILURE WITH HYPOXIA: Chronic | ICD-10-CM

## 2017-10-10 DIAGNOSIS — R91.1 PULMONARY NODULE: Chronic | ICD-10-CM

## 2017-10-10 DIAGNOSIS — J44.9 COPD, VERY SEVERE: Chronic | ICD-10-CM

## 2017-10-10 PROCEDURE — 99215 OFFICE O/P EST HI 40 MIN: CPT | Mod: S$PBB,,, | Performed by: INTERNAL MEDICINE

## 2017-10-10 PROCEDURE — 71020 XR CHEST PA AND LATERAL: CPT | Mod: TC

## 2017-10-10 PROCEDURE — 99213 OFFICE O/P EST LOW 20 MIN: CPT | Mod: PBBFAC,25 | Performed by: INTERNAL MEDICINE

## 2017-10-10 PROCEDURE — 71020 XR CHEST PA AND LATERAL: CPT | Mod: 26,,, | Performed by: RADIOLOGY

## 2017-10-10 PROCEDURE — 99999 PR PBB SHADOW E&M-EST. PATIENT-LVL III: CPT | Mod: PBBFAC,,, | Performed by: INTERNAL MEDICINE

## 2017-10-10 RX ORDER — ALBUTEROL SULFATE 0.63 MG/3ML
SOLUTION RESPIRATORY (INHALATION)
Refills: 0 | COMMUNITY
Start: 2017-09-07 | End: 2017-11-06 | Stop reason: SDUPTHER

## 2017-10-10 RX ORDER — FOLIC ACID 1 MG/1
1000 TABLET ORAL DAILY
Refills: 0 | Status: CANCELLED | OUTPATIENT
Start: 2017-10-10

## 2017-10-10 RX ORDER — ALBUTEROL SULFATE 90 UG/1
1-2 AEROSOL, METERED RESPIRATORY (INHALATION) EVERY 6 HOURS PRN
Qty: 1 INHALER | Refills: 11 | Status: SHIPPED | OUTPATIENT
Start: 2017-10-10 | End: 2018-05-08 | Stop reason: SDUPTHER

## 2017-10-10 NOTE — ASSESSMENT & PLAN NOTE
Continue oxygen supplementation at 3  L/min. DME is [facility].   Continue nocturnal TRIOLOGY ( VIEMED)

## 2017-10-10 NOTE — PATIENT INSTRUCTIONS
Lung Anatomy  Your lungs take air in to give your body oxygen, which the body needs to work. Your lungs, like all the tissues in your body, are made up of billions of tiny specialized cells. Old lung cells die and are replaced by new, identical lung cells. This natural process helps ensure healthy lungs.    Date Last Reviewed: 11/1/2016  © 7113-7668 [employer]. [address] [place] [address], [place] [address]. All rights reserved. This information is not intended as a substitute for professional medical care. Always follow your healthcare professional's instructions.

## 2017-10-10 NOTE — PROGRESS NOTES
Subjective:      Patient ID: Johnny Perales is a 62 y.o. male.  Patient Active Problem List   Diagnosis    COPD, very severe    Pulmonary nodule    Chronic respiratory failure with hypoxia    Screening for colon cancer    Family history of hypercholesterolemia     Chronic fatigue     Bilateral hearing loss    Elevated MCV    Depression    Immunization deficiency    Hyperkalemia    Elevated TSH    Screen for colon cancer    Bilateral carpal tunnel syndrome     Problem list has been reviewed.    Chief Complaint: COPD    HPI    The patient does not have symptoms / an exacerbation. He states that he  is at his respiratory baseline and denies any new onset specific pulmonary complaints. He reports  cough,  sputum and wheezing .   A full  review of systems, past , family  and social histories was performed except as mentioned in the note above, these are non contributory to the main issues discussed today. Her current respiratory regimen: ANORO, ALBUTEROL MDI, ACCUNEB: He does use medications compliantly. Immunization status reviewed and up to date.    COPD QUESTIONNAIRE  How often do you cough?: Most of the time  How often do you have phlegm (mucus) in your chest?: Most of the time  How often does your chest feel tight?: Most of the time  When you walk up a hill or one flight of stairs, how often are you breathless?: All of the time  How often are you limited doing any activities at home?: Some of the time  How often are you confident leaving the house despite your lung condition?: All of the time  How often do you sleep soundly?: Some of the time  How often do you have energy?: Some of the time  Total score: 24       Previous Report Reviewed: office notes     The following portions of the patient's history were reviewed and updated as appropriate: He  has a past medical history of COPD (chronic obstructive pulmonary disease) and Hearing impairment.  He  has a past surgical history that includes  "Inner ear surgery (Bilateral) and Colonoscopy (N/A, 7/12/2017).  His family history includes Cancer in his father and mother; Colon cancer in his maternal uncle; Heart failure in his father and mother.  He  reports that he has quit smoking. His smoking use included Vaping with nicotine. He has a 3.50 pack-year smoking history. He has never used smokeless tobacco. He reports that he drinks about 7.2 oz of alcohol per week . He reports that he does not use drugs.  He has a current medication list which includes the following prescription(s): albuterol, bupropion, citalopram, folic acid, ipratropium, oxygen-air delivery systems, vitamin d3, albuterol, and umeclidinium-vilanterol.  He has No Known Allergies..    Review of Systems   Constitutional: Positive for fatigue and night sweats.   HENT: Negative for nosebleeds and hearing loss.    Eyes: Negative for redness.   Respiratory: Positive for cough, sputum production, wheezing and dyspnea on extertion. Negative for somnolence.    Cardiovascular: Negative for chest pain and leg swelling.   Genitourinary: Negative for hematuria.   Endocrine: Negative for cold intolerance and heat intolerance.    Musculoskeletal: Negative for arthralgias and back pain.   Skin: Negative for rash.   Gastrointestinal: Negative for abdominal pain and abdominal distention.   Neurological: Negative for dizziness and headaches.   Hematological: Negative for adenopathy. Does not bruise/bleed easily.   Psychiatric/Behavioral: The patient is nervous/anxious.    All other systems reviewed and are negative.     Objective:   /72   Pulse 80   Resp 18   Ht 5' 11" (1.803 m)   Wt 84 kg (185 lb 1.2 oz)   SpO2 95% Comment: 3 liters  BMI 25.81 kg/m²   Body mass index is 25.81 kg/m².    Physical Exam   Constitutional: He is oriented to person, place, and time. He appears well-developed and well-nourished.   HENT:   Head: Normocephalic and atraumatic.   Eyes: EOM are normal. Pupils are equal, round, " and reactive to light.   Neck: Normal range of motion. Neck supple.   Cardiovascular: Normal rate and regular rhythm.    Pulmonary/Chest: He is in respiratory distress. He has wheezes.   Abdominal: Soft. Bowel sounds are normal.   Musculoskeletal: Normal range of motion.   Neurological: He is alert and oriented to person, place, and time.   Skin: Skin is warm.   Psychiatric: He has a normal mood and affect.       Personal Diagnostic Review  CXR: The cardiac and mediastinal silhouettes are within normal limits.   Lungs remain mildly hyperinflated with some blunting of the posterior costophrenic angles likely related to pleural thickening.  No appreciable change from prior.  No definite parenchymal consolidation or pleural effusion visualized. Visualized osseous structures demonstrate no acute abnormality.    Assessment:     1. COPD, very severe Active   2. Pulmonary nodule Stable   3. Chronic respiratory failure with hypoxia Stable     Outpatient Encounter Prescriptions as of 10/10/2017   Medication Sig Dispense Refill    albuterol (ACCUNEB) 0.63 mg/3 mL Nebu USE ONE VIAL IN NEBULIZER EVERY 4 HOURS AS NEEDED  0    buPROPion (WELLBUTRIN) 100 MG tablet Take 1 tablet (100 mg total) by mouth 2 (two) times daily. 60 tablet 2    citalopram (CELEXA) 20 MG tablet Take 1 tablet (20 mg total) by mouth once daily. 30 tablet 11    folic acid (FOLVITE) 1 MG tablet Take 1,000 mcg by mouth once daily.  0    ipratropium (ATROVENT) 0.02 % nebulizer solution Take 2.5 mLs (500 mcg total) by nebulization every 4 to 6 hours as needed for Wheezing. Rescue 180 vial 11    OXYGEN-AIR DELIVERY SYSTEMS MISC by Misc.(Non-Drug; Combo Route) route.      VITAMIN D3 400 unit tablet TAKE 2 TABLETS BY MOUTH DAILY.  5    [DISCONTINUED] albuterol 90 mcg/actuation inhaler Inhale 1-2 puffs into the lungs every 6 (six) hours as needed for Wheezing. Rescue 1 Inhaler 0    [DISCONTINUED] umeclidinium-vilanterol (ANORO ELLIPTA) 62.5-25 mcg/actuation  DsDv Inhale 1 puff into the lungs once daily. 30 each 11    albuterol 90 mcg/actuation inhaler Inhale 1-2 puffs into the lungs every 6 (six) hours as needed for Wheezing. Rescue 1 Inhaler 11    umeclidinium-vilanterol (ANORO ELLIPTA) 62.5-25 mcg/actuation DsDv Inhale 1 puff into the lungs once daily. 30 each 11     No facility-administered encounter medications on file as of 10/10/2017.      Orders Placed This Encounter   Procedures    Complete PFT with bronchodilator     Standing Status:   Future     Standing Expiration Date:   11/20/2018     Plan:     Discussed diagnosis, its evaluation, treatment and usual course. All questions answered.    COPD, very severe  COPD ROS: taking medications as instructed, no medication side effects noted, no significant ongoing wheezing or shortness of breath, using bronchodilator MDI less than twice a week.   New concerns: None.   Exam: Bilateral wheezes and mild respiratory distress.   Assessment:  COPD reasonably well controlled and loss of control due to intercurrent illness.   Plan: Plan as above. Continue  ACCUNEB, PROAIR, ANORO, DALIRESP. PFT in 6 months.     Pulmonary nodule  Radiologic monitoring.     Chronic respiratory failure with hypoxia  Continue oxygen supplementation at 3  L/min. DME is [facility] [employer] [facility] [employer].   Continue nocturnal TRIOLOGY ( VIEMED)      TIME SPENT WITH PATIENT: Time spent: 40 minutes in face to face  discussion concerning diagnosis, prognosis, review of lab and test results, benefits of treatment as well as management of disease, counseling of patient and coordination of care between various health  care providers . Greater than half the time spent was used for coordination of care and counseling of patient.          Return in about 6 months (around 4/10/2018) for COPD, Chronic Respiratory Failure, Lung Nodule.

## 2017-10-10 NOTE — ASSESSMENT & PLAN NOTE
COPD ROS: taking medications as instructed, no medication side effects noted, no significant ongoing wheezing or shortness of breath, using bronchodilator MDI less than twice a week.   New concerns: None.   Exam: Bilateral wheezes and mild respiratory distress.   Assessment:  COPD reasonably well controlled and loss of control due to intercurrent illness.   Plan: Plan as above. Continue  ACCUNEB, PROAIR, ANORO, DALIRESP. PFT in 6 months.

## 2017-11-02 ENCOUNTER — OFFICE VISIT (OUTPATIENT)
Dept: INTERNAL MEDICINE | Facility: CLINIC | Age: 62
End: 2017-11-02
Payer: MEDICARE

## 2017-11-02 VITALS
TEMPERATURE: 98 F | DIASTOLIC BLOOD PRESSURE: 76 MMHG | OXYGEN SATURATION: 95 % | BODY MASS INDEX: 25.97 KG/M2 | HEART RATE: 85 BPM | HEIGHT: 71 IN | WEIGHT: 185.5 LBS | SYSTOLIC BLOOD PRESSURE: 113 MMHG

## 2017-11-02 DIAGNOSIS — F32.A DEPRESSION, UNSPECIFIED DEPRESSION TYPE: ICD-10-CM

## 2017-11-02 DIAGNOSIS — J44.9 COPD, VERY SEVERE: Primary | ICD-10-CM

## 2017-11-02 PROCEDURE — 99213 OFFICE O/P EST LOW 20 MIN: CPT | Mod: S$PBB,,, | Performed by: FAMILY MEDICINE

## 2017-11-02 PROCEDURE — 99213 OFFICE O/P EST LOW 20 MIN: CPT | Mod: PBBFAC,PO | Performed by: FAMILY MEDICINE

## 2017-11-02 PROCEDURE — 99999 PR PBB SHADOW E&M-EST. PATIENT-LVL III: CPT | Mod: PBBFAC,,, | Performed by: FAMILY MEDICINE

## 2017-11-02 RX ORDER — BUPROPION HYDROCHLORIDE 100 MG/1
100 TABLET ORAL 3 TIMES DAILY
Qty: 90 TABLET | Refills: 1 | Status: SHIPPED | OUTPATIENT
Start: 2017-11-02 | End: 2018-02-01

## 2017-11-02 RX ORDER — CITALOPRAM 40 MG/1
40 TABLET, FILM COATED ORAL DAILY
Qty: 30 TABLET | Refills: 1 | Status: SHIPPED | OUTPATIENT
Start: 2017-11-02 | End: 2017-12-13 | Stop reason: SDUPTHER

## 2017-11-02 NOTE — PROGRESS NOTES
Subjective:       Patient ID: Johnny Perales is a 62 y.o. male.    Chief Complaint: Follow-up    Follow-up depression carpal   Depression is not better.  He is sad and crying at times.  His niece and her  recently moved in with him to provide support.he currently is on Celexa 20 mg and Wellbutrin 100 mg twice a day.  Carpal tunnel syndrome is improved      Review of Systems   Constitutional: Negative for activity change, appetite change, fatigue and unexpected weight change.        Is activity is limited due to severe COPD   Respiratory: Positive for shortness of breath. Negative for cough, chest tightness and wheezing.    Cardiovascular: Negative for chest pain, palpitations and leg swelling.   Gastrointestinal: Negative for abdominal pain.   Genitourinary: Negative for difficulty urinating.   Neurological: Negative for headaches.   Psychiatric/Behavioral: Positive for dysphoric mood and suicidal ideas. Negative for self-injury.        He reports that at times he feels like he is not worth living.  He reports he is not inclined to hurt himself       Objective:      Physical Exam   Constitutional: He is oriented to person, place, and time. He appears well-developed and well-nourished. No distress.   Neck: Neck supple. No thyromegaly present.   Cardiovascular: Normal rate, regular rhythm and normal heart sounds.    No murmur heard.  Pulmonary/Chest: He has no wheezes. He has no rales.   Nasal O2 in place.  O2 sat is 95%.  Breath sounds are generally decreased   Abdominal: Soft. Bowel sounds are normal. He exhibits no mass. There is no tenderness.   Lymphadenopathy:     He has no cervical adenopathy.   Neurological: He is alert and oriented to person, place, and time.   Psychiatric: His behavior is normal. Judgment and thought content normal.   Tearful at times during exam       Admission on 09/05/2017, Discharged on 09/05/2017   Component Date Value Ref Range Status    WBC 09/05/2017 8.04  3.90 - 12.70  K/uL Final    RBC 09/05/2017 4.54* 4.60 - 6.20 M/uL Final    Hemoglobin 09/05/2017 15.5  14.0 - 18.0 g/dL Final    Hematocrit 09/05/2017 45.6  40.0 - 54.0 % Final    MCV 09/05/2017 100* 82 - 98 fL Final    MCH 09/05/2017 34.1* 27.0 - 31.0 pg Final    MCHC 09/05/2017 34.0  32.0 - 36.0 g/dL Final    RDW 09/05/2017 13.5  11.5 - 14.5 % Final    Platelets 09/05/2017 259  150 - 350 K/uL Final    MPV 09/05/2017 9.7  9.2 - 12.9 fL Final    Gran # 09/05/2017 5.8  1.8 - 7.7 K/uL Final    Lymph # 09/05/2017 1.1  1.0 - 4.8 K/uL Final    Mono # 09/05/2017 0.9  0.3 - 1.0 K/uL Final    Eos # 09/05/2017 0.2  0.0 - 0.5 K/uL Final    Baso # 09/05/2017 0.06  0.00 - 0.20 K/uL Final    Gran% 09/05/2017 72.4  38.0 - 73.0 % Final    Lymph% 09/05/2017 13.3* 18.0 - 48.0 % Final    Mono% 09/05/2017 11.1  4.0 - 15.0 % Final    Eosinophil% 09/05/2017 2.5  0.0 - 8.0 % Final    Basophil% 09/05/2017 0.7  0.0 - 1.9 % Final    Differential Method 09/05/2017 Automated   Final    Sodium 09/05/2017 141  136 - 145 mmol/L Final    Potassium 09/05/2017 4.6  3.5 - 5.1 mmol/L Final    Chloride 09/05/2017 100  95 - 110 mmol/L Final    CO2 09/05/2017 32* 23 - 29 mmol/L Final    Glucose 09/05/2017 95  70 - 110 mg/dL Final    BUN, Bld 09/05/2017 8  8 - 23 mg/dL Final    Creatinine 09/05/2017 0.8  0.5 - 1.4 mg/dL Final    Calcium 09/05/2017 9.7  8.7 - 10.5 mg/dL Final    Total Protein 09/05/2017 7.0  6.0 - 8.4 g/dL Final    Albumin 09/05/2017 3.6  3.5 - 5.2 g/dL Final    Total Bilirubin 09/05/2017 0.6  0.1 - 1.0 mg/dL Final    Alkaline Phosphatase 09/05/2017 128  55 - 135 U/L Final    AST 09/05/2017 57* 10 - 40 U/L Final    ALT 09/05/2017 97* 10 - 44 U/L Final    Anion Gap 09/05/2017 9  8 - 16 mmol/L Final    eGFR if African American 09/05/2017 >60  >60 mL/min/1.73 m^2 Final    eGFR if non African American 09/05/2017 >60  >60 mL/min/1.73 m^2 Final    Prothrombin Time 09/05/2017 10.3  9.0 - 12.5 sec Final    INR 09/05/2017  1.0  0.8 - 1.2 Final    aPTT 09/05/2017 28.1  21.0 - 32.0 sec Final    CPK 09/05/2017 49  20 - 200 U/L Final    Troponin I 09/05/2017 <0.006  0.000 - 0.026 ng/mL Final    D-Dimer 09/05/2017 0.37  <0.50 mg/L FEU Final     Assessment:       1. Depression, unspecified depression type        Plan:     Increase Celexa and increase Wellbutrin.  Refer psychiatry.  His niece reports that she's goingto  behavioral health and she will arrange for him to seen there also.  Follow-up in 3 months    Depression, unspecified depression type  -     citalopram (CELEXA) 40 MG tablet; Take 1 tablet (40 mg total) by mouth once daily.  Dispense: 30 tablet; Refill: 1  -     buPROPion (WELLBUTRIN) 100 MG tablet; Take 1 tablet (100 mg total) by mouth 3 (three) times daily.  Dispense: 90 tablet; Refill: 1

## 2017-11-06 RX ORDER — ALBUTEROL SULFATE 0.63 MG/3ML
SOLUTION RESPIRATORY (INHALATION)
Qty: 375 ML | Refills: 11 | Status: SHIPPED | OUTPATIENT
Start: 2017-11-06 | End: 2017-11-06 | Stop reason: SDUPTHER

## 2017-11-06 RX ORDER — ALBUTEROL SULFATE 0.63 MG/3ML
SOLUTION RESPIRATORY (INHALATION)
Qty: 375 ML | Refills: 11 | Status: SHIPPED | OUTPATIENT
Start: 2017-11-06 | End: 2018-05-08 | Stop reason: SDUPTHER

## 2017-11-06 RX ORDER — CHOLECALCIFEROL (VITAMIN D3) 10 MCG
TABLET ORAL
Qty: 60 TABLET | Refills: 5 | Status: SHIPPED | OUTPATIENT
Start: 2017-11-06 | End: 2018-06-04 | Stop reason: SDUPTHER

## 2017-11-06 NOTE — TELEPHONE ENCOUNTER
Medication and pharmacy verified with patient. Instructed that medication refill request will be sent to provider for approval

## 2017-11-06 NOTE — TELEPHONE ENCOUNTER
----- Message from Taty Baker sent at 11/6/2017 12:35 PM CST -----  Contact: Pt/Caregiver  Caller states that pt needs a refill on his ALBUTEROL. Please give pt a call at ..[phone] (home)               ..  [facility] [address] [facility] - [address] - [address]  [address] [address]  Phone: [phone] Fax: [phone]

## 2017-11-15 ENCOUNTER — TELEPHONE (OUTPATIENT)
Dept: PULMONOLOGY | Facility: CLINIC | Age: 62
End: 2017-11-15

## 2017-11-15 NOTE — TELEPHONE ENCOUNTER
----- Message from Eduard Fiore sent at 11/15/2017 11:57 AM CST -----  Contact: [employer] Appeal Department -[phone] Ref#[id number]   Would like to consult with the nurse about Rx medication and coverage.  Please call back at [phone] Ref#[id number].  Jama

## 2017-12-13 DIAGNOSIS — F32.A DEPRESSION, UNSPECIFIED DEPRESSION TYPE: ICD-10-CM

## 2017-12-13 RX ORDER — CITALOPRAM 40 MG/1
40 TABLET, FILM COATED ORAL DAILY
Qty: 30 TABLET | Refills: 2 | Status: SHIPPED | OUTPATIENT
Start: 2017-12-13 | End: 2018-02-01

## 2017-12-13 RX ORDER — BUPROPION HYDROCHLORIDE 100 MG/1
TABLET ORAL
Qty: 60 TABLET | Refills: 2 | Status: SHIPPED | OUTPATIENT
Start: 2017-12-13 | End: 2018-02-01

## 2018-01-16 ENCOUNTER — TELEPHONE (OUTPATIENT)
Dept: PULMONOLOGY | Facility: CLINIC | Age: 63
End: 2018-01-16

## 2018-01-16 DIAGNOSIS — J44.89 OBSTRUCTIVE CHRONIC BRONCHITIS WITHOUT EXACERBATION: Primary | ICD-10-CM

## 2018-01-16 NOTE — TELEPHONE ENCOUNTER
----- Message from Rosita Forrest sent at 1/16/2018  2:11 PM CST -----  Contact: Irina Tariq  request a call concerning the pt cpap supplies, can be reached at [phone]///[id number]

## 2018-01-24 ENCOUNTER — HOSPITAL ENCOUNTER (EMERGENCY)
Facility: HOSPITAL | Age: 63
Discharge: OTHER INSTITUTION | End: 2018-01-25
Attending: EMERGENCY MEDICINE
Payer: MEDICARE

## 2018-01-24 DIAGNOSIS — T20.20XA PARTIAL THICKNESS BURN OF FACE, INITIAL ENCOUNTER: Primary | ICD-10-CM

## 2018-01-24 LAB
ALBUMIN SERPL BCP-MCNC: 3.5 G/DL
ALP SERPL-CCNC: 118 U/L
ALT SERPL W/O P-5'-P-CCNC: 79 U/L
ANION GAP SERPL CALC-SCNC: 10 MMOL/L
APTT BLDCRRT: 28.1 SEC
AST SERPL-CCNC: 64 U/L
BASOPHILS # BLD AUTO: 0.06 K/UL
BASOPHILS NFR BLD: 0.7 %
BILIRUB SERPL-MCNC: 0.4 MG/DL
BUN SERPL-MCNC: 10 MG/DL
CALCIUM SERPL-MCNC: 9.5 MG/DL
CARBOXYHEMOGLOBIN: 3.4 % (ref 1.5–5)
CHLORIDE SERPL-SCNC: 100 MMOL/L
CO2 SERPL-SCNC: 24 MMOL/L
CREAT SERPL-MCNC: 1 MG/DL
DIFFERENTIAL METHOD: ABNORMAL
EOSINOPHIL # BLD AUTO: 0.3 K/UL
EOSINOPHIL NFR BLD: 3.1 %
ERYTHROCYTE [DISTWIDTH] IN BLOOD BY AUTOMATED COUNT: 14.5 %
EST. GFR  (AFRICAN AMERICAN): >60 ML/MIN/1.73 M^2
EST. GFR  (NON AFRICAN AMERICAN): >60 ML/MIN/1.73 M^2
GLUCOSE SERPL-MCNC: 100 MG/DL
HCT VFR BLD AUTO: 46.7 %
HGB BLD-MCNC: 16.4 G/DL
INR PPP: 1
LYMPHOCYTES # BLD AUTO: 1.8 K/UL
LYMPHOCYTES NFR BLD: 21.2 %
MCH RBC QN AUTO: 34.7 PG
MCHC RBC AUTO-ENTMCNC: 35.1 G/DL
MCV RBC AUTO: 99 FL
MONOCYTES # BLD AUTO: 0.7 K/UL
MONOCYTES NFR BLD: 8.6 %
NEUTROPHILS # BLD AUTO: 5.5 K/UL
NEUTROPHILS NFR BLD: 66.4 %
PLATELET # BLD AUTO: 186 K/UL
PMV BLD AUTO: 10.2 FL
POTASSIUM SERPL-SCNC: 4.2 MMOL/L
PROT SERPL-MCNC: 6.9 G/DL
PROTHROMBIN TIME: 10.4 SEC
RBC # BLD AUTO: 4.72 M/UL
SODIUM SERPL-SCNC: 134 MMOL/L
WBC # BLD AUTO: 8.33 K/UL

## 2018-01-24 PROCEDURE — 96374 THER/PROPH/DIAG INJ IV PUSH: CPT

## 2018-01-24 PROCEDURE — 96375 TX/PRO/DX INJ NEW DRUG ADDON: CPT

## 2018-01-24 PROCEDURE — 99285 EMERGENCY DEPT VISIT HI MDM: CPT | Mod: 25

## 2018-01-24 PROCEDURE — 85730 THROMBOPLASTIN TIME PARTIAL: CPT

## 2018-01-24 PROCEDURE — 63600175 PHARM REV CODE 636 W HCPCS: Performed by: EMERGENCY MEDICINE

## 2018-01-24 PROCEDURE — 80053 COMPREHEN METABOLIC PANEL: CPT

## 2018-01-24 PROCEDURE — 85025 COMPLETE CBC W/AUTO DIFF WBC: CPT

## 2018-01-24 PROCEDURE — 85610 PROTHROMBIN TIME: CPT

## 2018-01-24 RX ORDER — CEFAZOLIN SODIUM 1 G/3ML
1 INJECTION, POWDER, FOR SOLUTION INTRAMUSCULAR; INTRAVENOUS
Status: DISCONTINUED | OUTPATIENT
Start: 2018-01-24 | End: 2018-01-24

## 2018-01-24 RX ORDER — MORPHINE SULFATE 2 MG/ML
4 INJECTION, SOLUTION INTRAMUSCULAR; INTRAVENOUS
Status: COMPLETED | OUTPATIENT
Start: 2018-01-24 | End: 2018-01-24

## 2018-01-24 RX ORDER — CEFAZOLIN SODIUM 1 G/3ML
1 INJECTION, POWDER, FOR SOLUTION INTRAMUSCULAR; INTRAVENOUS
Status: COMPLETED | OUTPATIENT
Start: 2018-01-24 | End: 2018-01-24

## 2018-01-24 RX ORDER — MORPHINE SULFATE 4 MG/ML
4 INJECTION, SOLUTION INTRAMUSCULAR; INTRAVENOUS
Status: DISCONTINUED | OUTPATIENT
Start: 2018-01-24 | End: 2018-01-24

## 2018-01-24 RX ORDER — ONDANSETRON 2 MG/ML
4 INJECTION INTRAMUSCULAR; INTRAVENOUS
Status: COMPLETED | OUTPATIENT
Start: 2018-01-24 | End: 2018-01-24

## 2018-01-24 RX ADMIN — ONDANSETRON 4 MG: 2 INJECTION INTRAMUSCULAR; INTRAVENOUS at 10:01

## 2018-01-24 RX ADMIN — CEFAZOLIN 1 G: 330 INJECTION, POWDER, FOR SOLUTION INTRAMUSCULAR; INTRAVENOUS at 10:01

## 2018-01-24 RX ADMIN — Medication 4 MG: at 10:01

## 2018-01-25 VITALS
HEIGHT: 71 IN | DIASTOLIC BLOOD PRESSURE: 78 MMHG | RESPIRATION RATE: 20 BRPM | TEMPERATURE: 98 F | SYSTOLIC BLOOD PRESSURE: 150 MMHG | HEART RATE: 80 BPM | OXYGEN SATURATION: 97 %

## 2018-01-25 NOTE — ED NOTES
Transfer approved by Kimberly for [facility]. Pt accepted by Dr. Tee. Transport by [facility]. Call report to [phone] and also notify unit when pt leaves.
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
## 2018-02-01 ENCOUNTER — OFFICE VISIT (OUTPATIENT)
Dept: INTERNAL MEDICINE | Facility: CLINIC | Age: 63
End: 2018-02-01
Payer: MEDICARE

## 2018-02-01 VITALS
HEIGHT: 71 IN | HEART RATE: 86 BPM | OXYGEN SATURATION: 95 % | SYSTOLIC BLOOD PRESSURE: 116 MMHG | BODY MASS INDEX: 25.4 KG/M2 | WEIGHT: 181.44 LBS | DIASTOLIC BLOOD PRESSURE: 84 MMHG | TEMPERATURE: 98 F

## 2018-02-01 DIAGNOSIS — F32.A DEPRESSION, UNSPECIFIED DEPRESSION TYPE: Primary | ICD-10-CM

## 2018-02-01 DIAGNOSIS — J44.9 COPD, VERY SEVERE: ICD-10-CM

## 2018-02-01 PROCEDURE — 99213 OFFICE O/P EST LOW 20 MIN: CPT | Mod: PBBFAC,PO | Performed by: FAMILY MEDICINE

## 2018-02-01 PROCEDURE — 99213 OFFICE O/P EST LOW 20 MIN: CPT | Mod: S$PBB,,, | Performed by: FAMILY MEDICINE

## 2018-02-01 PROCEDURE — 99999 PR PBB SHADOW E&M-EST. PATIENT-LVL III: CPT | Mod: PBBFAC,,, | Performed by: FAMILY MEDICINE
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
## 2018-02-05 ENCOUNTER — OFFICE VISIT (OUTPATIENT)
Dept: OTOLARYNGOLOGY | Facility: CLINIC | Age: 63
End: 2018-02-05
Payer: MEDICARE

## 2018-02-05 VITALS
WEIGHT: 187.81 LBS | TEMPERATURE: 98 F | BODY MASS INDEX: 26.2 KG/M2 | DIASTOLIC BLOOD PRESSURE: 82 MMHG | HEART RATE: 80 BPM | SYSTOLIC BLOOD PRESSURE: 123 MMHG

## 2018-02-05 DIAGNOSIS — H95.193 POST MASTOIDECTOMY SEQUELAE, BILATERAL: Primary | ICD-10-CM

## 2018-02-05 DIAGNOSIS — T20.00XA BURN OF FACE, UNSPECIFIED BURN DEGREE, INITIAL ENCOUNTER: ICD-10-CM

## 2018-02-05 DIAGNOSIS — H90.6 HEARING LOSS, MIXED, BILATERAL: ICD-10-CM

## 2018-02-05 PROCEDURE — 99999 PR PBB SHADOW E&M-EST. PATIENT-LVL III: CPT | Mod: PBBFAC,,, | Performed by: ORTHOPAEDIC SURGERY

## 2018-02-05 PROCEDURE — 99213 OFFICE O/P EST LOW 20 MIN: CPT | Mod: 25,S$PBB,, | Performed by: ORTHOPAEDIC SURGERY

## 2018-02-05 PROCEDURE — 99213 OFFICE O/P EST LOW 20 MIN: CPT | Mod: PBBFAC,25 | Performed by: ORTHOPAEDIC SURGERY

## 2018-02-05 PROCEDURE — 69220 CLEAN OUT MASTOID CAVITY: CPT | Mod: 50,PBBFAC | Performed by: ORTHOPAEDIC SURGERY

## 2018-02-05 PROCEDURE — 69220 CLEAN OUT MASTOID CAVITY: CPT | Mod: 50,S$PBB,, | Performed by: ORTHOPAEDIC SURGERY
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
## 2018-03-08 ENCOUNTER — HOSPITAL ENCOUNTER (EMERGENCY)
Facility: HOSPITAL | Age: 63
Discharge: PSYCHIATRIC OR SUBSTANCE USE FACILITY | End: 2018-03-09
Attending: EMERGENCY MEDICINE
Payer: MEDICARE

## 2018-03-08 DIAGNOSIS — R45.851 SUICIDAL IDEATION: Primary | ICD-10-CM

## 2018-03-08 DIAGNOSIS — R53.1 WEAKNESS: ICD-10-CM

## 2018-03-08 DIAGNOSIS — F10.10 ALCOHOL ABUSE: ICD-10-CM

## 2018-03-08 DIAGNOSIS — T50.902A INTENTIONAL DRUG OVERDOSE, INITIAL ENCOUNTER: ICD-10-CM

## 2018-03-08 LAB
ALBUMIN SERPL BCP-MCNC: 4.2 G/DL
ALLENS TEST: ABNORMAL
ALP SERPL-CCNC: 105 U/L
ALT SERPL W/O P-5'-P-CCNC: 39 U/L
AMPHET+METHAMPHET UR QL: NEGATIVE
ANION GAP SERPL CALC-SCNC: 13 MMOL/L
APAP SERPL-MCNC: <3 UG/ML
AST SERPL-CCNC: 54 U/L
BARBITURATES UR QL SCN>200 NG/ML: NEGATIVE
BASOPHILS # BLD AUTO: 0.05 K/UL
BASOPHILS NFR BLD: 0.6 %
BENZODIAZ UR QL SCN>200 NG/ML: NEGATIVE
BILIRUB SERPL-MCNC: 0.3 MG/DL
BILIRUB UR QL STRIP: NEGATIVE
BUN SERPL-MCNC: 8 MG/DL
BZE UR QL SCN: NEGATIVE
CALCIUM SERPL-MCNC: 9.9 MG/DL
CANNABINOIDS UR QL SCN: ABNORMAL
CHLORIDE SERPL-SCNC: 102 MMOL/L
CLARITY UR: CLEAR
CO2 SERPL-SCNC: 27 MMOL/L
COLOR UR: YELLOW
CREAT SERPL-MCNC: 0.9 MG/DL
CREAT UR-MCNC: 14 MG/DL
DELSYS: ABNORMAL
DIFFERENTIAL METHOD: ABNORMAL
EOSINOPHIL # BLD AUTO: 0.4 K/UL
EOSINOPHIL NFR BLD: 4.6 %
ERYTHROCYTE [DISTWIDTH] IN BLOOD BY AUTOMATED COUNT: 15 %
EST. GFR  (AFRICAN AMERICAN): >60 ML/MIN/1.73 M^2
EST. GFR  (NON AFRICAN AMERICAN): >60 ML/MIN/1.73 M^2
ETHANOL SERPL-MCNC: 114 MG/DL
ETHANOL SERPL-MCNC: 274 MG/DL
FIO2: 32
FLOW: 3
GLUCOSE SERPL-MCNC: 81 MG/DL
GLUCOSE UR QL STRIP: NEGATIVE
HCO3 UR-SCNC: 25.9 MMOL/L (ref 24–28)
HCT VFR BLD AUTO: 47.1 %
HGB BLD-MCNC: 16.7 G/DL
HGB UR QL STRIP: NEGATIVE
KETONES UR QL STRIP: NEGATIVE
LEUKOCYTE ESTERASE UR QL STRIP: NEGATIVE
LYMPHOCYTES # BLD AUTO: 2.2 K/UL
LYMPHOCYTES NFR BLD: 28.4 %
MCH RBC QN AUTO: 35 PG
MCHC RBC AUTO-ENTMCNC: 35.5 G/DL
MCV RBC AUTO: 99 FL
METHADONE UR QL SCN>300 NG/ML: NEGATIVE
MODE: ABNORMAL
MONOCYTES # BLD AUTO: 0.4 K/UL
MONOCYTES NFR BLD: 5.6 %
NEUTROPHILS # BLD AUTO: 4.7 K/UL
NEUTROPHILS NFR BLD: 60.8 %
NITRITE UR QL STRIP: NEGATIVE
OPIATES UR QL SCN: NEGATIVE
PCO2 BLDA: 49 MMHG (ref 35–45)
PCP UR QL SCN>25 NG/ML: NEGATIVE
PH SMN: 7.33 [PH] (ref 7.35–7.45)
PH UR STRIP: 5 [PH] (ref 5–8)
PLATELET # BLD AUTO: 262 K/UL
PMV BLD AUTO: 9.5 FL
PO2 BLDA: 106 MMHG (ref 80–100)
POC BE: 0 MMOL/L
POC SATURATED O2: 98 % (ref 95–100)
POTASSIUM SERPL-SCNC: 4.1 MMOL/L
PROT SERPL-MCNC: 7.9 G/DL
PROT UR QL STRIP: NEGATIVE
RBC # BLD AUTO: 4.77 M/UL
SALICYLATES SERPL-MCNC: <5 MG/DL
SAMPLE: ABNORMAL
SITE: ABNORMAL
SODIUM SERPL-SCNC: 142 MMOL/L
SP GR UR STRIP: <=1.005 (ref 1–1.03)
TOXICOLOGY INFORMATION: ABNORMAL
TSH SERPL DL<=0.005 MIU/L-ACNC: 0.77 UIU/ML
URN SPEC COLLECT METH UR: ABNORMAL
UROBILINOGEN UR STRIP-ACNC: NEGATIVE EU/DL
WBC # BLD AUTO: 7.81 K/UL

## 2018-03-08 PROCEDURE — 96361 HYDRATE IV INFUSION ADD-ON: CPT

## 2018-03-08 PROCEDURE — 81003 URINALYSIS AUTO W/O SCOPE: CPT

## 2018-03-08 PROCEDURE — 80320 DRUG SCREEN QUANTALCOHOLS: CPT | Mod: 91

## 2018-03-08 PROCEDURE — 93005 ELECTROCARDIOGRAM TRACING: CPT

## 2018-03-08 PROCEDURE — 99900035 HC TECH TIME PER 15 MIN (STAT)

## 2018-03-08 PROCEDURE — 63600175 PHARM REV CODE 636 W HCPCS: Performed by: EMERGENCY MEDICINE

## 2018-03-08 PROCEDURE — 82803 BLOOD GASES ANY COMBINATION: CPT

## 2018-03-08 PROCEDURE — 99285 EMERGENCY DEPT VISIT HI MDM: CPT | Mod: 25

## 2018-03-08 PROCEDURE — 80307 DRUG TEST PRSMV CHEM ANLYZR: CPT

## 2018-03-08 PROCEDURE — 93010 ELECTROCARDIOGRAM REPORT: CPT | Mod: ,,, | Performed by: INTERNAL MEDICINE

## 2018-03-08 PROCEDURE — G0425 INPT/ED TELECONSULT30: HCPCS | Mod: GT,,, | Performed by: PSYCHIATRY & NEUROLOGY

## 2018-03-08 PROCEDURE — 80320 DRUG SCREEN QUANTALCOHOLS: CPT

## 2018-03-08 PROCEDURE — 80053 COMPREHEN METABOLIC PANEL: CPT

## 2018-03-08 PROCEDURE — 36600 WITHDRAWAL OF ARTERIAL BLOOD: CPT

## 2018-03-08 PROCEDURE — 84443 ASSAY THYROID STIM HORMONE: CPT

## 2018-03-08 PROCEDURE — 27000221 HC OXYGEN, UP TO 24 HOURS

## 2018-03-08 PROCEDURE — 25000242 PHARM REV CODE 250 ALT 637 W/ HCPCS: Performed by: EMERGENCY MEDICINE

## 2018-03-08 PROCEDURE — 94640 AIRWAY INHALATION TREATMENT: CPT

## 2018-03-08 PROCEDURE — 25000003 PHARM REV CODE 250: Performed by: EMERGENCY MEDICINE

## 2018-03-08 PROCEDURE — 85025 COMPLETE CBC W/AUTO DIFF WBC: CPT

## 2018-03-08 PROCEDURE — 80329 ANALGESICS NON-OPIOID 1 OR 2: CPT

## 2018-03-08 PROCEDURE — 96374 THER/PROPH/DIAG INJ IV PUSH: CPT

## 2018-03-08 RX ORDER — DULOXETIN HYDROCHLORIDE 30 MG/1
30 CAPSULE, DELAYED RELEASE ORAL DAILY
COMMUNITY
End: 2018-03-19

## 2018-03-08 RX ORDER — IPRATROPIUM BROMIDE AND ALBUTEROL SULFATE 2.5; .5 MG/3ML; MG/3ML
3 SOLUTION RESPIRATORY (INHALATION)
Status: COMPLETED | OUTPATIENT
Start: 2018-03-08 | End: 2018-03-08

## 2018-03-08 RX ORDER — NALOXONE HYDROCHLORIDE 0.4 MG/ML
2 INJECTION, SOLUTION INTRAMUSCULAR; INTRAVENOUS; SUBCUTANEOUS
Status: COMPLETED | OUTPATIENT
Start: 2018-03-08 | End: 2018-03-08

## 2018-03-08 RX ADMIN — IPRATROPIUM BROMIDE AND ALBUTEROL SULFATE 3 ML: .5; 2.5 SOLUTION RESPIRATORY (INHALATION) at 04:03

## 2018-03-08 RX ADMIN — SODIUM CHLORIDE 1000 ML: 0.9 INJECTION, SOLUTION INTRAVENOUS at 04:03

## 2018-03-08 RX ADMIN — NALOXONE HYDROCHLORIDE 2 MG: 0.4 INJECTION, SOLUTION INTRAMUSCULAR; INTRAVENOUS; SUBCUTANEOUS at 03:03
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
## 2018-03-08 NOTE — ED NOTES
Pt demonstrated no response to naloxone.    HOA UMANZOR contacted poison control and stated that pt took approximately 30 duloxetine 30 mg.  Possible side effects are drowsiness, agitation, tachypnia, seizures, decreased BP.  Recommended assessments are UDS, tylenol level, ETOH.  Symptomatic treatment was recommended.

## 2018-03-08 NOTE — ED PROVIDER NOTES
SCRIBE #1 NOTE: I, Jose Reaves, am scribing for, and in the presence of, Yang Celaya MD. I have scribed the entire note.      History      Chief Complaint   Patient presents with    Drug Overdose     Pt sister states he took 23 duloxetine 2 hours prior to arrival; also drank ETOH (vodka).       Review of patient's allergies indicates:  No Known Allergies     HPI   HPI    3/8/2018, 3:37 PM   History obtained from the paramedic and family      History of Present Illness: Johnny Perales is a 63 y.o. male patient who presents to the Emergency Department for drug overdose which onset suddenly today. Pt's sister states pt took approximately 23 duloxetine 2 hours PTA and drank alcohol. EMS reports pt's O2 sat was 88 on room air at home. Sister states he has never tried to commit suicide in the past but states he was mentioning SI for the last 3 days. Unable to obtain ROS secondary to pt condition. Sister and EMS have no further complaints or concerns at this time.     Arrival mode: Personal vehicle     PCP: John Yap MD       Past Medical History:  Past Medical History:   Diagnosis Date    COPD (chronic obstructive pulmonary disease)     Hearing impairment        Past Surgical History:  Past Surgical History:   Procedure Laterality Date    COLONOSCOPY N/A 7/12/2017    Procedure: COLONOSCOPY;  Surgeon: Salvador Lambert MD;  Location: [facility];  Service: Endoscopy;  Laterality: N/A;    INNER EAR SURGERY Bilateral     LEG SURGERY Right          Family History:  Family History   Problem Relation Age of Onset    Cancer Mother     Heart failure Mother     Cancer Father     Heart failure Father     Colon cancer Maternal Uncle        Social History:  Social History     Social History Main Topics    Smoking status: Current Every Day Smoker     Packs/day: 0.10     Years: 35.00     Types: Vaping with nicotine    Smokeless tobacco: Never Used    Alcohol use 7.2 oz/week     12 Cans of beer per  week    Drug use: No    Sexual activity: Not on file       ROS   Review of Systems   Unable to perform ROS: Acuity of condition       Physical Exam      Initial Vitals   BP Pulse Resp Temp SpO2   -- -- -- -- --      MAP       --          Physical Exam  Nursing Notes and Vital Signs Reviewed.  Constitutional: Patient is in no acute distress. Well-developed and well-nourished.  Head: Atraumatic. Normocephalic.  Eyes: PERRL. EOM intact. Conjunctivae are not pale. No scleral icterus.  ENT: Mucous membranes are moist. Oropharynx is clear and symmetric.  Neck: Supple. Full ROM. No lymphadenopathy.  Cardiovascular: Regular rate. Regular rhythm. No murmurs, rubs, or gallops. Distal pulses are 2+ and symmetric.  Pulmonary/Chest: No respiratory distress. Coarse breath sounds noted bilaterally. No wheezing or rales.  Abdominal: Soft and non-distended. Good bowel sounds.  Musculoskeletal: Moves all extremities. No obvious deformities. No edema.   Skin: Warm and dry.  Neurological:  Responsive to pain.       ED Course    Procedures  ED Vital Signs:  Vitals:    03/08/18 1537 03/08/18 1552 03/08/18 1600 03/08/18 1605   BP: 134/86  135/84    Pulse: 97  80 80   Resp: 15  (!) 9 12   SpO2: 98%  100% 100%   Weight:  82.3 kg (181 lb 6 oz)      03/08/18 1610 03/08/18 1632   BP: 128/80 117/70   Pulse: 82 86   Resp: (!) 26 (!) 22   SpO2: 100% 98%   Weight:         Abnormal Lab Results:  Labs Reviewed   CBC W/ AUTO DIFFERENTIAL - Abnormal; Notable for the following:        Result Value    MCV 99 (*)     MCH 35.0 (*)     RDW 15.0 (*)     All other components within normal limits   COMPREHENSIVE METABOLIC PANEL - Abnormal; Notable for the following:     AST 54 (*)     All other components within normal limits   URINALYSIS - Abnormal; Notable for the following:     Specific Gravity, UA <=1.005 (*)     All other components within normal limits   DRUG SCREEN PANEL, URINE EMERGENCY - Abnormal; Notable for the following:     Creatinine, Random  Ur 14.0 (*)     All other components within normal limits   ALCOHOL,MEDICAL (ETHANOL) - Abnormal; Notable for the following:     Alcohol, Medical, Serum 274 (*)     All other components within normal limits   SALICYLATE LEVEL - Abnormal; Notable for the following:     Salicylate Lvl <5.0 (*)     All other components within normal limits   ACETAMINOPHEN LEVEL - Abnormal; Notable for the following:     Acetaminophen (Tylenol), Serum <3.0 (*)     All other components within normal limits   ISTAT PROCEDURE - Abnormal; Notable for the following:     POC PH 7.331 (*)     POC PCO2 49.0 (*)     POC PO2 106 (*)     All other components within normal limits   TSH        All Lab Results:  Results for orders placed or performed during the hospital encounter of 03/08/18   CBC auto differential   Result Value Ref Range    WBC 7.81 3.90 - 12.70 K/uL    RBC 4.77 4.60 - 6.20 M/uL    Hemoglobin 16.7 14.0 - 18.0 g/dL    Hematocrit 47.1 40.0 - 54.0 %    MCV 99 (H) 82 - 98 fL    MCH 35.0 (H) 27.0 - 31.0 pg    MCHC 35.5 32.0 - 36.0 g/dL    RDW 15.0 (H) 11.5 - 14.5 %    Platelets 262 150 - 350 K/uL    MPV 9.5 9.2 - 12.9 fL    Gran # (ANC) 4.7 1.8 - 7.7 K/uL    Lymph # 2.2 1.0 - 4.8 K/uL    Mono # 0.4 0.3 - 1.0 K/uL    Eos # 0.4 0.0 - 0.5 K/uL    Baso # 0.05 0.00 - 0.20 K/uL    Gran% 60.8 38.0 - 73.0 %    Lymph% 28.4 18.0 - 48.0 %    Mono% 5.6 4.0 - 15.0 %    Eosinophil% 4.6 0.0 - 8.0 %    Basophil% 0.6 0.0 - 1.9 %    Differential Method Automated    Comprehensive metabolic panel   Result Value Ref Range    Sodium 142 136 - 145 mmol/L    Potassium 4.1 3.5 - 5.1 mmol/L    Chloride 102 95 - 110 mmol/L    CO2 27 23 - 29 mmol/L    Glucose 81 70 - 110 mg/dL    BUN, Bld 8 8 - 23 mg/dL    Creatinine 0.9 0.5 - 1.4 mg/dL    Calcium 9.9 8.7 - 10.5 mg/dL    Total Protein 7.9 6.0 - 8.4 g/dL    Albumin 4.2 3.5 - 5.2 g/dL    Total Bilirubin 0.3 0.1 - 1.0 mg/dL    Alkaline Phosphatase 105 55 - 135 U/L    AST 54 (H) 10 - 40 U/L    ALT 39 10 - 44 U/L     Anion Gap 13 8 - 16 mmol/L    eGFR if African American >60 >60 mL/min/1.73 m^2    eGFR if non African American >60 >60 mL/min/1.73 m^2   Urinalysis   Result Value Ref Range    Specimen UA Urine, Clean Catch     Color, UA Yellow Yellow, Straw, Dalila    Appearance, UA Clear Clear    pH, UA 5.0 5.0 - 8.0    Specific Gravity, UA <=1.005 (A) 1.005 - 1.030    Protein, UA Negative Negative    Glucose, UA Negative Negative    Ketones, UA Negative Negative    Bilirubin (UA) Negative Negative    Occult Blood UA Negative Negative    Nitrite, UA Negative Negative    Urobilinogen, UA Negative <2.0 EU/dL    Leukocytes, UA Negative Negative   TSH   Result Value Ref Range    TSH 0.767 0.400 - 4.000 uIU/mL   Drug screen panel, emergency   Result Value Ref Range    Benzodiazepines Negative     Methadone metabolites Negative     Cocaine (Metab.) Negative     Opiate Scrn, Ur Negative     Barbiturate Screen, Ur Negative     Amphetamine Screen, Ur Negative     THC Presumptive Positive     Phencyclidine Negative     Creatinine, Random Ur 14.0 (L) 23.0 - 375.0 mg/dL    Toxicology Information SEE COMMENT    Ethanol   Result Value Ref Range    Alcohol, Medical, Serum 274 (H) <10 mg/dL   Salicylate level   Result Value Ref Range    Salicylate Lvl <5.0 (L) 15.0 - 30.0 mg/dL   Acetaminophen level   Result Value Ref Range    Acetaminophen (Tylenol), Serum <3.0 (L) 10.0 - 20.0 ug/mL   ISTAT PROCEDURE   Result Value Ref Range    POC PH 7.331 (L) 7.35 - 7.45    POC PCO2 49.0 (H) 35 - 45 mmHg    POC PO2 106 (H) 80 - 100 mmHg    POC HCO3 25.9 24 - 28 mmol/L    POC BE 0 -2 to 2 mmol/L    POC SATURATED O2 98 95 - 100 %    Sample ARTERIAL     Site LR     Allens Test Pass     DelSys Nasal Can     Mode SPONT     Flow 3     FiO2 32          Imaging Results:  Imaging Results          CT Head Without Contrast (Final result)  Result time 03/08/18 17:32:26    Final result by Benigno Bernal MD (03/08/18 17:32:26)                 Impression:       No acute  abnormality identified.    Mastoid air cells demonstrate small amount of fluid within the inferior mastoid air cells which could reflect chronic mastoid air cell disease.    All CT scans at this facility use dose modulation, iterative reconstruction and/or weight based dosing when appropriate to reduce radiation dose to as low as reasonably achievable.      Electronically signed by: TIMBO GRIMES MD  Date:     03/08/18  Time:    17:32              Narrative:    Indication: Neurologic defect, mental status change.    Axial CT images of the head were obtained without  contrast.    Comparison:  None    Findings:    Ventricles, sulci, and cisterns are symmetric without evidence of mass effect.  Brain volume and white matter are normal for age.  No intra or extraaxial masses, hemorrhages, abnormal fluid collections or abnormal calcifications. The cranium and extracranial structures are unremarkable.  Visualized sinuses are clear.  Mastoid air cells demonstrate small amount of fluid within the inferior mastoid air cells which could reflect mastoid air cell disease. Postoperative changes are suspected on the right.                               The EKG was ordered, reviewed, and independently interpreted by the ED provider.  Interpretation time: 1538  Rate: 84 BPM  Rhythm: normal sinus rhythm  Interpretation: Normal EKG. No STEMI.           The Emergency Provider reviewed the vital signs and test results, which are outlined above.    ED Discussion     3:41 PM: The PEC hold has been issued by Dr. Celaya at this time for psychiatric evaluation.    4:47 PM: Pt has been medically cleared by Dr. Celaya at this time. Reassessed pt at this time. Pt is awake, alert, and answering questions properly. Pt appears to be intoxicated. Pt is resting comfortably and appears in no acute distress. There are no psychiatric services offered at this facility. D/w pt all pertinent ED information and plan to transfer to psychiatric facility for  psychiatric treatment. Pt verbalizes understanding. Patient being transferred by [facility] for ongoing personal protection en route. Pt will be transported by personnel trained in CPR and CPI. All questions and complaints have been addressed at this time. Pt condition is stable at this time and is clear to transfer to psychiatric facility at this time.     ED Medication(s):  Medications   naloxone injection 2 mg (2 mg Intravenous Given 3/8/18 1545)   albuterol-ipratropium 2.5mg-0.5mg/3mL nebulizer solution 3 mL (3 mLs Nebulization Given 3/8/18 1610)   sodium chloride 0.9% bolus 1,000 mL (1,000 mLs Intravenous New Bag 3/8/18 1607)       New Prescriptions    No medications on file             Medical Decision Making    Medical Decision Making:   Clinical Tests:   Lab Tests: Ordered and Reviewed  Radiological Study: Ordered and Reviewed  Medical Tests: Ordered and Reviewed           Scribe Attestation:   Scribe #1: I performed the above scribed service and the documentation accurately describes the services I performed. I attest to the accuracy of the note.    Attending:   Physician Attestation Statement for Scribe #1: I, Yang Celaya MD, personally performed the services described in this documentation, as scribed by Jose Reaves, in my presence, and it is both accurate and complete.          Clinical Impression       ICD-10-CM ICD-9-CM   1. Suicidal ideation R45.851 V62.84   2. Weakness R53.1 780.79   3. Intentional drug overdose, initial encounter T50.902A 977.9     E950.5   4. Alcohol abuse F10.10 305.00       Disposition:   Disposition: Transferred  Condition: Stable         Yang Celaya MD  03/08/18 [id number]

## 2018-03-08 NOTE — CONSULTS
"Tele-Consultation to Emergency Department from Psychiatry    Please see previous notes:    Patient agreeable to consultation via telepsychiatry.    Consultation started: 3/8/2018 at 6:14AM  The chief complaint leading to psychiatric consultation is: Suicidal  This consultation was requested by Yang Celaya MD, the Emergency Department attending physician.  The location of the consulting psychiatrist is [address] [place] [address].  The patient location is [facility].  The patient arrived at the ED at:     Also present with the patient at the time of the consultation: Self    Patient Identification:  Johnny Perales is a 63 y.o. male.    Patient information was obtained from patient.  Patient presented voluntarily to the Emergency Department by ambulance    History of Present Illness:  Mr. Perales is a 63 year old man who presented to the ED after taking an overdose of 23 Duloxetine tabs and drinking alcohol. His sister reports that he has been expressing suicidal ideation for the past three days. He states "yeah, I was trying to kill myself. I don't have anything to live for. I am sick. I have COPD. I have to have breathing treatments every four hours. I wake up short of breath. I can't fish. I can't hunt. I can't ride my bike. I lost my job, my house and my wife." He denies prior suicide attempts or psychiatric hospitalizations. He reports being in outpatient psychiatric treatment and is on Cymbalta.     Psychiatric History:   Hospitalization: No  Medication Trials: Yes  Suicide Attempts: no  Violence: No  Depression: Yes  Dee Dee: No  AH's: No  Delusions: No    Review of Systems:  "i'm always short of breath and I'm deaf too."     Past Medical History:   Past Medical History:   Diagnosis Date    COPD (chronic obstructive pulmonary disease)     Hearing impairment         Seizures: No  Head trauma/l.o.c.: No  Wish to become pregnant[if female of childbearing age]: NA    Allergies: " "None  Review of patient's allergies indicates:  No Known Allergies    Medications in ER:   Medications   naloxone injection 2 mg (2 mg Intravenous Given 3/8/18 1545)   albuterol-ipratropium 2.5mg-0.5mg/3mL nebulizer solution 3 mL (3 mLs Nebulization Given 3/8/18 1610)   sodium chloride 0.9% bolus 1,000 mL (1,000 mLs Intravenous New Bag 3/8/18 1607)       Medications at home: Unknown    Substance Abuse History:   Alchohol: Denied  Drug: Denied     Legal History:   Past charges/incarcerations: None  Pending charges: None    Family Psychiatric History: Unknown    Social History:   History of Physical/Sexual Abuse: No  Education: HS    Employment/Disability: Unemployed   Financial: "dire straits"  Relationship Status/Sexual Orientation: Hetero   Children: Yes   Housing Status: Yes  Congregational: Unknown   History: None   Recreational Activities: Hunt, fish, motorcycles  Access to Gun: No     Current Evaluation:     Constitutional  Vitals:  Vitals:    03/08/18 1537 03/08/18 1552 03/08/18 1600 03/08/18 1605   BP: 134/86  135/84    Pulse: 97  80 80   Resp: 15  (!) 9 12   SpO2: 98%  100% 100%   Weight:  82.3 kg (181 lb 6 oz)      03/08/18 1610 03/08/18 1632   BP: 128/80 117/70   Pulse: 82 86   Resp: (!) 26 (!) 22   SpO2: 100% 98%   Weight:        General:  unremarkable, age appropriate     Musculoskeletal  Muscle Strength/Tone:   moving arms normally   Gait & Station:   sitting on stretcher     Psychiatric  Level of Consciousness: alert  Orientation: oriented to person, place and time  Grooming: in hospital gown  Psychomotor Behavior: no agitation  Speech: normal in rate, rhythm and volume  Language: uses words appropriately  Mood: Depressed  Affect: Depressed  Thought Process: LInear  Associations: Tight  Thought Content: No hallucinations  Memory: Intact  Attention: intact to interview  Fund of Knowledge: appears adequate  Insight: Poor  Judgement: Poor    Relevant Elements of Neurological Exam: no abnormality of " posture noted    Assessment - Diagnosis - Goals:     Diagnosis/Impression: Major Depressive Disorder    Rec: He is a danger to himself and requires inpatient psychiatric hospitalization      Time with patient: 20 minutes    More than 50% of the time was spent counseling/coordinating care    Laboratory Data:   Labs Reviewed   CBC W/ AUTO DIFFERENTIAL - Abnormal; Notable for the following:        Result Value    MCV 99 (*)     MCH 35.0 (*)     RDW 15.0 (*)     All other components within normal limits   COMPREHENSIVE METABOLIC PANEL - Abnormal; Notable for the following:     AST 54 (*)     All other components within normal limits   URINALYSIS - Abnormal; Notable for the following:     Specific Gravity, UA <=1.005 (*)     All other components within normal limits   DRUG SCREEN PANEL, URINE EMERGENCY - Abnormal; Notable for the following:     Creatinine, Random Ur 14.0 (*)     All other components within normal limits   ALCOHOL,MEDICAL (ETHANOL) - Abnormal; Notable for the following:     Alcohol, Medical, Serum 274 (*)     All other components within normal limits   SALICYLATE LEVEL - Abnormal; Notable for the following:     Salicylate Lvl <5.0 (*)     All other components within normal limits   ACETAMINOPHEN LEVEL - Abnormal; Notable for the following:     Acetaminophen (Tylenol), Serum <3.0 (*)     All other components within normal limits   ISTAT PROCEDURE - Abnormal; Notable for the following:     POC PH 7.331 (*)     POC PCO2 49.0 (*)     POC PO2 106 (*)     All other components within normal limits   TSH         Consulting clinician was informed of the encounter and consult note.    Consultation ended: 3/8/2018 at **

## 2018-03-09 VITALS
SYSTOLIC BLOOD PRESSURE: 123 MMHG | DIASTOLIC BLOOD PRESSURE: 81 MMHG | BODY MASS INDEX: 25.3 KG/M2 | RESPIRATION RATE: 18 BRPM | TEMPERATURE: 98 F | WEIGHT: 181.38 LBS | OXYGEN SATURATION: 97 % | HEART RATE: 105 BPM

## 2018-03-09 PROCEDURE — 25000242 PHARM REV CODE 250 ALT 637 W/ HCPCS: Performed by: EMERGENCY MEDICINE

## 2018-03-09 PROCEDURE — 94640 AIRWAY INHALATION TREATMENT: CPT

## 2018-03-09 RX ORDER — IPRATROPIUM BROMIDE AND ALBUTEROL SULFATE 2.5; .5 MG/3ML; MG/3ML
SOLUTION RESPIRATORY (INHALATION)
Status: DISPENSED
Start: 2018-03-09 | End: 2018-03-09

## 2018-03-09 RX ORDER — IPRATROPIUM BROMIDE AND ALBUTEROL SULFATE 2.5; .5 MG/3ML; MG/3ML
3 SOLUTION RESPIRATORY (INHALATION) ONCE
Status: COMPLETED | OUTPATIENT
Start: 2018-03-09 | End: 2018-03-09

## 2018-03-09 RX ADMIN — IPRATROPIUM BROMIDE AND ALBUTEROL SULFATE 3 ML: .5; 3 SOLUTION RESPIRATORY (INHALATION) at 12:03

## 2018-03-09 NOTE — PROVIDER PROGRESS NOTES - EMERGENCY DEPT.
Encounter Date: 3/8/2018    ED Physician Progress Notes       SCRIBE NOTE: I, Stacia Tineo, am scribing for, and in the presence of,  Rey Genao MD.  Physician Statement: IRey MD, personally performed the services described in this documentation as scribed by Stacia Tineo in my presence, and it is both accurate and complete.          10:44 PM: Pt has been accepted at [facility]. Accepting physician is Dr. Muniz. Pt will be transported by [facility] for ongoing personal protection en route. Pt will require [place] O2 en route. Pt will be transported by personnel trained in CPR and CPI. Risks and benefits of transfer/travel were discussed with pt priorly. Pt expresses understand.

## 2018-03-09 NOTE — ED NOTES
Pt resting quietly on stretcher in bed 8. SR up X 2. Sitter at bedside - Burak. Pt cont on O2 at 3L/min. Pt is on O2 at home. Pt aware of attempting placement at this time for transfer. Cont with HL to LFA and LAC.  No resp distress noted. Pt cont PEC

## 2018-03-09 NOTE — ED NOTES
Received t/c from Katie with Poison Control.  Provided update on patient's status.  She stated that poison control will close its file.

## 2018-03-09 NOTE — ED NOTES
Pt resting in ER psych stretcher with eyes closed, rr e/u, NAD noted, apparently asleep. Pt remains in ferreira gown with yellow socks per protocol. Bed low and locked.  JULIA Spain at  performing direct observation at this time. Will continue to monitor.

## 2018-03-09 NOTE — ED NOTES
Resp treatment given at pt request. Pt states he usually takes one at night before he goes to bed.

## 2018-03-09 NOTE — ED NOTES
Pt resting in ER psych stretcher, rr e/u, NAD noted. Pt remains in grey gown with yellow socks per protocol. Bed low and locked. Grabiel, JULIA at bs performing direct observation at this time. Pt denies further needs, will continue to monitor.

## 2018-03-09 NOTE — ED NOTES
Spoke with Gladis at [facility], states will accept pt per Dr FREIRE. Request repeat alcohol level prior to acceptance.   [facility] in [place] called and would accept pt if alcohol level is below 100.  [facility] in [place] called and will check for bed availability as long as alcohol level below 100.

## 2018-03-19 ENCOUNTER — OFFICE VISIT (OUTPATIENT)
Dept: INTERNAL MEDICINE | Facility: CLINIC | Age: 63
End: 2018-03-19
Payer: MEDICARE

## 2018-03-19 VITALS
SYSTOLIC BLOOD PRESSURE: 113 MMHG | HEIGHT: 71 IN | DIASTOLIC BLOOD PRESSURE: 75 MMHG | WEIGHT: 189.13 LBS | TEMPERATURE: 98 F | OXYGEN SATURATION: 98 % | HEART RATE: 84 BPM | BODY MASS INDEX: 26.48 KG/M2

## 2018-03-19 DIAGNOSIS — J44.9 COPD, VERY SEVERE: Primary | ICD-10-CM

## 2018-03-19 DIAGNOSIS — M25.511 ACUTE PAIN OF RIGHT SHOULDER: ICD-10-CM

## 2018-03-19 DIAGNOSIS — Z99.81 OXYGEN DEPENDENT: ICD-10-CM

## 2018-03-19 DIAGNOSIS — T14.91XA SUICIDAL BEHAVIOR WITH ATTEMPTED SELF-INJURY: ICD-10-CM

## 2018-03-19 DIAGNOSIS — F32.A DEPRESSION, UNSPECIFIED DEPRESSION TYPE: ICD-10-CM

## 2018-03-19 PROCEDURE — 99214 OFFICE O/P EST MOD 30 MIN: CPT | Mod: S$PBB,,, | Performed by: FAMILY MEDICINE

## 2018-03-19 PROCEDURE — 99999 PR PBB SHADOW E&M-EST. PATIENT-LVL III: CPT | Mod: PBBFAC,,, | Performed by: FAMILY MEDICINE

## 2018-03-19 PROCEDURE — 99213 OFFICE O/P EST LOW 20 MIN: CPT | Mod: PBBFAC,PO | Performed by: FAMILY MEDICINE

## 2018-03-19 RX ORDER — METHYLPREDNISOLONE 4 MG/1
TABLET ORAL
Qty: 1 PACKAGE | Refills: 0 | Status: SHIPPED | OUTPATIENT
Start: 2018-03-19 | End: 2018-04-09

## 2018-03-19 NOTE — PROGRESS NOTES
Subjective:       Patient ID: Johnny Perales is a 63 y.o. male.    Chief Complaint: Hospital Follow Up    Aftercare follow-up from Emergency room and psychiatric admission for depression and overdose on Cymbalta.  This was done in conjunction with excess alcohol.  He reports he still depressed but improved.  He is  scheduled for outpatient psychiatric visits at 3 times a week sessions.  He is also requesting referral to a different pulmonologist.  He reports he is hard of hearing and having difficulty communicating with his current pulmonologist.  He continues on continuous nasal O2 for severe COPD.        Review of Systems   Constitutional: Negative for activity change, appetite change, fever and unexpected weight change.   Respiratory: Positive for shortness of breath. Negative for cough, chest tightness and wheezing.    Cardiovascular: Negative for chest pain, palpitations and leg swelling.        Denies lightheadedness or syncope   Musculoskeletal:        3 weeks right shoulder discomfort with no trauma.  He tried ibuprofen and Tylenol with no results.   Neurological: Negative for syncope, speech difficulty, weakness, light-headedness and headaches.   Psychiatric/Behavioral: Positive for dysphoric mood and sleep disturbance. Negative for confusion. The patient is not nervous/anxious.        Objective:      Physical Exam   Constitutional: He appears well-developed and well-nourished. No distress.   Cardiovascular: Normal rate, regular rhythm, normal heart sounds and intact distal pulses.    Pulmonary/Chest: Effort normal. No respiratory distress. He has no wheezes. He has no rales.   Nasal O2 in place   Musculoskeletal:   Mild crepitance in the right shoulder.  Mild discomfort on range of motion.  No deformity.  No redness swelling   Skin: He is not diaphoretic.   Psychiatric: He has a normal mood and affect. His behavior is normal. Judgment and thought content normal.       Admission on 03/08/2018,  Discharged on 03/09/2018   Component Date Value Ref Range Status    WBC 03/08/2018 7.81  3.90 - 12.70 K/uL Final    RBC 03/08/2018 4.77  4.60 - 6.20 M/uL Final    Hemoglobin 03/08/2018 16.7  14.0 - 18.0 g/dL Final    Hematocrit 03/08/2018 47.1  40.0 - 54.0 % Final    MCV 03/08/2018 99* 82 - 98 fL Final    MCH 03/08/2018 35.0* 27.0 - 31.0 pg Final    MCHC 03/08/2018 35.5  32.0 - 36.0 g/dL Final    RDW 03/08/2018 15.0* 11.5 - 14.5 % Final    Platelets 03/08/2018 262  150 - 350 K/uL Final    MPV 03/08/2018 9.5  9.2 - 12.9 fL Final    Gran # (ANC) 03/08/2018 4.7  1.8 - 7.7 K/uL Final    Lymph # 03/08/2018 2.2  1.0 - 4.8 K/uL Final    Mono # 03/08/2018 0.4  0.3 - 1.0 K/uL Final    Eos # 03/08/2018 0.4  0.0 - 0.5 K/uL Final    Baso # 03/08/2018 0.05  0.00 - 0.20 K/uL Final    Gran% 03/08/2018 60.8  38.0 - 73.0 % Final    Lymph% 03/08/2018 28.4  18.0 - 48.0 % Final    Mono% 03/08/2018 5.6  4.0 - 15.0 % Final    Eosinophil% 03/08/2018 4.6  0.0 - 8.0 % Final    Basophil% 03/08/2018 0.6  0.0 - 1.9 % Final    Differential Method 03/08/2018 Automated   Final    Sodium 03/08/2018 142  136 - 145 mmol/L Final    Potassium 03/08/2018 4.1  3.5 - 5.1 mmol/L Final    Chloride 03/08/2018 102  95 - 110 mmol/L Final    CO2 03/08/2018 27  23 - 29 mmol/L Final    Glucose 03/08/2018 81  70 - 110 mg/dL Final    BUN, Bld 03/08/2018 8  8 - 23 mg/dL Final    Creatinine 03/08/2018 0.9  0.5 - 1.4 mg/dL Final    Calcium 03/08/2018 9.9  8.7 - 10.5 mg/dL Final    Total Protein 03/08/2018 7.9  6.0 - 8.4 g/dL Final    Albumin 03/08/2018 4.2  3.5 - 5.2 g/dL Final    Total Bilirubin 03/08/2018 0.3  0.1 - 1.0 mg/dL Final    Alkaline Phosphatase 03/08/2018 105  55 - 135 U/L Final    AST 03/08/2018 54* 10 - 40 U/L Final    ALT 03/08/2018 39  10 - 44 U/L Final    Anion Gap 03/08/2018 13  8 - 16 mmol/L Final    eGFR if African American 03/08/2018 >60  >60 mL/min/1.73 m^2 Final    eGFR if non African American 03/08/2018  >60  >60 mL/min/1.73 m^2 Final    Specimen UA 03/08/2018 Urine, Clean Catch   Final    Color, UA 03/08/2018 Yellow  Yellow, Straw, Dalila Final    Appearance, UA 03/08/2018 Clear  Clear Final    pH, UA 03/08/2018 5.0  5.0 - 8.0 Final    Specific Gravity, UA 03/08/2018 <=1.005* 1.005 - 1.030 Final    Protein, UA 03/08/2018 Negative  Negative Final    Glucose, UA 03/08/2018 Negative  Negative Final    Ketones, UA 03/08/2018 Negative  Negative Final    Bilirubin (UA) 03/08/2018 Negative  Negative Final    Occult Blood UA 03/08/2018 Negative  Negative Final    Nitrite, UA 03/08/2018 Negative  Negative Final    Urobilinogen, UA 03/08/2018 Negative  <2.0 EU/dL Final    Leukocytes, UA 03/08/2018 Negative  Negative Final    TSH 03/08/2018 0.767  0.400 - 4.000 uIU/mL Final    Benzodiazepines 03/08/2018 Negative   Final    Methadone metabolites 03/08/2018 Negative   Final    Cocaine (Metab.) 03/08/2018 Negative   Final    Opiate Scrn, Ur 03/08/2018 Negative   Final    Barbiturate Screen, Ur 03/08/2018 Negative   Final    Amphetamine Screen, Ur 03/08/2018 Negative   Final    THC 03/08/2018 Presumptive Positive   Final    Phencyclidine 03/08/2018 Negative   Final    Creatinine, Random Ur 03/08/2018 14.0* 23.0 - 375.0 mg/dL Final    Toxicology Information 03/08/2018 SEE COMMENT   Final    Alcohol, Medical, Serum 03/08/2018 274* <10 mg/dL Final    Salicylate Lvl 03/08/2018 <5.0* 15.0 - 30.0 mg/dL Final    Acetaminophen (Tylenol), Serum 03/08/2018 <3.0* 10.0 - 20.0 ug/mL Final    POC PH 03/08/2018 7.331* 7.35 - 7.45 Final    POC PCO2 03/08/2018 49.0* 35 - 45 mmHg Final    POC PO2 03/08/2018 106* 80 - 100 mmHg Final    POC HCO3 03/08/2018 25.9  24 - 28 mmol/L Final    POC BE 03/08/2018 0  -2 to 2 mmol/L Final    POC SATURATED O2 03/08/2018 98  95 - 100 % Final    Sample 03/08/2018 ARTERIAL   Final    Site 03/08/2018 LR   Final    Allens Test 03/08/2018 Pass   Final    DelSys 03/08/2018 Nasal Can    Final    Mode 03/08/2018 SPONT   Final    Flow 03/08/2018 3   Final    FiO2 03/08/2018 32   Final    Alcohol, Medical, Serum 03/08/2018 114* <10 mg/dL Final     Assessment:       1. COPD, very severe    2. Oxygen dependent    3. Acute pain of right shoulder        Plan:   He reports he recently started visiting disabled 81-year-old man help him with activities of daily living.  Continue psychiatric follow-up pulmonary referral done up in one month  he took range of motion to right shoulder twice a day for 2 weeks.  Medrol Dosepak.      COPD, very severe  -     Ambulatory referral to Pulmonology    Oxygen dependent  -     Ambulatory referral to Pulmonology    Acute pain of right shoulder  -     methylPREDNISolone (MEDROL DOSEPACK) 4 mg tablet; use as directed  Dispense: 1 Package; Refill: 0

## 2018-04-04 ENCOUNTER — PROCEDURE VISIT (OUTPATIENT)
Dept: PULMONOLOGY | Facility: CLINIC | Age: 63
End: 2018-04-04
Payer: MEDICARE

## 2018-04-04 DIAGNOSIS — J44.9 COPD, VERY SEVERE: Chronic | ICD-10-CM

## 2018-04-04 PROCEDURE — 94726 PLETHYSMOGRAPHY LUNG VOLUMES: CPT | Mod: 26,S$PBB,, | Performed by: INTERNAL MEDICINE

## 2018-04-04 PROCEDURE — 94729 DIFFUSING CAPACITY: CPT | Mod: 26,S$PBB,, | Performed by: INTERNAL MEDICINE

## 2018-04-04 PROCEDURE — 94060 EVALUATION OF WHEEZING: CPT | Mod: PBBFAC

## 2018-04-04 PROCEDURE — 94729 DIFFUSING CAPACITY: CPT | Mod: PBBFAC

## 2018-04-04 PROCEDURE — 94060 EVALUATION OF WHEEZING: CPT | Mod: 26,S$PBB,, | Performed by: INTERNAL MEDICINE

## 2018-04-04 PROCEDURE — 94726 PLETHYSMOGRAPHY LUNG VOLUMES: CPT | Mod: PBBFAC

## 2018-04-11 LAB
POST FEF 25 75: 0.36 L/S (ref 2.14–3.77)
POST FET 100: 15.72 S
POST FEV1 FVC: 36 %
POST FEV1: 1.4 L (ref 3.25–4.06)
POST FIF 50: 3.73 L/S
POST FVC: 3.91 L (ref 4.37–5.33)
POST PEF: 4.4 L/S (ref 8.08–10.45)
PRE DLCO: 12.4 ML/MMHG/MIN (ref 19.97–28.26)
PRE ERV: 0.79 L
PRE FEF 25 75: 0.35 L/S (ref 2.14–3.77)
PRE FET 100: 14.82 S
PRE FEV1 FVC: 39 %
PRE FEV1: 1.38 L (ref 3.25–4.06)
PRE FIF 50: 0.38 L/S
PRE FRC PL: 3.21 L (ref 3.2–4.41)
PRE FVC: 3.58 L (ref 4.37–5.33)
PRE KROGHS K: 2.42 1/MIN
PRE PEF: 3.11 L/S (ref 8.08–10.45)
PRE RV: 1.34 L (ref 2.08–2.87)
PRE SVC: 3.58 L
PRE TLC: 4.92 L (ref 6.37–7.37)
PREDICTED DLCO: 24.11 ML/MMHG/MIN (ref 19.97–28.26)
PREDICTED FEV1 FVC: 75.26 % (ref 70.42–80.1)
PREDICTED FEV1: 3.65 L (ref 3.25–4.06)
PREDICTED FRC N2: 3.81 L (ref 3.2–4.41)
PREDICTED FRC PL: 3.81 L (ref 3.2–4.41)
PREDICTED FVC: 4.85 L (ref 4.37–5.33)
PREDICTED RV: 2.47 L (ref 2.08–2.87)
PREDICTED SVC: 4.56 L
PREDICTED TLC: 6.87 L (ref 6.37–7.37)
PROVOCATION PROTOCOL: ABNORMAL

## 2018-05-07 ENCOUNTER — TELEPHONE (OUTPATIENT)
Dept: PULMONOLOGY | Facility: CLINIC | Age: 63
End: 2018-05-07

## 2018-05-08 ENCOUNTER — OFFICE VISIT (OUTPATIENT)
Dept: PULMONOLOGY | Facility: CLINIC | Age: 63
End: 2018-05-08
Payer: MEDICARE

## 2018-05-08 VITALS
WEIGHT: 187.81 LBS | BODY MASS INDEX: 26.29 KG/M2 | SYSTOLIC BLOOD PRESSURE: 118 MMHG | DIASTOLIC BLOOD PRESSURE: 72 MMHG | HEIGHT: 71 IN

## 2018-05-08 DIAGNOSIS — J44.9 COPD, VERY SEVERE: Primary | Chronic | ICD-10-CM

## 2018-05-08 DIAGNOSIS — J96.11 CHRONIC RESPIRATORY FAILURE WITH HYPOXIA: Chronic | ICD-10-CM

## 2018-05-08 PROCEDURE — 99212 OFFICE O/P EST SF 10 MIN: CPT | Mod: PBBFAC | Performed by: INTERNAL MEDICINE

## 2018-05-08 PROCEDURE — 99215 OFFICE O/P EST HI 40 MIN: CPT | Mod: S$PBB,,, | Performed by: INTERNAL MEDICINE

## 2018-05-08 PROCEDURE — 99999 PR PBB SHADOW E&M-EST. PATIENT-LVL II: CPT | Mod: PBBFAC,,, | Performed by: INTERNAL MEDICINE

## 2018-05-08 RX ORDER — ALBUTEROL SULFATE 0.63 MG/3ML
SOLUTION RESPIRATORY (INHALATION)
Qty: 375 ML | Refills: 11 | Status: SHIPPED | OUTPATIENT
Start: 2018-05-08 | End: 2018-07-07 | Stop reason: SDUPTHER

## 2018-05-08 RX ORDER — ALBUTEROL SULFATE 90 UG/1
1-2 AEROSOL, METERED RESPIRATORY (INHALATION) EVERY 6 HOURS PRN
Qty: 1 INHALER | Refills: 11 | Status: SHIPPED | OUTPATIENT
Start: 2018-05-08 | End: 2018-10-26 | Stop reason: SDUPTHER

## 2018-05-08 NOTE — ASSESSMENT & PLAN NOTE
COPD ROS: taking medications as instructed, no medication side effects noted, no significant ongoing wheezing or shortness of breath, using bronchodilator MDI less than twice a week.   New concerns: None.   Exam: Bilateral wheezes and mild respiratory distress.   Assessment:  COPD stable and reasonably well controlled.   Plan: Plan as above. Continue  ACCUNEB, PROAIR, ANORO, DALIRESP. PFT in 6 months.

## 2018-05-08 NOTE — PATIENT INSTRUCTIONS
Lung Anatomy  Your lungs take air in to give your body oxygen, which the body needs to work. Your lungs, like all the tissues in your body, are made up of billions of tiny specialized cells. Old lung cells die and are replaced by new, identical lung cells. This natural process helps ensure healthy lungs.    Date Last Reviewed: 11/1/2016  © 5096-6403 [employer]. [address] [place] [address], [place] [address]. All rights reserved. This information is not intended as a substitute for professional medical care. Always follow your healthcare professional's instructions.

## 2018-05-08 NOTE — PROGRESS NOTES
Subjective:      Patient ID: Johnny Perales is a 63 y.o. male.  Patient Active Problem List   Diagnosis    COPD, very severe    Pulmonary nodule    Chronic respiratory failure with hypoxia    Screening for colon cancer    Family history of hypercholesterolemia     Chronic fatigue     Bilateral hearing loss    Elevated MCV    Depression    Immunization deficiency    Hyperkalemia    Elevated TSH    Screen for colon cancer    Bilateral carpal tunnel syndrome    Suicidal behavior with attempted self-injury    Acute pain of right shoulder     Problem list has been reviewed.    Chief Complaint: COPD; Chronic respiratory failure; and Hypoxia    HPI        Patients reports NYHA III dyspnea    The patient does not have currently have symptoms / an exacerbation.       No cough, sputum, or hemoptysis and No shortness or breath.       The patient does not have symptoms / an exacerbation.      Her current respiratory regimen: ANORO, PROAIR, ACCUNEB: He does use medications compliantly. Immunization status reviewed and up to date.    COPD QUESTIONNAIRE  How often do you cough?: All of the time  How often do you have phlegm (mucus) in your chest?: All of the time  How often does your chest feel tight?: All of the time  When you walk up a hill or one flight of stairs, how often are you breathless?: Some of the time  How often are you limited doing any activities at home?: Most of the time  How often are you confident leaving the house despite your lung condition?: Never  How often do you sleep soundly?: Never  How often do you have energy?: Never  Total score: 37     HE IS ON OXYGEN SUPPLEMENTATION at 3 L /min     A full  review of systems, past , family  and social histories was performed except as mentioned in the note above, these are non contributory to the main issues discussed today.     Previous Report Reviewed: office notes     The following portions of the patient's history were reviewed and updated  "as appropriate: He  has a past medical history of COPD (chronic obstructive pulmonary disease) and Hearing impairment.  He  has a past surgical history that includes Inner ear surgery (Bilateral); Colonoscopy (N/A, 7/12/2017); and Leg Surgery (Right).  His family history includes Cancer in his father and mother; Colon cancer in his maternal uncle; Heart failure in his father and mother.  He  reports that he has been smoking Vaping with nicotine.  He has a 3.50 pack-year smoking history. He has never used smokeless tobacco. He reports that he drinks about 7.2 oz of alcohol per week . He reports that he does not use drugs.  He has a current medication list which includes the following prescription(s): albuterol, albuterol, ipratropium, oxygen-air delivery systems, umeclidinium-vilanterol, and vitamin d3.  He has No Known Allergies..    Review of Systems   Constitutional: Positive for fatigue and night sweats.   HENT: Negative for nosebleeds and hearing loss.    Eyes: Negative for redness.   Respiratory: Positive for cough, sputum production, wheezing and dyspnea on extertion. Negative for somnolence.    Cardiovascular: Negative for chest pain and leg swelling.   Genitourinary: Negative for hematuria.   Endocrine: Negative for cold intolerance and heat intolerance.    Musculoskeletal: Negative for arthralgias and back pain.   Skin: Negative for rash.   Gastrointestinal: Negative for abdominal pain and abdominal distention.   Neurological: Negative for dizziness and headaches.   Hematological: Negative for adenopathy. Does not bruise/bleed easily.   Psychiatric/Behavioral: The patient is nervous/anxious.    All other systems reviewed and are negative.     Objective:   /72   Ht 5' 11" (1.803 m)   Wt 85.2 kg (187 lb 13.3 oz)   BMI 26.20 kg/m²   Body mass index is 26.2 kg/m².    Physical Exam   Constitutional: He is oriented to person, place, and time. He appears well-developed and well-nourished.   HENT:   Head: " Normocephalic and atraumatic.   Eyes: EOM are normal. Pupils are equal, round, and reactive to light.   Neck: Normal range of motion. Neck supple.   Cardiovascular: Normal rate and regular rhythm.    Pulmonary/Chest: He is in respiratory distress. He has wheezes.   Abdominal: Soft. Bowel sounds are normal.   Musculoskeletal: Normal range of motion.   Neurological: He is alert and oriented to person, place, and time.   Skin: Skin is warm.   Psychiatric: He has a normal mood and affect.       Personal Diagnostic Review  None pertinent    Assessment / plan:       Discussed diagnosis, its evaluation, treatment and usual course. All questions answered.    Problem List Items Addressed This Visit        Pulmonary    COPD, very severe - Primary (Chronic)    Current Assessment & Plan     COPD ROS: taking medications as instructed, no medication side effects noted, no significant ongoing wheezing or shortness of breath, using bronchodilator MDI less than twice a week.   New concerns: None.   Exam: Bilateral wheezes and mild respiratory distress.   Assessment:  COPD stable and reasonably well controlled.   Plan: Plan as above. Continue  ACCUNEB, PROAIR, ANORO, DALIRESP. PFT in 6 months.          Relevant Medications    umeclidinium-vilanterol (ANORO ELLIPTA) 62.5-25 mcg/actuation DsDv    albuterol 90 mcg/actuation inhaler    albuterol (ACCUNEB) 0.63 mg/3 mL Nebu    Other Relevant Orders    X-Ray Chest PA And Lateral    Chronic respiratory failure with hypoxia (Chronic)    Current Assessment & Plan     Continue oxygen supplementation at 3  L/min. DME is [employer].   Continue nocturnal TRIOLOGY ( VIEMED)         Relevant Orders    Stress test, pulmonary          TIME SPENT WITH PATIENT: Time spent: 40 minutes in face to face  discussion concerning diagnosis, prognosis, review of lab and test results, benefits of treatment as well as management of disease, counseling of patient and coordination of care between various  health  care providers . Greater than half the time spent was used for coordination of care and counseling of patient.          Follow-up in about 6 months (around 11/8/2018) for COPD, Chronic Respiratory Failure.

## 2018-05-14 ENCOUNTER — OFFICE VISIT (OUTPATIENT)
Dept: OTOLARYNGOLOGY | Facility: CLINIC | Age: 63
End: 2018-05-14
Payer: MEDICARE

## 2018-05-14 ENCOUNTER — CLINICAL SUPPORT (OUTPATIENT)
Dept: AUDIOLOGY | Facility: CLINIC | Age: 63
End: 2018-05-14
Payer: MEDICARE

## 2018-05-14 VITALS
DIASTOLIC BLOOD PRESSURE: 71 MMHG | BODY MASS INDEX: 27.1 KG/M2 | HEART RATE: 80 BPM | SYSTOLIC BLOOD PRESSURE: 107 MMHG | HEIGHT: 71 IN | RESPIRATION RATE: 16 BRPM | TEMPERATURE: 97 F | WEIGHT: 193.56 LBS

## 2018-05-14 DIAGNOSIS — H95.193 POST MASTOIDECTOMY SEQUELAE, BILATERAL: Primary | ICD-10-CM

## 2018-05-14 DIAGNOSIS — H90.6 HEARING LOSS, MIXED, BILATERAL: Primary | ICD-10-CM

## 2018-05-14 DIAGNOSIS — H90.6 HEARING LOSS, MIXED, BILATERAL: ICD-10-CM

## 2018-05-14 PROCEDURE — 99999 PR PBB SHADOW E&M-EST. PATIENT-LVL III: CPT | Mod: PBBFAC,,, | Performed by: ORTHOPAEDIC SURGERY

## 2018-05-14 PROCEDURE — 99213 OFFICE O/P EST LOW 20 MIN: CPT | Mod: PBBFAC,25 | Performed by: ORTHOPAEDIC SURGERY

## 2018-05-14 PROCEDURE — 92557 COMPREHENSIVE HEARING TEST: CPT | Mod: PBBFAC | Performed by: AUDIOLOGIST-HEARING AID FITTER

## 2018-05-14 PROCEDURE — 99213 OFFICE O/P EST LOW 20 MIN: CPT | Mod: S$PBB,,, | Performed by: ORTHOPAEDIC SURGERY

## 2018-05-14 NOTE — PROGRESS NOTES
Johnny Perales was seen 05/14/2018 for an audiological evaluation prior to ENT for ear check.  Patient complains of decrease in hearing over the past year.  He uses OTC amplifier for the left ear and no longer gets benefit.  His last audiogram was 05/2017.  Patient has history of bilateral cholesteatomas and multiple ear surgeries - 3 surgeries for right ear and 2 for the left ear.  His last surgery and hearing test was in 1997 at [facility]. He tried hearing aids three times with no success.  His last hearing aid was obtained in 1997.  A Baha was discussed at that time but he declined, concerned about the abutment and more surgery.  He has bilateral tinnitus, described as crickets and ringing.      Results reveal a profound mixed hearing loss 250-8000 Hz for the right ear, and a profound mixed hearing loss 250-8000 Hz for the left ear.   Speech Reception Thresholds were no response for the right ear and 95 dBHL for the left ear.   Word recognition scores were unable to test for the right ear and fair for the left ear.   Tympanograms were not tested; bilateral mastoidectomy.     Since his last exam, there is significant decrease in air conducted thresholds for the left ear, slight decrease for the right ear.  Bone conducted thresholds are unchanged.   Patient was counseled on the above findings.    Recommendation:   1. Hearing aid for the left ear, Power BTE with earmold.  Patient will first check with [facility].   2. ENT

## 2018-05-14 NOTE — PROGRESS NOTES
Subjective:      Patient ID: Johnny Perales is a 63 y.o. male.    Chief Complaint: Hearing Loss    Patient is a very pleasant 62 year old male here to see me today in followup for evaluation of ear drainage and itching.  Patient has history of bilateral cholesteatomas and multiple ear surgeries - 3 surgeries for right ear and 2 for the left ear. His last surgery and hearing test was in 1997 [place].  He reports hearing loss that has been gradually progressing over the last several years, but worse over past 3-4 months.  He wears an amplifier in the left ear.  He has noted constant tinnitus in both ears for years.  He has not had any recent issues with ear pain but has noted bloody drainage on Q-tip in right ear for 3-4 months.  Denies ear drainage onto pillow.   He denies any history of significant loud noise exposure. He admits to issues with dizziness if he stands too fast or overexerts himself.  He tried hearing aids three times with no success. His last hearing aid was obtained in 1997. A Baha was discussed at that time but he declined, concerned about the abutment and more surgery. He is using an OTC amplifier and notes hearing better with that than any hearing aid.  He is not currently using any ear drops. He describes the drainage as more from the right ear than the left, and it is bloody.  He was last here for cleaning about two months ago.  He is interested in a new hearing aid to the left ear, has seen audiology today.      Hearing Loss:    Associated symptoms: ear pain and tinnitus.  No dizziness, no fever, no headaches and no rhinorrhea.No dizziness.        Review of Systems   Constitutional: Positive for fatigue. Negative for fever and unexpected weight change.   HENT: Positive for ear pain, hearing loss and tinnitus. Negative for congestion, ear discharge, facial swelling, nosebleeds, postnasal drip, rhinorrhea, sinus pressure, sneezing, sore throat, trouble swallowing and voice change.     Eyes: Positive for discharge. Negative for redness and itching.   Respiratory: Positive for shortness of breath. Negative for cough, choking and wheezing.    Cardiovascular: Positive for chest pain (A few weeks ago due to cough). Negative for palpitations.   Gastrointestinal: Negative for abdominal pain.        No reflux.   Musculoskeletal: Negative for neck pain.   Neurological: Negative for dizziness, facial asymmetry, light-headedness and headaches.   Hematological: Negative for adenopathy. Does not bruise/bleed easily.   Psychiatric/Behavioral: Negative for agitation, behavioral problems, confusion and decreased concentration.       Objective:       Physical Exam   Constitutional: He is oriented to person, place, and time. He appears well-developed and well-nourished.   HENT:   Head: Normocephalic and atraumatic.   Right Ear: External ear normal. Decreased hearing is noted.   Left Ear: External ear normal. Decreased hearing is noted.   Nose: Nose normal. No mucosal edema, rhinorrhea, nasal deformity or septal deviation. Right sinus exhibits no maxillary sinus tenderness and no frontal sinus tenderness. Left sinus exhibits no maxillary sinus tenderness and no frontal sinus tenderness.   Mouth/Throat: Uvula is midline, oropharynx is clear and moist and mucous membranes are normal. Mucous membranes are not pale and not dry. He has dentures. No trismus in the jaw. No uvula swelling. No oropharyngeal exudate or posterior oropharyngeal erythema.   Wears oxygen via nasal cannula; right CWD mastoidectomy with large cerumen impaction and bloody debris, left CWD mastoidectomy but mastoid bowl is much smaller, no significant debris   Eyes: Conjunctivae, EOM and lids are normal. Pupils are equal, round, and reactive to light. Right eye exhibits no chemosis. Left eye exhibits no chemosis. Right conjunctiva is not injected. Left conjunctiva is not injected. No scleral icterus. Right eye exhibits normal extraocular motion  and no nystagmus. Left eye exhibits normal extraocular motion and no nystagmus.   Wears eyeglasses   Neck: Trachea normal and phonation normal. No tracheal tenderness present. No tracheal deviation present. No thyroid mass and no thyromegaly present.   Cardiovascular: Intact distal pulses.    Pulmonary/Chest: Effort normal. No stridor. No respiratory distress.   Abdominal: He exhibits no distension.   Lymphadenopathy:        Head (right side): No submental, no submandibular, no preauricular, no posterior auricular and no occipital adenopathy present.        Head (left side): No submental, no submandibular, no preauricular, no posterior auricular and no occipital adenopathy present.     He has no cervical adenopathy.   Neurological: He is alert and oriented to person, place, and time. No cranial nerve deficit.   Skin: Skin is warm and dry. No rash noted. No erythema.   Psychiatric: He has a normal mood and affect. His speech is normal and behavior is normal.   Vitals reviewed.        Assessment:       1. Post mastoidectomy sequelae, bilateral    2. Hearing loss, mixed, bilateral        Plan:     Post mastoidectomy sequelae, bilateral:  No debris today to clean, return to clinic in four months for routine cleaning, sooner if needed.  Reassured patient that there is no sign of recurrent cholesteatoma as the cause for his current symptoms.    Hearing loss, mixed, bilateral:  He has an amplifier for one ear, and is doing fairly well with it.  He is interested in hearing aids with audiology, will have him meet with her today.

## 2018-05-21 ENCOUNTER — PATIENT OUTREACH (OUTPATIENT)
Dept: ADMINISTRATIVE | Facility: HOSPITAL | Age: 63
End: 2018-05-21

## 2018-05-21 PROBLEM — Z86.010 HISTORY OF COLON POLYPS: Status: ACTIVE | Noted: 2017-07-12

## 2018-05-21 PROBLEM — Z86.0100 HISTORY OF COLON POLYPS: Status: ACTIVE | Noted: 2017-07-12

## 2018-05-21 NOTE — LETTER
May 21, 2018    Johnny Queen Diaz  [address] [facility] [id number]             [facility]  [address]  [facility] [address]  Phone: [phone] Dear Mr. Perales:    [facility] is committed to your overall health.  To help you get the most out of each of your visits, we will review your information to make sure you are up to date on all of your recommended tests and/or procedures.      John Yap MD has found that you may be due for   Health Maintenance Due   Topic    Pneumococcal PPSV23 (Medium Risk) (1)    Lipid Panel         If you have had any of the above done at another facility, please bring the records or information with you so that your record at [facility] will be complete.    If you are currently taking medication, please bring it with you to your appointment for review.    We will be happy to assist you with scheduling any necessary appointments or you may contact the [facility] appointment desk at [phone] to schedule at your convenience.     Thank you for choosing [facility] for your healthcare needs,    Pinky CUEVAS LPN Care Coordinator  [facility]  [phone]

## 2018-05-29 ENCOUNTER — HOSPITAL ENCOUNTER (EMERGENCY)
Facility: HOSPITAL | Age: 63
Discharge: HOME | End: 2018-05-30
Attending: EMERGENCY MEDICINE
Payer: MEDICARE

## 2018-05-29 DIAGNOSIS — J44.1 COPD EXACERBATION: Primary | ICD-10-CM

## 2018-05-29 DIAGNOSIS — R06.02 SOB (SHORTNESS OF BREATH): ICD-10-CM

## 2018-05-29 LAB
ALBUMIN SERPL BCP-MCNC: 4 G/DL
ALP SERPL-CCNC: 91 U/L
ALT SERPL W/O P-5'-P-CCNC: 38 U/L
ANION GAP SERPL CALC-SCNC: 11 MMOL/L
APTT BLDCRRT: 27.8 SEC
AST SERPL-CCNC: 36 U/L
BASOPHILS # BLD AUTO: 0.06 K/UL
BASOPHILS NFR BLD: 0.7 %
BILIRUB SERPL-MCNC: 0.8 MG/DL
BUN SERPL-MCNC: 6 MG/DL
CALCIUM SERPL-MCNC: 10 MG/DL
CHLORIDE SERPL-SCNC: 102 MMOL/L
CK SERPL-CCNC: 87 U/L
CO2 SERPL-SCNC: 28 MMOL/L
CREAT SERPL-MCNC: 0.9 MG/DL
DIFFERENTIAL METHOD: ABNORMAL
EOSINOPHIL # BLD AUTO: 0.7 K/UL
EOSINOPHIL NFR BLD: 7.6 %
ERYTHROCYTE [DISTWIDTH] IN BLOOD BY AUTOMATED COUNT: 14.5 %
EST. GFR  (AFRICAN AMERICAN): >60 ML/MIN/1.73 M^2
EST. GFR  (NON AFRICAN AMERICAN): >60 ML/MIN/1.73 M^2
GLUCOSE SERPL-MCNC: 100 MG/DL
HCT VFR BLD AUTO: 50.1 %
HGB BLD-MCNC: 17.5 G/DL
INR PPP: 1
LYMPHOCYTES # BLD AUTO: 2.3 K/UL
LYMPHOCYTES NFR BLD: 27.2 %
MCH RBC QN AUTO: 35.4 PG
MCHC RBC AUTO-ENTMCNC: 34.9 G/DL
MCV RBC AUTO: 101 FL
MONOCYTES # BLD AUTO: 0.7 K/UL
MONOCYTES NFR BLD: 7.6 %
NEUTROPHILS # BLD AUTO: 4.9 K/UL
NEUTROPHILS NFR BLD: 56.9 %
PLATELET # BLD AUTO: 167 K/UL
PMV BLD AUTO: 9.5 FL
POTASSIUM SERPL-SCNC: 4.2 MMOL/L
PROT SERPL-MCNC: 7.9 G/DL
PROTHROMBIN TIME: 10.4 SEC
RBC # BLD AUTO: 4.95 M/UL
SODIUM SERPL-SCNC: 141 MMOL/L
TROPONIN I SERPL DL<=0.01 NG/ML-MCNC: <0.006 NG/ML
WBC # BLD AUTO: 8.58 K/UL

## 2018-05-29 PROCEDURE — 96365 THER/PROPH/DIAG IV INF INIT: CPT

## 2018-05-29 PROCEDURE — 80053 COMPREHEN METABOLIC PANEL: CPT

## 2018-05-29 PROCEDURE — 84484 ASSAY OF TROPONIN QUANT: CPT

## 2018-05-29 PROCEDURE — 99285 EMERGENCY DEPT VISIT HI MDM: CPT | Mod: 25

## 2018-05-29 PROCEDURE — 82550 ASSAY OF CK (CPK): CPT

## 2018-05-29 PROCEDURE — 93005 ELECTROCARDIOGRAM TRACING: CPT

## 2018-05-29 PROCEDURE — 94640 AIRWAY INHALATION TREATMENT: CPT

## 2018-05-29 PROCEDURE — 85025 COMPLETE CBC W/AUTO DIFF WBC: CPT

## 2018-05-29 PROCEDURE — 99900035 HC TECH TIME PER 15 MIN (STAT)

## 2018-05-29 PROCEDURE — 96366 THER/PROPH/DIAG IV INF ADDON: CPT

## 2018-05-29 PROCEDURE — 85610 PROTHROMBIN TIME: CPT

## 2018-05-29 PROCEDURE — 94660 CPAP INITIATION&MGMT: CPT

## 2018-05-29 PROCEDURE — 63600175 PHARM REV CODE 636 W HCPCS: Performed by: EMERGENCY MEDICINE

## 2018-05-29 PROCEDURE — 85730 THROMBOPLASTIN TIME PARTIAL: CPT

## 2018-05-29 PROCEDURE — 27000190 HC CPAP FULL FACE MASK W/VALVE

## 2018-05-29 PROCEDURE — 93010 ELECTROCARDIOGRAM REPORT: CPT | Mod: ,,, | Performed by: INTERNAL MEDICINE

## 2018-05-29 PROCEDURE — 25000242 PHARM REV CODE 250 ALT 637 W/ HCPCS: Performed by: EMERGENCY MEDICINE

## 2018-05-29 RX ORDER — IPRATROPIUM BROMIDE AND ALBUTEROL SULFATE 2.5; .5 MG/3ML; MG/3ML
3 SOLUTION RESPIRATORY (INHALATION)
Status: COMPLETED | OUTPATIENT
Start: 2018-05-29 | End: 2018-05-29

## 2018-05-29 RX ORDER — MAGNESIUM SULFATE HEPTAHYDRATE 40 MG/ML
2 INJECTION, SOLUTION INTRAVENOUS
Status: COMPLETED | OUTPATIENT
Start: 2018-05-29 | End: 2018-05-29

## 2018-05-29 RX ADMIN — IPRATROPIUM BROMIDE AND ALBUTEROL SULFATE 3 ML: .5; 3 SOLUTION RESPIRATORY (INHALATION) at 09:05

## 2018-05-29 RX ADMIN — MAGNESIUM SULFATE IN WATER 2 G: 40 INJECTION, SOLUTION INTRAVENOUS at 09:05

## 2018-05-30 VITALS
RESPIRATION RATE: 27 BRPM | OXYGEN SATURATION: 97 % | WEIGHT: 185.88 LBS | DIASTOLIC BLOOD PRESSURE: 71 MMHG | HEART RATE: 92 BPM | BODY MASS INDEX: 26.61 KG/M2 | SYSTOLIC BLOOD PRESSURE: 115 MMHG | HEIGHT: 70 IN | TEMPERATURE: 98 F

## 2018-05-30 RX ORDER — PREDNISONE 50 MG/1
50 TABLET ORAL DAILY
Qty: 4 TABLET | Refills: 0 | Status: SHIPPED | OUTPATIENT
Start: 2018-05-31 | End: 2018-06-04 | Stop reason: ALTCHOICE

## 2018-05-30 NOTE — ED PROVIDER NOTES
SCRIBE #1 NOTE: I, Prabhakar Fernando, am scribing for, and in the presence of, Maurice Gil MD. I have scribed the entire note.      History      Chief Complaint   Patient presents with    Shortness of Breath       Review of patient's allergies indicates:  No Known Allergies     HPI   HPI    5/29/2018, 8:42 PM   History obtained from the patient      History of Present Illness: Johnny Perales is a 63 y.o. male patient w/ a PMHx of COPD who presents to the Emergency Department for SOB which onset gradually at 08:00. Symptoms increased in severity over the day and are moderate. No mitigating or exacerbating factors reported. Associated sxs include chest tightness and cough. Patient denies any CP, palpitations, n/v, congestion, abd pain, fever, choking, dizziness, and all other sxs at this time. Prior Tx includes albuterol, NTG, Atrovent per EMS. Pt reports taking his usual albuterol treatments today, as well as using his CPAP. Pt denies any past cardiac or abdominal surgeries. No further complaints or concerns at this time.         Arrival mode: EMS    PCP: John Yap MD       Past Medical History:  Past Medical History:   Diagnosis Date    COPD (chronic obstructive pulmonary disease)     Hearing impairment        Past Surgical History:  Past Surgical History:   Procedure Laterality Date    COLONOSCOPY N/A 7/12/2017    Procedure: COLONOSCOPY;  Surgeon: Salvador Lambert MD;  Location: [facility];  Service: Endoscopy;  Laterality: N/A;    INNER EAR SURGERY Bilateral     LEG SURGERY Right          Family History:  Family History   Problem Relation Age of Onset    Cancer Mother     Heart failure Mother     Cancer Father     Heart failure Father     Colon cancer Maternal Uncle        Social History:  Social History     Social History Main Topics    Smoking status: Current Every Day Smoker     Packs/day: 0.10     Years: 35.00     Types: Vaping with nicotine    Smokeless tobacco: Never Used     Alcohol use 7.2 oz/week     12 Cans of beer per week    Drug use: No    Sexual activity: Unknown       ROS   Review of Systems   Constitutional: Negative for fever.   HENT: Negative for congestion and sore throat.    Respiratory: Positive for cough, chest tightness and shortness of breath. Negative for choking.    Cardiovascular: Negative for chest pain and palpitations.   Gastrointestinal: Negative for abdominal pain, nausea and vomiting.   Genitourinary: Negative for dysuria.   Musculoskeletal: Negative for back pain.   Skin: Negative for rash.   Neurological: Negative for dizziness and weakness.   Hematological: Does not bruise/bleed easily.   All other systems reviewed and are negative.      Physical Exam      Initial Vitals [05/29/18 2030]   BP Pulse Resp Temp SpO2   133/82 (!) 115 (!) 24 97.4 °F (36.3 °C) 100 %      MAP       99          Physical Exam  Nursing Notes and Vital Signs Reviewed.  Constitutional: Patient is in mild distress. Well-developed and well-nourished.  Head: Atraumatic. Normocephalic.  Eyes: PERRL. EOM intact. Conjunctivae are not pale. No scleral icterus.  ENT: Mucous membranes are moist. Oropharynx is clear and symmetric.    Neck: Supple. Full ROM. No lymphadenopathy.  Cardiovascular: Tachycardic. Regular rhythm. No murmurs, rubs, or gallops. Distal pulses are 2+ and symmetric.  Pulmonary/Chest: Mild respiratory distress. Coarse breath sounds bilaterally. Expiratory wheezing bilaterally. No rales.  Abdominal: Soft and non-distended.  There is no tenderness.  No rebound, guarding, or rigidity.   Musculoskeletal: Moves all extremities. No obvious deformities. No edema. No calf tenderness.  Skin: Warm and dry.  Neurological:  Alert, awake, and appropriate.  Normal speech.  No acute focal neurological deficits are appreciated.  Psychiatric: Normal affect. Good eye contact. Appropriate in content.    ED Course    Procedures  ED Vital Signs:  Vitals:    05/29/18 2030 05/29/18 2055  "05/29/18 2101 05/29/18 2104   BP: 133/82      Pulse: (!) 115 103 102 103   Resp: (!) 24  (!) 22    Temp: 97.4 °F (36.3 °C)      TempSrc: Oral      SpO2: 100%  100%    Weight: 84.3 kg (185 lb 14.4 oz)      Height: 5' 10" (1.778 m)       05/29/18 2105 05/29/18 2110 05/29/18 2317 05/29/18 2346   BP:   121/75 110/68   Pulse: 103 104 92 93   Resp: (!) 22 (!) 22 20 20   Temp:  97.6 °F (36.4 °C)     TempSrc:  Oral     SpO2: 100% 100% 99% 99%   Weight:       Height:        05/30/18 0001   BP: 115/71   Pulse: 92   Resp: (!) 27   Temp:    TempSrc:    SpO2: 97%   Weight:    Height:        Abnormal Lab Results:  Labs Reviewed   CBC W/ AUTO DIFFERENTIAL - Abnormal; Notable for the following:        Result Value     (*)     MCH 35.4 (*)     Eos # 0.7 (*)     All other components within normal limits   COMPREHENSIVE METABOLIC PANEL - Abnormal; Notable for the following:     BUN, Bld 6 (*)     All other components within normal limits   PROTIME-INR   APTT   CK   TROPONIN I        All Lab Results:  Results for orders placed or performed during the hospital encounter of 05/29/18   CBC auto differential   Result Value Ref Range    WBC 8.58 3.90 - 12.70 K/uL    RBC 4.95 4.60 - 6.20 M/uL    Hemoglobin 17.5 14.0 - 18.0 g/dL    Hematocrit 50.1 40.0 - 54.0 %     (H) 82 - 98 fL    MCH 35.4 (H) 27.0 - 31.0 pg    MCHC 34.9 32.0 - 36.0 g/dL    RDW 14.5 11.5 - 14.5 %    Platelets 167 150 - 350 K/uL    MPV 9.5 9.2 - 12.9 fL    Gran # (ANC) 4.9 1.8 - 7.7 K/uL    Lymph # 2.3 1.0 - 4.8 K/uL    Mono # 0.7 0.3 - 1.0 K/uL    Eos # 0.7 (H) 0.0 - 0.5 K/uL    Baso # 0.06 0.00 - 0.20 K/uL    Gran% 56.9 38.0 - 73.0 %    Lymph% 27.2 18.0 - 48.0 %    Mono% 7.6 4.0 - 15.0 %    Eosinophil% 7.6 0.0 - 8.0 %    Basophil% 0.7 0.0 - 1.9 %    Differential Method Automated    Comprehensive metabolic panel   Result Value Ref Range    Sodium 141 136 - 145 mmol/L    Potassium 4.2 3.5 - 5.1 mmol/L    Chloride 102 95 - 110 mmol/L    CO2 28 23 - 29 mmol/L    " Glucose 100 70 - 110 mg/dL    BUN, Bld 6 (L) 8 - 23 mg/dL    Creatinine 0.9 0.5 - 1.4 mg/dL    Calcium 10.0 8.7 - 10.5 mg/dL    Total Protein 7.9 6.0 - 8.4 g/dL    Albumin 4.0 3.5 - 5.2 g/dL    Total Bilirubin 0.8 0.1 - 1.0 mg/dL    Alkaline Phosphatase 91 55 - 135 U/L    AST 36 10 - 40 U/L    ALT 38 10 - 44 U/L    Anion Gap 11 8 - 16 mmol/L    eGFR if African American >60 >60 mL/min/1.73 m^2    eGFR if non African American >60 >60 mL/min/1.73 m^2   Protime-INR   Result Value Ref Range    Prothrombin Time 10.4 9.0 - 12.5 sec    INR 1.0 0.8 - 1.2   APTT   Result Value Ref Range    aPTT 27.8 21.0 - 32.0 sec   CPK   Result Value Ref Range    CPK 87 20 - 200 U/L   Troponin I   Result Value Ref Range    Troponin I <0.006 0.000 - 0.026 ng/mL         Imaging Results:  Imaging Results          X-Ray Chest AP Portable (Final result)  Result time 05/29/18 22:33:26    Final result by Ravinder Grimm MD (05/29/18 22:33:26)                 Impression:      No acute cardiopulmonary disease.      Electronically signed by: Ravinder Grimm MD  Date:    05/29/2018  Time:    22:33             Narrative:    EXAMINATION:  XR CHEST AP PORTABLE    CLINICAL HISTORY:  Shortness of breath    COMPARISON:  Chest x-ray, 10/10/2017    FINDINGS:  The lungs are clear. The cardiac silhouette is within normal limits. No pleural effusion or pneumothorax or pulmonary edema.                                 The EKG was ordered, reviewed, and independently interpreted by the ED provider.  Interpretation time: 20:38  Rate: 107 BPM  Rhythm: sinus tachycardia  Interpretation: Otherwise normal ECG. No acute ST changes. No STEMI.             The Emergency Provider reviewed the vital signs and test results, which are outlined above.    ED Discussion     11:34 PM: Re-evaluated pt. Pt is resting comfortably and is in no acute distress. Pt is now in no respiratory distress. Have removed BiPAP and placed pt on 3L O2, per nc. If he continues to improved, he will be  discharged.  D/w pt all pertinent results. D/w pt any concerns expressed at this time. Answered all questions. Pt expresses understanding at this time.    12:01 AM: Reassessed pt at this time.  Pt is in no respiratory distress. Pt states his condition has improved at this time. Discussed with pt all pertinent ED information and results. Discussed pt dx and plan of tx. Gave pt all f/u and return to the ED instructions. All questions and concerns were addressed at this time. Pt expresses understanding of information and instructions, and is comfortable with plan to discharge. Pt is stable for discharge.    I discussed with patient and/or family/caretaker that evaluation in the ED does not suggest any emergent or life threatening medical conditions requiring immediate intervention beyond what was provided in the ED, and I believe patient is safe for discharge.  Regardless, an unremarkable evaluation in the ED does not preclude the development or presence of a serious of life threatening condition. As such, patient was instructed to return immediately for any worsening or change in current symptoms.      ED Medication(s):  Medications   albuterol-ipratropium 2.5 mg-0.5 mg/3 mL nebulizer solution 3 mL (3 mLs Nebulization Given 5/29/18 2110)   magnesium sulfate 2g in water 50mL IVPB (premix) (0 g Intravenous Stopped 5/29/18 2330)       Discharge Medication List as of 5/30/2018 12:06 AM      START taking these medications    Details   predniSONE (DELTASONE) 50 MG Tab Take 1 tablet (50 mg total) by mouth once daily., Starting Thu 5/31/2018, Until Mon 6/4/2018, Print             Follow-up Information     John Yap MD. Schedule an appointment as soon as possible for a visit in 3 days.    Specialty:  Family Medicine  Contact information:  [place] [id number]  [phone]             Go to  [facility].    Specialty:  Emergency Medicine  Why:  As needed, If symptoms worsen  Contact  information:  [address] [facility] [id number] [phone]                   Medical Decision Making    Medical Decision Making:   Clinical Tests:   Lab Tests: Ordered and Reviewed  Radiological Study: Ordered and Reviewed  Medical Tests: Ordered and Reviewed           Scribe Attestation:   Scribe #1: I performed the above scribed service and the documentation accurately describes the services I performed. I attest to the accuracy of the note.    Attending:   Physician Attestation Statement for Scribe #1: I, Maurice Gil MD, personally performed the services described in this documentation, as scribed by Prabhakar Fernando, in my presence, and it is both accurate and complete.          Clinical Impression       ICD-10-CM ICD-9-CM   1. COPD exacerbation J44.1 491.21   2. SOB (shortness of breath) R06.02 786.05       Disposition:   Disposition: Discharged  Condition: Stable         Maurice Gil MD  06/02/18 [id number]

## 2018-06-04 ENCOUNTER — OFFICE VISIT (OUTPATIENT)
Dept: INTERNAL MEDICINE | Facility: CLINIC | Age: 63
End: 2018-06-04
Payer: MEDICARE

## 2018-06-04 VITALS
HEIGHT: 70 IN | OXYGEN SATURATION: 92 % | HEART RATE: 87 BPM | BODY MASS INDEX: 26.94 KG/M2 | SYSTOLIC BLOOD PRESSURE: 106 MMHG | TEMPERATURE: 98 F | WEIGHT: 188.19 LBS | DIASTOLIC BLOOD PRESSURE: 79 MMHG

## 2018-06-04 DIAGNOSIS — J44.9 COPD, VERY SEVERE: Primary | Chronic | ICD-10-CM

## 2018-06-04 DIAGNOSIS — F32.A DEPRESSION, UNSPECIFIED DEPRESSION TYPE: ICD-10-CM

## 2018-06-04 PROCEDURE — 99213 OFFICE O/P EST LOW 20 MIN: CPT | Mod: PBBFAC,PO | Performed by: FAMILY MEDICINE

## 2018-06-04 PROCEDURE — 99214 OFFICE O/P EST MOD 30 MIN: CPT | Mod: S$PBB,,, | Performed by: FAMILY MEDICINE

## 2018-06-04 PROCEDURE — 99999 PR PBB SHADOW E&M-EST. PATIENT-LVL III: CPT | Mod: PBBFAC,,, | Performed by: FAMILY MEDICINE

## 2018-06-04 RX ORDER — CHOLECALCIFEROL (VITAMIN D3) 10 MCG
TABLET ORAL
Qty: 60 TABLET | Refills: 5 | Status: SHIPPED | OUTPATIENT
Start: 2018-06-04 | End: 2018-11-27 | Stop reason: SDUPTHER

## 2018-06-04 RX ORDER — CITALOPRAM 40 MG/1
40 TABLET, FILM COATED ORAL DAILY
Qty: 30 TABLET | Refills: 2 | Status: SHIPPED | OUTPATIENT
Start: 2018-06-04 | End: 2018-09-01 | Stop reason: SDUPTHER

## 2018-06-04 RX ORDER — PREDNISONE 10 MG/1
10-40 TABLET ORAL DAILY
Qty: 30 TABLET | Refills: 0 | Status: SHIPPED | OUTPATIENT
Start: 2018-06-04 | End: 2018-06-14

## 2018-06-04 NOTE — PROGRESS NOTES
Subjective:       Patient ID: Johnny Perales is a 63 y.o. male.    Chief Complaint: Follow-up    Emergency room follow-up for COPD acute exacerbation.  He feels improved.  He has been on prednisone 50 mg a day for 4 days.  He continues with nebulizers that include Atrovent and albuterol.  He also uses anoro ellipta.  He does not smoke cigarettes but is exposed to secondhand cigarette smoking at home.  He has cough productive of thick whitish sputum.  Denies fever or chills.  He also has depression.  He was treated by psychiatry in the past with Celexa 20 mg a day.  He feels this did not help.  He also took Wellbutrin did not help.  He would like to resume antidepressant.      Review of Systems   Constitutional: Negative for activity change, appetite change, chills, diaphoresis and fever.   HENT: Negative for congestion.    Respiratory: Positive for cough, chest tightness, shortness of breath and wheezing.    Cardiovascular: Negative for chest pain, palpitations and leg swelling.        Denies lightheadedness   Psychiatric/Behavioral: Positive for dysphoric mood. Negative for agitation, behavioral problems and confusion. The patient is not nervous/anxious.        Objective:      Physical Exam   Constitutional: He appears well-developed and well-nourished. No distress.   HENT:   Right Ear: External ear normal.   Left Ear: External ear normal.   Nose: Nose normal.   Mouth/Throat: Oropharynx is clear and moist.   Eyes: Conjunctivae are normal.   Cardiovascular: Normal rate and regular rhythm.  Exam reveals no gallop.    No murmur heard.  Pulmonary/Chest: Effort normal. No respiratory distress. He has wheezes. He has no rales.   Decreased breath sounds especially bilateral lower lung fields.  Mild expiratory wheezing with forced expiration   Skin: He is not diaphoretic.   Psychiatric: He has a normal mood and affect. His behavior is normal. Judgment and thought content normal.       Admission on 05/29/2018,  Discharged on 05/30/2018   Component Date Value Ref Range Status    WBC 05/29/2018 8.58  3.90 - 12.70 K/uL Final    RBC 05/29/2018 4.95  4.60 - 6.20 M/uL Final    Hemoglobin 05/29/2018 17.5  14.0 - 18.0 g/dL Final    Hematocrit 05/29/2018 50.1  40.0 - 54.0 % Final    MCV 05/29/2018 101* 82 - 98 fL Final    MCH 05/29/2018 35.4* 27.0 - 31.0 pg Final    MCHC 05/29/2018 34.9  32.0 - 36.0 g/dL Final    RDW 05/29/2018 14.5  11.5 - 14.5 % Final    Platelets 05/29/2018 167  150 - 350 K/uL Final    MPV 05/29/2018 9.5  9.2 - 12.9 fL Final    Gran # (ANC) 05/29/2018 4.9  1.8 - 7.7 K/uL Final    Lymph # 05/29/2018 2.3  1.0 - 4.8 K/uL Final    Mono # 05/29/2018 0.7  0.3 - 1.0 K/uL Final    Eos # 05/29/2018 0.7* 0.0 - 0.5 K/uL Final    Baso # 05/29/2018 0.06  0.00 - 0.20 K/uL Final    Gran% 05/29/2018 56.9  38.0 - 73.0 % Final    Lymph% 05/29/2018 27.2  18.0 - 48.0 % Final    Mono% 05/29/2018 7.6  4.0 - 15.0 % Final    Eosinophil% 05/29/2018 7.6  0.0 - 8.0 % Final    Basophil% 05/29/2018 0.7  0.0 - 1.9 % Final    Differential Method 05/29/2018 Automated   Final    Sodium 05/29/2018 141  136 - 145 mmol/L Final    Potassium 05/29/2018 4.2  3.5 - 5.1 mmol/L Final    Chloride 05/29/2018 102  95 - 110 mmol/L Final    CO2 05/29/2018 28  23 - 29 mmol/L Final    Glucose 05/29/2018 100  70 - 110 mg/dL Final    BUN, Bld 05/29/2018 6* 8 - 23 mg/dL Final    Creatinine 05/29/2018 0.9  0.5 - 1.4 mg/dL Final    Calcium 05/29/2018 10.0  8.7 - 10.5 mg/dL Final    Total Protein 05/29/2018 7.9  6.0 - 8.4 g/dL Final    Albumin 05/29/2018 4.0  3.5 - 5.2 g/dL Final    Total Bilirubin 05/29/2018 0.8  0.1 - 1.0 mg/dL Final    Alkaline Phosphatase 05/29/2018 91  55 - 135 U/L Final    AST 05/29/2018 36  10 - 40 U/L Final    ALT 05/29/2018 38  10 - 44 U/L Final    Anion Gap 05/29/2018 11  8 - 16 mmol/L Final    eGFR if African American 05/29/2018 >60  >60 mL/min/1.73 m^2 Final    eGFR if non African American 05/29/2018  >60  >60 mL/min/1.73 m^2 Final    Prothrombin Time 05/29/2018 10.4  9.0 - 12.5 sec Final    INR 05/29/2018 1.0  0.8 - 1.2 Final    aPTT 05/29/2018 27.8  21.0 - 32.0 sec Final    CPK 05/29/2018 87  20 - 200 U/L Final    Troponin I 05/29/2018 <0.006  0.000 - 0.026 ng/mL Final     Assessment:       1. COPD, very severe    2. Depression, unspecified depression type         continue current inhalers.  Taper prednisone 40 mg a day for 3 days, 30 mg a day for 3 days, 20 mg a day for 3 days, 10 mg a day for 3 days.  Start Mucinex 600 mg twice a day with increase water intake.  Avoid cigarette smoke.  Start Celexa 40 mg a day for depression with potential side effects discussed.  Follow-up in 2 weeks.    COPD, very severe  -     predniSONE (DELTASONE) 10 MG tablet; Take 1-4 tablets (10-40 mg total) by mouth once daily. 4qd for 3 days  3 qd for 3 days 2qd for 3 days 1 qd for 3 days  Dispense: 30 tablet; Refill: 0    Depression, unspecified depression type  -     citalopram (CELEXA) 40 MG tablet; Take 1 tablet (40 mg total) by mouth once daily.  Dispense: 30 tablet; Refill: 2    Other orders  -     Cancel: Lipid panel; Future; Expected date: 05/21/2018

## 2018-07-05 DIAGNOSIS — J44.9 COPD, VERY SEVERE: Chronic | ICD-10-CM

## 2018-07-05 NOTE — TELEPHONE ENCOUNTER
----- Message from Sarah Mojica sent at 7/5/2018 11:17 AM CDT -----  Contact: roberta-sister  ipratropium 0.02% refill needed..[phone]      [facility] [address] [address] [employer] - [address] - [address]  [address] [place] [address]  Phone: [phone] Fax: [phone]

## 2018-07-07 RX ORDER — IPRATROPIUM BROMIDE 0.5 MG/2.5ML
SOLUTION RESPIRATORY (INHALATION)
Qty: 187.5 ML | Refills: 10 | Status: SHIPPED | OUTPATIENT
Start: 2018-07-07 | End: 2019-05-29 | Stop reason: SDUPTHER

## 2018-07-07 RX ORDER — ALBUTEROL SULFATE 0.63 MG/3ML
SOLUTION RESPIRATORY (INHALATION)
Qty: 375 ML | Refills: 11 | Status: SHIPPED | OUTPATIENT
Start: 2018-07-07 | End: 2019-10-22 | Stop reason: SDUPTHER

## 2018-09-01 DIAGNOSIS — F32.A DEPRESSION, UNSPECIFIED DEPRESSION TYPE: ICD-10-CM

## 2018-09-02 RX ORDER — CITALOPRAM 40 MG/1
TABLET, FILM COATED ORAL
Qty: 30 TABLET | Refills: 2 | Status: SHIPPED | OUTPATIENT
Start: 2018-09-02 | End: 2018-11-27 | Stop reason: SDUPTHER

## 2018-10-26 DIAGNOSIS — J44.9 COPD, VERY SEVERE: Chronic | ICD-10-CM

## 2018-10-26 RX ORDER — ALBUTEROL SULFATE 90 UG/1
AEROSOL, METERED RESPIRATORY (INHALATION)
Qty: 8.5 INHALER | Refills: 8 | Status: SHIPPED | OUTPATIENT
Start: 2018-10-26 | End: 2018-11-06

## 2018-11-06 ENCOUNTER — HOSPITAL ENCOUNTER (OUTPATIENT)
Dept: RADIOLOGY | Facility: HOSPITAL | Age: 63
Discharge: HOME | End: 2018-11-06
Attending: INTERNAL MEDICINE
Payer: MEDICARE

## 2018-11-06 ENCOUNTER — CLINICAL SUPPORT (OUTPATIENT)
Dept: PULMONOLOGY | Facility: CLINIC | Age: 63
End: 2018-11-06
Payer: MEDICARE

## 2018-11-06 ENCOUNTER — OFFICE VISIT (OUTPATIENT)
Dept: PULMONOLOGY | Facility: CLINIC | Age: 63
End: 2018-11-06
Payer: MEDICARE

## 2018-11-06 VITALS
DIASTOLIC BLOOD PRESSURE: 80 MMHG | HEIGHT: 71 IN | BODY MASS INDEX: 25.62 KG/M2 | HEART RATE: 82 BPM | SYSTOLIC BLOOD PRESSURE: 129 MMHG | WEIGHT: 183 LBS | HEIGHT: 71 IN | WEIGHT: 183 LBS | RESPIRATION RATE: 18 BRPM | BODY MASS INDEX: 25.62 KG/M2 | OXYGEN SATURATION: 93 %

## 2018-11-06 DIAGNOSIS — J96.11 CHRONIC RESPIRATORY FAILURE WITH HYPOXIA: Chronic | ICD-10-CM

## 2018-11-06 DIAGNOSIS — J44.9 COPD, GROUP D, BY GOLD 2017 CLASSIFICATION: Primary | ICD-10-CM

## 2018-11-06 DIAGNOSIS — J44.9 COPD, VERY SEVERE: Chronic | ICD-10-CM

## 2018-11-06 DIAGNOSIS — R91.1 PULMONARY NODULE: Chronic | ICD-10-CM

## 2018-11-06 PROCEDURE — 99215 OFFICE O/P EST HI 40 MIN: CPT | Mod: 25,HCNC,S$GLB, | Performed by: INTERNAL MEDICINE

## 2018-11-06 PROCEDURE — 71046 X-RAY EXAM CHEST 2 VIEWS: CPT | Mod: TC,HCNC

## 2018-11-06 PROCEDURE — 99999 PR PBB SHADOW E&M-EST. PATIENT-LVL III: CPT | Mod: PBBFAC,HCNC,, | Performed by: INTERNAL MEDICINE

## 2018-11-06 PROCEDURE — 71046 X-RAY EXAM CHEST 2 VIEWS: CPT | Mod: 26,HCNC,, | Performed by: RADIOLOGY

## 2018-11-06 PROCEDURE — 3008F BODY MASS INDEX DOCD: CPT | Mod: CPTII,HCNC,S$GLB, | Performed by: INTERNAL MEDICINE

## 2018-11-06 PROCEDURE — 94618 PULMONARY STRESS TESTING: CPT | Mod: HCNC,S$GLB,, | Performed by: INTERNAL MEDICINE

## 2018-11-06 RX ORDER — ALBUTEROL SULFATE 90 UG/1
2 AEROSOL, METERED RESPIRATORY (INHALATION) EVERY 4 HOURS PRN
Qty: 18 G | Refills: 11 | Status: SHIPPED | OUTPATIENT
Start: 2018-11-06 | End: 2019-10-22 | Stop reason: SDUPTHER

## 2018-11-06 RX ORDER — ROFLUMILAST 500 UG/1
500 TABLET ORAL DAILY
Qty: 30 TABLET | Refills: 11 | Status: SHIPPED | OUTPATIENT
Start: 2018-11-06 | End: 2019-10-22 | Stop reason: SDUPTHER

## 2018-11-06 NOTE — PROGRESS NOTES
Subjective:      Patient ID: Johnny Perales is a 63 y.o. male.  Patient Active Problem List   Diagnosis    COPD, group D, by GOLD 2017 classification    Pulmonary nodule    Chronic respiratory failure with hypoxia    Screening for colon cancer    Family history of hypercholesterolemia     Chronic fatigue     Bilateral hearing loss    Elevated MCV    Depression    Immunization deficiency    Hyperkalemia    Elevated TSH    Screen for colon cancer    Bilateral carpal tunnel syndrome    Suicidal behavior with attempted self-injury    Acute pain of right shoulder    History of colon polyps     Problem list has been reviewed.    Chief Complaint: COPD    HPI      Group Spirometric Classification Exacerbations / year mMRC CAT   Group D: High risk; more sypmtoms  GOLD 3-4 > = 2 >= 2 >= 10     FEV 1 => 1.38L  (38% predicted)     Six minute walk test reviewed with pateint who voiced understanding.     Patients reports NYHA III dyspnea    The patient does not have currently have symptoms / an exacerbation.       No cough, sputum, or hemoptysis and No shortness or breath.       The patient does not have symptoms / an exacerbation.      Her current respiratory regimen: ANORO, PROAIR, ACCUNEB: He does use medications compliantly.     Immunization status reviewed and up to date.    COPD QUESTIONNAIRE  How often do you cough?: Most of the time  How often do you have phlegm (mucus) in your chest?: Most of the time  How often does your chest feel tight?: Most of the time  When you walk up a hill or one flight of stairs, how often are you breathless?: All of the time  How often are you limited doing any activities at home?: Most of the time  How often are you confident leaving the house despite your lung condition?: All of the time  How often do you sleep soundly?: Almost never  How often do you have energy?: Almost never  Total score: 29     HE IS ON OXYGEN SUPPLEMENTATION at 3 L /min    HE IS ON TRIOLOGY NIV (  VIEMED) . HE IS COMPLAINCE WITH HIS TRIOLOGY.     A full  review of systems, past , family  and social histories was performed except as mentioned in the note above, these are non contributory to the main issues discussed today.     Previous Report Reviewed: office notes     The following portions of the patient's history were reviewed and updated as appropriate: He  has a past medical history of COPD (chronic obstructive pulmonary disease) and Hearing impairment.  He  has a past surgical history that includes Inner ear surgery (Bilateral); Leg Surgery (Right); and COLONOSCOPY (N/A, 7/12/2017).  His family history includes Cancer in his father and mother; Colon cancer in his maternal uncle; Heart failure in his father and mother.  He  reports that he has been smoking vaping with nicotine.  He has a 3.50 pack-year smoking history. he has never used smokeless tobacco. He reports that he drinks about 7.2 oz of alcohol per week. He reports that he does not use drugs.  He has a current medication list which includes the following prescription(s): afluria quad 4924-1810 (pf), albuterol, citalopram, ipratropium, oxygen-air delivery systems, umeclidinium-vilanterol, vitamin d3, albuterol, and roflumilast.  He has No Known Allergies..    Review of Systems   Constitutional: Positive for fatigue and night sweats.   HENT: Negative for nosebleeds and hearing loss.    Eyes: Negative for redness.   Respiratory: Positive for cough, sputum production, wheezing and dyspnea on extertion. Negative for somnolence.    Cardiovascular: Negative for chest pain and leg swelling.   Genitourinary: Negative for hematuria.   Endocrine: Negative for cold intolerance and heat intolerance.    Musculoskeletal: Negative for arthralgias and back pain.   Skin: Negative for rash.   Gastrointestinal: Negative for abdominal pain and abdominal distention.   Neurological: Negative for dizziness and headaches.   Hematological: Negative for adenopathy. Does  "not bruise/bleed easily.   Psychiatric/Behavioral: The patient is nervous/anxious.    All other systems reviewed and are negative.     Objective:   /80   Pulse 82   Resp 18   Ht 5' 11" (1.803 m)   Wt 83 kg (183 lb)   SpO2 (!) 93% Comment: 3 liters  BMI 25.52 kg/m²   Body mass index is 25.52 kg/m².    Physical Exam   Constitutional: He is oriented to person, place, and time. He appears well-developed and well-nourished.   HENT:   Head: Normocephalic and atraumatic.   Eyes: EOM are normal. Pupils are equal, round, and reactive to light.   Neck: Normal range of motion. Neck supple.   Cardiovascular: Normal rate and regular rhythm.   Pulmonary/Chest: No respiratory distress. He has decreased breath sounds in the right upper field, the right middle field, the right lower field, the left upper field, the left middle field and the left lower field. He has no wheezes. He has no rales.   Abdominal: Soft. Bowel sounds are normal.   Musculoskeletal: Normal range of motion.   Neurological: He is alert and oriented to person, place, and time.   Skin: Skin is warm.   Psychiatric: He has a normal mood and affect.       Personal Diagnostic Review    Six minute walk distance is 365.76 / 598 meters (61.11 % predicted) with heavy dyspnea. Peak VO2 during walking was 14.95 ml/min/kg [ 4.27 METS]. During exercise, there was significant desaturation while breathing room air to 88%. Oxygen saturation improved to 94% with oxygen supplementation at 2  L /min. Both blood pressure and heart rate remained stable with walking. The patient did not report non-pulmonary symptoms during exercise.  Significant exercise impairment is likely due to desaturation. The patient did complete the study, walking 360 seconds of the 360 second test.  The patient may benefit from using supplemental oxygen during exertion. Since the previous study in 10/07/16, exercise capacity may be somewhat improved. Based upon age and body mass index, exercise " capacity is less than predicted.      CXR: The lungs are clear. The cardiac silhouette is within normal limits. No pleural effusion or pneumothorax or pulmonary edema.      Assessment / plan:       Discussed diagnosis, its evaluation, treatment and usual course. All questions answered.    Problem List Items Addressed This Visit        Pulmonary    Pulmonary nodule (Chronic)    Current Assessment & Plan     STABLE RADIOLOGICALLY. Radiologic monitoring.          Chronic respiratory failure with hypoxia (Chronic)    Current Assessment & Plan     Continue oxygen supplementation at 2  L/min. DME is [employer].   Continue nocturnal TRIOLOGY ( VIEMED)         COPD, group D, by GOLD 2017 classification - Primary    Current Assessment & Plan     COPD ROS: taking medications as instructed, no medication side effects noted, no significant ongoing wheezing or shortness of breath, using bronchodilator MDI less than twice a week.   New concerns: None.   Exam: Bilateral wheezes and mild respiratory distress.   Assessment:  COPD stable and reasonably well controlled.   Plan: Plan as above. Continue  ACCUNEB, VENTOLIN , ANORO. VENTOLIN REFILLED. START DALIRESP PER ORDERS. PFT in 6 months.          Relevant Medications    albuterol (PROVENTIL/VENTOLIN HFA) 90 mcg/actuation inhaler    roflumilast (DALIRESP) 500 mcg Tab    Other Relevant Orders    Complete PFT with bronchodilator          TIME SPENT WITH PATIENT: Time spent: 40 minutes in face to face  discussion concerning diagnosis, prognosis, review of lab and test results, benefits of treatment as well as management of disease, counseling of patient and coordination of care between various health  care providers . Greater than half the time spent was used for coordination of care and counseling of patient.          Follow-up in about 6 months (around 5/6/2019) for COPD, Chronic Respiratory Failure, Lung Nodule.

## 2018-11-06 NOTE — PROCEDURES
"[facility]  Six Minute Walk     SUMMARY     Ordering Provider: DR Rodriguez   Interpreting Provider: DR Rodriguez  Performing nurse/tech/RT: Mikhail  Diagnosis: (Respiratory Failure with Hypoxemia)  Height: 5' 11" (180.3 cm)  Weight: 83 kg (183 lb)  BMI (Calculated): 25.6   Patient Race:             Phase Oxygen Assessment Supplemental O2 Heart   Rate Blood Pressure Mika Dyspnea Scale Rating   Resting 94 % Room Air 80 bpm 129/80 4   Exercise        Minute        1 92 % Room Air 93 bpm     2 90 % Room Air 95 bpm     3 88 % Room Air 95 bpm     4 96 % 2 L/M 95 bpm     5 92 % 2 L/M 97 bpm     6  94 % 2 L/M 98 bpm 132/78 5-6   Recovery        Minute        1 96 % 2 L/M 88 bpm     2 96 % 2 L/M 85 bpm     3 96 % 2 L/M 81 bpm     4 97 % 2 L/M 87 bpm 129/82 3     Six Minute Walk Summary  6MWT Status: completed without stopping  Patient Reported: No complaints     Interpretation:  Did the patient stop or pause?: No           Total Time Walked (Calculated): 360 seconds  Final Partial Lap Distance (feet): 0 feet  Total Distance Meters (Calculated): 365.76 meters  Predicted Distance Meters (Calculated): 598.54 meters  Percentage of Predicted (Calculated): 61.11  Peak VO2 (Calculated): 14.95  Mets: 4.27  Has The Patient Had a Previous Six Minute Walk Test?: Yes       Previous 6MWT Results  Has The Patient Had a Previous Six Minute Walk Test?: Yes  Date of Previous Test: 10/07/16  Total Time Walked: 360 seconds  Total Distance (meters): 441.48  Predicted Distance (meters): 610.35 meters  Percentage of Predicted: 67.42  Percent Change (Calculated): 0.17        PHYSICIAN INTERPRETATION:    Six minute walk distance is 365.76 / 598 meters (61.11 % predicted) with heavy dyspnea. Peak VO2 during walking was 14.95 ml/min/kg [ 4.27 METS]. During exercise, there was significant desaturation while breathing room air to 88%. Oxygen saturation improved to 94% with oxygen supplementation at 2  L /min. Both blood pressure and " heart rate remained stable with walking. The patient did not report non-pulmonary symptoms during exercise.  Significant exercise impairment is likely due to desaturation. The patient did complete the study, walking 360 seconds of the 360 second test. The patient may benefit from using supplemental oxygen during exertion. Since the previous study in 10/07/16, exercise capacity may be somewhat improved. Based upon age and body mass index, exercise capacity is less than predicted.

## 2018-11-06 NOTE — ASSESSMENT & PLAN NOTE
Continue oxygen supplementation at 2  L/min. DME is [facility].   Continue nocturnal TRIOLOGY ( VIEMED)

## 2018-11-06 NOTE — PATIENT INSTRUCTIONS
Chronic Lung Disease: If Oxygen Is Prescribed   Supplemental oxygen is prescribed if tests show that the level of oxygen in your blood is too low. If the level stays too low for too long, serious problems can develop in many parts of the body. Supplemental oxygen helps to relieve your symptoms and prevent future problems by getting more oxygen to the blood. Depending on your test results, you may need oxygen all the time. Or it may only be needed during certain activities, such as exercise or sleep. When oxygen is prescribed, youll be referred to a medical equipment company. They will set up the oxygen unit and teach you how to use it.  Nasal cannula     A nasal cannula should be placed in the nose with the prongs arching toward you.     Oxygen is most often inhaled through a nasal cannula (lightweight tube with two hollow prongs that fit just inside the nose).  Types of supplemental oxygen    Compressed oxygen   Oxygen concentrator   Liquid oxygen   Prescribed oxygen comes in several forms. You may use more than one type, depending on when you need oxygen:  · Compressed oxygen is oxygen gas stored in a tank. Because the oxygen is stored under pressure, these tanks must be handled carefully. Gauges on the tank can be used to adjust the oxygen flow rate. Your healthcare provider will determine what this should be. Small tanks can be carried. Larger tanks are on wheels and can be pulled around the house.  · An oxygen concentrator is a machine about the size of a large suitcase. It plugs into an electrical outlet. (A back-up oxygen supply is recommended in case of a power outage.) The machine takes oxygen from the air and concentrates it. Its then delivered to you through plastic tubing. The tubing is long enough so that you can move around the house. When youre using the concentrator, it must be kept somewhere that has a good supply of fresh air. (Dont keep it in a confined space, like a closet.) You may be set  up on a concentrator if you need oxygen all the time or while youre sleeping.  · Liquid oxygen results when oxygen gas is cooled to a very low temperature. Its kept in special containers that maintain this low temperature. When you use liquid oxygen, its warmed and becomes gas before reaching the cannula. Most tanks come with a portable unit that you can carry or pull on a cart. Some of these weigh only a few pounds. Liquid oxygen units are easy to carry around. If you need oxygen all the time or during activity, this kind of unit can help you stay active.  Oxygen is prescribed just for you  Your healthcare provider will prescribe oxygen based on your needs. Here are a few things you should know:  · A therapist from the medical equipment company will explain when to use oxygen and what type to use. Youll be taught how to use and maintain your oxygen equipment.  · You must use the exact rate of oxygen prescribed for each activity. Dont increase or decrease the amount without asking your healthcare provider first.  · Supplemental oxygen is a medicine. Its not addictive and causes no side effects when used as directed.   Date Last Reviewed: 5/1/2016  © 3791-0860 [employer]. [address] [place] [address], [place] [address]. All rights reserved. This information is not intended as a substitute for professional medical care. Always follow your healthcare professional's instructions.

## 2018-11-06 NOTE — ASSESSMENT & PLAN NOTE
COPD ROS: taking medications as instructed, no medication side effects noted, no significant ongoing wheezing or shortness of breath, using bronchodilator MDI less than twice a week.   New concerns: None.   Exam: Bilateral wheezes and mild respiratory distress.   Assessment:  COPD stable and reasonably well controlled.   Plan: Plan as above. Continue  ACCUNEB, VENTOLIN , ANORO. VENTOLIN REFILLED. START DALIRESP PER ORDERS. PFT in 6 months.

## 2018-11-13 ENCOUNTER — TELEPHONE (OUTPATIENT)
Dept: PULMONOLOGY | Facility: CLINIC | Age: 63
End: 2018-11-13

## 2018-11-13 NOTE — TELEPHONE ENCOUNTER
----- Message from Lynda Ashton sent at 11/13/2018  4:00 PM CST -----  Contact:  Chio conteh ([phone])  Caller called stating pt's oxygen machine isn't currently working and caller states the machine keeps making beeping noise. Pt really need nurse to contact her regarding help getting machine fixed. Please contact Chio at [phone].

## 2018-11-27 DIAGNOSIS — F32.A DEPRESSION, UNSPECIFIED DEPRESSION TYPE: ICD-10-CM

## 2018-11-27 RX ORDER — CYANOCOBALAMIN (VITAMIN B-12) 500 MCG
2 TABLET ORAL DAILY
Qty: 60 TABLET | Refills: 5 | Status: SHIPPED | OUTPATIENT
Start: 2018-11-27

## 2018-11-27 RX ORDER — CITALOPRAM 40 MG/1
TABLET, FILM COATED ORAL
Qty: 30 TABLET | Refills: 2 | Status: SHIPPED | OUTPATIENT
Start: 2018-11-27 | End: 2019-04-01 | Stop reason: SDUPTHER

## 2019-01-02 RX ORDER — ALBUTEROL SULFATE 0.63 MG/3ML
SOLUTION RESPIRATORY (INHALATION)
Qty: 375 ML | Refills: 10 | Status: SHIPPED | OUTPATIENT
Start: 2019-01-02 | End: 2019-05-29 | Stop reason: SDUPTHER

## 2019-01-23 ENCOUNTER — PATIENT MESSAGE (OUTPATIENT)
Dept: PULMONOLOGY | Facility: CLINIC | Age: 64
End: 2019-01-23

## 2019-04-01 DIAGNOSIS — F32.A DEPRESSION, UNSPECIFIED DEPRESSION TYPE: ICD-10-CM

## 2019-04-01 RX ORDER — CITALOPRAM 40 MG/1
TABLET, FILM COATED ORAL
Qty: 30 TABLET | Refills: 2 | Status: SHIPPED | OUTPATIENT
Start: 2019-04-01 | End: 2019-04-09 | Stop reason: SDUPTHER

## 2019-04-01 NOTE — TELEPHONE ENCOUNTER
----- Message from Parvin Harris sent at 4/1/2019 10:56 AM CDT -----  Type:  RX Refill Request    Who Called:   Pt  Johnny  Refill or New Rx: refill  RX Name and Strength:  Citalopram  40mg  How is the patient currently taking it? (ex. 1XDay): Takes once daily  Is this a 30 day or 90 day RX:  90 day   Preferred Pharmacy with phone number: [facility]  Local or Mail Order: Mail Order  Ordering Provider: Dr Yap  Would the patient rather a call back or a response via [facility]?  Call back  Best Call Back Number:[phone]  Additional Information:  Please call when sent in//jessica//misha

## 2019-04-09 ENCOUNTER — APPOINTMENT (OUTPATIENT)
Dept: LAB | Facility: HOSPITAL | Age: 64
End: 2019-04-09
Attending: FAMILY MEDICINE
Payer: MEDICARE

## 2019-04-09 ENCOUNTER — OFFICE VISIT (OUTPATIENT)
Dept: INTERNAL MEDICINE | Facility: CLINIC | Age: 64
End: 2019-04-09
Payer: MEDICARE

## 2019-04-09 VITALS
HEART RATE: 74 BPM | SYSTOLIC BLOOD PRESSURE: 114 MMHG | TEMPERATURE: 98 F | OXYGEN SATURATION: 98 % | DIASTOLIC BLOOD PRESSURE: 78 MMHG | BODY MASS INDEX: 23.41 KG/M2 | WEIGHT: 167.25 LBS | HEIGHT: 71 IN

## 2019-04-09 DIAGNOSIS — Z12.5 SCREENING FOR PROSTATE CANCER: ICD-10-CM

## 2019-04-09 DIAGNOSIS — Z12.11 COLON CANCER SCREENING: ICD-10-CM

## 2019-04-09 DIAGNOSIS — J44.9 CHRONIC OBSTRUCTIVE PULMONARY DISEASE, UNSPECIFIED COPD TYPE: ICD-10-CM

## 2019-04-09 DIAGNOSIS — Z00.00 ANNUAL PHYSICAL EXAM: Primary | ICD-10-CM

## 2019-04-09 DIAGNOSIS — F32.A DEPRESSION, UNSPECIFIED DEPRESSION TYPE: ICD-10-CM

## 2019-04-09 DIAGNOSIS — Z83.42 FAMILY HISTORY OF HYPERCHOLESTEROLEMIA: ICD-10-CM

## 2019-04-09 PROCEDURE — 85025 COMPLETE CBC W/AUTO DIFF WBC: CPT | Mod: HCNC

## 2019-04-09 PROCEDURE — 82274 ASSAY TEST FOR BLOOD FECAL: CPT | Mod: HCNC

## 2019-04-09 PROCEDURE — G0009 PNEUMOCOCCAL POLYSACCHARIDE VACCINE 23-VALENT =>2YO SQ IM: ICD-10-PCS | Mod: HCNC,S$GLB,, | Performed by: FAMILY MEDICINE

## 2019-04-09 PROCEDURE — 36415 PR COLLECTION VENOUS BLOOD,VENIPUNCTURE: ICD-10-PCS | Mod: HCNC,S$GLB,, | Performed by: FAMILY MEDICINE

## 2019-04-09 PROCEDURE — 84443 ASSAY THYROID STIM HORMONE: CPT | Mod: HCNC

## 2019-04-09 PROCEDURE — 99214 OFFICE O/P EST MOD 30 MIN: CPT | Mod: HCNC,25,S$GLB, | Performed by: FAMILY MEDICINE

## 2019-04-09 PROCEDURE — 36415 COLL VENOUS BLD VENIPUNCTURE: CPT | Mod: HCNC,S$GLB,, | Performed by: FAMILY MEDICINE

## 2019-04-09 PROCEDURE — 99999 PR PBB SHADOW E&M-EST. PATIENT-LVL IV: CPT | Mod: PBBFAC,HCNC,, | Performed by: FAMILY MEDICINE

## 2019-04-09 PROCEDURE — G0009 ADMIN PNEUMOCOCCAL VACCINE: HCPCS | Mod: HCNC,S$GLB,, | Performed by: FAMILY MEDICINE

## 2019-04-09 PROCEDURE — 80053 COMPREHEN METABOLIC PANEL: CPT | Mod: HCNC

## 2019-04-09 PROCEDURE — 3008F PR BODY MASS INDEX (BMI) DOCUMENTED: ICD-10-PCS | Mod: HCNC,CPTII,S$GLB, | Performed by: FAMILY MEDICINE

## 2019-04-09 PROCEDURE — 90732 PPSV23 VACC 2 YRS+ SUBQ/IM: CPT | Mod: HCNC,S$GLB,, | Performed by: FAMILY MEDICINE

## 2019-04-09 PROCEDURE — 81001 URINALYSIS AUTO W/SCOPE: CPT | Mod: HCNC

## 2019-04-09 PROCEDURE — 80061 LIPID PANEL: CPT | Mod: HCNC

## 2019-04-09 PROCEDURE — 3008F BODY MASS INDEX DOCD: CPT | Mod: HCNC,CPTII,S$GLB, | Performed by: FAMILY MEDICINE

## 2019-04-09 PROCEDURE — 99214 PR OFFICE/OUTPT VISIT, EST, LEVL IV, 30-39 MIN: ICD-10-PCS | Mod: HCNC,25,S$GLB, | Performed by: FAMILY MEDICINE

## 2019-04-09 PROCEDURE — 90732 PNEUMOCOCCAL POLYSACCHARIDE VACCINE 23-VALENT =>2YO SQ IM: ICD-10-PCS | Mod: HCNC,S$GLB,, | Performed by: FAMILY MEDICINE

## 2019-04-09 PROCEDURE — 84153 ASSAY OF PSA TOTAL: CPT | Mod: HCNC

## 2019-04-09 PROCEDURE — 99999 PR PBB SHADOW E&M-EST. PATIENT-LVL IV: ICD-10-PCS | Mod: PBBFAC,HCNC,, | Performed by: FAMILY MEDICINE

## 2019-04-09 RX ORDER — CITALOPRAM 40 MG/1
TABLET, FILM COATED ORAL
Qty: 90 TABLET | Refills: 1 | Status: SHIPPED | OUTPATIENT
Start: 2019-04-09 | End: 2019-10-24 | Stop reason: SDUPTHER

## 2019-04-09 NOTE — PROGRESS NOTES
Injection received today, informed to sit in clinic 15 minutes, verbalized understanding.  [employer] was given to patient on 4/9/2019 10:27 AM

## 2019-04-09 NOTE — PROGRESS NOTES
Subjective:       Patient ID: Johnny Perales is a 64 y.o. male.    Chief Complaint: Annual Exam    Annual exam.  Follow-up COPD depression.  He is also followed by Pulmonary.  He reports pulmonary wise he is stable.  He continues on nasal O2 continuous.  He reports depression is now controlled on Celexa.  Denies headache chest pain palpitations or edema. He denies any urinary obstructive flow.  He is not up-to-date on pneumococcal immunization.  He is not up-to-date on colon cancer screening.    Review of Systems   Constitutional: Negative for activity change, appetite change, fatigue and unexpected weight change.   HENT: Negative for congestion, ear pain, hearing loss, sneezing, sore throat, tinnitus and trouble swallowing.    Eyes: Negative for pain, redness and visual disturbance.   Respiratory: Positive for cough, shortness of breath and wheezing. Negative for chest tightness.    Cardiovascular: Negative for chest pain, palpitations and leg swelling.   Gastrointestinal: Negative for abdominal distention, abdominal pain, blood in stool, constipation, diarrhea, nausea, rectal pain and vomiting.   Endocrine: Negative for polydipsia and polyuria.   Genitourinary: Negative for difficulty urinating, dysuria, frequency, hematuria and urgency.   Musculoskeletal: Negative for arthralgias, back pain, joint swelling, myalgias, neck pain and neck stiffness.   Skin: Negative for rash and wound.   Neurological: Negative for dizziness, tremors, syncope, light-headedness, numbness and headaches.   Hematological: Negative for adenopathy. Does not bruise/bleed easily.   Psychiatric/Behavioral: Negative for agitation, confusion, dysphoric mood and sleep disturbance. The patient is not nervous/anxious.        Objective:      Physical Exam   Constitutional: He is oriented to person, place, and time. He appears well-developed and well-nourished.   HENT:   Right Ear: External ear normal.   Left Ear: External ear normal.   Nose:  Nose normal.   Mouth/Throat: Oropharynx is clear and moist.   Eyes: Pupils are equal, round, and reactive to light. Conjunctivae and EOM are normal.   Neck: Normal range of motion. Neck supple. No JVD present. No thyromegaly present.   Cardiovascular: Normal rate, regular rhythm, normal heart sounds and intact distal pulses. Exam reveals no gallop and no friction rub.   No murmur heard.  Pulmonary/Chest: Effort normal. No respiratory distress. He has no wheezes. He has no rales.   Decreased breath sounds in general  O2 sat on nasal oxygen 98%   Abdominal: Soft. Bowel sounds are normal. He exhibits no distension and no mass. There is no tenderness.   Musculoskeletal: Normal range of motion. He exhibits no edema or tenderness.   Lymphadenopathy:     He has no cervical adenopathy.   Neurological: He is alert and oriented to person, place, and time. He has normal reflexes. He displays normal reflexes. No cranial nerve deficit. He exhibits normal muscle tone. Coordination normal.   Skin: Skin is warm and dry. No rash noted.   Psychiatric: He has a normal mood and affect. His behavior is normal. Judgment and thought content normal.       Admission on 05/29/2018, Discharged on 05/30/2018   Component Date Value Ref Range Status    WBC 05/29/2018 8.58  3.90 - 12.70 K/uL Final    RBC 05/29/2018 4.95  4.60 - 6.20 M/uL Final    Hemoglobin 05/29/2018 17.5  14.0 - 18.0 g/dL Final    Hematocrit 05/29/2018 50.1  40.0 - 54.0 % Final    MCV 05/29/2018 101* 82 - 98 fL Final    MCH 05/29/2018 35.4* 27.0 - 31.0 pg Final    MCHC 05/29/2018 34.9  32.0 - 36.0 g/dL Final    RDW 05/29/2018 14.5  11.5 - 14.5 % Final    Platelets 05/29/2018 167  150 - 350 K/uL Final    MPV 05/29/2018 9.5  9.2 - 12.9 fL Final    Gran # (ANC) 05/29/2018 4.9  1.8 - 7.7 K/uL Final    Lymph # 05/29/2018 2.3  1.0 - 4.8 K/uL Final    Mono # 05/29/2018 0.7  0.3 - 1.0 K/uL Final    Eos # 05/29/2018 0.7* 0.0 - 0.5 K/uL Final    Baso # 05/29/2018 0.06   0.00 - 0.20 K/uL Final    Gran% 05/29/2018 56.9  38.0 - 73.0 % Final    Lymph% 05/29/2018 27.2  18.0 - 48.0 % Final    Mono% 05/29/2018 7.6  4.0 - 15.0 % Final    Eosinophil% 05/29/2018 7.6  0.0 - 8.0 % Final    Basophil% 05/29/2018 0.7  0.0 - 1.9 % Final    Differential Method 05/29/2018 Automated   Final    Sodium 05/29/2018 141  136 - 145 mmol/L Final    Potassium 05/29/2018 4.2  3.5 - 5.1 mmol/L Final    Chloride 05/29/2018 102  95 - 110 mmol/L Final    CO2 05/29/2018 28  23 - 29 mmol/L Final    Glucose 05/29/2018 100  70 - 110 mg/dL Final    BUN, Bld 05/29/2018 6* 8 - 23 mg/dL Final    Creatinine 05/29/2018 0.9  0.5 - 1.4 mg/dL Final    Calcium 05/29/2018 10.0  8.7 - 10.5 mg/dL Final    Total Protein 05/29/2018 7.9  6.0 - 8.4 g/dL Final    Albumin 05/29/2018 4.0  3.5 - 5.2 g/dL Final    Total Bilirubin 05/29/2018 0.8  0.1 - 1.0 mg/dL Final    Alkaline Phosphatase 05/29/2018 91  55 - 135 U/L Final    AST 05/29/2018 36  10 - 40 U/L Final    ALT 05/29/2018 38  10 - 44 U/L Final    Anion Gap 05/29/2018 11  8 - 16 mmol/L Final    eGFR if African American 05/29/2018 >60  >60 mL/min/1.73 m^2 Final    eGFR if non African American 05/29/2018 >60  >60 mL/min/1.73 m^2 Final    Prothrombin Time 05/29/2018 10.4  9.0 - 12.5 sec Final    INR 05/29/2018 1.0  0.8 - 1.2 Final    aPTT 05/29/2018 27.8  21.0 - 32.0 sec Final    CPK 05/29/2018 87  20 - 200 U/L Final    Troponin I 05/29/2018 <0.006  0.000 - 0.026 ng/mL Final     Assessment:       1. Chronic obstructive pulmonary disease, unspecified COPD type    2. Depression, unspecified depression type    3. Colon cancer screening    4. Screening for prostate cancer    5. Family history of hypercholesterolemia         Plan:     Medications reviewed.  Celexa refill.  Lab was ordered.  Pneumococcal 23 given today.  He received Prevnar 13 about 3 years ago.  Follow-up in 3 months.    Chronic obstructive pulmonary disease, unspecified COPD type  -     CBC  auto differential  -     Urinalysis  -     Lipid panel    Depression, unspecified depression type  -     citalopram (CELEXA) 40 MG tablet; TAKE 1 TABLET BY MOUTH ONCE DAILY  Dispense: 90 tablet; Refill: 1  -     Comprehensive metabolic panel  -     TSH    Colon cancer screening  -     Fecal Immunochemical Test (iFOBT); Future; Expected date: 04/09/2019    Screening for prostate cancer  -     PSA, Screening    Family history of hypercholesterolemia   -     Lipid panel    Other orders  -     (In Office Administered) Pneumococcal Polysaccharide Vaccine (23 Valent) (SQ/IM)

## 2019-04-10 LAB
ALBUMIN SERPL BCP-MCNC: 3.9 G/DL (ref 3.5–5.2)
ALP SERPL-CCNC: 124 U/L (ref 55–135)
ALT SERPL W/O P-5'-P-CCNC: 28 U/L (ref 10–44)
ANION GAP SERPL CALC-SCNC: 9 MMOL/L (ref 8–16)
AST SERPL-CCNC: 25 U/L (ref 10–40)
BACTERIA #/AREA URNS AUTO: ABNORMAL /HPF
BASOPHILS # BLD AUTO: 0.07 K/UL (ref 0–0.2)
BASOPHILS NFR BLD: 1.1 % (ref 0–1.9)
BILIRUB SERPL-MCNC: 0.8 MG/DL (ref 0.1–1)
BILIRUB UR QL STRIP: NEGATIVE
BUN SERPL-MCNC: 4 MG/DL (ref 8–23)
CALCIUM SERPL-MCNC: 10.4 MG/DL (ref 8.7–10.5)
CHLORIDE SERPL-SCNC: 100 MMOL/L (ref 95–110)
CHOLEST SERPL-MCNC: 157 MG/DL (ref 120–199)
CHOLEST/HDLC SERPL: 2 {RATIO} (ref 2–5)
CLARITY UR REFRACT.AUTO: CLEAR
CO2 SERPL-SCNC: 30 MMOL/L (ref 23–29)
COLOR UR AUTO: YELLOW
COMPLEXED PSA SERPL-MCNC: 2.1 NG/ML (ref 0–4)
CREAT SERPL-MCNC: 0.8 MG/DL (ref 0.5–1.4)
DIFFERENTIAL METHOD: ABNORMAL
EOSINOPHIL # BLD AUTO: 0.2 K/UL (ref 0–0.5)
EOSINOPHIL NFR BLD: 2.4 % (ref 0–8)
ERYTHROCYTE [DISTWIDTH] IN BLOOD BY AUTOMATED COUNT: 14.3 % (ref 11.5–14.5)
EST. GFR  (AFRICAN AMERICAN): >60 ML/MIN/1.73 M^2
EST. GFR  (NON AFRICAN AMERICAN): >60 ML/MIN/1.73 M^2
GLUCOSE SERPL-MCNC: 81 MG/DL (ref 70–110)
GLUCOSE UR QL STRIP: NEGATIVE
HCT VFR BLD AUTO: 53 % (ref 40–54)
HDLC SERPL-MCNC: 78 MG/DL (ref 40–75)
HDLC SERPL: 49.7 % (ref 20–50)
HEMOCCULT STL QL IA: NEGATIVE
HGB BLD-MCNC: 18.4 G/DL (ref 14–18)
HGB UR QL STRIP: NEGATIVE
IMM GRANULOCYTES # BLD AUTO: 0.01 K/UL (ref 0–0.04)
IMM GRANULOCYTES NFR BLD AUTO: 0.2 % (ref 0–0.5)
KETONES UR QL STRIP: NEGATIVE
LDLC SERPL CALC-MCNC: 64.8 MG/DL (ref 63–159)
LEUKOCYTE ESTERASE UR QL STRIP: ABNORMAL
LYMPHOCYTES # BLD AUTO: 1.1 K/UL (ref 1–4.8)
LYMPHOCYTES NFR BLD: 17.3 % (ref 18–48)
MCH RBC QN AUTO: 35.4 PG (ref 27–31)
MCHC RBC AUTO-ENTMCNC: 34.7 G/DL (ref 32–36)
MCV RBC AUTO: 102 FL (ref 82–98)
MICROSCOPIC COMMENT: ABNORMAL
MONOCYTES # BLD AUTO: 0.5 K/UL (ref 0.3–1)
MONOCYTES NFR BLD: 7.9 % (ref 4–15)
NEUTROPHILS # BLD AUTO: 4.7 K/UL (ref 1.8–7.7)
NEUTROPHILS NFR BLD: 71.1 % (ref 38–73)
NITRITE UR QL STRIP: NEGATIVE
NONHDLC SERPL-MCNC: 79 MG/DL
NRBC BLD-RTO: 0 /100 WBC
PH UR STRIP: 7 [PH] (ref 5–8)
PLATELET # BLD AUTO: 248 K/UL (ref 150–350)
PMV BLD AUTO: 10.5 FL (ref 9.2–12.9)
POTASSIUM SERPL-SCNC: 4.1 MMOL/L (ref 3.5–5.1)
PROT SERPL-MCNC: 7.6 G/DL (ref 6–8.4)
PROT UR QL STRIP: NEGATIVE
RBC # BLD AUTO: 5.2 M/UL (ref 4.6–6.2)
RBC #/AREA URNS AUTO: 0 /HPF (ref 0–4)
SODIUM SERPL-SCNC: 139 MMOL/L (ref 136–145)
SP GR UR STRIP: 1 (ref 1–1.03)
SQUAMOUS #/AREA URNS AUTO: 0 /HPF
TRIGL SERPL-MCNC: 71 MG/DL (ref 30–150)
TSH SERPL DL<=0.005 MIU/L-ACNC: 1.29 UIU/ML (ref 0.4–4)
URN SPEC COLLECT METH UR: ABNORMAL
WBC # BLD AUTO: 6.55 K/UL (ref 3.9–12.7)
WBC #/AREA URNS AUTO: 7 /HPF (ref 0–5)

## 2019-05-29 ENCOUNTER — CLINICAL SUPPORT (OUTPATIENT)
Dept: PULMONOLOGY | Facility: CLINIC | Age: 64
End: 2019-05-29
Payer: MEDICARE

## 2019-05-29 ENCOUNTER — OFFICE VISIT (OUTPATIENT)
Dept: PULMONOLOGY | Facility: CLINIC | Age: 64
End: 2019-05-29
Payer: MEDICARE

## 2019-05-29 VITALS
HEART RATE: 81 BPM | RESPIRATION RATE: 18 BRPM | HEIGHT: 71 IN | BODY MASS INDEX: 23.3 KG/M2 | DIASTOLIC BLOOD PRESSURE: 72 MMHG | WEIGHT: 166.44 LBS | SYSTOLIC BLOOD PRESSURE: 110 MMHG | OXYGEN SATURATION: 97 %

## 2019-05-29 DIAGNOSIS — J44.9 COPD, GROUP D, BY GOLD 2017 CLASSIFICATION: ICD-10-CM

## 2019-05-29 DIAGNOSIS — J44.9 CHRONIC OBSTRUCTIVE PULMONARY DISEASE, UNSPECIFIED COPD TYPE: Primary | ICD-10-CM

## 2019-05-29 PROCEDURE — 94060 EVALUATION OF WHEEZING: CPT | Mod: HCNC,S$GLB,, | Performed by: INTERNAL MEDICINE

## 2019-05-29 PROCEDURE — 94726 PULM FUNCT TST PLETHYSMOGRAP: ICD-10-PCS | Mod: HCNC,S$GLB,, | Performed by: INTERNAL MEDICINE

## 2019-05-29 PROCEDURE — 99499 NO LOS: ICD-10-PCS | Mod: HCNC,S$GLB,, | Performed by: INTERNAL MEDICINE

## 2019-05-29 PROCEDURE — 99999 PR PBB SHADOW E&M-EST. PATIENT-LVL III: CPT | Mod: PBBFAC,HCNC,, | Performed by: INTERNAL MEDICINE

## 2019-05-29 PROCEDURE — 99999 PR PBB SHADOW E&M-EST. PATIENT-LVL III: ICD-10-PCS | Mod: PBBFAC,HCNC,, | Performed by: INTERNAL MEDICINE

## 2019-05-29 PROCEDURE — 94729 DIFFUSING CAPACITY: CPT | Mod: HCNC,S$GLB,, | Performed by: INTERNAL MEDICINE

## 2019-05-29 PROCEDURE — 94060 PR EVAL OF BRONCHOSPASM: ICD-10-PCS | Mod: HCNC,S$GLB,, | Performed by: INTERNAL MEDICINE

## 2019-05-29 PROCEDURE — 94729 PR C02/MEMBANE DIFFUSE CAPACITY: ICD-10-PCS | Mod: HCNC,S$GLB,, | Performed by: INTERNAL MEDICINE

## 2019-05-29 PROCEDURE — 99499 UNLISTED E&M SERVICE: CPT | Mod: HCNC,S$GLB,, | Performed by: INTERNAL MEDICINE

## 2019-05-29 PROCEDURE — 94726 PLETHYSMOGRAPHY LUNG VOLUMES: CPT | Mod: HCNC,S$GLB,, | Performed by: INTERNAL MEDICINE

## 2019-05-30 NOTE — PATIENT INSTRUCTIONS
Chronic Lung Disease: If Oxygen Is Prescribed   Supplemental oxygen is prescribed if tests show that the level of oxygen in your blood is too low. If the level stays too low for too long, serious problems can develop in many parts of the body. Supplemental oxygen helps to relieve your symptoms and prevent future problems by getting more oxygen to the blood. Depending on your test results, you may need oxygen all the time. Or it may only be needed during certain activities, such as exercise or sleep. When oxygen is prescribed, youll be referred to a medical equipment company. They will set up the oxygen unit and teach you how to use it.  Nasal cannula     A nasal cannula should be placed in the nose with the prongs arching toward you.     Oxygen is most often inhaled through a nasal cannula (lightweight tube with two hollow prongs that fit just inside the nose).  Types of supplemental oxygen    Compressed oxygen   Oxygen concentrator   Liquid oxygen   Prescribed oxygen comes in several forms. You may use more than one type, depending on when you need oxygen:  · Compressed oxygen is oxygen gas stored in a tank. Because the oxygen is stored under pressure, these tanks must be handled carefully. Gauges on the tank can be used to adjust the oxygen flow rate. Your healthcare provider will determine what this should be. Small tanks can be carried. Larger tanks are on wheels and can be pulled around the house.  · An oxygen concentrator is a machine about the size of a large suitcase. It plugs into an electrical outlet. (A back-up oxygen supply is recommended in case of a power outage.) The machine takes oxygen from the air and concentrates it. Its then delivered to you through plastic tubing. The tubing is long enough so that you can move around the house. When youre using the concentrator, it must be kept somewhere that has a good supply of fresh air. (Dont keep it in a confined space, like a closet.) You may be set  up on a concentrator if you need oxygen all the time or while youre sleeping.  · Liquid oxygen results when oxygen gas is cooled to a very low temperature. Its kept in special containers that maintain this low temperature. When you use liquid oxygen, its warmed and becomes gas before reaching the cannula. Most tanks come with a portable unit that you can carry or pull on a cart. Some of these weigh only a few pounds. Liquid oxygen units are easy to carry around. If you need oxygen all the time or during activity, this kind of unit can help you stay active.  Oxygen is prescribed just for you  Your healthcare provider will prescribe oxygen based on your needs. Here are a few things you should know:  · A therapist from the medical equipment company will explain when to use oxygen and what type to use. Youll be taught how to use and maintain your oxygen equipment.  · You must use the exact rate of oxygen prescribed for each activity. Dont increase or decrease the amount without asking your healthcare provider first.  · Supplemental oxygen is a medicine. Its not addictive and causes no side effects when used as directed.   Date Last Reviewed: 5/1/2016  © 2503-1676 [employer]. [address] [place] [address], [place] [address]. All rights reserved. This information is not intended as a substitute for professional medical care. Always follow your healthcare professional's instructions.

## 2019-06-01 LAB
BRPFT: ABNORMAL
DLCO ADJ PRE: 14.29 ML/(MIN*MMHG) (ref 22.37–36.22)
DLCO SINGLE BREATH LLN: 22.37
DLCO SINGLE BREATH PRE REF: 53.3 %
DLCO SINGLE BREATH REF: 29.29
DLCOC SBVA LLN: 2.87
DLCOC SBVA PRE REF: 61.2 %
DLCOC SBVA REF: 4.01
DLCOC SINGLE BREATH LLN: 22.37
DLCOC SINGLE BREATH PRE REF: 48.8 %
DLCOC SINGLE BREATH REF: 29.29
DLCOVA LLN: 2.87
DLCOVA PRE REF: 66.9 %
DLCOVA PRE: 2.68 ML/(MIN*MMHG*L) (ref 2.87–5.15)
DLCOVA REF: 4.01
DLVAADJ PRE: 2.46 ML/(MIN*MMHG*L) (ref 2.87–5.15)
ERV LLN: 1.18
ERV PRE REF: 110.9 %
ERV REF: 1.18
FEF 25 75 CHG: 12.1 %
FEF 25 75 LLN: 1.35
FEF 25 75 POST REF: 22.1 %
FEF 25 75 PRE REF: 19.7 %
FEF 25 75 REF: 2.86
FET100 CHG: 2.7 %
FEV1 CHG: 20.6 %
FEV1 FVC CHG: 2 %
FEV1 FVC LLN: 64
FEV1 FVC POST REF: 59.9 %
FEV1 FVC PRE REF: 58.7 %
FEV1 FVC REF: 77
FEV1 LLN: 2.65
FEV1 POST REF: 54 %
FEV1 PRE REF: 44.8 %
FEV1 REF: 3.56
FRCPLETH LLN: 2.7
FRCPLETH PREREF: 128.4 %
FRCPLETH REF: 3.69
FVC CHG: 18.2 %
FVC LLN: 3.52
FVC POST REF: 89.9 %
FVC PRE REF: 76 %
FVC REF: 4.65
IVC PRE: 3.73 L (ref 3.52–5.78)
IVC SINGLE BREATH LLN: 3.52
IVC SINGLE BREATH PRE REF: 80.3 %
IVC SINGLE BREATH REF: 4.65
MVV LLN: 116
MVV PRE REF: 28.5 %
MVV REF: 137
PEF CHG: 8.3 %
PEF LLN: 6.81
PEF POST REF: 48.4 %
PEF PRE REF: 44.7 %
PEF REF: 9.19
POST FEF 25 75: 0.63 L/S (ref 1.35–4.36)
POST FET 100: 10.17 SEC
POST FEV1 FVC: 46.03 % (ref 64.42–89.24)
POST FEV1: 1.92 L (ref 2.65–4.47)
POST FVC: 4.18 L (ref 3.52–5.78)
POST PEF: 4.45 L/S (ref 6.81–11.56)
PRE DLCO: 15.62 ML/(MIN*MMHG) (ref 22.37–36.22)
PRE ERV: 1.3 L (ref 1.18–1.18)
PRE FEF 25 75: 0.56 L/S (ref 1.35–4.36)
PRE FET 100: 9.9 SEC
PRE FEV1 FVC: 45.11 % (ref 64.42–89.24)
PRE FEV1: 1.59 L (ref 2.65–4.47)
PRE FRC PL: 4.74 L
PRE FVC: 3.54 L (ref 3.52–5.78)
PRE MVV: 39 L/MIN (ref 116.38–157.46)
PRE PEF: 4.11 L/S (ref 6.81–11.56)
PRE RV: 3.44 L (ref 1.84–3.19)
PRE TLC: 7.24 L (ref 6.15–8.45)
RAW LLN: 3.06
RAW PRE REF: 196.8 %
RAW PRE: 6.02 CMH2O*S/L (ref 3.06–3.06)
RAW REF: 3.06
RV LLN: 1.84
RV PRE REF: 136.7 %
RV REF: 2.51
RVTLC LLN: 30
RVTLC PRE REF: 123.2 %
RVTLC PRE: 47.48 % (ref 29.55–47.51)
RVTLC REF: 39
TLC LLN: 6.15
TLC PRE REF: 99.1 %
TLC REF: 7.3
VA PRE: 5.83 L (ref 7.15–7.15)
VA SINGLE BREATH LLN: 7.15
VA SINGLE BREATH PRE REF: 81.4 %
VA SINGLE BREATH REF: 7.15
VC LLN: 3.52
VC PRE REF: 81.7 %
VC PRE: 3.8 L (ref 3.52–5.78)
VC REF: 4.65
VTGRAWPRE: 4.7 L

## 2019-06-02 DIAGNOSIS — J44.9 COPD, VERY SEVERE: Chronic | ICD-10-CM

## 2019-06-03 RX ORDER — UMECLIDINIUM BROMIDE AND VILANTEROL TRIFENATATE 62.5; 25 UG/1; UG/1
POWDER RESPIRATORY (INHALATION)
Qty: 60 EACH | Refills: 11 | Status: SHIPPED | OUTPATIENT
Start: 2019-06-03 | End: 2019-10-22 | Stop reason: SDUPTHER

## 2019-07-15 PROBLEM — Z00.00 ANNUAL PHYSICAL EXAM: Status: RESOLVED | Noted: 2017-05-09 | Resolved: 2019-07-15

## 2019-07-16 ENCOUNTER — OFFICE VISIT (OUTPATIENT)
Dept: INTERNAL MEDICINE | Facility: CLINIC | Age: 64
End: 2019-07-16
Payer: MEDICARE

## 2019-07-16 VITALS
WEIGHT: 165.13 LBS | OXYGEN SATURATION: 95 % | HEART RATE: 87 BPM | DIASTOLIC BLOOD PRESSURE: 70 MMHG | BODY MASS INDEX: 23.12 KG/M2 | SYSTOLIC BLOOD PRESSURE: 118 MMHG | HEIGHT: 71 IN | TEMPERATURE: 98 F

## 2019-07-16 DIAGNOSIS — D75.1 POLYCYTHEMIA: ICD-10-CM

## 2019-07-16 DIAGNOSIS — Z99.81 OXYGEN DEPENDENT: ICD-10-CM

## 2019-07-16 DIAGNOSIS — R82.81 PYURIA: ICD-10-CM

## 2019-07-16 DIAGNOSIS — F32.A DEPRESSION, UNSPECIFIED DEPRESSION TYPE: ICD-10-CM

## 2019-07-16 DIAGNOSIS — J44.9 CHRONIC OBSTRUCTIVE PULMONARY DISEASE, UNSPECIFIED COPD TYPE: Primary | ICD-10-CM

## 2019-07-16 LAB
BASOPHILS # BLD AUTO: 0.06 K/UL (ref 0–0.2)
BASOPHILS NFR BLD: 0.8 % (ref 0–1.9)
DIFFERENTIAL METHOD: ABNORMAL
EOSINOPHIL # BLD AUTO: 0.3 K/UL (ref 0–0.5)
EOSINOPHIL NFR BLD: 3.5 % (ref 0–8)
ERYTHROCYTE [DISTWIDTH] IN BLOOD BY AUTOMATED COUNT: 13.3 % (ref 11.5–14.5)
HCT VFR BLD AUTO: 48.2 % (ref 40–54)
HGB BLD-MCNC: 16.2 G/DL (ref 14–18)
IMM GRANULOCYTES # BLD AUTO: 0.04 K/UL (ref 0–0.04)
IMM GRANULOCYTES NFR BLD AUTO: 0.6 % (ref 0–0.5)
LYMPHOCYTES # BLD AUTO: 1.1 K/UL (ref 1–4.8)
LYMPHOCYTES NFR BLD: 15.6 % (ref 18–48)
MCH RBC QN AUTO: 34.2 PG (ref 27–31)
MCHC RBC AUTO-ENTMCNC: 33.6 G/DL (ref 32–36)
MCV RBC AUTO: 102 FL (ref 82–98)
MONOCYTES # BLD AUTO: 0.5 K/UL (ref 0.3–1)
MONOCYTES NFR BLD: 7.1 % (ref 4–15)
NEUTROPHILS # BLD AUTO: 5.1 K/UL (ref 1.8–7.7)
NEUTROPHILS NFR BLD: 72.4 % (ref 38–73)
NRBC BLD-RTO: 0 /100 WBC
PLATELET # BLD AUTO: 221 K/UL (ref 150–350)
PMV BLD AUTO: 10.1 FL (ref 9.2–12.9)
RBC # BLD AUTO: 4.74 M/UL (ref 4.6–6.2)
WBC # BLD AUTO: 7.07 K/UL (ref 3.9–12.7)

## 2019-07-16 PROCEDURE — 99214 OFFICE O/P EST MOD 30 MIN: CPT | Mod: HCNC,S$GLB,, | Performed by: FAMILY MEDICINE

## 2019-07-16 PROCEDURE — 3008F BODY MASS INDEX DOCD: CPT | Mod: HCNC,CPTII,S$GLB, | Performed by: FAMILY MEDICINE

## 2019-07-16 PROCEDURE — 99999 PR PBB SHADOW E&M-EST. PATIENT-LVL III: ICD-10-PCS | Mod: PBBFAC,HCNC,, | Performed by: FAMILY MEDICINE

## 2019-07-16 PROCEDURE — 99999 PR PBB SHADOW E&M-EST. PATIENT-LVL III: CPT | Mod: PBBFAC,HCNC,, | Performed by: FAMILY MEDICINE

## 2019-07-16 PROCEDURE — 3008F PR BODY MASS INDEX (BMI) DOCUMENTED: ICD-10-PCS | Mod: HCNC,CPTII,S$GLB, | Performed by: FAMILY MEDICINE

## 2019-07-16 PROCEDURE — 85025 COMPLETE CBC W/AUTO DIFF WBC: CPT | Mod: HCNC

## 2019-07-16 PROCEDURE — 99214 PR OFFICE/OUTPT VISIT, EST, LEVL IV, 30-39 MIN: ICD-10-PCS | Mod: HCNC,S$GLB,, | Performed by: FAMILY MEDICINE

## 2019-07-16 PROCEDURE — 81001 URINALYSIS AUTO W/SCOPE: CPT | Mod: HCNC

## 2019-07-16 RX ORDER — IPRATROPIUM BROMIDE 0.5 MG/2.5ML
SOLUTION RESPIRATORY (INHALATION)
COMMUNITY
Start: 2019-06-11

## 2019-07-16 RX ORDER — BUPROPION HYDROCHLORIDE 100 MG/1
100 TABLET ORAL 2 TIMES DAILY
Qty: 60 TABLET | Refills: 5 | Status: SHIPPED | OUTPATIENT
Start: 2019-07-16 | End: 2019-10-29 | Stop reason: SDUPTHER

## 2019-07-16 NOTE — PROGRESS NOTES
Subjective:       Patient ID: Jhonny Perales is a 64 y.o. male.    Chief Complaint: Follow-up (COPD)    Follow-up COPD depression polycythemia pyuria.  Shortness of breath waxes and wanes and is stable.  He reports 2 or 3 times a week having depressive episodes.  He has a past history of suicidal behavior.  He currently is on Celexa 40 mg a day.  He has no seizure history.  He denies dysuria urgency hematuria.  Denies fever chills.    Review of Systems   Constitutional: Positive for fatigue. Negative for appetite change, chills, diaphoresis, fever and unexpected weight change.   Respiratory: Positive for shortness of breath. Negative for cough, chest tightness and wheezing.    Cardiovascular: Negative for chest pain, palpitations and leg swelling.        Denies lightheadedness   Gastrointestinal: Negative for abdominal pain.   Genitourinary: Negative for difficulty urinating.   Neurological: Negative for dizziness, weakness, light-headedness and headaches.   Psychiatric/Behavioral: Positive for dysphoric mood. Negative for suicidal ideas.       Objective:      Physical Exam   Constitutional: He is oriented to person, place, and time. He appears well-developed and well-nourished. No distress.   Neck: Neck supple. No thyromegaly present.   Cardiovascular: Normal rate, regular rhythm and normal heart sounds.   No murmur heard.  Pulmonary/Chest: Effort normal. No respiratory distress. He has no wheezes. He has no rales.   Decreased breath sounds in general.  O2 sat 95% on nasal O2   Abdominal: Soft. Bowel sounds are normal. He exhibits no mass. There is no tenderness.   Lymphadenopathy:     He has no cervical adenopathy.   Neurological: He is alert and oriented to person, place, and time.   Skin: He is not diaphoretic.   Psychiatric: He has a normal mood and affect. His behavior is normal. Judgment and thought content normal.       Clinical Support on 05/29/2019   Component Date Value Ref Range Status     Interpretation 06/01/2019    Final                    Value:Baseline spirometry reveals severe expiratory airflow obstruction.  Airflow is significantly improved following the administration of bronchodilator.  Improvement in airflow following bronchodilator  administration suggests an asthmatic component.    Plethysmographic lung volumes revealed air trapping but no hyperinflation.  Diffusion capacity is moderately reduced.   Compared to previous test of April 4, 2018 there has been a significant improvement in total lung capacity   (TLC ).  Recommend   clinical correlation.      Post FVC 06/01/2019 4.18  3.52 - 5.78 L Final    Post FEV1 06/01/2019 1.92* 2.65 - 4.47 L Final    Post FEV1 FVC 06/01/2019 46.03* 64.42 - 89.24 % Final    Post FEF 25 75 06/01/2019 0.63* 1.35 - 4.36 L/s Final    Post PEF 06/01/2019 4.45* 6.81 - 11.56 L/s Final    Post  06/01/2019 10.17  sec Final    Pre DLCO 06/01/2019 15.62* 22.37 - 36.22 ml/(min*mmHg) Final    DLCO ADJ PRE 06/01/2019 14.29* 22.37 - 36.22 ml/(min*mmHg) Final    DLCOVA PRE 06/01/2019 2.68* 2.87 - 5.15 ml/(min*mmHg*L) Final    DLVAAdj PRE 06/01/2019 2.46* 2.87 - 5.15 ml/(min*mmHg*L) Final    VA PRE 06/01/2019 5.83* 7.15 - 7.15 L Final    IVC PRE 06/01/2019 3.73  3.52 - 5.78 L Final    Pre TLC 06/01/2019 7.24  6.15 - 8.45 L Final    VC PRE 06/01/2019 3.80  3.52 - 5.78 L Final    Pre FRC PL 06/01/2019 4.74  L Final    Pre ERV 06/01/2019 1.30* 1.18 - 1.18 L Final    Pre RV 06/01/2019 3.44* 1.84 - 3.19 L Final    RVTLC PRE 06/01/2019 47.48  29.55 - 47.51 % Final    Raw PRE 06/01/2019 6.02* 3.06 - 3.06 cmH2O*s/L Final    VTGRAWPRE 06/01/2019 4.70  L Final    Pre FVC 06/01/2019 3.54  3.52 - 5.78 L Final    Pre FEV1 06/01/2019 1.59* 2.65 - 4.47 L Final    Pre FEV1 FVC 06/01/2019 45.11* 64.42 - 89.24 % Final    Pre FEF 25 75 06/01/2019 0.56* 1.35 - 4.36 L/s Final    Pre PEF 06/01/2019 4.11* 6.81 - 11.56 L/s Final    Pre  06/01/2019 9.90  sec  Final    Pre MVV 06/01/2019 39.00* 116.38 - 157.46 L/min Final    FVC Ref 06/01/2019 4.65   Final    FVC LLN 06/01/2019 3.52   Final    FVC Pre Ref 06/01/2019 76.0  % Final    FVC Post Ref 06/01/2019 89.9  % Final    FVC Chg 06/01/2019 18.2  % Final    FEV1 Ref 06/01/2019 3.56   Final    FEV1 LLN 06/01/2019 2.65   Final    FEV1 Pre Ref 06/01/2019 44.8  % Final    FEV1 Post Ref 06/01/2019 54.0  % Final    FEV1 Chg 06/01/2019 20.6  % Final    FEV1 FVC Ref 06/01/2019 77   Final    FEV1 FVC LLN 06/01/2019 64   Final    FEV1 FVC Pre Ref 06/01/2019 58.7  % Final    FEV1 FVC Post Ref 06/01/2019 59.9  % Final    FEV1 FVC Chg 06/01/2019 2.0  % Final    FEF 25 75 Ref 06/01/2019 2.86   Final    FEF 25 75 LLN 06/01/2019 1.35   Final    FEF 25 75 Pre Ref 06/01/2019 19.7  % Final    FEF 25 75 Post Ref 06/01/2019 22.1  % Final    FEF 25 75 Chg 06/01/2019 12.1  % Final    PEF Ref 06/01/2019 9.19   Final    PEF LLN 06/01/2019 6.81   Final    PEF Pre Ref 06/01/2019 44.7  % Final    PEF Post Ref 06/01/2019 48.4  % Final    PEF Chg 06/01/2019 8.3  % Final    NSY512 Chg 06/01/2019 2.7  % Final    MVV Ref 06/01/2019 137   Final    MVV LLN 06/01/2019 116   Final    MVV Pre Ref 06/01/2019 28.5  % Final    TLC Ref 06/01/2019 7.30   Final    TLC LLN 06/01/2019 6.15   Final    TLC Pre Ref 06/01/2019 99.1  % Final    VC Ref 06/01/2019 4.65   Final    VC LLN 06/01/2019 3.52   Final    VC Pre Ref 06/01/2019 81.7  % Final    FRCpleth Ref 06/01/2019 3.69   Final    FRCpleth LLN 06/01/2019 2.70   Final    FRCpleth PreRef 06/01/2019 128.4  % Final    ERV Ref 06/01/2019 1.18   Final    ERV LLN 06/01/2019 1.18   Final    ERV Pre Ref 06/01/2019 110.9  % Final    RV Ref 06/01/2019 2.51   Final    RV LLN 06/01/2019 1.84   Final    RV Pre Ref 06/01/2019 136.7  % Final    RVTLC Ref 06/01/2019 39   Final    RVTLC LLN 06/01/2019 30   Final    RVTLC Pre Ref 06/01/2019 123.2  % Final    Raw Ref 06/01/2019 3.06    Final    Raw LLN 06/01/2019 3.06   Final    Raw Pre Ref 06/01/2019 196.8  % Final    DLCO Single Breath Ref 06/01/2019 29.29   Final    DLCO Single Breath LLN 06/01/2019 22.37   Final    DLCO Single Breath Pre Ref 06/01/2019 53.3  % Final    DLCOc Single Breath Ref 06/01/2019 29.29   Final    DLCOc Single Breath LLN 06/01/2019 22.37   Final    DLCOc Single Breath Pre Ref 06/01/2019 48.8  % Final    DLCOVA Ref 06/01/2019 4.01   Final    DLCOVA LLN 06/01/2019 2.87   Final    DLCOVA Pre Ref 06/01/2019 66.9  % Final    DLCOc SBVA Ref 06/01/2019 4.01   Final    DLCOc SBVA LLN 06/01/2019 2.87   Final    DLCOc SBVA Pre Ref 06/01/2019 61.2  % Final    VA Single Breath Ref 06/01/2019 7.15   Final    VA Single Breath LLN 06/01/2019 7.15   Final    VA Single Breath Pre Ref 06/01/2019 81.4  % Final    IVC Single Breath Ref 06/01/2019 4.65   Final    IVC Single Breath LLN 06/01/2019 3.52   Final    IVC Single Breath Pre Ref 06/01/2019 80.3  % Final     Assessment:       1. Pyuria    2. Polycythemia        Plan:     Add Wellbutrin 100 mg twice a day.  Recheck CBC urinalysis.  Medications reviewed.  Follow-up in 3 months.    Pyuria  -     Urinalysis    Polycythemia  -     CBC auto differential    Other orders  -     buPROPion (WELLBUTRIN) 100 MG tablet; Take 1 tablet (100 mg total) by mouth 2 (two) times daily.  Dispense: 60 tablet; Refill: 5

## 2019-07-17 LAB
BACTERIA #/AREA URNS AUTO: NORMAL /HPF
BILIRUB UR QL STRIP: NEGATIVE
CLARITY UR REFRACT.AUTO: CLEAR
COLOR UR AUTO: YELLOW
GLUCOSE UR QL STRIP: NEGATIVE
HGB UR QL STRIP: NEGATIVE
KETONES UR QL STRIP: NEGATIVE
LEUKOCYTE ESTERASE UR QL STRIP: ABNORMAL
MICROSCOPIC COMMENT: NORMAL
NITRITE UR QL STRIP: NEGATIVE
PH UR STRIP: 8 [PH] (ref 5–8)
PROT UR QL STRIP: NEGATIVE
SP GR UR STRIP: 1 (ref 1–1.03)
URN SPEC COLLECT METH UR: ABNORMAL
WBC #/AREA URNS AUTO: 5 /HPF (ref 0–5)

## 2019-10-22 ENCOUNTER — OFFICE VISIT (OUTPATIENT)
Dept: PULMONOLOGY | Facility: CLINIC | Age: 64
End: 2019-10-22
Payer: MEDICARE

## 2019-10-22 VITALS
BODY MASS INDEX: 23.48 KG/M2 | DIASTOLIC BLOOD PRESSURE: 70 MMHG | HEIGHT: 71 IN | OXYGEN SATURATION: 95 % | HEART RATE: 86 BPM | WEIGHT: 167.75 LBS | SYSTOLIC BLOOD PRESSURE: 114 MMHG | RESPIRATION RATE: 18 BRPM

## 2019-10-22 DIAGNOSIS — J44.9 COPD, GROUP D, BY GOLD 2017 CLASSIFICATION: Primary | Chronic | ICD-10-CM

## 2019-10-22 DIAGNOSIS — R91.1 PULMONARY NODULE: Chronic | ICD-10-CM

## 2019-10-22 DIAGNOSIS — J96.11 CHRONIC RESPIRATORY FAILURE WITH HYPOXIA: Chronic | ICD-10-CM

## 2019-10-22 PROCEDURE — 99214 OFFICE O/P EST MOD 30 MIN: CPT | Mod: 25,HCNC,S$GLB, | Performed by: INTERNAL MEDICINE

## 2019-10-22 PROCEDURE — G0008 FLU VACCINE (QUAD) GREATER THAN OR EQUAL TO 3YO PRESERVATIVE FREE IM: ICD-10-PCS | Mod: HCNC,S$GLB,, | Performed by: INTERNAL MEDICINE

## 2019-10-22 PROCEDURE — G0008 ADMIN INFLUENZA VIRUS VAC: HCPCS | Mod: HCNC,S$GLB,, | Performed by: INTERNAL MEDICINE

## 2019-10-22 PROCEDURE — 90686 FLU VACCINE (QUAD) GREATER THAN OR EQUAL TO 3YO PRESERVATIVE FREE IM: ICD-10-PCS | Mod: HCNC,S$GLB,, | Performed by: INTERNAL MEDICINE

## 2019-10-22 PROCEDURE — 3008F PR BODY MASS INDEX (BMI) DOCUMENTED: ICD-10-PCS | Mod: HCNC,CPTII,S$GLB, | Performed by: INTERNAL MEDICINE

## 2019-10-22 PROCEDURE — 3008F BODY MASS INDEX DOCD: CPT | Mod: HCNC,CPTII,S$GLB, | Performed by: INTERNAL MEDICINE

## 2019-10-22 PROCEDURE — 99999 PR PBB SHADOW E&M-EST. PATIENT-LVL III: CPT | Mod: PBBFAC,HCNC,, | Performed by: INTERNAL MEDICINE

## 2019-10-22 PROCEDURE — 99214 PR OFFICE/OUTPT VISIT, EST, LEVL IV, 30-39 MIN: ICD-10-PCS | Mod: 25,HCNC,S$GLB, | Performed by: INTERNAL MEDICINE

## 2019-10-22 PROCEDURE — 99999 PR PBB SHADOW E&M-EST. PATIENT-LVL III: ICD-10-PCS | Mod: PBBFAC,HCNC,, | Performed by: INTERNAL MEDICINE

## 2019-10-22 PROCEDURE — 90686 IIV4 VACC NO PRSV 0.5 ML IM: CPT | Mod: HCNC,S$GLB,, | Performed by: INTERNAL MEDICINE

## 2019-10-22 RX ORDER — ROFLUMILAST 500 UG/1
500 TABLET ORAL DAILY
Qty: 90 TABLET | Refills: 3 | Status: SHIPPED | OUTPATIENT
Start: 2019-10-22

## 2019-10-22 RX ORDER — ALBUTEROL SULFATE 90 UG/1
2 AEROSOL, METERED RESPIRATORY (INHALATION) EVERY 4 HOURS PRN
Qty: 18 G | Refills: 11 | Status: SHIPPED | OUTPATIENT
Start: 2019-10-22

## 2019-10-22 RX ORDER — ALBUTEROL SULFATE 0.63 MG/3ML
SOLUTION RESPIRATORY (INHALATION)
Qty: 375 ML | Refills: 11 | Status: SHIPPED | OUTPATIENT
Start: 2019-10-22

## 2019-10-22 RX ORDER — GUAIFENESIN/DEXTROMETHORPHAN 100-10MG/5
SYRUP ORAL
COMMUNITY
Start: 2019-10-03

## 2019-10-22 NOTE — ASSESSMENT & PLAN NOTE
Continue oxygen supplementation at 2  L/min. DME is [facility].     Simple pulmonary stress test in 1 month.

## 2019-10-22 NOTE — PATIENT INSTRUCTIONS
Lung Anatomy  Your lungs take air in to give your body oxygen, which the body needs to work. Your lungs, like all the tissues in your body, are made up of billions of tiny specialized cells. Old lung cells die and are replaced by new, identical lung cells. This natural process helps ensure healthy lungs.    Date Last Reviewed: 11/1/2016  © 7271-8847 [employer]. [address] [place] [address], [place] [address]. All rights reserved. This information is not intended as a substitute for professional medical care. Always follow your healthcare professional's instructions.

## 2019-10-22 NOTE — ASSESSMENT & PLAN NOTE
COPD ROS: taking medications as instructed, no medication side effects noted, no significant ongoing wheezing or shortness of breath, using bronchodilator MDI less than twice a week.   New concerns: None.   Exam: Bilateral wheezes and mild respiratory distress.   Assessment:  COPD reasonably well controlled.   Plan: Plan as above. Continue  ACCUNEB, VENTOLIN , ANORO. VENTOLIN REFILLED. START DALIRESP PER ORDERS. MEDS refilled.

## 2019-10-22 NOTE — PROGRESS NOTES
Subjective:      Patient ID: Johnny Perales is a 64 y.o. male.  Patient Active Problem List   Diagnosis    COPD, group D, by GOLD 2017 classification    Pulmonary nodule    Chronic respiratory failure with hypoxia    Screening for colon cancer    Family history of hypercholesterolemia     Chronic fatigue     Bilateral hearing loss    Elevated MCV    Depression    Immunization deficiency    Hyperkalemia    Elevated TSH    Colon cancer screening    Bilateral carpal tunnel syndrome    Suicidal behavior with attempted self-injury    Acute pain of right shoulder    History of colon polyps    Oxygen dependent    Polycythemia    Pyuria     Problem list has been reviewed.    Chief Complaint: COPD    HPI      Group Spirometric Classification Exacerbations / year mMRC CAT   Group D: High risk; more sypmtoms  GOLD 3-4 > = 2 >= 2 >= 10     FEV 1 => 1.38L  (38% predicted)     Patients reports NYHA III dyspnea    The patient does not have currently have symptoms / an exacerbation.       No cough, sputum, or hemoptysis and No shortness or breath.       The patient does not have symptoms / an exacerbation.      Her current respiratory regimen: ANORO, PROAIR, ACCUNEB: He does use medications compliantly.     Immunization status reviewed and up dated.    COPD Questionnaire  How often do you cough?: All of the time  How often do you have phlegm (mucus) in your chest?: All of the time  How often does your chest feel tight?: All of the time  When you walk up a hill or one flight of stairs, how often are you breathless?: All of the time  How often are you limited doing any activities at home?: Most of the time  How often are you confident leaving the house despite your lung condition?: All of the time  How often do you sleep soundly?: Almost never  How often do you have energy?: Never  Total score: 33     HE IS ON OXYGEN SUPPLEMENTATION at 3 L /min    HE IS ON TRIOLOGY NIV ( VIEMED) . HE IS COMPLAINCE WITH HIS  TRIOLOGY.     A full  review of systems, past , family  and social histories was performed except as mentioned in the note above, these are non contributory to the main issues discussed today.     Previous Report Reviewed: office notes     The following portions of the patient's history were reviewed and updated as appropriate: He  has a past medical history of COPD (chronic obstructive pulmonary disease) and Hearing impairment.  He  has a past surgical history that includes Inner ear surgery (Bilateral); Colonoscopy (N/A, 7/12/2017); and Leg Surgery (Right).  His family history includes Cancer in his father and mother; Colon cancer in his maternal uncle; Heart failure in his father and mother.  He  reports that he quit smoking about 12 months ago. His smoking use included vaping with nicotine. He started smoking about 48 years ago. He has a 3.50 pack-year smoking history. He has never used smokeless tobacco. He reports that he drinks about 12.0 standard drinks of alcohol per week. He reports that he does not use drugs.  He has a current medication list which includes the following prescription(s): afluria quad [id number] (pf), albuterol, albuterol, bupropion, cholecalciferal, cholecalciferol (vitamin d3), citalopram, dextromethorphan-guaifenesin  mg/5 ml, guaifenesin/pseudoephedrne hcl, ipratropium, oxygen-air delivery systems, roflumilast, sodium chloride, soft lens rinse,store solution, and umeclidinium-vilanterol.  He has No Known Allergies..    Review of Systems   Constitutional: Positive for fatigue and night sweats.   HENT: Negative for nosebleeds and hearing loss.    Eyes: Negative for redness.   Respiratory: Positive for cough, sputum production, wheezing and dyspnea on extertion. Negative for somnolence.    Cardiovascular: Negative for chest pain and leg swelling.   Genitourinary: Negative for hematuria.   Endocrine: Negative for cold intolerance and heat intolerance.    Musculoskeletal: Negative for  "arthralgias and back pain.   Skin: Negative for rash.   Gastrointestinal: Negative for abdominal pain and abdominal distention.   Neurological: Negative for dizziness and headaches.   Hematological: Negative for adenopathy. Does not bruise/bleed easily.   Psychiatric/Behavioral: The patient is nervous/anxious.    All other systems reviewed and are negative.     Objective:   /70   Pulse 86   Resp 18   Ht 5' 11" (1.803 m)   Wt 76.1 kg (167 lb 12.3 oz)   SpO2 95%   BMI 23.40 kg/m²   Body mass index is 23.4 kg/m².    Physical Exam   Constitutional: He is oriented to person, place, and time. He appears well-developed and well-nourished.   HENT:   Head: Normocephalic and atraumatic.   Eyes: Pupils are equal, round, and reactive to light. EOM are normal.   Neck: Normal range of motion. Neck supple.   Cardiovascular: Normal rate and regular rhythm.   Pulmonary/Chest: No respiratory distress. He has decreased breath sounds in the right upper field, the right middle field, the right lower field, the left upper field, the left middle field and the left lower field. He has no wheezes. He has no rales.   Abdominal: Soft. Bowel sounds are normal.   Musculoskeletal: Normal range of motion.   Neurological: He is alert and oriented to person, place, and time.   Skin: Skin is warm.   Psychiatric: He has a normal mood and affect.       Personal Diagnostic Review    Six Minute Walk test: 11/06/18: Six minute walk distance is 365.76 / 598 meters (61.11 % predicted) with heavy dyspnea. Peak VO2 during walking was 14.95 ml/min/kg [ 4.27 METS]. During exercise, there was significant desaturation while breathing room air to 88%. Oxygen saturation improved to 94% with oxygen supplementation at 2  L /min. Both blood pressure and heart rate remained stable with walking. The patient did not report non-pulmonary symptoms during exercise.  Significant exercise impairment is likely due to desaturation. The patient did complete the " study, walking 360 seconds of the 360 second test. The patient may benefit from using supplemental oxygen during exertion. Since the previous study in 10/07/16, exercise capacity may be somewhat improved. Based upon age and body mass index, exercise capacity is less than predicted.      CXR: The lungs are clear. The cardiac silhouette is within normal limits. No pleural effusion or pneumothorax or pulmonary edema.      Assessment / plan:       Discussed diagnosis, its evaluation, treatment and usual course. All questions answered.    Problem List Items Addressed This Visit        Pulmonary    Pulmonary nodule (Chronic)    Current Assessment & Plan     STABLE RADIOLOGICALLY. Radiologic monitoring.          Chronic respiratory failure with hypoxia (Chronic)    Current Assessment & Plan     Continue oxygen supplementation at 2  L/min. DME is [facility].     Simple pulmonary stress test in 1 month.            Relevant Orders    Stress test, pulmonary    COPD, group D, by GOLD 2017 classification - Primary    Current Assessment & Plan     COPD ROS: taking medications as instructed, no medication side effects noted, no significant ongoing wheezing or shortness of breath, using bronchodilator MDI less than twice a week.   New concerns: None.   Exam: Bilateral wheezes and mild respiratory distress.   Assessment:  COPD reasonably well controlled.   Plan: Plan as above. Continue  ACCUNEB, VENTOLIN , ANORO. VENTOLIN REFILLED. START DALIRESP PER ORDERS. MEDS refilled.         Relevant Medications    roflumilast (DALIRESP) 500 mcg Tab    umeclidinium-vilanterol (ANORO ELLIPTA) 62.5-25 mcg/actuation DsDv    albuterol (PROVENTIL/VENTOLIN HFA) 90 mcg/actuation inhaler    albuterol (ACCUNEB) 0.63 mg/3 mL Nebu          TIME SPENT WITH PATIENT: Time spent: 40 minutes in face to face  discussion concerning diagnosis, prognosis, review of lab and test results, benefits of treatment as well as management of disease, counseling of  patient and coordination of care between various health  care providers . Greater than half the time spent was used for coordination of care and counseling of patient.          Follow up in about 1 month (around 11/22/2019) for Emphysema, Chronic Respiratory Failure, Lung Nodule.

## 2019-10-24 DIAGNOSIS — F32.A DEPRESSION, UNSPECIFIED DEPRESSION TYPE: ICD-10-CM

## 2019-10-24 RX ORDER — CITALOPRAM 40 MG/1
TABLET, FILM COATED ORAL
Qty: 90 TABLET | Refills: 1 | Status: SHIPPED | OUTPATIENT
Start: 2019-10-24

## 2019-10-29 ENCOUNTER — OFFICE VISIT (OUTPATIENT)
Dept: INTERNAL MEDICINE | Facility: CLINIC | Age: 64
End: 2019-10-29
Payer: MEDICARE

## 2019-10-29 VITALS
OXYGEN SATURATION: 99 % | WEIGHT: 167 LBS | DIASTOLIC BLOOD PRESSURE: 68 MMHG | SYSTOLIC BLOOD PRESSURE: 114 MMHG | BODY MASS INDEX: 23.38 KG/M2 | TEMPERATURE: 98 F | HEIGHT: 71 IN | HEART RATE: 70 BPM

## 2019-10-29 DIAGNOSIS — D75.1 POLYCYTHEMIA: ICD-10-CM

## 2019-10-29 DIAGNOSIS — J44.9 CHRONIC OBSTRUCTIVE PULMONARY DISEASE, UNSPECIFIED COPD TYPE: Primary | ICD-10-CM

## 2019-10-29 DIAGNOSIS — R31.29 MICROHEMATURIA: ICD-10-CM

## 2019-10-29 DIAGNOSIS — F32.A DEPRESSION, UNSPECIFIED DEPRESSION TYPE: ICD-10-CM

## 2019-10-29 LAB
BASOPHILS # BLD AUTO: 0.1 K/UL (ref 0–0.2)
BASOPHILS NFR BLD: 1.5 % (ref 0–1.9)
BILIRUB UR QL STRIP: NEGATIVE
CLARITY UR REFRACT.AUTO: CLEAR
COLOR UR AUTO: YELLOW
DIFFERENTIAL METHOD: ABNORMAL
EOSINOPHIL # BLD AUTO: 0.4 K/UL (ref 0–0.5)
EOSINOPHIL NFR BLD: 6.1 % (ref 0–8)
ERYTHROCYTE [DISTWIDTH] IN BLOOD BY AUTOMATED COUNT: 12.1 % (ref 11.5–14.5)
GLUCOSE UR QL STRIP: NEGATIVE
HCT VFR BLD AUTO: 47.5 % (ref 40–54)
HGB BLD-MCNC: 16.1 G/DL (ref 14–18)
HGB UR QL STRIP: NEGATIVE
IMM GRANULOCYTES # BLD AUTO: 0.01 K/UL (ref 0–0.04)
IMM GRANULOCYTES NFR BLD AUTO: 0.2 % (ref 0–0.5)
KETONES UR QL STRIP: NEGATIVE
LEUKOCYTE ESTERASE UR QL STRIP: NEGATIVE
LYMPHOCYTES # BLD AUTO: 1.6 K/UL (ref 1–4.8)
LYMPHOCYTES NFR BLD: 24.3 % (ref 18–48)
MCH RBC QN AUTO: 33.9 PG (ref 27–31)
MCHC RBC AUTO-ENTMCNC: 33.9 G/DL (ref 32–36)
MCV RBC AUTO: 100 FL (ref 82–98)
MONOCYTES # BLD AUTO: 0.7 K/UL (ref 0.3–1)
MONOCYTES NFR BLD: 10.4 % (ref 4–15)
NEUTROPHILS # BLD AUTO: 3.8 K/UL (ref 1.8–7.7)
NEUTROPHILS NFR BLD: 57.5 % (ref 38–73)
NITRITE UR QL STRIP: NEGATIVE
NRBC BLD-RTO: 0 /100 WBC
PH UR STRIP: 6 [PH] (ref 5–8)
PLATELET # BLD AUTO: 231 K/UL (ref 150–350)
PMV BLD AUTO: 10.1 FL (ref 9.2–12.9)
PROT UR QL STRIP: NEGATIVE
RBC # BLD AUTO: 4.75 M/UL (ref 4.6–6.2)
SP GR UR STRIP: 1 (ref 1–1.03)
URN SPEC COLLECT METH UR: NORMAL
WBC # BLD AUTO: 6.53 K/UL (ref 3.9–12.7)

## 2019-10-29 PROCEDURE — 81003 URINALYSIS AUTO W/O SCOPE: CPT | Mod: HCNC

## 2019-10-29 PROCEDURE — 99213 PR OFFICE/OUTPT VISIT, EST, LEVL III, 20-29 MIN: ICD-10-PCS | Mod: HCNC,S$GLB,, | Performed by: FAMILY MEDICINE

## 2019-10-29 PROCEDURE — 85025 COMPLETE CBC W/AUTO DIFF WBC: CPT | Mod: HCNC

## 2019-10-29 PROCEDURE — 3008F PR BODY MASS INDEX (BMI) DOCUMENTED: ICD-10-PCS | Mod: HCNC,CPTII,S$GLB, | Performed by: FAMILY MEDICINE

## 2019-10-29 PROCEDURE — 99999 PR PBB SHADOW E&M-EST. PATIENT-LVL IV: ICD-10-PCS | Mod: PBBFAC,HCNC,, | Performed by: FAMILY MEDICINE

## 2019-10-29 PROCEDURE — 99999 PR PBB SHADOW E&M-EST. PATIENT-LVL IV: CPT | Mod: PBBFAC,HCNC,, | Performed by: FAMILY MEDICINE

## 2019-10-29 PROCEDURE — 99213 OFFICE O/P EST LOW 20 MIN: CPT | Mod: HCNC,S$GLB,, | Performed by: FAMILY MEDICINE

## 2019-10-29 PROCEDURE — 3008F BODY MASS INDEX DOCD: CPT | Mod: HCNC,CPTII,S$GLB, | Performed by: FAMILY MEDICINE

## 2019-10-29 RX ORDER — BUPROPION HYDROCHLORIDE 100 MG/1
100 TABLET ORAL 2 TIMES DAILY
Qty: 180 TABLET | Refills: 1 | Status: SHIPPED | OUTPATIENT
Start: 2019-10-29

## 2019-10-29 NOTE — PROGRESS NOTES
Subjective:       Patient ID: Johnny Perales is a 64 y.o. male.    Chief Complaint: 3 month follow up    He is followed by Pulmonary for COPD continues nasal oxygen.  Depression is improved since Wellbutrin was added to Celexa.  He had increased hemoglobin in the past as well as 1+ urine red blood cells.  PSA is up-to-date and negative.  Received flu immunization last week.  Denies any headache chest pain palpitations or edema. He is satisfied with the current dose of Wellbutrin    Review of Systems   Constitutional: Negative for activity change, appetite change, chills, diaphoresis and fatigue.   Respiratory: Positive for shortness of breath. Negative for cough, chest tightness and wheezing.    Cardiovascular: Negative for chest pain, palpitations and leg swelling.   Psychiatric/Behavioral: Negative for dysphoric mood. The patient is not nervous/anxious.        Objective:      Physical Exam   Constitutional: He is oriented to person, place, and time. He appears well-developed and well-nourished. No distress.   Cardiovascular: Normal rate and regular rhythm.   No murmur heard.  Pulmonary/Chest: Effort normal. No respiratory distress. He has no wheezes. He has no rales.   Decreased breath sounds throughout.  Nasal O2 in place.  O2 sat of 99%   Neurological: He is alert and oriented to person, place, and time.   Psychiatric: He has a normal mood and affect. Thought content normal.       Office Visit on 07/16/2019   Component Date Value Ref Range Status    WBC 07/16/2019 7.07  3.90 - 12.70 K/uL Final    RBC 07/16/2019 4.74  4.60 - 6.20 M/uL Final    Hemoglobin 07/16/2019 16.2  14.0 - 18.0 g/dL Final    Hematocrit 07/16/2019 48.2  40.0 - 54.0 % Final    Mean Corpuscular Volume 07/16/2019 102* 82 - 98 fL Final    Mean Corpuscular Hemoglobin 07/16/2019 34.2* 27.0 - 31.0 pg Final    Mean Corpuscular Hemoglobin Conc 07/16/2019 33.6  32.0 - 36.0 g/dL Final    RDW 07/16/2019 13.3  11.5 - 14.5 % Final     Platelets 07/16/2019 221  150 - 350 K/uL Final    MPV 07/16/2019 10.1  9.2 - 12.9 fL Final    Immature Granulocytes 07/16/2019 0.6* 0.0 - 0.5 % Final    Gran # (ANC) 07/16/2019 5.1  1.8 - 7.7 K/uL Final    Immature Grans (Abs) 07/16/2019 0.04  0.00 - 0.04 K/uL Final    Lymph # 07/16/2019 1.1  1.0 - 4.8 K/uL Final    Mono # 07/16/2019 0.5  0.3 - 1.0 K/uL Final    Eos # 07/16/2019 0.3  0.0 - 0.5 K/uL Final    Baso # 07/16/2019 0.06  0.00 - 0.20 K/uL Final    nRBC 07/16/2019 0  0 /100 WBC Final    Gran% 07/16/2019 72.4  38.0 - 73.0 % Final    Lymph% 07/16/2019 15.6* 18.0 - 48.0 % Final    Mono% 07/16/2019 7.1  4.0 - 15.0 % Final    Eosinophil% 07/16/2019 3.5  0.0 - 8.0 % Final    Basophil% 07/16/2019 0.8  0.0 - 1.9 % Final    Differential Method 07/16/2019 Automated   Final    Specimen UA 07/16/2019 Urine, Clean Catch   Final    Color, UA 07/16/2019 Yellow  Yellow, Straw, Dalila Final    Appearance, UA 07/16/2019 Clear  Clear Final    pH, UA 07/16/2019 8.0  5.0 - 8.0 Final    Specific Gravity, UA 07/16/2019 1.005  1.005 - 1.030 Final    Protein, UA 07/16/2019 Negative  Negative Final    Glucose, UA 07/16/2019 Negative  Negative Final    Ketones, UA 07/16/2019 Negative  Negative Final    Bilirubin (UA) 07/16/2019 Negative  Negative Final    Occult Blood UA 07/16/2019 Negative  Negative Final    Nitrite, UA 07/16/2019 Negative  Negative Final    Leukocytes, UA 07/16/2019 1+* Negative Final    WBC, UA 07/16/2019 5  0 - 5 /hpf Final    Bacteria 07/16/2019 Rare  None-Occ /hpf Final    Microscopic Comment 07/16/2019 SEE COMMENT   Final     Assessment:       1. Microhematuria    2. Polycythemia        Plan:     Recheck lab.  Refill wellbutrin follow-up in 3 months.  Discussed need for shingles immunization at the pharmacy  Microhematuria  -     Urinalysis    Polycythemia  -     CBC auto differential    Other orders  -     buPROPion (WELLBUTRIN) 100 MG tablet; Take 1 tablet (100 mg total) by mouth  2 (two) times daily.  Dispense: 180 tablet; Refill: 1

## 2019-12-03 ENCOUNTER — CLINICAL SUPPORT (OUTPATIENT)
Dept: PULMONOLOGY | Facility: CLINIC | Age: 64
End: 2019-12-03
Payer: MEDICARE

## 2019-12-03 ENCOUNTER — OFFICE VISIT (OUTPATIENT)
Dept: PULMONOLOGY | Facility: CLINIC | Age: 64
End: 2019-12-03
Payer: MEDICARE

## 2019-12-03 VITALS
WEIGHT: 172.38 LBS | HEIGHT: 69 IN | HEIGHT: 69 IN | WEIGHT: 172.38 LBS | BODY MASS INDEX: 25.53 KG/M2 | HEART RATE: 87 BPM | OXYGEN SATURATION: 97 % | RESPIRATION RATE: 22 BRPM | SYSTOLIC BLOOD PRESSURE: 124 MMHG | DIASTOLIC BLOOD PRESSURE: 60 MMHG | BODY MASS INDEX: 25.53 KG/M2

## 2019-12-03 DIAGNOSIS — J96.11 CHRONIC RESPIRATORY FAILURE WITH HYPOXIA: Chronic | ICD-10-CM

## 2019-12-03 DIAGNOSIS — R91.1 PULMONARY NODULE: Chronic | ICD-10-CM

## 2019-12-03 DIAGNOSIS — J44.9 COPD, GROUP D, BY GOLD 2017 CLASSIFICATION: Primary | ICD-10-CM

## 2019-12-03 PROCEDURE — 99215 OFFICE O/P EST HI 40 MIN: CPT | Mod: HCNC,S$GLB,, | Performed by: INTERNAL MEDICINE

## 2019-12-03 PROCEDURE — 99999 PR PBB SHADOW E&M-EST. PATIENT-LVL III: ICD-10-PCS | Mod: PBBFAC,HCNC,, | Performed by: INTERNAL MEDICINE

## 2019-12-03 PROCEDURE — 94618 PULMONARY STRESS TESTING: ICD-10-PCS | Mod: HCNC,S$GLB,, | Performed by: INTERNAL MEDICINE

## 2019-12-03 PROCEDURE — 99215 PR OFFICE/OUTPT VISIT, EST, LEVL V, 40-54 MIN: ICD-10-PCS | Mod: HCNC,S$GLB,, | Performed by: INTERNAL MEDICINE

## 2019-12-03 PROCEDURE — 3008F PR BODY MASS INDEX (BMI) DOCUMENTED: ICD-10-PCS | Mod: HCNC,CPTII,S$GLB, | Performed by: INTERNAL MEDICINE

## 2019-12-03 PROCEDURE — 3008F BODY MASS INDEX DOCD: CPT | Mod: HCNC,CPTII,S$GLB, | Performed by: INTERNAL MEDICINE

## 2019-12-03 PROCEDURE — 94618 PULMONARY STRESS TESTING: CPT | Mod: HCNC,S$GLB,, | Performed by: INTERNAL MEDICINE

## 2019-12-03 PROCEDURE — 99999 PR PBB SHADOW E&M-EST. PATIENT-LVL III: CPT | Mod: PBBFAC,HCNC,, | Performed by: INTERNAL MEDICINE

## 2019-12-03 RX ORDER — PREDNISONE 10 MG/1
TABLET ORAL
Qty: 60 TABLET | Refills: 0 | Status: SHIPPED | OUTPATIENT
Start: 2019-12-03

## 2019-12-03 NOTE — PROCEDURES
"[facility]  Six Minute Walk     SUMMARY     Ordering Provider: Dr. Rodriguez   Interpreting Provider: Dr. Rodriguez  Performing nurse/tech/RT: ANDREY Albarran crt  Diagnosis: (chronic resp failure with hypoxia)  Height: 5' 9" (175.3 cm)  Weight: 78.2 kg (172 lb 6.4 oz)  BMI (Calculated): 25.4   Patient Race:             Phase Oxygen Assessment Supplemental O2 Heart   Rate Blood Pressure Mika Dyspnea Scale Rating   Resting 90 % Room Air 106 bpm 107/77 5-6   Exercise        Minute        1 88 % Room Air 107 bpm     2 92 % 2 L/M 104 bpm     3 90 % 2 L/M 110 bpm     4 90 % 2 L/M 121 bpm     5 89 % 2 L/M 120 bpm     6  92 % 2 L/M 109 bpm 148/67 7-8   Recovery        Minute        1 92 % 2 L/M 112 bpm     2 94 % 2 L/M 101 bpm     3 95 % 2 L/M 101 bpm     4 96 % 2 L/M 92 bpm 133/71 3     Six Minute Walk Summary  6MWT Status: completed with stops  Patient Reported: Dizziness, Dyspnea, Lightheadedness     Interpretation:  Did the patient stop or pause?: Yes  How many times did the patient stop or pause?: 1  Stop Time 1: 274  Restart Time 1: 330  Pause Time 1: 56 seconds                             Total Time Walked (Calculated): 304 seconds  Final Partial Lap Distance (feet): 175 feet  Total Distance Meters (Calculated): 236.22 meters  Predicted Distance Meters (Calculated): 564.13 meters  Percentage of Predicted (Calculated): 41.87  Peak VO2 (Calculated): 11.07  Mets: 3.16  Has The Patient Had a Previous Six Minute Walk Test?: Yes       Previous 6MWT Results  Has The Patient Had a Previous Six Minute Walk Test?: Yes  Date of Previous Test: 11/06/18  Total Time Walked: 360 seconds  Total Distance (meters): 365.76  Predicted Distance (meters): 598.54 meters  Percentage of Predicted: 61.11  Percent Change (Calculated): 0.35          PHYSICIAN INTERPRETATION:      Six minute walk distance is 236.22 / 564.13 meters (41.87 % predicted) with very heavy dyspnea.   Peak VO2 during walking was 11.07  Ml/min/kg [ 3.16 " METS].   During exercise, there was significant desaturation while breathing room air to 88%.   Oxygen saturation improved to 92% with oxygen supplementation at 2  L /min.  Blood pressure remained stable and Heart rate was irregular ( 106 - 120 /min ) with walking.  Tachycardia was present prior to exercise.  This may represent an abnormal cardiovascular response to exercise.  The patient reported non-pulmonary symptoms during exercise - dizziness and lightheadedness.  Significant exercise impairment is likely due to desaturation and cardiovascular causes.  The patient did complete the study, walking 304 seconds of the 360 second test.  The patient may benefit from using supplemental oxygen during exertion.  Since the previous study in 11/06/18, exercise capacity is significantly worse.  Based upon age and body mass index, exercise capacity is less than predicted.

## 2019-12-03 NOTE — ASSESSMENT & PLAN NOTE
Continue oxygen supplementation at 2  L/min. DME is [facility].     Simple pulmonary stress test in 1 year

## 2019-12-03 NOTE — PATIENT INSTRUCTIONS
Chronic Lung Disease: If Oxygen Is Prescribed   Supplemental oxygen is prescribed if tests show that the level of oxygen in your blood is too low. If the level stays too low for too long, serious problems can develop in many parts of the body. Supplemental oxygen helps to relieve your symptoms and prevent future problems by getting more oxygen to the blood. Depending on your test results, you may need oxygen all the time. Or it may only be needed during certain activities, such as exercise or sleep. When oxygen is prescribed, youll be referred to a medical equipment company. They will set up the oxygen unit and teach you how to use it.  Nasal cannula     A nasal cannula should be placed in the nose with the prongs arching toward you.     Oxygen is most often inhaled through a nasal cannula (lightweight tube with two hollow prongs that fit just inside the nose).  Types of supplemental oxygen    Compressed oxygen   Oxygen concentrator   Liquid oxygen   Prescribed oxygen comes in several forms. You may use more than one type, depending on when you need oxygen:  · Compressed oxygen is oxygen gas stored in a tank. Because the oxygen is stored under pressure, these tanks must be handled carefully. Gauges on the tank can be used to adjust the oxygen flow rate. Your healthcare provider will determine what this should be. Small tanks can be carried. Larger tanks are on wheels and can be pulled around the house.  · An oxygen concentrator is a machine about the size of a large suitcase. It plugs into an electrical outlet. (A back-up oxygen supply is recommended in case of a power outage.) The machine takes oxygen from the air and concentrates it. Its then delivered to you through plastic tubing. The tubing is long enough so that you can move around the house. When youre using the concentrator, it must be kept somewhere that has a good supply of fresh air. (Dont keep it in a confined space, like a closet.) You may be set  up on a concentrator if you need oxygen all the time or while youre sleeping.  · Liquid oxygen results when oxygen gas is cooled to a very low temperature. Its kept in special containers that maintain this low temperature. When you use liquid oxygen, its warmed and becomes gas before reaching the cannula. Most tanks come with a portable unit that you can carry or pull on a cart. Some of these weigh only a few pounds. Liquid oxygen units are easy to carry around. If you need oxygen all the time or during activity, this kind of unit can help you stay active.  Oxygen is prescribed just for you  Your healthcare provider will prescribe oxygen based on your needs. Here are a few things you should know:  · A therapist from the medical equipment company will explain when to use oxygen and what type to use. Youll be taught how to use and maintain your oxygen equipment.  · You must use the exact rate of oxygen prescribed for each activity. Dont increase or decrease the amount without asking your healthcare provider first.  · Supplemental oxygen is a medicine. Its not addictive and causes no side effects when used as directed.   Date Last Reviewed: 5/1/2016  © 6336-1217 [employer]. [address] [place] [address], [place] [address]. All rights reserved. This information is not intended as a substitute for professional medical care. Always follow your healthcare professional's instructions.

## 2019-12-03 NOTE — PROGRESS NOTES
Subjective:      Patient ID: Johnny Perales is a 64 y.o. male.  Patient Active Problem List   Diagnosis    COPD, group D, by GOLD 2017 classification    Pulmonary nodule    Chronic respiratory failure with hypoxia    Screening for colon cancer    Family history of hypercholesterolemia     Chronic fatigue     Bilateral hearing loss    Elevated MCV    Depression    Immunization deficiency    Hyperkalemia    Elevated TSH    Colon cancer screening    Bilateral carpal tunnel syndrome    Suicidal behavior with attempted self-injury    Acute pain of right shoulder    History of colon polyps    Oxygen dependent    Polycythemia    Pyuria    Microhematuria     Problem list has been reviewed.    Chief Complaint: COPD and Shortness of Breath    Patients reports NYHA III dyspnea    The patient does not have currently have symptoms / an exacerbation.       No cough, sputum, or hemoptysis and No shortness or breath.   6MWT reviewd with patient who voiced understanding.    The patient does not have symptoms / an exacerbation.      Her current respiratory regimen: ANORO, PROAIR, ACCUNEB, DALIRESP: He does use medications compliantly.     Immunization status reviewed and up dated.        Group Spirometric Classification Exacerbations / year mMRC CAT   Group D: High risk; more sypmtoms  GOLD 3-4 > = 2 >= 2 >= 10     FEV 1 => 1.38L  (38% predicted)         COPD Questionnaire  How often do you cough?: All of the time  How often do you have phlegm (mucus) in your chest?: All of the time  How often does your chest feel tight?: Most of the time  When you walk up a hill or one flight of stairs, how often are you breathless?: All of the time  How often are you limited doing any activities at home?: Most of the time  How often are you confident leaving the house despite your lung condition?: All of the time  How often do you sleep soundly?: Never  How often do you have energy?: Some of the time  Total score:  30     HE IS ON OXYGEN SUPPLEMENTATION at 2 - 3 L /min    HE IS ON TRIOLOGY NIV ( VIEMED) . HE IS COMPLAINT WITH HIS TRIOLOGY.      Immunization status reviewed and up to date.       A full  review of systems, past , family  and social histories was performed except as mentioned in the note above, these are non contributory to the main issues discussed today.     Previous Report Reviewed: office notes     The following portions of the patient's history were reviewed and updated as appropriate: He  has a past medical history of COPD (chronic obstructive pulmonary disease) and Hearing impairment.     He  has a past surgical history that includes Inner ear surgery (Bilateral); Colonoscopy (N/A, 7/12/2017); and Leg Surgery (Right).     His family history includes Cancer in his father and mother; Colon cancer in his maternal uncle; Heart failure in his father and mother.     He  reports that he quit smoking about 14 months ago. His smoking use included vaping with nicotine. He started smoking about 48 years ago. He has a 3.50 pack-year smoking history. He has never used smokeless tobacco. He reports that he drinks about 12.0 standard drinks of alcohol per week. He reports that he does not use drugs.     He has a current medication list which includes the following prescription(s): afluria quad [id number] (pf), albuterol, albuterol, bupropion, cholecalciferol (vitamin d3), citalopram, dextromethorphan-guaifenesin  mg/5 ml, guaifenesin/pseudoephedrne hcl, ipratropium, multivit-min-fa-lycopen-lutein, oxygen-air delivery systems, roflumilast, soft lens rinse,store solution, umeclidinium-vilanterol, cholecalciferal, prednisone, and sodium chloride.     He has No Known Allergies..    Review of Systems   Constitutional: Positive for fatigue and night sweats.   HENT: Negative for nosebleeds and hearing loss.    Eyes: Negative for redness.   Respiratory: Positive for cough, sputum production, shortness of breath, wheezing  "and dyspnea on extertion. Negative for somnolence.    Cardiovascular: Negative for chest pain and leg swelling.   Genitourinary: Negative for hematuria.   Endocrine: Negative for cold intolerance and heat intolerance.    Musculoskeletal: Negative for arthralgias and back pain.   Skin: Negative for rash.   Gastrointestinal: Negative for abdominal pain and abdominal distention.   Neurological: Negative for dizziness and headaches.   Hematological: Negative for adenopathy. Does not bruise/bleed easily.   Psychiatric/Behavioral: The patient is nervous/anxious.    All other systems reviewed and are negative.     Objective:   /60   Pulse 87   Resp (!) 22   Ht 5' 9" (1.753 m)   Wt 78.2 kg (172 lb 6.4 oz)   SpO2 97%   BMI 25.46 kg/m²   Body mass index is 25.46 kg/m².    Physical Exam   Constitutional: He is oriented to person, place, and time. He appears well-developed and well-nourished.   HENT:   Head: Normocephalic and atraumatic.   Eyes: Pupils are equal, round, and reactive to light. EOM are normal.   Neck: Normal range of motion. Neck supple.   Cardiovascular: Normal rate and regular rhythm.   Pulmonary/Chest: No respiratory distress. He has decreased breath sounds in the right upper field, the right middle field, the right lower field, the left upper field, the left middle field and the left lower field. He has wheezes. He has no rales.   Abdominal: Soft. Bowel sounds are normal.   Musculoskeletal: Normal range of motion.   Neurological: He is alert and oriented to person, place, and time.   Skin: Skin is warm.   Psychiatric: He has a normal mood and affect.       Personal Diagnostic Review    Six Minute Walk test: 11/06/18: Six minute walk distance is 365.76 / 598 meters (61.11 % predicted) with heavy dyspnea. Peak VO2 during walking was 14.95 ml/min/kg [ 4.27 METS]. During exercise, there was significant desaturation while breathing room air to 88%. Oxygen saturation improved to 94% with oxygen " supplementation at 2  L /min. Both blood pressure and heart rate remained stable with walking. The patient did not report non-pulmonary symptoms during exercise.  Significant exercise impairment is likely due to desaturation. The patient did complete the study, walking 360 seconds of the 360 second test. The patient may benefit from using supplemental oxygen during exertion. Since the previous study in 10/07/16, exercise capacity may be somewhat improved. Based upon age and body mass index, exercise capacity is less than predicted.      CXR: The lungs are clear. The cardiac silhouette is within normal limits. No pleural effusion or pneumothorax or pulmonary edema.      Assessment / plan:       Discussed diagnosis, its evaluation, treatment and usual course. All questions answered.    Problem List Items Addressed This Visit        Pulmonary    Pulmonary nodule (Chronic)    Current Assessment & Plan     STABLE RADIOLOGICALLY. Radiologic monitoring.          Relevant Orders    X-Ray Chest PA And Lateral    Chronic respiratory failure with hypoxia (Chronic)    Current Assessment & Plan     Continue oxygen supplementation at 2  L/min. DME is [facility].     Simple pulmonary stress test in 1 year           COPD, group D, by GOLD 2017 classification - Primary    Current Assessment & Plan     COPD ROS: taking medications as instructed, no medication side effects noted, no significant ongoing wheezing or shortness of breath, using bronchodilator MDI less than twice a week.   New concerns: None.   Exam: Bilateral wheezes and mild respiratory distress.   Assessment:  COPD reasonably well controlled.   Plan:  Continue  ACCUNEB, VENTOLIN , ANORO, DALIRESP. PREDNISONE TAPER per orders. PFT in 6 months.         Relevant Medications    predniSONE (DELTASONE) 10 MG tablet    Other Relevant Orders    Complete PFT without bronchodilator - Clinic          TIME SPENT WITH PATIENT: Time spent: 40 minutes in face to face   discussion concerning diagnosis, prognosis, review of lab and test results, benefits of treatment as well as management of disease, counseling of patient and coordination of care between various health  care providers . Greater than half the time spent was used for coordination of care and counseling of patient.          Follow up in about 6 months (around 6/3/2020) for COPD, Chronic Respiratory Failure, Lung Nodule.

## 2019-12-03 NOTE — ASSESSMENT & PLAN NOTE
COPD ROS: taking medications as instructed, no medication side effects noted, no significant ongoing wheezing or shortness of breath, using bronchodilator MDI less than twice a week.   New concerns: None.   Exam: Bilateral wheezes and mild respiratory distress.   Assessment:  COPD reasonably well controlled.   Plan:  Continue  ACCUNEB, VENTOLIN , ANORO, DALIRESP. PREDNISONE TAPER per orders. PFT in 6 months.

## 2020-02-10 ENCOUNTER — TELEPHONE (OUTPATIENT)
Dept: INTERNAL MEDICINE | Facility: CLINIC | Age: 65
End: 2020-02-10

## 2020-02-10 NOTE — TELEPHONE ENCOUNTER
Spoke to Emigdio and was advised that the patient switched insurance, so a medical of necessity form is needed.  Emigdio also advised that the form was faxed over.  I advised Emigdio that  will review the form once received.  Emigdio verbally understood.

## 2020-02-10 NOTE — TELEPHONE ENCOUNTER
----- Message from Katie Benítez sent at 2/10/2020 11:17 AM CST -----  Contact: [employer]  Would like to consult with nurse regarding a new form he need filled out due to pt changing insurance. Please give a call back at [phone] [phone].          Thanks,  Katie MURRAY

## 2020-02-17 ENCOUNTER — TELEPHONE (OUTPATIENT)
Dept: INTERNAL MEDICINE | Facility: CLINIC | Age: 65
End: 2020-02-17

## 2020-02-17 NOTE — TELEPHONE ENCOUNTER
----- Message from Rere Ibrahim sent at 2/17/2020 10:42 AM CST -----  Contact: Emigdio/Bibiana Beal would like a call back at [phone] ext [phone], Regards to his medical necessity letter is missing some information.    Thanks  Td

## 2020-08-27 DIAGNOSIS — J44.9 COPD, GROUP D, BY GOLD 2017 CLASSIFICATION: Primary | ICD-10-CM
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
## 2021-04-29 ENCOUNTER — PATIENT MESSAGE (OUTPATIENT)
Dept: RESEARCH | Facility: HOSPITAL | Age: 66
End: 2021-04-29

## 2022-10-13 ENCOUNTER — PATIENT OUTREACH (OUTPATIENT)
Dept: ADMINISTRATIVE | Facility: HOSPITAL | Age: 67
End: 2022-10-13
Payer: MEDICARE

## 2022-10-13 NOTE — PROGRESS NOTES
Attempted to contact patient by phone for PCP visit, was able to speak to patient. He hung up after introduction

## 2022-10-17 ENCOUNTER — PATIENT MESSAGE (OUTPATIENT)
Dept: RESEARCH | Facility: HOSPITAL | Age: 67
End: 2022-10-17
Payer: MEDICARE

## 2023-03-15 NOTE — ASSESSMENT & PLAN NOTE
Continue oxygen supplementation at 3  L/min. DME is [facility].   Continue nocturnal TRIOLOGY ( VIEMED)   Eye Clamp Note Details: An eye clamp was used during the procedure.

## 2023-09-13 ENCOUNTER — HOSPITAL ENCOUNTER (EMERGENCY)
Facility: HOSPITAL | Age: 68
Discharge: HOME | End: 2023-09-14
Attending: FAMILY MEDICINE
Payer: MEDICARE
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
## 2023-09-13 DIAGNOSIS — I95.1 ORTHOSTATIC HYPOTENSION: Primary | ICD-10-CM

## 2023-09-13 LAB
ALBUMIN SERPL BCP-MCNC: 4.3 G/DL (ref 3.5–5.2)
ALP SERPL-CCNC: 85 U/L (ref 55–135)
ALT SERPL W/O P-5'-P-CCNC: 13 U/L (ref 10–44)
ANION GAP SERPL CALC-SCNC: 13 MMOL/L (ref 8–16)
AST SERPL-CCNC: 23 U/L (ref 10–40)
BASOPHILS # BLD AUTO: 0.08 K/UL (ref 0–0.2)
BASOPHILS NFR BLD: 0.7 % (ref 0–1.9)
BILIRUB SERPL-MCNC: 0.2 MG/DL (ref 0.1–1)
BILIRUB UR QL STRIP: NEGATIVE
BNP SERPL-MCNC: <10 PG/ML (ref 0–99)
BUN SERPL-MCNC: 8 MG/DL (ref 8–23)
CALCIUM SERPL-MCNC: 9.6 MG/DL (ref 8.7–10.5)
CHLORIDE SERPL-SCNC: 100 MMOL/L (ref 95–110)
CLARITY UR: CLEAR
CO2 SERPL-SCNC: 25 MMOL/L (ref 23–29)
COLOR UR: COLORLESS
CREAT SERPL-MCNC: 0.8 MG/DL (ref 0.5–1.4)
DIFFERENTIAL METHOD: ABNORMAL
EOSINOPHIL # BLD AUTO: 0.4 K/UL (ref 0–0.5)
EOSINOPHIL NFR BLD: 3.3 % (ref 0–8)
ERYTHROCYTE [DISTWIDTH] IN BLOOD BY AUTOMATED COUNT: 13.3 % (ref 11.5–14.5)
EST. GFR  (NO RACE VARIABLE): >60 ML/MIN/1.73 M^2
GLUCOSE SERPL-MCNC: 78 MG/DL (ref 70–110)
GLUCOSE UR QL STRIP: NEGATIVE
HCT VFR BLD AUTO: 42.2 % (ref 40–54)
HCV AB SERPL QL IA: NEGATIVE
HGB BLD-MCNC: 14.4 G/DL (ref 14–18)
HGB UR QL STRIP: NEGATIVE
HIV 1+2 AB+HIV1 P24 AG SERPL QL IA: NEGATIVE
IMM GRANULOCYTES # BLD AUTO: 0.07 K/UL (ref 0–0.04)
IMM GRANULOCYTES NFR BLD AUTO: 0.6 % (ref 0–0.5)
KETONES UR QL STRIP: NEGATIVE
LEUKOCYTE ESTERASE UR QL STRIP: NEGATIVE
LYMPHOCYTES # BLD AUTO: 2 K/UL (ref 1–4.8)
LYMPHOCYTES NFR BLD: 16.5 % (ref 18–48)
MCH RBC QN AUTO: 33.1 PG (ref 27–31)
MCHC RBC AUTO-ENTMCNC: 34.1 G/DL (ref 32–36)
MCV RBC AUTO: 97 FL (ref 82–98)
MONOCYTES # BLD AUTO: 0.6 K/UL (ref 0.3–1)
MONOCYTES NFR BLD: 5.1 % (ref 4–15)
NEUTROPHILS # BLD AUTO: 9 K/UL (ref 1.8–7.7)
NEUTROPHILS NFR BLD: 73.8 % (ref 38–73)
NITRITE UR QL STRIP: NEGATIVE
NRBC BLD-RTO: 0 /100 WBC
PH UR STRIP: 6 [PH] (ref 5–8)
PLATELET # BLD AUTO: 220 K/UL (ref 150–450)
PMV BLD AUTO: 8.7 FL (ref 9.2–12.9)
POTASSIUM SERPL-SCNC: 4 MMOL/L (ref 3.5–5.1)
PROT SERPL-MCNC: 7.3 G/DL (ref 6–8.4)
PROT UR QL STRIP: NEGATIVE
RBC # BLD AUTO: 4.35 M/UL (ref 4.6–6.2)
SODIUM SERPL-SCNC: 138 MMOL/L (ref 136–145)
SP GR UR STRIP: 1 (ref 1–1.03)
TROPONIN I SERPL DL<=0.01 NG/ML-MCNC: 0.01 NG/ML (ref 0–0.03)
URN SPEC COLLECT METH UR: ABNORMAL
UROBILINOGEN UR STRIP-ACNC: NEGATIVE EU/DL
WBC # BLD AUTO: 12.23 K/UL (ref 3.9–12.7)

## 2023-09-13 PROCEDURE — 85025 COMPLETE CBC W/AUTO DIFF WBC: CPT | Performed by: FAMILY MEDICINE

## 2023-09-13 PROCEDURE — 93010 ELECTROCARDIOGRAM REPORT: CPT | Mod: ,,, | Performed by: INTERNAL MEDICINE

## 2023-09-13 PROCEDURE — 86803 HEPATITIS C AB TEST: CPT | Performed by: FAMILY MEDICINE

## 2023-09-13 PROCEDURE — 93010 EKG 12-LEAD: ICD-10-PCS | Mod: ,,, | Performed by: INTERNAL MEDICINE

## 2023-09-13 PROCEDURE — 99285 EMERGENCY DEPT VISIT HI MDM: CPT | Mod: 25

## 2023-09-13 PROCEDURE — 81003 URINALYSIS AUTO W/O SCOPE: CPT | Performed by: FAMILY MEDICINE

## 2023-09-13 PROCEDURE — 93005 ELECTROCARDIOGRAM TRACING: CPT

## 2023-09-13 PROCEDURE — 83880 ASSAY OF NATRIURETIC PEPTIDE: CPT | Performed by: FAMILY MEDICINE

## 2023-09-13 PROCEDURE — 87389 HIV-1 AG W/HIV-1&-2 AB AG IA: CPT | Performed by: FAMILY MEDICINE

## 2023-09-13 PROCEDURE — 80053 COMPREHEN METABOLIC PANEL: CPT | Performed by: FAMILY MEDICINE

## 2023-09-13 PROCEDURE — 84484 ASSAY OF TROPONIN QUANT: CPT | Performed by: FAMILY MEDICINE

## 2023-09-13 PROCEDURE — 96360 HYDRATION IV INFUSION INIT: CPT

## 2023-09-14 VITALS
DIASTOLIC BLOOD PRESSURE: 68 MMHG | TEMPERATURE: 98 F | SYSTOLIC BLOOD PRESSURE: 110 MMHG | BODY MASS INDEX: 24.86 KG/M2 | RESPIRATION RATE: 18 BRPM | OXYGEN SATURATION: 100 % | HEART RATE: 83 BPM | WEIGHT: 173.69 LBS | HEIGHT: 70 IN

## 2023-09-14 LAB — ETHANOL SERPL-MCNC: 253 MG/DL

## 2023-09-14 PROCEDURE — 82077 ASSAY SPEC XCP UR&BREATH IA: CPT | Performed by: FAMILY MEDICINE

## 2023-09-14 PROCEDURE — 25000003 PHARM REV CODE 250: Performed by: FAMILY MEDICINE

## 2023-09-14 RX ADMIN — SODIUM CHLORIDE 1000 ML: 9 INJECTION, SOLUTION INTRAVENOUS at 12:09
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
## 2023-09-14 NOTE — ED PROVIDER NOTES
SCRIBE #1 NOTE: I, Alma Childers, am scribing for, and in the presence of, Pinky Arteaga MD. I have scribed the entire note.       History     Chief Complaint   Patient presents with    Fall     Pt to ED via [facility] who reports multiple ground level falls today. Pt ETOH     Review of patient's allergies indicates:  No Known Allergies      History of Present Illness     HPI    2023, 12:58 AM  History obtained from the patient      History of Present Illness: Johnny Perales is a 68 y.o. male patient with a PMHx of COPD and hearing impairment who presents to the Emergency Department following multiple falls which onset several hours PTA. Symptoms are constant and moderate in severity. Pt consumed EtOH today. He suffered multiple ground level falls. No further complaints or concerns at this time.       Arrival mode: EMS    PCP: John Yap MD        Past Medical History:  Past Medical History:   Diagnosis Date    COPD (chronic obstructive pulmonary disease)     Hearing impairment        Past Surgical History:  Past Surgical History:   Procedure Laterality Date    COLONOSCOPY N/A 2017    Procedure: COLONOSCOPY;  Surgeon: Salvador Lambert MD;  Location: [facility];  Service: Endoscopy;  Laterality: N/A;    INNER EAR SURGERY Bilateral     LEG SURGERY Right          Family History:  Family History   Problem Relation Age of Onset    Cancer Mother     Heart failure Mother     Cancer Father     Heart failure Father     Colon cancer Maternal Uncle        Social History:  Social History     Tobacco Use    Smoking status: Former     Current packs/day: 0.00     Average packs/day: 0.1 packs/day for 47.0 years (4.7 ttl pk-yrs)     Types: Vaping with nicotine, Cigarettes     Start date: 10/1971     Quit date: 10/2018     Years since quittin.9    Smokeless tobacco: Never   Substance and Sexual Activity    Alcohol use: Yes     Alcohol/week: 12.0 standard drinks of alcohol     Types: 12 Cans of  "beer per week    Drug use: No    Sexual activity: Not on file        Review of Systems     Review of Systems   Musculoskeletal:  Positive for gait problem.        (+) falls      Physical Exam     Initial Vitals [09/13/23 2206]   BP Pulse Resp Temp SpO2   124/78 88 14 97.8 °F (36.6 °C) 97 %      MAP       --          Physical Exam  Nursing Notes and Vital Signs Reviewed.  Constitutional: Patient is in no acute distress. Well-developed and well-nourished.  Head: Atraumatic. Normocephalic.  Eyes: PERRL. EOM intact. Conjunctivae are not pale. No scleral icterus.  ENT: Mucous membranes are moist. Oropharynx is clear and symmetric.  Hard of hearing.   Neck: Supple. Full ROM. No lymphadenopathy.  Cardiovascular: Regular rate. Regular rhythm. No murmurs, rubs, or gallops. Distal pulses are 2+ and symmetric.  Pulmonary/Chest: Coarse breath sounds bilaterally. No wheezing or rales.  Abdominal: Soft and non-distended.  There is no tenderness.  No rebound, guarding, or rigidity. Good bowel sounds.  Genitourinary: No CVA tenderness  Musculoskeletal: Moves all extremities. No obvious deformities. No edema. No calf tenderness.  Skin: Warm and dry.  Neurological:  Alert, awake, and appropriate.  Normal speech.  No acute focal neurological deficits are appreciated.  Psychiatric: Normal affect. Good eye contact. Appropriate in content.     ED Course   Procedures  ED Vital Signs:  Vitals:    09/13/23 2206 09/13/23 2303 09/13/23 2330 09/13/23 2357   BP: 124/78  99/69 (!) 99/58   Pulse: 88  79 79   Resp: 14  15 16   Temp: 97.8 °F (36.6 °C)  98.2 °F (36.8 °C)    TempSrc: Oral  Oral    SpO2: 97%  98% 100%   Weight:  78.8 kg (173 lb 11.2 oz)     Height:  5' 10" (1.778 m)      09/13/23 2359 09/14/23 0001 09/14/23 0100 09/14/23 0130   BP: 105/61 (!) 101/51 103/65 106/70   Pulse: 77 79 73 71   Resp: 18 20 15 14   Temp:   98.1 °F (36.7 °C)    TempSrc:   Oral    SpO2: 100% 100% 98% 100%   Weight:       Height:        09/14/23 0300   BP: 110/68 "   Pulse: 83   Resp: 18   Temp: 98.1 °F (36.7 °C)   TempSrc: Oral   SpO2: 100%   Weight:    Height:        Abnormal Lab Results:  Labs Reviewed   CBC W/ AUTO DIFFERENTIAL - Abnormal; Notable for the following components:       Result Value    RBC 4.35 (*)     MCH 33.1 (*)     MPV 8.7 (*)     Immature Granulocytes 0.6 (*)     Gran # (ANC) 9.0 (*)     Immature Grans (Abs) 0.07 (*)     Gran % 73.8 (*)     Lymph % 16.5 (*)     All other components within normal limits    Narrative:     Release to patient->Immediate   URINALYSIS - Abnormal; Notable for the following components:    Color, UA Colorless (*)     All other components within normal limits   ALCOHOL,MEDICAL (ETHANOL) - Abnormal; Notable for the following components:    Alcohol, Serum 253 (*)     All other components within normal limits   HIV 1 / 2 ANTIBODY    Narrative:     Release to patient->Immediate   HEPATITIS C ANTIBODY    Narrative:     Release to patient->Immediate   COMPREHENSIVE METABOLIC PANEL    Narrative:     Release to patient->Immediate   TROPONIN I    Narrative:     Release to patient->Immediate   B-TYPE NATRIURETIC PEPTIDE    Narrative:     Release to patient->Immediate   ALCOHOL,MEDICAL (ETHANOL)   HEP C VIRUS HOLD SPECIMEN        All Lab Results:  Results for orders placed or performed during the hospital encounter of 09/13/23   HIV 1/2 Ag/Ab (4th Gen)   Result Value Ref Range    HIV 1/2 Ag/Ab Negative Negative   Hepatitis C Antibody   Result Value Ref Range    Hepatitis C Ab Negative Negative   CBC auto differential   Result Value Ref Range    WBC 12.23 3.90 - 12.70 K/uL    RBC 4.35 (L) 4.60 - 6.20 M/uL    Hemoglobin 14.4 14.0 - 18.0 g/dL    Hematocrit 42.2 40.0 - 54.0 %    MCV 97 82 - 98 fL    MCH 33.1 (H) 27.0 - 31.0 pg    MCHC 34.1 32.0 - 36.0 g/dL    RDW 13.3 11.5 - 14.5 %    Platelets 220 150 - 450 K/uL    MPV 8.7 (L) 9.2 - 12.9 fL    Immature Granulocytes 0.6 (H) 0.0 - 0.5 %    Gran # (ANC) 9.0 (H) 1.8 - 7.7 K/uL    Immature Grans  (Abs) 0.07 (H) 0.00 - 0.04 K/uL    Lymph # 2.0 1.0 - 4.8 K/uL    Mono # 0.6 0.3 - 1.0 K/uL    Eos # 0.4 0.0 - 0.5 K/uL    Baso # 0.08 0.00 - 0.20 K/uL    nRBC 0 0 /100 WBC    Gran % 73.8 (H) 38.0 - 73.0 %    Lymph % 16.5 (L) 18.0 - 48.0 %    Mono % 5.1 4.0 - 15.0 %    Eosinophil % 3.3 0.0 - 8.0 %    Basophil % 0.7 0.0 - 1.9 %    Differential Method Automated    Comprehensive metabolic panel   Result Value Ref Range    Sodium 138 136 - 145 mmol/L    Potassium 4.0 3.5 - 5.1 mmol/L    Chloride 100 95 - 110 mmol/L    CO2 25 23 - 29 mmol/L    Glucose 78 70 - 110 mg/dL    BUN 8 8 - 23 mg/dL    Creatinine 0.8 0.5 - 1.4 mg/dL    Calcium 9.6 8.7 - 10.5 mg/dL    Total Protein 7.3 6.0 - 8.4 g/dL    Albumin 4.3 3.5 - 5.2 g/dL    Total Bilirubin 0.2 0.1 - 1.0 mg/dL    Alkaline Phosphatase 85 55 - 135 U/L    AST 23 10 - 40 U/L    ALT 13 10 - 44 U/L    eGFR >60 >60 mL/min/1.73 m^2    Anion Gap 13 8 - 16 mmol/L   Urinalysis - Clean Catch   Result Value Ref Range    Specimen UA Urine, Clean Catch     Color, UA Colorless (A) Yellow, Straw, Dalila    Appearance, UA Clear Clear    pH, UA 6.0 5.0 - 8.0    Specific Gravity, UA 1.005 1.005 - 1.030    Protein, UA Negative Negative    Glucose, UA Negative Negative    Ketones, UA Negative Negative    Bilirubin (UA) Negative Negative    Occult Blood UA Negative Negative    Nitrite, UA Negative Negative    Urobilinogen, UA Negative <2.0 EU/dL    Leukocytes, UA Negative Negative   Troponin I   Result Value Ref Range    Troponin I 0.007 0.000 - 0.026 ng/mL   B-Type natriuretic peptide (BNP)   Result Value Ref Range    BNP <10 0 - 99 pg/mL   Ethanol   Result Value Ref Range    Alcohol, Serum 253 (H) <10 mg/dL         Imaging Results:  Imaging Results              CT Head Without Contrast (Final result)  Result time 09/13/23 22:57:20      Final result by Pelon Addison MD (09/13/23 22:57:20)                   Impression:      No acute abnormality.    Atrophy and chronic white matter changes    All  CT scans   are performed using dose optimization techniques including the following: automated exposure control; adjustment of the mA and/or kV; use of iterative reconstruction technique.  Dose modulation was employed for ALARA by means of: Automated exposure control; adjustment of the mA and/or kV according to patient size (this includes techniques or standardized protocols for targeted exams where dose is matched to indication/reason for exam; i.e. extremities or head); and/or use of iterative reconstructive technique.      Electronically signed by: Pelon Addison  Date:    09/13/2023  Time:    22:57               Narrative:    EXAMINATION:  CT HEAD WITHOUT CONTRAST    CLINICAL HISTORY:  Mental status change, unknown cause;    TECHNIQUE:  Low dose axial CT images obtained throughout the head without intravenous contrast. Sagittal and coronal reconstructions were performed.    COMPARISON:  None.    FINDINGS:  Atrophy and chronic white matter changes.    No extra-axial blood or fluid collections.    No parenchymal mass, hemorrhage, edema or major vascular distribution infarct.    Skull/extracranial contents (limited evaluation): No fracture. Mastoid air cells and paranasal sinuses are essentially clear.                                       The EKG was ordered, reviewed, and independently interpreted by the ED provider.  Interpretation time: 22:50  Rate: 79 BPM  Rhythm: normal sinus rhythm  Interpretation: Right bundle branch block.   Minimal voltage criteria for LVH, may be normal variant ( R in aVL ). No STEMI.             The Emergency Provider reviewed the vital signs and test results, which are outlined above.     ED Discussion     1:02 AM: Reassessed pt at this time. Discussed with pt all pertinent ED information and results. Discussed pt dx and plan of tx. Gave pt all f/u and return to the ED instructions. All questions and concerns were addressed at this time. Pt expresses understanding of information and  instructions, and is comfortable with plan to discharge. Pt is stable for discharge.    I discussed with patient and/or family/caretaker that evaluation in the ED does not suggest any emergent or life threatening medical conditions requiring immediate intervention beyond what was provided in the ED, and I believe patient is safe for discharge.  Regardless, an unremarkable evaluation in the ED does not preclude the development or presence of a serious of life threatening condition. As such, patient was instructed to return immediately for any worsening or change in current symptoms.         Medical Decision Making  Amount and/or Complexity of Data Reviewed  Labs: ordered. Decision-making details documented in ED Course.  Radiology: ordered. Decision-making details documented in ED Course.  ECG/medicine tests: ordered. Decision-making details documented in ED Course.                ED Medication(s):  Medications   sodium chloride 0.9% bolus 1,000 mL 1,000 mL (0 mLs Intravenous Stopped 9/14/23 0205)       Discharge Medication List as of 9/14/2023 12:57 AM           Follow-up Information       John Yap MD. Schedule an appointment as soon as possible for a visit in 1 week.    Specialty: Family Medicine  Contact information:  [address] [facility] [address] [phone]                                 Scribe Attestation:   Scribe #1: I performed the above scribed service and the documentation accurately describes the services I performed. I attest to the accuracy of the note.     Attending:   Physician Attestation Statement for Scribe #1: I, Pinky Arteaga MD, personally performed the services described in this documentation, as scribed by Alma Childers, in my presence, and it is both accurate and complete.           Clinical Impression       ICD-10-CM ICD-9-CM   1. Orthostatic hypotension  I95.1 458.0   2. Fall  W19.XXXA E888.9       Disposition:   Disposition: Discharged  Condition:  Stable         Pinky Arteaga MD  09/14/23 [id number]

## 2024-07-24 ENCOUNTER — HOSPITAL ENCOUNTER (INPATIENT)
Facility: HOSPITAL | Age: 69
LOS: 1 days | Discharge: HOME | DRG: 192 | End: 2024-07-27
Attending: EMERGENCY MEDICINE | Admitting: STUDENT IN AN ORGANIZED HEALTH CARE EDUCATION/TRAINING PROGRAM
Payer: COMMERCIAL

## 2024-07-24 DIAGNOSIS — J44.1 COPD WITH ACUTE EXACERBATION: Primary | ICD-10-CM

## 2024-07-24 DIAGNOSIS — R06.02 SOB (SHORTNESS OF BREATH): ICD-10-CM

## 2024-07-24 LAB
ALBUMIN SERPL BCP-MCNC: 4.4 G/DL (ref 3.5–5.2)
ALP SERPL-CCNC: 79 U/L (ref 55–135)
ALT SERPL W/O P-5'-P-CCNC: 14 U/L (ref 10–44)
ANION GAP SERPL CALC-SCNC: 13 MMOL/L (ref 8–16)
AST SERPL-CCNC: 23 U/L (ref 10–40)
BASOPHILS # BLD AUTO: 0.1 K/UL (ref 0–0.2)
BASOPHILS NFR BLD: 1.2 % (ref 0–1.9)
BILIRUB SERPL-MCNC: 0.6 MG/DL (ref 0.1–1)
BILIRUB UR QL STRIP: NEGATIVE
BNP SERPL-MCNC: <10 PG/ML (ref 0–99)
BUN SERPL-MCNC: 9 MG/DL (ref 8–23)
CALCIUM SERPL-MCNC: 10.5 MG/DL (ref 8.7–10.5)
CHLORIDE SERPL-SCNC: 99 MMOL/L (ref 95–110)
CK SERPL-CCNC: 153 U/L (ref 20–200)
CLARITY UR: CLEAR
CO2 SERPL-SCNC: 30 MMOL/L (ref 23–29)
COLOR UR: YELLOW
CREAT SERPL-MCNC: 0.9 MG/DL (ref 0.5–1.4)
DIFFERENTIAL METHOD BLD: ABNORMAL
EOSINOPHIL # BLD AUTO: 0.3 K/UL (ref 0–0.5)
EOSINOPHIL NFR BLD: 3.7 % (ref 0–8)
ERYTHROCYTE [DISTWIDTH] IN BLOOD BY AUTOMATED COUNT: 13 % (ref 11.5–14.5)
EST. GFR  (NO RACE VARIABLE): >60 ML/MIN/1.73 M^2
GLUCOSE SERPL-MCNC: 101 MG/DL (ref 70–110)
GLUCOSE UR QL STRIP: NEGATIVE
HCT VFR BLD AUTO: 47.8 % (ref 40–54)
HGB BLD-MCNC: 15.9 G/DL (ref 14–18)
HGB UR QL STRIP: NEGATIVE
IMM GRANULOCYTES # BLD AUTO: 0.02 K/UL (ref 0–0.04)
IMM GRANULOCYTES NFR BLD AUTO: 0.2 % (ref 0–0.5)
INFLUENZA A, MOLECULAR: NEGATIVE
INFLUENZA B, MOLECULAR: NEGATIVE
KETONES UR QL STRIP: ABNORMAL
LEUKOCYTE ESTERASE UR QL STRIP: NEGATIVE
LYMPHOCYTES # BLD AUTO: 1.3 K/UL (ref 1–4.8)
LYMPHOCYTES NFR BLD: 16.6 % (ref 18–48)
MCH RBC QN AUTO: 31.5 PG (ref 27–31)
MCHC RBC AUTO-ENTMCNC: 33.3 G/DL (ref 32–36)
MCV RBC AUTO: 95 FL (ref 82–98)
MONOCYTES # BLD AUTO: 0.4 K/UL (ref 0.3–1)
MONOCYTES NFR BLD: 5.2 % (ref 4–15)
NEUTROPHILS # BLD AUTO: 5.9 K/UL (ref 1.8–7.7)
NEUTROPHILS NFR BLD: 73.1 % (ref 38–73)
NITRITE UR QL STRIP: NEGATIVE
NRBC BLD-RTO: 0 /100 WBC
OHS QRS DURATION: 120 MS
OHS QTC CALCULATION: 454 MS
PH UR STRIP: 6 [PH] (ref 5–8)
PLATELET # BLD AUTO: 249 K/UL (ref 150–450)
PMV BLD AUTO: 9 FL (ref 9.2–12.9)
POTASSIUM SERPL-SCNC: 4.5 MMOL/L (ref 3.5–5.1)
PROT SERPL-MCNC: 7.7 G/DL (ref 6–8.4)
PROT UR QL STRIP: ABNORMAL
RBC # BLD AUTO: 5.05 M/UL (ref 4.6–6.2)
SARS-COV-2 RDRP RESP QL NAA+PROBE: NEGATIVE
SODIUM SERPL-SCNC: 142 MMOL/L (ref 136–145)
SP GR UR STRIP: 1.03 (ref 1–1.03)
SPECIMEN SOURCE: NORMAL
TROPONIN I SERPL DL<=0.01 NG/ML-MCNC: 0.01 NG/ML (ref 0–0.03)
URN SPEC COLLECT METH UR: ABNORMAL
UROBILINOGEN UR STRIP-ACNC: NEGATIVE EU/DL
WBC # BLD AUTO: 8.05 K/UL (ref 3.9–12.7)

## 2024-07-24 PROCEDURE — 83880 ASSAY OF NATRIURETIC PEPTIDE: CPT | Performed by: EMERGENCY MEDICINE

## 2024-07-24 PROCEDURE — G0378 HOSPITAL OBSERVATION PER HR: HCPCS

## 2024-07-24 PROCEDURE — 27000190 HC CPAP FULL FACE MASK W/VALVE

## 2024-07-24 PROCEDURE — 63700000 PHARM REV CODE 250 ALT 637 W/O HCPCS: Performed by: STUDENT IN AN ORGANIZED HEALTH CARE EDUCATION/TRAINING PROGRAM

## 2024-07-24 PROCEDURE — 87502 INFLUENZA DNA AMP PROBE: CPT | Performed by: EMERGENCY MEDICINE

## 2024-07-24 PROCEDURE — 96375 TX/PRO/DX INJ NEW DRUG ADDON: CPT

## 2024-07-24 PROCEDURE — 80053 COMPREHEN METABOLIC PANEL: CPT | Performed by: EMERGENCY MEDICINE

## 2024-07-24 PROCEDURE — 5A09357 ASSISTANCE WITH RESPIRATORY VENTILATION, LESS THAN 24 CONSECUTIVE HOURS, CONTINUOUS POSITIVE AIRWAY PRESSURE: ICD-10-PCS | Performed by: STUDENT IN AN ORGANIZED HEALTH CARE EDUCATION/TRAINING PROGRAM

## 2024-07-24 PROCEDURE — 94640 AIRWAY INHALATION TREATMENT: CPT

## 2024-07-24 PROCEDURE — 25000242 PHARM REV CODE 250 ALT 637 W/ HCPCS: Performed by: STUDENT IN AN ORGANIZED HEALTH CARE EDUCATION/TRAINING PROGRAM

## 2024-07-24 PROCEDURE — 96374 THER/PROPH/DIAG INJ IV PUSH: CPT

## 2024-07-24 PROCEDURE — 63600175 PHARM REV CODE 636 W HCPCS: Performed by: STUDENT IN AN ORGANIZED HEALTH CARE EDUCATION/TRAINING PROGRAM

## 2024-07-24 PROCEDURE — 94660 CPAP INITIATION&MGMT: CPT

## 2024-07-24 PROCEDURE — 99900035 HC TECH TIME PER 15 MIN (STAT)

## 2024-07-24 PROCEDURE — 25000003 PHARM REV CODE 250: Performed by: STUDENT IN AN ORGANIZED HEALTH CARE EDUCATION/TRAINING PROGRAM

## 2024-07-24 PROCEDURE — 84484 ASSAY OF TROPONIN QUANT: CPT | Performed by: EMERGENCY MEDICINE

## 2024-07-24 PROCEDURE — 93005 ELECTROCARDIOGRAM TRACING: CPT

## 2024-07-24 PROCEDURE — 99285 EMERGENCY DEPT VISIT HI MDM: CPT | Mod: 25

## 2024-07-24 PROCEDURE — 81003 URINALYSIS AUTO W/O SCOPE: CPT | Performed by: STUDENT IN AN ORGANIZED HEALTH CARE EDUCATION/TRAINING PROGRAM

## 2024-07-24 PROCEDURE — 93010 ELECTROCARDIOGRAM REPORT: CPT | Mod: ,,, | Performed by: INTERNAL MEDICINE

## 2024-07-24 PROCEDURE — 85025 COMPLETE CBC W/AUTO DIFF WBC: CPT | Performed by: EMERGENCY MEDICINE

## 2024-07-24 PROCEDURE — 94761 N-INVAS EAR/PLS OXIMETRY MLT: CPT

## 2024-07-24 PROCEDURE — 27100171 HC OXYGEN HIGH FLOW UP TO 24 HOURS

## 2024-07-24 PROCEDURE — 82550 ASSAY OF CK (CPK): CPT | Performed by: EMERGENCY MEDICINE

## 2024-07-24 PROCEDURE — 25000242 PHARM REV CODE 250 ALT 637 W/ HCPCS: Performed by: EMERGENCY MEDICINE

## 2024-07-24 PROCEDURE — U0002 COVID-19 LAB TEST NON-CDC: HCPCS | Performed by: EMERGENCY MEDICINE

## 2024-07-24 RX ORDER — FLUTICASONE PROPIONATE 50 MCG
2 SPRAY, SUSPENSION (ML) NASAL 2 TIMES DAILY
Status: DISCONTINUED | OUTPATIENT
Start: 2024-07-24 | End: 2024-07-27 | Stop reason: HOSPADM

## 2024-07-24 RX ORDER — BENZONATATE 100 MG/1
100 CAPSULE ORAL 3 TIMES DAILY PRN
Status: DISCONTINUED | OUTPATIENT
Start: 2024-07-24 | End: 2024-07-27 | Stop reason: HOSPADM

## 2024-07-24 RX ORDER — BUPROPION HYDROCHLORIDE 100 MG/1
100 TABLET ORAL 2 TIMES DAILY
Status: DISCONTINUED | OUTPATIENT
Start: 2024-07-24 | End: 2024-07-27 | Stop reason: HOSPADM

## 2024-07-24 RX ORDER — ENOXAPARIN SODIUM 100 MG/ML
40 INJECTION SUBCUTANEOUS EVERY 24 HOURS
Status: DISCONTINUED | OUTPATIENT
Start: 2024-07-24 | End: 2024-07-27 | Stop reason: HOSPADM

## 2024-07-24 RX ORDER — ONDANSETRON 8 MG/1
8 TABLET, ORALLY DISINTEGRATING ORAL EVERY 8 HOURS PRN
Status: DISCONTINUED | OUTPATIENT
Start: 2024-07-24 | End: 2024-07-27 | Stop reason: HOSPADM

## 2024-07-24 RX ORDER — AZITHROMYCIN 250 MG/1
500 TABLET, FILM COATED ORAL ONCE
Status: COMPLETED | OUTPATIENT
Start: 2024-07-24 | End: 2024-07-24

## 2024-07-24 RX ORDER — FLUTICASONE PROPIONATE AND SALMETEROL 500; 50 UG/1; UG/1
POWDER RESPIRATORY (INHALATION)
COMMUNITY
Start: 2024-02-28

## 2024-07-24 RX ORDER — POLYETHYLENE GLYCOL 3350 17 G/17G
17 POWDER, FOR SOLUTION ORAL DAILY
Status: DISCONTINUED | OUTPATIENT
Start: 2024-07-24 | End: 2024-07-27 | Stop reason: HOSPADM

## 2024-07-24 RX ORDER — CITALOPRAM 20 MG/1
20 TABLET, FILM COATED ORAL DAILY
Status: DISCONTINUED | OUTPATIENT
Start: 2024-07-24 | End: 2024-07-27 | Stop reason: HOSPADM

## 2024-07-24 RX ORDER — PREDNISONE 20 MG/1
40 TABLET ORAL DAILY
Status: DISCONTINUED | OUTPATIENT
Start: 2024-07-24 | End: 2024-07-24

## 2024-07-24 RX ORDER — AZITHROMYCIN 250 MG/1
250 TABLET, FILM COATED ORAL DAILY
Status: DISCONTINUED | OUTPATIENT
Start: 2024-07-25 | End: 2024-07-27 | Stop reason: HOSPADM

## 2024-07-24 RX ORDER — IPRATROPIUM BROMIDE AND ALBUTEROL SULFATE 2.5; .5 MG/3ML; MG/3ML
3 SOLUTION RESPIRATORY (INHALATION) EVERY 4 HOURS
Status: DISCONTINUED | OUTPATIENT
Start: 2024-07-24 | End: 2024-07-27 | Stop reason: HOSPADM

## 2024-07-24 RX ORDER — ASPIRIN 81 MG/1
1 TABLET ORAL EVERY MORNING
COMMUNITY

## 2024-07-24 RX ORDER — FUROSEMIDE 10 MG/ML
40 INJECTION INTRAMUSCULAR; INTRAVENOUS ONCE
Status: COMPLETED | OUTPATIENT
Start: 2024-07-24 | End: 2024-07-24

## 2024-07-24 RX ORDER — ROFLUMILAST 500 UG/1
500 TABLET ORAL DAILY
Status: DISCONTINUED | OUTPATIENT
Start: 2024-07-25 | End: 2024-07-27 | Stop reason: HOSPADM

## 2024-07-24 RX ORDER — IBUPROFEN 100 MG/5ML
1000 SUSPENSION, ORAL (FINAL DOSE FORM) ORAL DAILY
COMMUNITY

## 2024-07-24 RX ORDER — FLUTICASONE FUROATE, UMECLIDINIUM BROMIDE AND VILANTEROL TRIFENATATE 200; 62.5; 25 UG/1; UG/1; UG/1
POWDER RESPIRATORY (INHALATION)
COMMUNITY

## 2024-07-24 RX ORDER — BENZONATATE 200 MG/1
CAPSULE ORAL
Status: ON HOLD | COMMUNITY
Start: 2024-02-15 | End: 2024-07-27 | Stop reason: HOSPADM

## 2024-07-24 RX ORDER — ACETAMINOPHEN 325 MG/1
650 TABLET ORAL EVERY 8 HOURS PRN
Status: DISCONTINUED | OUTPATIENT
Start: 2024-07-24 | End: 2024-07-27 | Stop reason: HOSPADM

## 2024-07-24 RX ORDER — FOLIC ACID 0.8 MG
800 TABLET ORAL DAILY
COMMUNITY

## 2024-07-24 RX ORDER — FLUTICASONE PROPIONATE 50 MCG
2 SPRAY, SUSPENSION (ML) NASAL DAILY
Status: DISCONTINUED | OUTPATIENT
Start: 2024-07-25 | End: 2024-07-24

## 2024-07-24 RX ORDER — IPRATROPIUM BROMIDE AND ALBUTEROL SULFATE 2.5; .5 MG/3ML; MG/3ML
3 SOLUTION RESPIRATORY (INHALATION)
Status: COMPLETED | OUTPATIENT
Start: 2024-07-24 | End: 2024-07-24

## 2024-07-24 RX ORDER — ACETAMINOPHEN 325 MG/1
650 TABLET ORAL EVERY 4 HOURS PRN
Status: DISCONTINUED | OUTPATIENT
Start: 2024-07-24 | End: 2024-07-27 | Stop reason: HOSPADM

## 2024-07-24 RX ORDER — SODIUM CHLORIDE 0.9 % (FLUSH) 0.9 %
3 SYRINGE (ML) INJECTION
Status: DISCONTINUED | OUTPATIENT
Start: 2024-07-24 | End: 2024-07-27 | Stop reason: HOSPADM

## 2024-07-24 RX ORDER — IPRATROPIUM BROMIDE AND ALBUTEROL SULFATE 2.5; .5 MG/3ML; MG/3ML
3 SOLUTION RESPIRATORY (INHALATION) EVERY 4 HOURS
Status: DISCONTINUED | OUTPATIENT
Start: 2024-07-24 | End: 2024-07-24

## 2024-07-24 RX ORDER — SODIUM CHLORIDE FOR INHALATION 0.9 %
VIAL, NEBULIZER (ML) INHALATION
Status: ON HOLD | COMMUNITY
End: 2024-07-27 | Stop reason: HOSPADM

## 2024-07-24 RX ORDER — IPRATROPIUM BROMIDE AND ALBUTEROL SULFATE 2.5; .5 MG/3ML; MG/3ML
3 SOLUTION RESPIRATORY (INHALATION)
Status: DISCONTINUED | OUTPATIENT
Start: 2024-07-24 | End: 2024-07-24

## 2024-07-24 RX ORDER — ASPIRIN 81 MG/1
81 TABLET ORAL EVERY MORNING
Status: DISCONTINUED | OUTPATIENT
Start: 2024-07-25 | End: 2024-07-27 | Stop reason: HOSPADM

## 2024-07-24 RX ORDER — GUAIFENESIN AND DEXTROMETHORPHAN HYDROBROMIDE 10; 100 MG/5ML; MG/5ML
5 SYRUP ORAL EVERY 6 HOURS PRN
Status: DISCONTINUED | OUTPATIENT
Start: 2024-07-24 | End: 2024-07-24

## 2024-07-24 RX ORDER — FLUTICASONE PROPIONATE 50 MCG
2 SPRAY, SUSPENSION (ML) NASAL DAILY
Status: DISCONTINUED | OUTPATIENT
Start: 2024-07-24 | End: 2024-07-24

## 2024-07-24 RX ORDER — GUAIFENESIN 600 MG/1
1200 TABLET, EXTENDED RELEASE ORAL 2 TIMES DAILY
Status: DISCONTINUED | OUTPATIENT
Start: 2024-07-24 | End: 2024-07-27 | Stop reason: HOSPADM

## 2024-07-24 RX ORDER — ZINC GLUCONATE 50 MG
50 TABLET ORAL DAILY
COMMUNITY

## 2024-07-24 RX ADMIN — GUAIFENESIN 1200 MG: 600 TABLET, EXTENDED RELEASE ORAL at 09:07

## 2024-07-24 RX ADMIN — POLYETHYLENE GLYCOL 3350 17 G: 17 POWDER, FOR SOLUTION ORAL at 03:07

## 2024-07-24 RX ADMIN — AZITHROMYCIN DIHYDRATE 500 MG: 250 TABLET ORAL at 03:07

## 2024-07-24 RX ADMIN — FLUTICASONE PROPIONATE 100 MCG: 50 SPRAY, METERED NASAL at 06:07

## 2024-07-24 RX ADMIN — FUROSEMIDE 40 MG: 10 INJECTION, SOLUTION INTRAMUSCULAR; INTRAVENOUS at 09:07

## 2024-07-24 RX ADMIN — IPRATROPIUM BROMIDE AND ALBUTEROL SULFATE 3 ML: 2.5; .5 SOLUTION RESPIRATORY (INHALATION) at 06:07

## 2024-07-24 RX ADMIN — IPRATROPIUM BROMIDE AND ALBUTEROL SULFATE 3 ML: 2.5; .5 SOLUTION RESPIRATORY (INHALATION) at 01:07

## 2024-07-24 RX ADMIN — BUPROPION HYDROCHLORIDE 100 MG: 100 TABLET, FILM COATED ORAL at 09:07

## 2024-07-24 RX ADMIN — CITALOPRAM HYDROBROMIDE 20 MG: 20 TABLET ORAL at 03:07

## 2024-07-24 RX ADMIN — METHYLPREDNISOLONE SODIUM SUCCINATE 60 MG: 40 INJECTION, POWDER, FOR SOLUTION INTRAMUSCULAR; INTRAVENOUS at 03:07

## 2024-07-24 NOTE — H&P
[facility] Medicine  History & Physical    Patient Name: Johnny Perales  MRN: [id number]  Patient Class: OP- Observation  Admission Date: 7/24/2024  Attending Physician: Wing Blank MD   Primary Care Provider: John Yap MD         Patient information was obtained from patient, spouse/SO, past medical records, and ER records.     Subjective:     Principal Problem:COPD exacerbation    Chief Complaint:   Chief Complaint   Patient presents with    Shortness of Breath     Pt arrived to ED via EMS c/o SOB x 3 days. Hx COPD and pneumonia. EMS reports pt found at home on 10L NC at 75% and diaphoretic. EMS gave 1 duoneb, 4 albuterol treatments, and solumedrol. Pt improved and is now on 8L face mask @ 97%. Pt reports having pneumonia and being intubated about 2 years ago.         HPI: Johnny Perales is a 69-year-old gentleman with a past medical history of severe COPD (home O2; 4L), severe hearing impairment, anxiety, coronary artery disease, and former tobacco abuse, who presented to the ER with shortness of breath that gradually onset prior to arrival. Per EMS, the patient has been very sick for the last three days, staying home alone and increasing nebulizer use. EMS found him sitting in front of a fan and extremely diaphoretic. Symptoms are constant and moderate in severity with no mitigating or exacerbating factors and no associated symptoms reported. The patient denies any fever, congestion, chest pain, abdominal pain, and all other symptoms at this time. Prior treatment includes one Duoneb, four albuterol treatments, and Solumedrol with improvement to symptoms.  Patient reports having several exacerbation episodes a year, last one about a year ago. Reports that he had to be intubated about two years ago.    Upon arrival in the ER, the patient's initial vital signs were /74, pulse 100, respiratory rate 24, temperature 98°F. Patient initially required bipap  to maintain sats but has since stabilized back to his home O2 rate of 4L. Labs were grossly within normal limits, with SARS-CoV-2 and flu swabs negative and a normal BNP. Chest X-ray AP portable showed no acute process involving the chest. Hospital Medicine called for obs-admission.    Past Medical History:   Diagnosis Date    COPD (chronic obstructive pulmonary disease)     Hearing impairment        Past Surgical History:   Procedure Laterality Date    COLONOSCOPY N/A 2017    Procedure: COLONOSCOPY;  Surgeon: Salvador Lambert MD;  Location: [facility];  Service: Endoscopy;  Laterality: N/A;    INNER EAR SURGERY Bilateral     LEG SURGERY Right        Review of patient's allergies indicates:  No Known Allergies    No current facility-administered medications on file prior to encounter.     Current Outpatient Medications on File Prior to Encounter   Medication Sig    albuterol (ACCUNEB) 0.63 mg/3 mL Nebu USE ONE VIAL IN NEBULIZER EVERY 4 HOURS AS NEEDED    albuterol (PROVENTIL/VENTOLIN HFA) 90 mcg/actuation inhaler Inhale 2 puffs into the lungs every 4 (four) hours as needed for Wheezing.    ascorbic acid, vitamin C, (VITAMIN C) 1000 MG tablet Take 1,000 mg by mouth once daily.    cholecalciferol, vitamin D3, (VITAMIN D3) 400 unit Tab Take 2 tablets (800 Units total) by mouth once daily.    fluticasone-salmeterol diskus inhaler 500-50 mcg Inhale into the lungs.    folic acid (FOLVITE) 800 MCG Tab Take 800 mcg by mouth once daily.    guaifenesin/pseudoephedrne HCl (MUCINEX D ORAL) Take 1,200 mg by mouth.    OXYGEN-AIR DELIVERY SYSTEMS MISC 4 L/min by Misc.(Non-Drug; Combo Route) route.    roflumilast (DALIRESP) 500 mcg Tab Take 1 tablet (500 mcg total) by mouth once daily.    sodium chloride (OCEAN) 0.65 % nasal spray     TRELEGY ELLIPTA 200-62.5-25 mcg inhaler SMARTSI Puff(s) Via Inhaler Every Morning    umeclidinium-vilanterol (ANORO ELLIPTA) 62.5-25 mcg/actuation DsDv Inhale 1 puff into the lungs once  daily. Controller    zinc gluconate 50 mg tablet Take 50 mg by mouth once daily.    AFLURIA QUAD 1687-5617, PF, 60 mcg/0.5 mL vaccine     aspirin (ECOTRIN) 81 MG EC tablet Take 1 tablet by mouth every morning.    benzonatate (TESSALON) 200 MG capsule Take by mouth.    buPROPion (WELLBUTRIN) 100 MG tablet Take 1 tablet (100 mg total) by mouth 2 (two) times daily.    cholecalciferal (CHOLECALCIFERAL) 100,000 IU/mL injecttion     citalopram (CELEXA) 40 MG tablet TAKE 1 TABLET EVERY DAY    dextromethorphan-guaifenesin  mg/5 ml (ROBITUSSIN-DM)  mg/5 mL liquid     ipratropium (ATROVENT) 0.02 % nebulizer solution     multivit-min-FA-lycopen-lutein (CENTRUM SILVER MEN) 300-600-300 mcg Tab Take by mouth.    predniSONE (DELTASONE) 10 MG tablet 40 mg PO QD X 3 days then 30 mg PO QD x 3 days then 20 mg PO QD X 3 days then 10 mg PO QD X 3 days then 5 mg PO QD x 3 days then stop.    sodium chloride for inhalation (SODIUM CHLORIDE 0.9%) 0.9 % nebulizer solution SMARTSI Vial(s) Via Nebulizer PRN    soft lens rinse,store solution (SALINE SOLUTION MISC) by Misc.(Non-Drug; Combo Route) route.     Family History       Problem Relation (Age of Onset)    Cancer Mother, Father    Colon cancer Maternal Uncle    Heart failure Mother, Father          Tobacco Use    Smoking status: Former     Current packs/day: 0.00     Average packs/day: 0.1 packs/day for 47.0 years (4.7 ttl pk-yrs)     Types: Vaping with nicotine, Cigarettes     Start date: 10/1971     Quit date: 10/2018     Years since quittin.8    Smokeless tobacco: Never   Substance and Sexual Activity    Alcohol use: Yes     Alcohol/week: 12.0 standard drinks of alcohol     Types: 12 Cans of beer per week    Drug use: No    Sexual activity: Not on file     Review of Systems   Constitutional:  Negative for chills, diaphoresis and fever.   HENT:  Negative for congestion, postnasal drip, rhinorrhea, sore throat and trouble swallowing.    Eyes:  Negative for pain,  redness, itching and visual disturbance.   Respiratory:  Positive for cough, chest tightness, shortness of breath and wheezing.    Cardiovascular:  Negative for chest pain and palpitations.   Gastrointestinal:  Negative for abdominal distention, abdominal pain, constipation, diarrhea, nausea and vomiting.   Genitourinary:  Negative for difficulty urinating, dysuria and frequency.   Musculoskeletal:  Negative for arthralgias, back pain and joint swelling.   Neurological:  Negative for dizziness, seizures, syncope, weakness, light-headedness and headaches.   Psychiatric/Behavioral:  Negative for agitation, behavioral problems, confusion and suicidal ideas.      Objective:     Vital Signs (Most Recent):  Temp: 97.2 °F (36.2 °C) (07/24/24 1526)  Pulse: 94 (07/24/24 1714)  Resp: 18 (07/24/24 1526)  BP: 119/70 (07/24/24 1526)  SpO2: 100 % (07/24/24 1526) Vital Signs (24h Range):  Temp:  [97.2 °F (36.2 °C)-98 °F (36.7 °C)] 97.2 °F (36.2 °C)  Pulse:  [] 94  Resp:  [18-24] 18  SpO2:  [96 %-100 %] 100 %  BP: (117-141)/(70-81) 119/70     Weight: 72 kg (158 lb 11.7 oz)  Body mass index is 22.78 kg/m².     Physical Exam  Constitutional:       Appearance: He is normal weight. He is ill-appearing and toxic-appearing.   HENT:      Head: Normocephalic and atraumatic.   Eyes:      Extraocular Movements: Extraocular movements intact.      Conjunctiva/sclera: Conjunctivae normal.      Pupils: Pupils are equal, round, and reactive to light.   Cardiovascular:      Rate and Rhythm: Normal rate and regular rhythm.      Pulses: Normal pulses.      Heart sounds: No murmur heard.  Pulmonary:      Effort: Pulmonary effort is normal. Tachypnea present. No respiratory distress.      Breath sounds: Decreased air movement present. Wheezing present.   Abdominal:      General: Abdomen is flat. There is no distension.      Palpations: Abdomen is soft.   Neurological:      General: No focal deficit present.      Mental Status: He is alert and  "oriented to person, place, and time. Mental status is at baseline.   Psychiatric:         Mood and Affect: Mood normal.         Behavior: Behavior normal.              CRANIAL NERVES     CN III, IV, VI   Pupils are equal, round, and reactive to light.       Significant Labs: All pertinent labs within the past 24 hours have been reviewed.  BMP:   Recent Labs   Lab 07/24/24  1339         K 4.5   CL 99   CO2 30*   BUN 9   CREATININE 0.9   CALCIUM 10.5     CBC:   Recent Labs   Lab 07/24/24  1339   WBC 8.05   HGB 15.9   HCT 47.8        CMP:   Recent Labs   Lab 07/24/24  1339      K 4.5   CL 99   CO2 30*      BUN 9   CREATININE 0.9   CALCIUM 10.5   PROT 7.7   ALBUMIN 4.4   BILITOT 0.6   ALKPHOS 79   AST 23   ALT 14   ANIONGAP 13     Cardiac Markers:   Recent Labs   Lab 07/24/24  1339   BNP <10     Coagulation: No results for input(s): "PT", "INR", "APTT" in the last 48 hours.  Lactic Acid: No results for input(s): "LACTATE" in the last 48 hours.  Magnesium: No results for input(s): "MG" in the last 48 hours.  Troponin:   Recent Labs   Lab 07/24/24  1339   TROPONINI 0.009     TSH: No results for input(s): "TSH" in the last 4320 hours.  Urine Studies:   Recent Labs   Lab 07/24/24  1513   COLORU Yellow   APPEARANCEUA Clear   PHUR 6.0   SPECGRAV 1.030   PROTEINUA Trace*   GLUCUA Negative   KETONESU 1+*   BILIRUBINUA Negative   OCCULTUA Negative   NITRITE Negative   UROBILINOGEN Negative   LEUKOCYTESUR Negative       Significant Imaging: I have reviewed all pertinent imaging results/findings within the past 24 hours.  Imaging Results              X-Ray Chest AP Portable (Final result)  Result time 07/24/24 13:58:57      Final result by Willie Hodge MD (07/24/24 13:58:57)                   Impression:      1.  Negative for acute process involving the chest.    2.  Stable findings as noted above.      Electronically signed by: Willie Hodge MD  Date:    07/24/2024  Time:    13:58          "      Narrative:    EXAMINATION:  XR CHEST AP PORTABLE    CLINICAL HISTORY:  sob;    COMPARISON:  November 6, 2018    FINDINGS:  EKG leads overlie the chest, which is rotated to the left.  The lungs are clear. The cardiac silhouette size is normal. The trachea is midline and the mediastinal width is normal. Negative for focal infiltrate, effusion or pneumothorax. Pulmonary vasculature is normal. Negative for osseous abnormalities. Convex left curvature of the lower thoracic spine with marginal spondylosis.  Aortic arch calcifications.  Stable scarring in the left mid lung.                                      Assessment/Plan:     * COPD exacerbation  --O2 requirements: 4LNC (baseline), saturating in the high 90s  --CXR manually reviewed, hyperinflated lungs but no acute disaes  --PO Azithromycin ordered,   --failed OP oral prednisone therapy  --IV SoluMedrol 60 mg Q8H ordered- modify as necessary  --supportive therapy (PRN tessalon, mucinex, flonase, Q4H nebs)  --covid and flu negative  --Trelegy ordered for QHS, home settings- wife bringing from home  --reassess in the AM    Anxiety/Depression  --continue home buproprion and citalopram upon admission      VTE Risk Mitigation (From admission, onward)           Ordered     enoxaparin injection 40 mg  Daily         07/24/24 1450     IP VTE HIGH RISK PATIENT  Once         07/24/24 1450     Place sequential compression device  Until discontinued         07/24/24 1450                       On 07/24/2024, patient should be placed in hospital observation services under my care.             Wing Blank MD  Department of Hospital Medicine  [place] - Telemetry ([facility])

## 2024-07-24 NOTE — HPI
Johnny Perales is a 69-year-old gentleman with a past medical history of severe COPD (home O2; 4L), severe hearing impairment, anxiety, coronary artery disease, and former tobacco abuse, who presented to the ER with shortness of breath that gradually onset prior to arrival. Per EMS, the patient has been very sick for the last three days, staying home alone and increasing nebulizer use. EMS found him sitting in front of a fan and extremely diaphoretic. Symptoms are constant and moderate in severity with no mitigating or exacerbating factors and no associated symptoms reported. The patient denies any fever, congestion, chest pain, abdominal pain, and all other symptoms at this time. Prior treatment includes one Duoneb, four albuterol treatments, and Solumedrol with improvement to symptoms.  Patient reports having several exacerbation episodes a year, last one about a year ago. Reports that he had to be intubated about two years ago.    Upon arrival in the ER, the patient's initial vital signs were /74, pulse 100, respiratory rate 24, temperature 98°F. Patient initially required bipap to maintain sats but has since stabilized back to his home O2 rate of 4L. Labs were grossly within normal limits, with SARS-CoV-2 and flu swabs negative and a normal BNP. Chest X-ray AP portable showed no acute process involving the chest. Hospital Medicine called for obs-admission.

## 2024-07-24 NOTE — ASSESSMENT & PLAN NOTE
--O2 requirements: 4LNC (baseline), saturating in the high 90s  --CXR manually reviewed, hyperinflated lungs but no acute disaes  --PO Azithromycin ordered,   --failed OP oral prednisone therapy  --IV SoluMedrol 60 mg Q8H ordered- modify as necessary  --supportive therapy (PRN tessalon, mucinex, flonase, Q4H nebs)  --covid and flu negative  --Trelegy ordered for QHS, home settings- wife bringing from home  --reassess in the AM

## 2024-07-24 NOTE — PLAN OF CARE
Pt oriented x4.  VSS.  Pt remained afebrile throughout this shift.   All meds administered per order.   Pt remained free of falls this shift.   Plan of care reviewed. Patient verbalizes understanding.   Pt moving/turning independently.   Patient NSR on monitor.   Bed low, side rails up x 2, wheels locked, call light in reach.   Patient instructed to call for assistance.  Will continue to monitor.

## 2024-07-24 NOTE — ED PROVIDER NOTES
SCRIBE #1 NOTE: I, Yang Celaya, am scribing for, and in the presence of, Wing Blank MD. I have scribed the entire note.       History     Chief Complaint   Patient presents with    Shortness of Breath     Pt arrived to ED via EMS c/o SOB x 3 days. Hx COPD and pneumonia. EMS reports pt found at home on 10L NC at 75% and diaphoretic. EMS gave 1 duoneb, 4 albuterol treatments, and solumedrol. Pt improved and is now on 8L face mask @ 97%. Pt reports having pneumonia and being intubated about 2 years ago.      Review of patient's allergies indicates:  No Known Allergies      History of Present Illness     HPI    7/24/2024, 1:20 PM  History obtained from the  EMS and patient      History of Present Illness: Johnny Perales is a 69 y.o. male patient with a PMHx of COPD who presents to the Emergency Department for evaluation of SOB which onset gradually PTA. Per EMS, pt has been very sick for the last 3 days. He has been staying home alone and increasing neb use. Per EMS, pt was found sitting in front of a fan and extremely diaphoretic. Symptoms are constant and moderate in severity. No mitigating or exacerbating factors reported. No associated sxs mentioned. Patient denies any fever, congestion, CP, abd pain, and all other sxs at this time. Prior Tx includes 1 duoneb, 4 albuterol treatments, and solumedrol with improvement to symptoms. Pt reports that he had pneumonia and was intubated about 2 years ago. No further complaints or concerns at this time.       Arrival mode: EMS    PCP: John Yap MD        Past Medical History:  Past Medical History:   Diagnosis Date    COPD (chronic obstructive pulmonary disease)     Hearing impairment        Past Surgical History:  Past Surgical History:   Procedure Laterality Date    COLONOSCOPY N/A 7/12/2017    Procedure: COLONOSCOPY;  Surgeon: Salvador Lambert MD;  Location: [facility];  Service: Endoscopy;  Laterality: N/A;    INNER EAR SURGERY Bilateral      LEG SURGERY Right          Family History:  Family History   Problem Relation Name Age of Onset    Cancer Mother      Heart failure Mother      Cancer Father      Heart failure Father      Colon cancer Maternal Uncle         Social History:  Social History     Tobacco Use    Smoking status: Former     Current packs/day: 0.00     Average packs/day: 0.1 packs/day for 47.0 years (4.7 ttl pk-yrs)     Types: Vaping with nicotine, Cigarettes     Start date: 10/1971     Quit date: 10/2018     Years since quittin.8    Smokeless tobacco: Never   Substance and Sexual Activity    Alcohol use: Yes     Alcohol/week: 12.0 standard drinks of alcohol     Types: 12 Cans of beer per week    Drug use: No    Sexual activity: Not on file        Review of Systems     Review of Systems   Constitutional:  Positive for diaphoresis. Negative for fever.   HENT:  Negative for congestion and sore throat.    Respiratory:  Positive for shortness of breath.    Cardiovascular:  Negative for chest pain.   Gastrointestinal:  Negative for abdominal pain and nausea.   Genitourinary:  Negative for dysuria.   Musculoskeletal:  Negative for back pain.   Skin:  Negative for rash.   Neurological:  Negative for weakness and headaches.   Hematological:  Does not bruise/bleed easily.        Physical Exam     Initial Vitals [24 1322]   BP Pulse Resp Temp SpO2   117/74 100 (!) 24 98 °F (36.7 °C) 100 %      MAP       --          Physical Exam  Nursing Notes and Vital Signs Reviewed.  Constitutional: Patient is in no acute distress. Well-developed and well-nourished.  Head: Atraumatic. Normocephalic.  Eyes: PERRL. EOM intact. Conjunctivae are not pale. No scleral icterus.  ENT: Mucous membranes are moist. Oropharynx is clear and symmetric.    Neck: Supple. Full ROM. No lymphadenopathy.  Cardiovascular: Regular rate. Regular rhythm. No murmurs, rubs, or gallops. Distal pulses are 2+ and symmetric.  Pulmonary/Chest: No respiratory distress. Clear to  auscultation bilaterally. No wheezing or rales.  Abdominal: Soft and non-distended.  There is no tenderness.  No rebound, guarding, or rigidity. Good bowel sounds.  Genitourinary: No CVA tenderness  Musculoskeletal: Moves all extremities. No obvious deformities. No edema. No calf tenderness.  Skin: Warm and dry.  Neurological:  Alert, awake, and appropriate.  Normal speech.  No acute focal neurological deficits are appreciated.  Psychiatric: Normal affect. Good eye contact. Appropriate in content.     ED Course   Procedures  ED Vital Signs:  Vitals:    07/24/24 1322 07/24/24 1327 07/24/24 1330 07/24/24 1345   BP: 117/74 117/74 117/74 119/75   Pulse: 100 98 95 101   Resp: (!) 24 (!) 21 18 (!) 24   Temp: 98 °F (36.7 °C)      TempSrc: Oral      SpO2: 100% 100% 100% 96%   Weight: 72 kg (158 lb 11.7 oz)       07/24/24 1359 07/24/24 1400 07/24/24 1415 07/24/24 1430   BP: 139/76 139/76 135/76 132/81   Pulse: 95 96 92 87   Resp: 20 (!) 22 19 (!) 23   Temp:       TempSrc:       SpO2: 100% 100% 98% 100%   Weight:        07/24/24 1445   BP: 133/76   Pulse: 82   Resp: 20   Temp:    TempSrc:    SpO2: 100%   Weight:        Abnormal Lab Results:  Labs Reviewed   CBC W/ AUTO DIFFERENTIAL - Abnormal       Result Value    WBC 8.05      RBC 5.05      Hemoglobin 15.9      Hematocrit 47.8      MCV 95      MCH 31.5 (*)     MCHC 33.3      RDW 13.0      Platelets 249      MPV 9.0 (*)     Immature Granulocytes 0.2      Gran # (ANC) 5.9      Immature Grans (Abs) 0.02      Lymph # 1.3      Mono # 0.4      Eos # 0.3      Baso # 0.10      nRBC 0      Gran % 73.1 (*)     Lymph % 16.6 (*)     Mono % 5.2      Eosinophil % 3.7      Basophil % 1.2      Differential Method Automated     COMPREHENSIVE METABOLIC PANEL - Abnormal    Sodium 142      Potassium 4.5      Chloride 99      CO2 30 (*)     Glucose 101      BUN 9      Creatinine 0.9      Calcium 10.5      Total Protein 7.7      Albumin 4.4      Total Bilirubin 0.6      Alkaline Phosphatase 79       AST 23      ALT 14      eGFR >60      Anion Gap 13     INFLUENZA A & B BY MOLECULAR    Influenza A, Molecular Negative      Influenza B, Molecular Negative      Flu A & B Source Nasal swab     SARS-COV-2 RNA AMPLIFICATION, QUAL    SARS-CoV-2 RNA, Amplification, Qual Negative     B-TYPE NATRIURETIC PEPTIDE    BNP <10     CK         TROPONIN I    Troponin I 0.009     URINALYSIS, REFLEX TO URINE CULTURE        All Lab Results:  Results for orders placed or performed during the hospital encounter of 07/24/24   Influenza A & B by Molecular    Specimen: Nasopharyngeal Swab   Result Value Ref Range    Influenza A, Molecular Negative Negative    Influenza B, Molecular Negative Negative    Flu A & B Source Nasal swab    COVID-19 Rapid Screening   Result Value Ref Range    SARS-CoV-2 RNA, Amplification, Qual Negative Negative   CBC Auto Differential   Result Value Ref Range    WBC 8.05 3.90 - 12.70 K/uL    RBC 5.05 4.60 - 6.20 M/uL    Hemoglobin 15.9 14.0 - 18.0 g/dL    Hematocrit 47.8 40.0 - 54.0 %    MCV 95 82 - 98 fL    MCH 31.5 (H) 27.0 - 31.0 pg    MCHC 33.3 32.0 - 36.0 g/dL    RDW 13.0 11.5 - 14.5 %    Platelets 249 150 - 450 K/uL    MPV 9.0 (L) 9.2 - 12.9 fL    Immature Granulocytes 0.2 0.0 - 0.5 %    Gran # (ANC) 5.9 1.8 - 7.7 K/uL    Immature Grans (Abs) 0.02 0.00 - 0.04 K/uL    Lymph # 1.3 1.0 - 4.8 K/uL    Mono # 0.4 0.3 - 1.0 K/uL    Eos # 0.3 0.0 - 0.5 K/uL    Baso # 0.10 0.00 - 0.20 K/uL    nRBC 0 0 /100 WBC    Gran % 73.1 (H) 38.0 - 73.0 %    Lymph % 16.6 (L) 18.0 - 48.0 %    Mono % 5.2 4.0 - 15.0 %    Eosinophil % 3.7 0.0 - 8.0 %    Basophil % 1.2 0.0 - 1.9 %    Differential Method Automated    Comprehensive Metabolic Panel   Result Value Ref Range    Sodium 142 136 - 145 mmol/L    Potassium 4.5 3.5 - 5.1 mmol/L    Chloride 99 95 - 110 mmol/L    CO2 30 (H) 23 - 29 mmol/L    Glucose 101 70 - 110 mg/dL    BUN 9 8 - 23 mg/dL    Creatinine 0.9 0.5 - 1.4 mg/dL    Calcium 10.5 8.7 - 10.5 mg/dL    Total  Protein 7.7 6.0 - 8.4 g/dL    Albumin 4.4 3.5 - 5.2 g/dL    Total Bilirubin 0.6 0.1 - 1.0 mg/dL    Alkaline Phosphatase 79 55 - 135 U/L    AST 23 10 - 40 U/L    ALT 14 10 - 44 U/L    eGFR >60 >60 mL/min/1.73 m^2    Anion Gap 13 8 - 16 mmol/L   BNP   Result Value Ref Range    BNP <10 0 - 99 pg/mL   CK   Result Value Ref Range     20 - 200 U/L   Troponin I   Result Value Ref Range    Troponin I 0.009 0.000 - 0.026 ng/mL   EKG 12-lead   Result Value Ref Range    QRS Duration 120 ms    [facility] QTC Calculation 454 ms         Imaging Results:  Imaging Results              X-Ray Chest AP Portable (Final result)  Result time 07/24/24 13:58:57      Final result by Willie Hodge MD (07/24/24 13:58:57)                   Impression:      1.  Negative for acute process involving the chest.    2.  Stable findings as noted above.      Electronically signed by: Willie Hodge MD  Date:    07/24/2024  Time:    13:58               Narrative:    EXAMINATION:  XR CHEST AP PORTABLE    CLINICAL HISTORY:  sob;    COMPARISON:  November 6, 2018    FINDINGS:  EKG leads overlie the chest, which is rotated to the left.  The lungs are clear. The cardiac silhouette size is normal. The trachea is midline and the mediastinal width is normal. Negative for focal infiltrate, effusion or pneumothorax. Pulmonary vasculature is normal. Negative for osseous abnormalities. Convex left curvature of the lower thoracic spine with marginal spondylosis.  Aortic arch calcifications.  Stable scarring in the left mid lung.                                       The EKG was ordered, reviewed, and independently interpreted by the ED provider.  Interpretation time: 13:20  Rate: 97 BPM  Rhythm: normal sinus rhythm  Interpretation: Right bundle branch block. No STEMI.           The Emergency Provider reviewed the vital signs and test results, which are outlined above.     ED Discussion       2:44 PM: Discussed case with Malorie Monet MD (Hospital Medicine).   Rosamaria agrees with current care and management of pt and accepts admission.   Admitting Service:   Admitting Physician: Dr. Blank  Admit to: OBS Med Tele     2:44 PM: Re-evaluated pt. I have discussed test results, shared treatment plan, and the need for admission with patient and family at bedside. Pt and family express understanding at this time and agree with all information. All questions answered. Pt and family have no further questions or concerns at this time. Pt is ready for admit.        Medical Decision Making  DDx: COPD, pneumonia    Amount and/or Complexity of Data Reviewed  Labs: ordered. Decision-making details documented in ED Course.  Radiology: ordered. Decision-making details documented in ED Course.  ECG/medicine tests: ordered and independent interpretation performed. Decision-making details documented in ED Course.    Risk  Decision regarding hospitalization.                ED Medication(s):  Medications   sodium chloride 0.9% flush 3 mL (has no administration in time range)   predniSONE tablet 40 mg (has no administration in time range)   albuterol-ipratropium 2.5 mg-0.5 mg/3 mL nebulizer solution 3 mL (has no administration in time range)   enoxaparin injection 40 mg (has no administration in time range)   azithromycin tablet 500 mg (has no administration in time range)     Followed by   azithromycin tablet 250 mg (has no administration in time range)   ondansetron disintegrating tablet 8 mg (has no administration in time range)   polyethylene glycol packet 17 g (has no administration in time range)   acetaminophen tablet 650 mg (has no administration in time range)   acetaminophen tablet 650 mg (has no administration in time range)   albuterol-ipratropium 2.5 mg-0.5 mg/3 mL nebulizer solution 3 mL (3 mLs Nebulization Given 7/24/24 1327)       New Prescriptions    No medications on file               Scribe Attestation:   Scribe #1: I performed the above scribed service and the  documentation accurately describes the services I performed. I attest to the accuracy of the note.     Attending:   Physician Attestation Statement for Scribe #1: I, Yang Celaya MD, personally performed the services described in this documentation, as scribed by Gita Varela, in my presence, and it is both accurate and complete.           Clinical Impression       ICD-10-CM ICD-9-CM   1. COPD with acute exacerbation  J44.1 491.21   2. SOB (shortness of breath)  R06.02 786.05       Disposition:   Disposition: Admitted  Condition: Stable         Yang Celaya MD  07/24/24 2396

## 2024-07-24 NOTE — SUBJECTIVE & OBJECTIVE
Past Medical History:   Diagnosis Date    COPD (chronic obstructive pulmonary disease)     Hearing impairment        Past Surgical History:   Procedure Laterality Date    COLONOSCOPY N/A 2017    Procedure: COLONOSCOPY;  Surgeon: Salvador Lambetr MD;  Location: [facility];  Service: Endoscopy;  Laterality: N/A;    INNER EAR SURGERY Bilateral     LEG SURGERY Right        Review of patient's allergies indicates:  No Known Allergies    No current facility-administered medications on file prior to encounter.     Current Outpatient Medications on File Prior to Encounter   Medication Sig    albuterol (ACCUNEB) 0.63 mg/3 mL Nebu USE ONE VIAL IN NEBULIZER EVERY 4 HOURS AS NEEDED    albuterol (PROVENTIL/VENTOLIN HFA) 90 mcg/actuation inhaler Inhale 2 puffs into the lungs every 4 (four) hours as needed for Wheezing.    ascorbic acid, vitamin C, (VITAMIN C) 1000 MG tablet Take 1,000 mg by mouth once daily.    cholecalciferol, vitamin D3, (VITAMIN D3) 400 unit Tab Take 2 tablets (800 Units total) by mouth once daily.    fluticasone-salmeterol diskus inhaler 500-50 mcg Inhale into the lungs.    folic acid (FOLVITE) 800 MCG Tab Take 800 mcg by mouth once daily.    guaifenesin/pseudoephedrne HCl (MUCINEX D ORAL) Take 1,200 mg by mouth.    OXYGEN-AIR DELIVERY SYSTEMS MISC 4 L/min by Misc.(Non-Drug; Combo Route) route.    roflumilast (DALIRESP) 500 mcg Tab Take 1 tablet (500 mcg total) by mouth once daily.    sodium chloride (OCEAN) 0.65 % nasal spray     TRELEGY ELLIPTA 200-62.5-25 mcg inhaler SMARTSI Puff(s) Via Inhaler Every Morning    umeclidinium-vilanterol (ANORO ELLIPTA) 62.5-25 mcg/actuation DsDv Inhale 1 puff into the lungs once daily. Controller    zinc gluconate 50 mg tablet Take 50 mg by mouth once daily.    AFLURIA QUAD [id number], PF, 60 mcg/0.5 mL vaccine     aspirin (ECOTRIN) 81 MG EC tablet Take 1 tablet by mouth every morning.    benzonatate (TESSALON) 200 MG capsule Take by mouth.    buPROPion  (WELLBUTRIN) 100 MG tablet Take 1 tablet (100 mg total) by mouth 2 (two) times daily.    cholecalciferal (CHOLECALCIFERAL) 100,000 IU/mL injecttion     citalopram (CELEXA) 40 MG tablet TAKE 1 TABLET EVERY DAY    dextromethorphan-guaifenesin  mg/5 ml (ROBITUSSIN-DM)  mg/5 mL liquid     ipratropium (ATROVENT) 0.02 % nebulizer solution     multivit-min-FA-lycopen-lutein (CENTRUM SILVER MEN) 300-600-300 mcg Tab Take by mouth.    predniSONE (DELTASONE) 10 MG tablet 40 mg PO QD X 3 days then 30 mg PO QD x 3 days then 20 mg PO QD X 3 days then 10 mg PO QD X 3 days then 5 mg PO QD x 3 days then stop.    sodium chloride for inhalation (SODIUM CHLORIDE 0.9%) 0.9 % nebulizer solution SMARTSI Vial(s) Via Nebulizer PRN    soft lens rinse,store solution (SALINE SOLUTION MISC) by Misc.(Non-Drug; Combo Route) route.     Family History       Problem Relation (Age of Onset)    Cancer Mother, Father    Colon cancer Maternal Uncle    Heart failure Mother, Father          Tobacco Use    Smoking status: Former     Current packs/day: 0.00     Average packs/day: 0.1 packs/day for 47.0 years (4.7 ttl pk-yrs)     Types: Vaping with nicotine, Cigarettes     Start date: 10/1971     Quit date: 10/2018     Years since quittin.8    Smokeless tobacco: Never   Substance and Sexual Activity    Alcohol use: Yes     Alcohol/week: 12.0 standard drinks of alcohol     Types: 12 Cans of beer per week    Drug use: No    Sexual activity: Not on file     Review of Systems   Constitutional:  Negative for chills, diaphoresis and fever.   HENT:  Negative for congestion, postnasal drip, rhinorrhea, sore throat and trouble swallowing.    Eyes:  Negative for pain, redness, itching and visual disturbance.   Respiratory:  Positive for cough, chest tightness, shortness of breath and wheezing.    Cardiovascular:  Negative for chest pain and palpitations.   Gastrointestinal:  Negative for abdominal distention, abdominal pain, constipation, diarrhea,  nausea and vomiting.   Genitourinary:  Negative for difficulty urinating, dysuria and frequency.   Musculoskeletal:  Negative for arthralgias, back pain and joint swelling.   Neurological:  Negative for dizziness, seizures, syncope, weakness, light-headedness and headaches.   Psychiatric/Behavioral:  Negative for agitation, behavioral problems, confusion and suicidal ideas.      Objective:     Vital Signs (Most Recent):  Temp: 97.2 °F (36.2 °C) (07/24/24 1526)  Pulse: 94 (07/24/24 1714)  Resp: 18 (07/24/24 1526)  BP: 119/70 (07/24/24 1526)  SpO2: 100 % (07/24/24 1526) Vital Signs (24h Range):  Temp:  [97.2 °F (36.2 °C)-98 °F (36.7 °C)] 97.2 °F (36.2 °C)  Pulse:  [] 94  Resp:  [18-24] 18  SpO2:  [96 %-100 %] 100 %  BP: (117-141)/(70-81) 119/70     Weight: 72 kg (158 lb 11.7 oz)  Body mass index is 22.78 kg/m².     Physical Exam  Constitutional:       Appearance: He is normal weight. He is ill-appearing and toxic-appearing.   HENT:      Head: Normocephalic and atraumatic.   Eyes:      Extraocular Movements: Extraocular movements intact.      Conjunctiva/sclera: Conjunctivae normal.      Pupils: Pupils are equal, round, and reactive to light.   Cardiovascular:      Rate and Rhythm: Normal rate and regular rhythm.      Pulses: Normal pulses.      Heart sounds: No murmur heard.  Pulmonary:      Effort: Pulmonary effort is normal. Tachypnea present. No respiratory distress.      Breath sounds: Decreased air movement present. Wheezing present.   Abdominal:      General: Abdomen is flat. There is no distension.      Palpations: Abdomen is soft.   Neurological:      General: No focal deficit present.      Mental Status: He is alert and oriented to person, place, and time. Mental status is at baseline.   Psychiatric:         Mood and Affect: Mood normal.         Behavior: Behavior normal.              CRANIAL NERVES     CN III, IV, VI   Pupils are equal, round, and reactive to light.       Significant Labs: All  "pertinent labs within the past 24 hours have been reviewed.  BMP:   Recent Labs   Lab 07/24/24  1339         K 4.5   CL 99   CO2 30*   BUN 9   CREATININE 0.9   CALCIUM 10.5     CBC:   Recent Labs   Lab 07/24/24  1339   WBC 8.05   HGB 15.9   HCT 47.8        CMP:   Recent Labs   Lab 07/24/24  1339      K 4.5   CL 99   CO2 30*      BUN 9   CREATININE 0.9   CALCIUM 10.5   PROT 7.7   ALBUMIN 4.4   BILITOT 0.6   ALKPHOS 79   AST 23   ALT 14   ANIONGAP 13     Cardiac Markers:   Recent Labs   Lab 07/24/24  1339   BNP <10     Coagulation: No results for input(s): "PT", "INR", "APTT" in the last 48 hours.  Lactic Acid: No results for input(s): "LACTATE" in the last 48 hours.  Magnesium: No results for input(s): "MG" in the last 48 hours.  Troponin:   Recent Labs   Lab 07/24/24  1339   TROPONINI 0.009     TSH: No results for input(s): "TSH" in the last 4320 hours.  Urine Studies:   Recent Labs   Lab 07/24/24  1513   COLORU Yellow   APPEARANCEUA Clear   PHUR 6.0   SPECGRAV 1.030   PROTEINUA Trace*   GLUCUA Negative   KETONESU 1+*   BILIRUBINUA Negative   OCCULTUA Negative   NITRITE Negative   UROBILINOGEN Negative   LEUKOCYTESUR Negative       Significant Imaging: I have reviewed all pertinent imaging results/findings within the past 24 hours.  Imaging Results              X-Ray Chest AP Portable (Final result)  Result time 07/24/24 13:58:57      Final result by Willie Hodge MD (07/24/24 13:58:57)                   Impression:      1.  Negative for acute process involving the chest.    2.  Stable findings as noted above.      Electronically signed by: Willie Hodge MD  Date:    07/24/2024  Time:    13:58               Narrative:    EXAMINATION:  XR CHEST AP PORTABLE    CLINICAL HISTORY:  sob;    COMPARISON:  November 6, 2018    FINDINGS:  EKG leads overlie the chest, which is rotated to the left.  The lungs are clear. The cardiac silhouette size is normal. The trachea is midline and the " mediastinal width is normal. Negative for focal infiltrate, effusion or pneumothorax. Pulmonary vasculature is normal. Negative for osseous abnormalities. Convex left curvature of the lower thoracic spine with marginal spondylosis.  Aortic arch calcifications.  Stable scarring in the left mid lung.                                       : Yes

## 2024-07-25 LAB
ANION GAP SERPL CALC-SCNC: 15 MMOL/L (ref 8–16)
BASOPHILS # BLD AUTO: 0.01 K/UL (ref 0–0.2)
BASOPHILS NFR BLD: 0.1 % (ref 0–1.9)
BUN SERPL-MCNC: 14 MG/DL (ref 8–23)
CALCIUM SERPL-MCNC: 10.4 MG/DL (ref 8.7–10.5)
CHLORIDE SERPL-SCNC: 95 MMOL/L (ref 95–110)
CO2 SERPL-SCNC: 29 MMOL/L (ref 23–29)
CREAT SERPL-MCNC: 1 MG/DL (ref 0.5–1.4)
DIFFERENTIAL METHOD BLD: ABNORMAL
EOSINOPHIL # BLD AUTO: 0 K/UL (ref 0–0.5)
EOSINOPHIL NFR BLD: 0 % (ref 0–8)
ERYTHROCYTE [DISTWIDTH] IN BLOOD BY AUTOMATED COUNT: 12.7 % (ref 11.5–14.5)
EST. GFR  (NO RACE VARIABLE): >60 ML/MIN/1.73 M^2
GLUCOSE SERPL-MCNC: 130 MG/DL (ref 70–110)
HCT VFR BLD AUTO: 48.2 % (ref 40–54)
HGB BLD-MCNC: 16.1 G/DL (ref 14–18)
IMM GRANULOCYTES # BLD AUTO: 0.03 K/UL (ref 0–0.04)
IMM GRANULOCYTES NFR BLD AUTO: 0.4 % (ref 0–0.5)
LYMPHOCYTES # BLD AUTO: 0.7 K/UL (ref 1–4.8)
LYMPHOCYTES NFR BLD: 9.7 % (ref 18–48)
MAGNESIUM SERPL-MCNC: 1.8 MG/DL (ref 1.6–2.6)
MCH RBC QN AUTO: 31.3 PG (ref 27–31)
MCHC RBC AUTO-ENTMCNC: 33.4 G/DL (ref 32–36)
MCV RBC AUTO: 94 FL (ref 82–98)
MONOCYTES # BLD AUTO: 0.1 K/UL (ref 0.3–1)
MONOCYTES NFR BLD: 1.7 % (ref 4–15)
NEUTROPHILS # BLD AUTO: 6.7 K/UL (ref 1.8–7.7)
NEUTROPHILS NFR BLD: 88.1 % (ref 38–73)
NRBC BLD-RTO: 0 /100 WBC
PHOSPHATE SERPL-MCNC: 3.6 MG/DL (ref 2.7–4.5)
PLATELET # BLD AUTO: 290 K/UL (ref 150–450)
PMV BLD AUTO: 9.3 FL (ref 9.2–12.9)
POTASSIUM SERPL-SCNC: 4.9 MMOL/L (ref 3.5–5.1)
RBC # BLD AUTO: 5.14 M/UL (ref 4.6–6.2)
SODIUM SERPL-SCNC: 139 MMOL/L (ref 136–145)
WBC # BLD AUTO: 7.61 K/UL (ref 3.9–12.7)

## 2024-07-25 PROCEDURE — 94640 AIRWAY INHALATION TREATMENT: CPT | Mod: XB

## 2024-07-25 PROCEDURE — 83735 ASSAY OF MAGNESIUM: CPT | Performed by: STUDENT IN AN ORGANIZED HEALTH CARE EDUCATION/TRAINING PROGRAM

## 2024-07-25 PROCEDURE — 36415 COLL VENOUS BLD VENIPUNCTURE: CPT | Performed by: STUDENT IN AN ORGANIZED HEALTH CARE EDUCATION/TRAINING PROGRAM

## 2024-07-25 PROCEDURE — G0378 HOSPITAL OBSERVATION PER HR: HCPCS

## 2024-07-25 PROCEDURE — 99900035 HC TECH TIME PER 15 MIN (STAT)

## 2024-07-25 PROCEDURE — 27000221 HC OXYGEN, UP TO 24 HOURS

## 2024-07-25 PROCEDURE — 25000003 PHARM REV CODE 250: Performed by: STUDENT IN AN ORGANIZED HEALTH CARE EDUCATION/TRAINING PROGRAM

## 2024-07-25 PROCEDURE — 85025 COMPLETE CBC W/AUTO DIFF WBC: CPT | Performed by: STUDENT IN AN ORGANIZED HEALTH CARE EDUCATION/TRAINING PROGRAM

## 2024-07-25 PROCEDURE — 96372 THER/PROPH/DIAG INJ SC/IM: CPT | Performed by: STUDENT IN AN ORGANIZED HEALTH CARE EDUCATION/TRAINING PROGRAM

## 2024-07-25 PROCEDURE — 80048 BASIC METABOLIC PNL TOTAL CA: CPT | Performed by: STUDENT IN AN ORGANIZED HEALTH CARE EDUCATION/TRAINING PROGRAM

## 2024-07-25 PROCEDURE — 25000242 PHARM REV CODE 250 ALT 637 W/ HCPCS: Performed by: STUDENT IN AN ORGANIZED HEALTH CARE EDUCATION/TRAINING PROGRAM

## 2024-07-25 PROCEDURE — 96374 THER/PROPH/DIAG INJ IV PUSH: CPT | Mod: 59

## 2024-07-25 PROCEDURE — 96376 TX/PRO/DX INJ SAME DRUG ADON: CPT

## 2024-07-25 PROCEDURE — 94761 N-INVAS EAR/PLS OXIMETRY MLT: CPT

## 2024-07-25 PROCEDURE — 63600175 PHARM REV CODE 636 W HCPCS: Performed by: STUDENT IN AN ORGANIZED HEALTH CARE EDUCATION/TRAINING PROGRAM

## 2024-07-25 PROCEDURE — 84100 ASSAY OF PHOSPHORUS: CPT | Performed by: STUDENT IN AN ORGANIZED HEALTH CARE EDUCATION/TRAINING PROGRAM

## 2024-07-25 PROCEDURE — 63700000 PHARM REV CODE 250 ALT 637 W/O HCPCS: Performed by: STUDENT IN AN ORGANIZED HEALTH CARE EDUCATION/TRAINING PROGRAM

## 2024-07-25 RX ADMIN — IPRATROPIUM BROMIDE AND ALBUTEROL SULFATE 3 ML: 2.5; .5 SOLUTION RESPIRATORY (INHALATION) at 07:07

## 2024-07-25 RX ADMIN — IPRATROPIUM BROMIDE AND ALBUTEROL SULFATE 3 ML: 2.5; .5 SOLUTION RESPIRATORY (INHALATION) at 08:07

## 2024-07-25 RX ADMIN — IPRATROPIUM BROMIDE AND ALBUTEROL SULFATE 3 ML: 2.5; .5 SOLUTION RESPIRATORY (INHALATION) at 04:07

## 2024-07-25 RX ADMIN — FLUTICASONE PROPIONATE 100 MCG: 50 SPRAY, METERED NASAL at 09:07

## 2024-07-25 RX ADMIN — ENOXAPARIN SODIUM 40 MG: 40 INJECTION SUBCUTANEOUS at 04:07

## 2024-07-25 RX ADMIN — ACETAMINOPHEN 650 MG: 325 TABLET ORAL at 08:07

## 2024-07-25 RX ADMIN — CITALOPRAM HYDROBROMIDE 20 MG: 20 TABLET ORAL at 08:07

## 2024-07-25 RX ADMIN — BUPROPION HYDROCHLORIDE 100 MG: 100 TABLET, FILM COATED ORAL at 09:07

## 2024-07-25 RX ADMIN — ROFLUMILAST 500 MCG: 500 TABLET ORAL at 08:07

## 2024-07-25 RX ADMIN — AZITHROMYCIN DIHYDRATE 250 MG: 250 TABLET ORAL at 03:07

## 2024-07-25 RX ADMIN — METHYLPREDNISOLONE SODIUM SUCCINATE 60 MG: 40 INJECTION, POWDER, FOR SOLUTION INTRAMUSCULAR; INTRAVENOUS at 03:07

## 2024-07-25 RX ADMIN — METHYLPREDNISOLONE SODIUM SUCCINATE 60 MG: 40 INJECTION, POWDER, FOR SOLUTION INTRAMUSCULAR; INTRAVENOUS at 11:07

## 2024-07-25 RX ADMIN — GUAIFENESIN 1200 MG: 600 TABLET, EXTENDED RELEASE ORAL at 08:07

## 2024-07-25 RX ADMIN — BUPROPION HYDROCHLORIDE 100 MG: 100 TABLET, FILM COATED ORAL at 08:07

## 2024-07-25 RX ADMIN — METHYLPREDNISOLONE SODIUM SUCCINATE 60 MG: 40 INJECTION, POWDER, FOR SOLUTION INTRAMUSCULAR; INTRAVENOUS at 12:07

## 2024-07-25 RX ADMIN — IPRATROPIUM BROMIDE AND ALBUTEROL SULFATE 3 ML: 2.5; .5 SOLUTION RESPIRATORY (INHALATION) at 02:07

## 2024-07-25 RX ADMIN — FLUTICASONE PROPIONATE 100 MCG: 50 SPRAY, METERED NASAL at 08:07

## 2024-07-25 RX ADMIN — METHYLPREDNISOLONE SODIUM SUCCINATE 60 MG: 40 INJECTION, POWDER, FOR SOLUTION INTRAMUSCULAR; INTRAVENOUS at 08:07

## 2024-07-25 RX ADMIN — ASPIRIN 81 MG: 81 TABLET, COATED ORAL at 08:07

## 2024-07-25 RX ADMIN — IPRATROPIUM BROMIDE AND ALBUTEROL SULFATE 3 ML: 2.5; .5 SOLUTION RESPIRATORY (INHALATION) at 12:07

## 2024-07-25 NOTE — PLAN OF CARE
[facility] (Hospital)  Initial Discharge Assessment       Primary Care Provider: John Yap MD    Admission Diagnosis: SOB (shortness of breath) [R06.02]  COPD with acute exacerbation [J44.1]    Admission Date: 7/24/2024  Expected Discharge Date: Per Attending    Transition of Care Barriers: None    Payor: MEDICAID / Plan: MEDICAID/[place] TAKE CHARGE / Product Type: Government /     Extended Emergency Contact Information  Primary Emergency Contact: Lashawn Gaines  Address: [address] [place] [address] [place]  Mobile Phone: [phone]  Relation: Sister  Secondary Emergency Contact: Mayte Bains   [place]  Mobile Phone: [phone]  Relation: Other    Discharge Plan A: Home with family         [facility] [address] [facility] [employer] [place] [facility] [address] [facility]  [address] [facility] [address]  Phone: [phone] Fax: [phone]    [id number] - Closed - [place], [place] - [address] [facility] [address] [facility] [place]  [address] [facility] [address]  Phone: [phone] Fax: [phone]    [facility] - [facility] [address] [facility]  [address] [facility] [address]  Phone: [phone] Fax: [phone]      Initial Assessment (most recent)       Adult Discharge Assessment - 07/25/24 0948          Discharge Assessment    Assessment Type Discharge Planning Assessment     Confirmed/corrected address, phone number and insurance Yes     When was your last doctors appointment? 06/03/24   Effie Zimmerman MD - Pulmonology    Communicated TALAT with patient/caregiver Date not available/Unable to determine     Reason For Admission COPD exacerbation     People in Home other relative(s)   Ron - 42 year old nephew    Facility Arrived From: home     Do you expect to return to your current living situation? Yes     Do you have help at home or someone to help you manage your care at home?  Yes     Who are your caregiver(s) and their phone number(s)? Ron - 42 year old nephew and Lashawn Gaines - sister     Prior to hospitilization cognitive status: Alert/Oriented     Current cognitive status: Alert/Oriented     Walking or Climbing Stairs Difficulty no     Dressing/Bathing Difficulty no     Home Accessibility wheelchair accessible     Equipment Currently Used at Home grab bar;oxygen;nebulizer;CPAP     Readmission within 30 days? No     Patient currently being followed by outpatient case management? No     Do you currently have service(s) that help you manage your care at home? No     Do you take prescription medications? Yes     Do you have prescription coverage? Yes     Coverage Medicaid of [place]     Do you have any problems affording any of your prescribed medications? No     Is the patient taking medications as prescribed? yes     Who is going to help you get home at discharge? Ron - 42 year old nephew and Lashawn Gaines - sister     How do you get to doctors appointments? family or friend will provide     Are you on dialysis? No     Do you take coumadin? No     Discharge Plan A Home with family     DME Needed Upon Discharge  none     Discharge Plan discussed with: Patient     Transition of Care Barriers None        Transportation Needs    Has the lack of transportation kept you from medical appointments, meetings, work or from getting things needed for daily living? No                   Anticipated DC dispo: home with family  Prior Level of Function: limited with ADLs  People in home: 42 year old nephew - Ron    Comments:  SW met with patient at bedside to introduce role and discuss discharge planning. Patient's nephew and sister will be help at home and can provide transport at time of discharge. Confirmed demographics, insurance, and emergency contacts. SW updated Patient's whiteboard with contact information and anticipated DC plan. CM discharge needs depends on hospital progress. [facility] will  continue following to assist with other needs.

## 2024-07-25 NOTE — PLAN OF CARE
A226/A226 VICKIE  Johnny Perales is a 69 y.o.male admitted on 7/24/2024 for COPD exacerbation   Code Status: Full Code MRN: [id number]   Review of patient's allergies indicates:  No Known Allergies  Past Medical History:   Diagnosis Date    COPD (chronic obstructive pulmonary disease)     Hearing impairment       PRN meds    acetaminophen, 650 mg, Q8H PRN  acetaminophen, 650 mg, Q4H PRN  benzonatate, 100 mg, TID PRN  ondansetron, 8 mg, Q8H PRN  sodium chloride 0.9%, 3 mL, PRN      ongoing (interventions implemented as appropriate)  pt able to make needs known  pt remained afebrile throughout this shift  pt remained free of falls this shift, bed alarm on  Pt complained of shortness of breath and respiratory called   Pt moving with assistance, turning independently. Frequent weight shifting encouraged  Pt sinus rhythm of monitor  Bed low, side rails up x 2, wheels locked,call light in reach  Hourly rounding completed  Will continue to observe           Barnard Coma Scale Score: 15     Lead Monitored: Lead II Rhythm: normal sinus rhythm    Cardiac/Telemetry Box Number: [id number]  VTE Required Core Measure: Pharmacological prophylaxis initiated/maintained Last Bowel Movement: 07/25/24  Diet Adult Regular     Da Score: 20  Fall Risk Score: 8  Accucheck []   Freq?      Lines/Drains/Airways       Peripheral Intravenous Line  Duration                  Peripheral IV - Single Lumen 07/25/24 1102 Anterior;Right Forearm <1 day

## 2024-07-25 NOTE — PROGRESS NOTES
[facility] ([facility])  [facility] Medicine  Progress Note     Patient Name:  Johnny Perales  MRN:  [id number]  Patient Class: OP-Observation  Admission Date:  7/24/2024   Length of Stay:  0  Attending Physician: Wing Blank MD  Primary Care Provider: John Yap MD    Subjective:      Subsequent Care: Follow up COPD exacerbation        HPI:  Johnny Perales is a 69-year-old gentleman with a past medical history of severe COPD (home O2; 4L), severe hearing impairment, anxiety, coronary artery disease, and former tobacco abuse, who presented to the ER with shortness of breath that gradually onset prior to arrival. Per EMS, the patient has been very sick for the last three days, staying home alone and increasing nebulizer use. EMS found him sitting in front of a fan and extremely diaphoretic. Symptoms are constant and moderate in severity with no mitigating or exacerbating factors and no associated symptoms reported. The patient denies any fever, congestion, chest pain, abdominal pain, and all other symptoms at this time. Prior treatment includes one Duoneb, four albuterol treatments, and Solumedrol with improvement to symptoms. Patient reports having several exacerbation episodes a year, last one about a year ago. Reports that he had to be intubated about two years ago.    Upon arrival in the ER, the patient's initial vital signs were /74, pulse 100, respiratory rate 24, temperature 98°F. Patient initially required bipap to maintain sats but has since stabilized back to his home O2 rate of 4L. Labs were grossly within normal limits, with SARS-CoV-2 and flu swabs negative and a normal BNP. Chest X-ray AP portable showed no acute process involving the chest. Hospital Medicine called for obs-admission.      Overview/Hospital Course:  07/25/2024  Some improvement since admission, but still having considerable wheezing. Continue therapy for at least another 24 hours.      Interval  "Hx  NAEON.     Objective  BP (!) 145/78   Pulse 100   Temp 97.2 °F (36.2 °C) (Oral)   Resp 20   Ht 5' 10" (1.778 m)   Wt 72 kg (158 lb 11.7 oz)   SpO2 95%   BMI 22.78 kg/m²     Intake/Output Summary (Last 24 hours) at 7/25/2024 1037  Last data filed at 7/25/2024 0454  Gross per 24 hour   Intake --   Output 800 ml   Net -800 ml       PHYSICAL EXAM  Vitals reviewed  Constitutional:       Appearance: He is normal weight. He is ill-appearing and toxic-appearing.   HENT:      Head: Normocephalic and atraumatic.   Eyes:      Extraocular Movements: Extraocular movements intact.      Conjunctiva/sclera: Conjunctivae normal.      Pupils: Pupils are equal, round, and reactive to light.   Cardiovascular:      Rate and Rhythm: Normal rate and regular rhythm.      Pulses: Normal pulses.      Heart sounds: No murmur heard.  Pulmonary:      Effort: Pulmonary effort is normal. Tachypnea present. No respiratory distress.      Breath sounds: Decreased air movement present. Wheezing present.   Abdominal:      General: Abdomen is flat. There is no distension.      Palpations: Abdomen is soft.   Neurological:      General: No focal deficit present.      Mental Status: He is alert and oriented to person, place, and time. Mental status is at baseline.   Psychiatric:         Mood and Affect: Mood normal.         Behavior: Behavior normal.     LABS  All labs from the past 24 hours were reviewed.     BMP:   Recent Labs   Lab 07/25/24  0519   *      K 4.9   CL 95   CO2 29   BUN 14   CREATININE 1.0   CALCIUM 10.4   MG 1.8     CBC:   Recent Labs   Lab 07/24/24  1339 07/25/24  0519   WBC 8.05 7.61   HGB 15.9 16.1   HCT 47.8 48.2    290     CMP:   Recent Labs   Lab 07/24/24  1339 07/25/24  0519    139   K 4.5 4.9   CL 99 95   CO2 30* 29    130*   BUN 9 14   CREATININE 0.9 1.0   CALCIUM 10.5 10.4   PROT 7.7  --    ALBUMIN 4.4  --    BILITOT 0.6  --    ALKPHOS 79  --    AST 23  --    ALT 14  --    ANIONGAP 13 " "15     Cardiac Markers:   Recent Labs   Lab 07/24/24  1339   BNP <10     Coagulation: No results for input(s): "PT", "INR", "APTT" in the last 48 hours.  Lactic Acid: No results for input(s): "LACTATE" in the last 48 hours.  Magnesium:   Recent Labs   Lab 07/25/24  0519   MG 1.8     Troponin:   Recent Labs   Lab 07/24/24  1339   TROPONINI 0.009     TSH:   No results for input(s): "TSH" in the last 4320 hours.  Urine Studies:   Recent Labs   Lab 07/24/24  1513   COLORU Yellow   APPEARANCEUA Clear   PHUR 6.0   SPECGRAV 1.030   PROTEINUA Trace*   GLUCUA Negative   KETONESU 1+*   BILIRUBINUA Negative   OCCULTUA Negative   NITRITE Negative   UROBILINOGEN Negative   LEUKOCYTESUR Negative       IMAGING  All imaging from the past 24 hours were reviewed.     Imaging Results              X-Ray Chest AP Portable (Final result)  Result time 07/24/24 13:58:57      Final result by Willie Hodge MD (07/24/24 13:58:57)                   Impression:      1.  Negative for acute process involving the chest.    2.  Stable findings as noted above.      Electronically signed by: Willie Hodge MD  Date:    07/24/2024  Time:    13:58               Narrative:    EXAMINATION:  XR CHEST AP PORTABLE    CLINICAL HISTORY:  sob;    COMPARISON:  November 6, 2018    FINDINGS:  EKG leads overlie the chest, which is rotated to the left.  The lungs are clear. The cardiac silhouette size is normal. The trachea is midline and the mediastinal width is normal. Negative for focal infiltrate, effusion or pneumothorax. Pulmonary vasculature is normal. Negative for osseous abnormalities. Convex left curvature of the lower thoracic spine with marginal spondylosis.  Aortic arch calcifications.  Stable scarring in the left mid lung.                                      Assessment/Plan:    * COPD exacerbation  --O2 requirements: 4LNC (baseline), saturating in the high 90s  --CXR manually reviewed, hyperinflated lungs but no acute disaes  --PO Azithromycin " ordered,   --failed OP oral prednisone therapy  --IV SoluMedrol 60 mg Q8H ordered- modify as necessary  --supportive therapy (PRN tessalon, mucinex, flonase, Q4H nebs)  --covid and flu negative  --Trelegy ordered for QHS, home settings- wife bringing from home  --reassess in the AM    07/25/2024  Improved but still with severe symptoms  Continue same plan as above for additional 24 hours  Reassess in the AM     Anxiety/Depression  --continue home buproprion and citalopram upon admission     CORE MEASURES:  VTE Risk Mitigation (From admission, onward)           Ordered     enoxaparin injection 40 mg  Daily         07/24/24 1450     IP VTE HIGH RISK PATIENT  Once         07/24/24 1450     Place sequential compression device  Until discontinued         07/24/24 1450                    Code Status: FULL    Diet: Diet Adult Regular    Disposition: pending clinical improvement.       Wing Blank MD  Department of Hospital Medicine   [facility] Tele[facility] ([facility])

## 2024-07-26 LAB
ANION GAP SERPL CALC-SCNC: 12 MMOL/L (ref 8–16)
BASOPHILS # BLD AUTO: 0.01 K/UL (ref 0–0.2)
BASOPHILS NFR BLD: 0.1 % (ref 0–1.9)
BUN SERPL-MCNC: 23 MG/DL (ref 8–23)
CALCIUM SERPL-MCNC: 10.2 MG/DL (ref 8.7–10.5)
CHLORIDE SERPL-SCNC: 93 MMOL/L (ref 95–110)
CO2 SERPL-SCNC: 31 MMOL/L (ref 23–29)
CREAT SERPL-MCNC: 0.9 MG/DL (ref 0.5–1.4)
DIFFERENTIAL METHOD BLD: ABNORMAL
EOSINOPHIL # BLD AUTO: 0 K/UL (ref 0–0.5)
EOSINOPHIL NFR BLD: 0 % (ref 0–8)
ERYTHROCYTE [DISTWIDTH] IN BLOOD BY AUTOMATED COUNT: 12.8 % (ref 11.5–14.5)
EST. GFR  (NO RACE VARIABLE): >60 ML/MIN/1.73 M^2
GLUCOSE SERPL-MCNC: 112 MG/DL (ref 70–110)
HCT VFR BLD AUTO: 49.5 % (ref 40–54)
HGB BLD-MCNC: 16.6 G/DL (ref 14–18)
IMM GRANULOCYTES # BLD AUTO: 0.09 K/UL (ref 0–0.04)
IMM GRANULOCYTES NFR BLD AUTO: 0.6 % (ref 0–0.5)
LYMPHOCYTES # BLD AUTO: 0.6 K/UL (ref 1–4.8)
LYMPHOCYTES NFR BLD: 4.2 % (ref 18–48)
MAGNESIUM SERPL-MCNC: 2.1 MG/DL (ref 1.6–2.6)
MCH RBC QN AUTO: 31.4 PG (ref 27–31)
MCHC RBC AUTO-ENTMCNC: 33.5 G/DL (ref 32–36)
MCV RBC AUTO: 94 FL (ref 82–98)
MONOCYTES # BLD AUTO: 0.4 K/UL (ref 0.3–1)
MONOCYTES NFR BLD: 2.5 % (ref 4–15)
NEUTROPHILS # BLD AUTO: 13.8 K/UL (ref 1.8–7.7)
NEUTROPHILS NFR BLD: 92.6 % (ref 38–73)
NRBC BLD-RTO: 0 /100 WBC
PHOSPHATE SERPL-MCNC: 4 MG/DL (ref 2.7–4.5)
PLATELET # BLD AUTO: 341 K/UL (ref 150–450)
PMV BLD AUTO: 9.9 FL (ref 9.2–12.9)
POTASSIUM SERPL-SCNC: 4.8 MMOL/L (ref 3.5–5.1)
RBC # BLD AUTO: 5.28 M/UL (ref 4.6–6.2)
SODIUM SERPL-SCNC: 136 MMOL/L (ref 136–145)
WBC # BLD AUTO: 14.9 K/UL (ref 3.9–12.7)

## 2024-07-26 PROCEDURE — 94761 N-INVAS EAR/PLS OXIMETRY MLT: CPT

## 2024-07-26 PROCEDURE — 36415 COLL VENOUS BLD VENIPUNCTURE: CPT | Performed by: STUDENT IN AN ORGANIZED HEALTH CARE EDUCATION/TRAINING PROGRAM

## 2024-07-26 PROCEDURE — 80048 BASIC METABOLIC PNL TOTAL CA: CPT | Performed by: STUDENT IN AN ORGANIZED HEALTH CARE EDUCATION/TRAINING PROGRAM

## 2024-07-26 PROCEDURE — 25000242 PHARM REV CODE 250 ALT 637 W/ HCPCS: Performed by: STUDENT IN AN ORGANIZED HEALTH CARE EDUCATION/TRAINING PROGRAM

## 2024-07-26 PROCEDURE — 84100 ASSAY OF PHOSPHORUS: CPT | Performed by: STUDENT IN AN ORGANIZED HEALTH CARE EDUCATION/TRAINING PROGRAM

## 2024-07-26 PROCEDURE — 94640 AIRWAY INHALATION TREATMENT: CPT | Mod: XB

## 2024-07-26 PROCEDURE — 63700000 PHARM REV CODE 250 ALT 637 W/O HCPCS: Performed by: STUDENT IN AN ORGANIZED HEALTH CARE EDUCATION/TRAINING PROGRAM

## 2024-07-26 PROCEDURE — 27000221 HC OXYGEN, UP TO 24 HOURS

## 2024-07-26 PROCEDURE — 99900035 HC TECH TIME PER 15 MIN (STAT)

## 2024-07-26 PROCEDURE — 83735 ASSAY OF MAGNESIUM: CPT | Performed by: STUDENT IN AN ORGANIZED HEALTH CARE EDUCATION/TRAINING PROGRAM

## 2024-07-26 PROCEDURE — 63600175 PHARM REV CODE 636 W HCPCS: Performed by: STUDENT IN AN ORGANIZED HEALTH CARE EDUCATION/TRAINING PROGRAM

## 2024-07-26 PROCEDURE — 85025 COMPLETE CBC W/AUTO DIFF WBC: CPT | Performed by: STUDENT IN AN ORGANIZED HEALTH CARE EDUCATION/TRAINING PROGRAM

## 2024-07-26 PROCEDURE — 96376 TX/PRO/DX INJ SAME DRUG ADON: CPT

## 2024-07-26 PROCEDURE — 21400001 HC TELEMETRY ROOM

## 2024-07-26 PROCEDURE — 27100171 HC OXYGEN HIGH FLOW UP TO 24 HOURS

## 2024-07-26 PROCEDURE — 25000003 PHARM REV CODE 250: Performed by: STUDENT IN AN ORGANIZED HEALTH CARE EDUCATION/TRAINING PROGRAM

## 2024-07-26 RX ORDER — CETIRIZINE HYDROCHLORIDE 5 MG/1
5 TABLET ORAL DAILY
Status: DISCONTINUED | OUTPATIENT
Start: 2024-07-26 | End: 2024-07-27 | Stop reason: HOSPADM

## 2024-07-26 RX ADMIN — FLUTICASONE PROPIONATE 100 MCG: 50 SPRAY, METERED NASAL at 09:07

## 2024-07-26 RX ADMIN — ROFLUMILAST 500 MCG: 500 TABLET ORAL at 09:07

## 2024-07-26 RX ADMIN — IPRATROPIUM BROMIDE AND ALBUTEROL SULFATE 3 ML: 2.5; .5 SOLUTION RESPIRATORY (INHALATION) at 07:07

## 2024-07-26 RX ADMIN — ASPIRIN 81 MG: 81 TABLET, COATED ORAL at 07:07

## 2024-07-26 RX ADMIN — CETIRIZINE HYDROCHLORIDE 5 MG: 5 TABLET ORAL at 03:07

## 2024-07-26 RX ADMIN — IPRATROPIUM BROMIDE AND ALBUTEROL SULFATE 3 ML: 2.5; .5 SOLUTION RESPIRATORY (INHALATION) at 04:07

## 2024-07-26 RX ADMIN — METHYLPREDNISOLONE SODIUM SUCCINATE 40 MG: 40 INJECTION, POWDER, FOR SOLUTION INTRAMUSCULAR; INTRAVENOUS at 08:07

## 2024-07-26 RX ADMIN — AZITHROMYCIN DIHYDRATE 250 MG: 250 TABLET ORAL at 09:07

## 2024-07-26 RX ADMIN — GUAIFENESIN 1200 MG: 600 TABLET, EXTENDED RELEASE ORAL at 09:07

## 2024-07-26 RX ADMIN — IPRATROPIUM BROMIDE AND ALBUTEROL SULFATE 3 ML: 2.5; .5 SOLUTION RESPIRATORY (INHALATION) at 01:07

## 2024-07-26 RX ADMIN — BUPROPION HYDROCHLORIDE 100 MG: 100 TABLET, FILM COATED ORAL at 09:07

## 2024-07-26 RX ADMIN — IPRATROPIUM BROMIDE AND ALBUTEROL SULFATE 3 ML: 2.5; .5 SOLUTION RESPIRATORY (INHALATION) at 08:07

## 2024-07-26 RX ADMIN — METHYLPREDNISOLONE SODIUM SUCCINATE 60 MG: 40 INJECTION, POWDER, FOR SOLUTION INTRAMUSCULAR; INTRAVENOUS at 09:07

## 2024-07-26 RX ADMIN — IPRATROPIUM BROMIDE AND ALBUTEROL SULFATE 3 ML: 2.5; .5 SOLUTION RESPIRATORY (INHALATION) at 11:07

## 2024-07-26 RX ADMIN — ENOXAPARIN SODIUM 40 MG: 40 INJECTION SUBCUTANEOUS at 04:07

## 2024-07-26 RX ADMIN — METHYLPREDNISOLONE SODIUM SUCCINATE 40 MG: 40 INJECTION, POWDER, FOR SOLUTION INTRAMUSCULAR; INTRAVENOUS at 03:07

## 2024-07-26 RX ADMIN — CITALOPRAM HYDROBROMIDE 20 MG: 20 TABLET ORAL at 09:07

## 2024-07-26 NOTE — PLAN OF CARE
07/26/24 1347   Rounds   Attendance Provider;;Charge nurse;Physical therapist   Discharge Plan A Home with family   Why the patient remains in the hospital Requires continued medical care   Transition of Care Barriers None       Pending medical stability.   Patient could benefit from additional nebulizer and continued medical treatment.

## 2024-07-26 NOTE — PLAN OF CARE
Problem: COPD (Chronic Obstructive Pulmonary Disease)  Goal: Optimal Chronic Illness Coping  Outcome: Progressing     Problem: COPD (Chronic Obstructive Pulmonary Disease)  Goal: Effective Oxygenation and Ventilation  Outcome: Progressing     Problem: Fatigue  Goal: Improved Activity Tolerance  Outcome: Progressing     Problem: Adult Inpatient Plan of Care  Goal: Patient-Specific Goal (Individualized)  Outcome: Progressing     Problem: Adult Inpatient Plan of Care  Goal: Optimal Comfort and Wellbeing  Outcome: Progressing

## 2024-07-26 NOTE — PROGRESS NOTES
[facility])  [facility] Medicine  Progress Note     Patient Name:  Johnny Perales  MRN:  [id number]  Patient Class: OP-Observation  Admission Date:  7/24/2024   Length of Stay:  0  Attending Physician: Wing Blank MD  Primary Care Provider: John Yap MD    Subjective:      Subsequent Care: Follow up COPD exacerbation        HPI:  Johnny Perales is a 69-year-old gentleman with a past medical history of severe COPD (home O2; 4L), severe hearing impairment, anxiety, coronary artery disease, and former tobacco abuse, who presented to the ER with shortness of breath that gradually onset prior to arrival. Per EMS, the patient has been very sick for the last three days, staying home alone and increasing nebulizer use. EMS found him sitting in front of a fan and extremely diaphoretic. Symptoms are constant and moderate in severity with no mitigating or exacerbating factors and no associated symptoms reported. The patient denies any fever, congestion, chest pain, abdominal pain, and all other symptoms at this time. Prior treatment includes one Duoneb, four albuterol treatments, and Solumedrol with improvement to symptoms. Patient reports having several exacerbation episodes a year, last one about a year ago. Reports that he had to be intubated about two years ago.    Upon arrival in the ER, the patient's initial vital signs were /74, pulse 100, respiratory rate 24, temperature 98°F. Patient initially required bipap to maintain sats but has since stabilized back to his home O2 rate of 4L. Labs were grossly within normal limits, with SARS-CoV-2 and flu swabs negative and a normal BNP. Chest X-ray AP portable showed no acute process involving the chest. Hospital Medicine called for obs-admission.      Overview/Hospital Course:  07/25/2024  Some improvement since admission, but still having considerable wheezing.     07/26/2024  Some improvement from yesterday, will mobilize with  "PMT to see how his breathing fairs. Would likely benefit from continued IV solumedrol and round the clock nebs unless he      Interval Hx  NAEON.     Objective  /82   Pulse 96   Temp 97.6 °F (36.4 °C)   Resp 20   Ht 5' 10" (1.778 m)   Wt 72 kg (158 lb 11.7 oz)   SpO2 (!) 93%   BMI 22.78 kg/m²     Intake/Output Summary (Last 24 hours) at 7/26/2024 1240  Last data filed at 7/26/2024 0638  Gross per 24 hour   Intake --   Output 1450 ml   Net -1450 ml       PHYSICAL EXAM  Vitals reviewed  Constitutional:       Appearance: He is normal weight. He is ill-appearing and toxic-appearing.   HENT:      Head: Normocephalic and atraumatic.   Eyes:      Extraocular Movements: Extraocular movements intact.      Conjunctiva/sclera: Conjunctivae normal.      Pupils: Pupils are equal, round, and reactive to light.   Cardiovascular:      Rate and Rhythm: Normal rate and regular rhythm.      Pulses: Normal pulses.      Heart sounds: No murmur heard.  Pulmonary:      Effort: Pulmonary effort is normal. Tachypnea present. No respiratory distress.      Breath sounds: Decreased air movement present. Wheezing present.   Abdominal:      General: Abdomen is flat. There is no distension.      Palpations: Abdomen is soft.   Neurological:      General: No focal deficit present.      Mental Status: He is alert and oriented to person, place, and time. Mental status is at baseline.   Psychiatric:         Mood and Affect: Mood normal.         Behavior: Behavior normal.     LABS  All labs from the past 24 hours were reviewed.     BMP:   Recent Labs   Lab 07/26/24  0432   *      K 4.8   CL 93*   CO2 31*   BUN 23   CREATININE 0.9   CALCIUM 10.2   MG 2.1     CBC:   Recent Labs   Lab 07/24/24  1339 07/25/24  0519 07/26/24  0432   WBC 8.05 7.61 14.90*   HGB 15.9 16.1 16.6   HCT 47.8 48.2 49.5    290 341     CMP:   Recent Labs   Lab 07/24/24  1339 07/25/24  0519 07/26/24  0432    139 136   K 4.5 4.9 4.8   CL 99 95 93* " "  CO2 30* 29 31*    130* 112*   BUN 9 14 23   CREATININE 0.9 1.0 0.9   CALCIUM 10.5 10.4 10.2   PROT 7.7  --   --    ALBUMIN 4.4  --   --    BILITOT 0.6  --   --    ALKPHOS 79  --   --    AST 23  --   --    ALT 14  --   --    ANIONGAP 13 15 12     Cardiac Markers:   Recent Labs   Lab 07/24/24  1339   BNP <10     Coagulation: No results for input(s): "PT", "INR", "APTT" in the last 48 hours.  Lactic Acid: No results for input(s): "LACTATE" in the last 48 hours.  Magnesium:   Recent Labs   Lab 07/25/24  0519 07/26/24  0432   MG 1.8 2.1     Troponin:   Recent Labs   Lab 07/24/24  1339   TROPONINI 0.009     TSH:   No results for input(s): "TSH" in the last 4320 hours.  Urine Studies:   Recent Labs   Lab 07/24/24  1513   COLORU Yellow   APPEARANCEUA Clear   PHUR 6.0   SPECGRAV 1.030   PROTEINUA Trace*   GLUCUA Negative   KETONESU 1+*   BILIRUBINUA Negative   OCCULTUA Negative   NITRITE Negative   UROBILINOGEN Negative   LEUKOCYTESUR Negative       IMAGING  All imaging from the past 24 hours were reviewed.     Imaging Results              X-Ray Chest AP Portable (Final result)  Result time 07/24/24 13:58:57      Final result by Willei Hodge MD (07/24/24 13:58:57)                   Impression:      1.  Negative for acute process involving the chest.    2.  Stable findings as noted above.      Electronically signed by: Willie Hodge MD  Date:    07/24/2024  Time:    13:58               Narrative:    EXAMINATION:  XR CHEST AP PORTABLE    CLINICAL HISTORY:  sob;    COMPARISON:  November 6, 2018    FINDINGS:  EKG leads overlie the chest, which is rotated to the left.  The lungs are clear. The cardiac silhouette size is normal. The trachea is midline and the mediastinal width is normal. Negative for focal infiltrate, effusion or pneumothorax. Pulmonary vasculature is normal. Negative for osseous abnormalities. Convex left curvature of the lower thoracic spine with marginal spondylosis.  Aortic arch calcifications.  " Stable scarring in the left mid lung.                                      Assessment/Plan:    * COPD exacerbation  --O2 requirements: 4LNC (baseline), saturating in the high 90s  --CXR manually reviewed, hyperinflated lungs but no acute disaes  --PO Azithromycin ordered,   --failed OP oral prednisone therapy  --IV SoluMedrol 60 mg Q8H ordered- modify as necessary  --supportive therapy (PRN tessalon, mucinex, flonase, Q4H nebs)  --covid and flu negative  --Trelegy ordered for QHS, home settings- wife bringing from home  --reassess in the AM    07/26/2024  Improved but still with severe symptoms  Continue same plan as above for additional 24 hours  Reassess in the AM     Anxiety/Depression  --continue home buproprion and citalopram upon admission     CORE MEASURES:  VTE Risk Mitigation (From admission, onward)           Ordered     enoxaparin injection 40 mg  Daily         07/24/24 1450     IP VTE HIGH RISK PATIENT  Once         07/24/24 1450     Place sequential compression device  Until discontinued         07/24/24 1450                    Code Status: FULL    Diet: Diet Adult Regular    Disposition: pending clinical improvement.       Wing Blank MD  Department of Hospital Medicine   [facility] ([facility])

## 2024-07-27 VITALS
HEART RATE: 97 BPM | SYSTOLIC BLOOD PRESSURE: 133 MMHG | OXYGEN SATURATION: 95 % | RESPIRATION RATE: 18 BRPM | HEIGHT: 70 IN | DIASTOLIC BLOOD PRESSURE: 69 MMHG | WEIGHT: 158.75 LBS | BODY MASS INDEX: 22.73 KG/M2 | TEMPERATURE: 98 F

## 2024-07-27 LAB
ANION GAP SERPL CALC-SCNC: 9 MMOL/L (ref 8–16)
BASOPHILS # BLD AUTO: 0.02 K/UL (ref 0–0.2)
BASOPHILS NFR BLD: 0.2 % (ref 0–1.9)
BUN SERPL-MCNC: 21 MG/DL (ref 8–23)
CALCIUM SERPL-MCNC: 10.1 MG/DL (ref 8.7–10.5)
CHLORIDE SERPL-SCNC: 94 MMOL/L (ref 95–110)
CO2 SERPL-SCNC: 33 MMOL/L (ref 23–29)
CREAT SERPL-MCNC: 0.9 MG/DL (ref 0.5–1.4)
DIFFERENTIAL METHOD BLD: ABNORMAL
EOSINOPHIL # BLD AUTO: 0 K/UL (ref 0–0.5)
EOSINOPHIL NFR BLD: 0 % (ref 0–8)
ERYTHROCYTE [DISTWIDTH] IN BLOOD BY AUTOMATED COUNT: 12.6 % (ref 11.5–14.5)
EST. GFR  (NO RACE VARIABLE): >60 ML/MIN/1.73 M^2
GLUCOSE SERPL-MCNC: 110 MG/DL (ref 70–110)
HCT VFR BLD AUTO: 47.3 % (ref 40–54)
HGB BLD-MCNC: 16.2 G/DL (ref 14–18)
IMM GRANULOCYTES # BLD AUTO: 0.07 K/UL (ref 0–0.04)
IMM GRANULOCYTES NFR BLD AUTO: 0.6 % (ref 0–0.5)
LYMPHOCYTES # BLD AUTO: 0.9 K/UL (ref 1–4.8)
LYMPHOCYTES NFR BLD: 7.5 % (ref 18–48)
MAGNESIUM SERPL-MCNC: 2.2 MG/DL (ref 1.6–2.6)
MCH RBC QN AUTO: 31.5 PG (ref 27–31)
MCHC RBC AUTO-ENTMCNC: 34.2 G/DL (ref 32–36)
MCV RBC AUTO: 92 FL (ref 82–98)
MONOCYTES # BLD AUTO: 0.8 K/UL (ref 0.3–1)
MONOCYTES NFR BLD: 6.2 % (ref 4–15)
NEUTROPHILS # BLD AUTO: 10.5 K/UL (ref 1.8–7.7)
NEUTROPHILS NFR BLD: 85.5 % (ref 38–73)
NRBC BLD-RTO: 0 /100 WBC
PHOSPHATE SERPL-MCNC: 3 MG/DL (ref 2.7–4.5)
PLATELET # BLD AUTO: 311 K/UL (ref 150–450)
PMV BLD AUTO: 9.7 FL (ref 9.2–12.9)
POTASSIUM SERPL-SCNC: 5.4 MMOL/L (ref 3.5–5.1)
RBC # BLD AUTO: 5.14 M/UL (ref 4.6–6.2)
SODIUM SERPL-SCNC: 136 MMOL/L (ref 136–145)
WBC # BLD AUTO: 12.24 K/UL (ref 3.9–12.7)

## 2024-07-27 PROCEDURE — 27100171 HC OXYGEN HIGH FLOW UP TO 24 HOURS

## 2024-07-27 PROCEDURE — 94761 N-INVAS EAR/PLS OXIMETRY MLT: CPT

## 2024-07-27 PROCEDURE — 85025 COMPLETE CBC W/AUTO DIFF WBC: CPT | Performed by: STUDENT IN AN ORGANIZED HEALTH CARE EDUCATION/TRAINING PROGRAM

## 2024-07-27 PROCEDURE — 94640 AIRWAY INHALATION TREATMENT: CPT

## 2024-07-27 PROCEDURE — 80048 BASIC METABOLIC PNL TOTAL CA: CPT | Performed by: STUDENT IN AN ORGANIZED HEALTH CARE EDUCATION/TRAINING PROGRAM

## 2024-07-27 PROCEDURE — 63600175 PHARM REV CODE 636 W HCPCS: Performed by: STUDENT IN AN ORGANIZED HEALTH CARE EDUCATION/TRAINING PROGRAM

## 2024-07-27 PROCEDURE — 63700000 PHARM REV CODE 250 ALT 637 W/O HCPCS: Performed by: STUDENT IN AN ORGANIZED HEALTH CARE EDUCATION/TRAINING PROGRAM

## 2024-07-27 PROCEDURE — 25000003 PHARM REV CODE 250: Performed by: STUDENT IN AN ORGANIZED HEALTH CARE EDUCATION/TRAINING PROGRAM

## 2024-07-27 PROCEDURE — 25000242 PHARM REV CODE 250 ALT 637 W/ HCPCS: Performed by: STUDENT IN AN ORGANIZED HEALTH CARE EDUCATION/TRAINING PROGRAM

## 2024-07-27 PROCEDURE — 84100 ASSAY OF PHOSPHORUS: CPT | Performed by: STUDENT IN AN ORGANIZED HEALTH CARE EDUCATION/TRAINING PROGRAM

## 2024-07-27 PROCEDURE — 83735 ASSAY OF MAGNESIUM: CPT | Performed by: STUDENT IN AN ORGANIZED HEALTH CARE EDUCATION/TRAINING PROGRAM

## 2024-07-27 PROCEDURE — 36415 COLL VENOUS BLD VENIPUNCTURE: CPT | Performed by: STUDENT IN AN ORGANIZED HEALTH CARE EDUCATION/TRAINING PROGRAM

## 2024-07-27 RX ORDER — PREDNISONE 20 MG/1
40 TABLET ORAL DAILY
Qty: 10 TABLET | Refills: 0 | Status: SHIPPED | OUTPATIENT
Start: 2024-07-27 | End: 2024-08-01

## 2024-07-27 RX ORDER — CETIRIZINE HYDROCHLORIDE 5 MG/1
5 TABLET ORAL DAILY
Start: 2024-07-27 | End: 2025-07-27

## 2024-07-27 RX ORDER — PREDNISONE 20 MG/1
40 TABLET ORAL DAILY
Qty: 10 TABLET | Refills: 0 | Status: SHIPPED | OUTPATIENT
Start: 2024-07-27 | End: 2024-07-27

## 2024-07-27 RX ORDER — GUAIFENESIN 600 MG/1
1200 TABLET, EXTENDED RELEASE ORAL 2 TIMES DAILY
Start: 2024-07-27

## 2024-07-27 RX ORDER — AZITHROMYCIN 500 MG/1
250 TABLET, FILM COATED ORAL DAILY
Qty: 2 TABLET | Refills: 0 | Status: SHIPPED | OUTPATIENT
Start: 2024-07-27

## 2024-07-27 RX ORDER — AZITHROMYCIN 500 MG/1
250 TABLET, FILM COATED ORAL DAILY
Qty: 2 TABLET | Refills: 0 | Status: SHIPPED | OUTPATIENT
Start: 2024-07-27 | End: 2024-07-27

## 2024-07-27 RX ORDER — FLUTICASONE PROPIONATE 50 MCG
2 SPRAY, SUSPENSION (ML) NASAL 2 TIMES DAILY
Start: 2024-07-27

## 2024-07-27 RX ADMIN — CITALOPRAM HYDROBROMIDE 20 MG: 20 TABLET ORAL at 10:07

## 2024-07-27 RX ADMIN — IPRATROPIUM BROMIDE AND ALBUTEROL SULFATE 3 ML: 2.5; .5 SOLUTION RESPIRATORY (INHALATION) at 03:07

## 2024-07-27 RX ADMIN — METHYLPREDNISOLONE SODIUM SUCCINATE 40 MG: 40 INJECTION, POWDER, FOR SOLUTION INTRAMUSCULAR; INTRAVENOUS at 10:07

## 2024-07-27 RX ADMIN — GUAIFENESIN 1200 MG: 600 TABLET, EXTENDED RELEASE ORAL at 10:07

## 2024-07-27 RX ADMIN — IPRATROPIUM BROMIDE AND ALBUTEROL SULFATE 3 ML: 2.5; .5 SOLUTION RESPIRATORY (INHALATION) at 07:07

## 2024-07-27 RX ADMIN — ASPIRIN 81 MG: 81 TABLET, COATED ORAL at 10:07

## 2024-07-27 RX ADMIN — FLUTICASONE PROPIONATE 100 MCG: 50 SPRAY, METERED NASAL at 10:07

## 2024-07-27 RX ADMIN — IPRATROPIUM BROMIDE AND ALBUTEROL SULFATE 3 ML: 2.5; .5 SOLUTION RESPIRATORY (INHALATION) at 12:07

## 2024-07-27 RX ADMIN — AZITHROMYCIN DIHYDRATE 250 MG: 250 TABLET ORAL at 10:07

## 2024-07-27 RX ADMIN — BUPROPION HYDROCHLORIDE 100 MG: 100 TABLET, FILM COATED ORAL at 10:07

## 2024-07-27 RX ADMIN — ROFLUMILAST 500 MCG: 500 TABLET ORAL at 10:07

## 2024-07-27 RX ADMIN — CETIRIZINE HYDROCHLORIDE 5 MG: 5 TABLET ORAL at 10:07

## 2024-07-27 NOTE — NURSING
Patient is ready for discharge. Patient stable alert and oriented. IVs removed. No complaints of pain. Discussed discharge plan. Reviewed medications and side effects, appointments, and answered questions with patient and family. __ RX sent to patient preferred pharmacy.

## 2024-07-27 NOTE — PLAN OF CARE
A226/A226 VICKIE  Johnny Perales is a 69 y.o.male admitted on 7/24/2024 for COPD exacerbation   Code Status: Full Code MRN: [id number]   Review of patient's allergies indicates:  No Known Allergies  Past Medical History:   Diagnosis Date    COPD (chronic obstructive pulmonary disease)     Hearing impairment       PRN meds    acetaminophen, 650 mg, Q8H PRN  acetaminophen, 650 mg, Q4H PRN  benzonatate, 100 mg, TID PRN  ondansetron, 8 mg, Q8H PRN  sodium chloride 0.9%, 3 mL, PRN      Chart check completed. Will continue plan of care.         Bebeto Coma Scale Score: 15     Lead Monitored: Lead II Rhythm: sinus bradycardia    Cardiac/Telemetry Box Number: [id number]  VTE Required Core Measure: Pharmacological prophylaxis initiated/maintained Last Bowel Movement: 07/25/24  Diet Adult Regular     Da Score: 19  Fall Risk Score: 11  Accucheck []   Freq?      Lines/Drains/Airways       Peripheral Intravenous Line  Duration                  Peripheral IV - Single Lumen 07/25/24 1102 Anterior;Right Forearm 1 day

## 2024-07-27 NOTE — PLAN OF CARE
[facility] (Hospital)  Discharge Final Note    Primary Care Provider: John Yap MD    Expected Discharge Date: 7/27/2024    Final Discharge Note (most recent)       Final Note - 07/27/24 1007          Final Note    Assessment Type Final Discharge Note     Anticipated Discharge Disposition Home or Self Care        Post-Acute Status    Discharge Delays None known at this time                   No needs at time of chart review. Km,MSW    Important Message from Medicare             Contact Info       Effie Zimmerman PA    [address]   [address]   [place] [address] [phone] (Work)   [phone] (Fax)       Next Steps: Schedule an appointment as soon as possible for a visit    Instructions: Follow up with your primary pulmonologist within 1-2 weeks of discharge.    John Yap MD   Specialty: Family Medicine   Relationship: PCP - General    [id number] [place] [address]   Phone: [phone]       Next Steps: Follow up

## 2024-08-15 ENCOUNTER — OFFICE VISIT (OUTPATIENT)
Dept: HOME HEALTH SERVICES | Facility: CLINIC | Age: 69
End: 2024-08-15
Payer: COMMERCIAL

## 2024-08-15 VITALS
OXYGEN SATURATION: 98 % | SYSTOLIC BLOOD PRESSURE: 114 MMHG | DIASTOLIC BLOOD PRESSURE: 70 MMHG | RESPIRATION RATE: 18 BRPM | HEART RATE: 71 BPM

## 2024-08-15 DIAGNOSIS — J96.11 CHRONIC RESPIRATORY FAILURE WITH HYPOXIA: Chronic | ICD-10-CM

## 2024-08-15 DIAGNOSIS — Z99.81 OXYGEN DEPENDENT: ICD-10-CM

## 2024-08-15 DIAGNOSIS — Z09 HOSPITAL DISCHARGE FOLLOW-UP: Primary | ICD-10-CM

## 2024-08-15 DIAGNOSIS — J44.1 COPD EXACERBATION: ICD-10-CM

## 2024-08-15 NOTE — ASSESSMENT & PLAN NOTE
Recent admission with exacerbation  Treated during admission with abx, steroids  Continue home oxygen and trilogy at night  F/U with pulm  Pulm rehab ordered at discharge

## 2024-08-15 NOTE — PROGRESS NOTES
[facility]  Transitional Care Management (TCM) Home Visit    Encounter Provider: Bret Sparks   PCP: John Yap MD  Consult Requested By: Dr. Wing Blank  Admit Date: 7/24/24   IP Discharge Date: 7/27/24  Hospital Length of Stay:RRHLOS@ days  Days since discharge (from IP or SNF): 19   [facility] On Call Contact Note: 8/12/24  Hospital Diagnosis: COPD exacerbation [J44.1]     HISTORY OF PRESENT ILLNESS      Patient ID: Johnny Perales is a 69 y.o. male was recently admitted to the hospital, this is their TCM encounter.    Hospital Course Synopsis:  Johnny Perales is a 69-year-old gentleman with a past medical history of severe COPD (home O2; 4L), severe hearing impairment, anxiety, coronary artery disease, and former tobacco abuse, who presented to the ER with shortness of breath that gradually onset prior to arrival. Per EMS, the patient has been very sick for the last three days, staying home alone and increasing nebulizer use. EMS found him sitting in front of a fan and extremely diaphoretic. Symptoms are constant and moderate in severity with no mitigating or exacerbating factors and no associated symptoms reported. The patient denies any fever, congestion, chest pain, abdominal pain, and all other symptoms at this time. Prior treatment includes one Duoneb, four albuterol treatments, and Solumedrol with improvement to symptoms.  Patient reports having several exacerbation episodes a year, last one about a year ago. Reports that he had to be intubated about two years ago.    Upon arrival in the ER, the patient's initial vital signs were /74, pulse 100, respiratory rate 24, temperature 98°F. Patient initially required bipap to maintain sats but has since stabilized back to his home O2 rate of 4L. Labs were grossly within normal limits, with SARS-CoV-2 and flu swabs negative and a normal BNP. Chest X-ray AP portable showed no acute process involving the chest. [facility]  called for obs-admission.    Hospital Course:   The patient was admitted on 07/25/2024, presenting with considerable wheezing but showed some improvement by the following day. On 07/26/2024, the patient demonstrated further improvement, and mobilization was planned with physical therapy to assess respiratory function. It was noted that continued IV Solu-Medrol and round-the-clock nebulizer treatments would likely be beneficial. By 07/27/2024, the patient was cleared for discharge, stable, alert, and oriented. Discharge planning was discussed, and a prescription for prednisone was sent to the patient's preferred pharmacy.    Doing well since discharge. Family present with patient during visit. Denies reoccurrence of symptoms in which prompted hospital stay. Home oxygen at 4L. Uses trilogy at night. Reports Sob with exertion which has improved since discharge. Reports compliance with medications. Denies any further complaints or concerns. Questions elicited. Time allowed for questions, all issues addressed. Contact info given for any concerns or changes. Need to follow up with pulmonary rehab.     DECISION MAKING TODAY       Assessment & Plan:  1. Hospital discharge follow-up    2. COPD exacerbation  Assessment & Plan:  Recent admission with exacerbation  Treated during admission with abx, steroids  Continue home oxygen and trilogy at night  F/U with pulm  Pulm rehab ordered at discharge    Orders:  -     Ambulatory referral/consult to [facility]    3. Chronic respiratory failure with hypoxia  Assessment & Plan:  Continue home oxygen  Trilogy at night      4. Oxygen dependent  Assessment & Plan:  Medical diagnosis of severe COPD  Home oxygen at 4 L  Continue home oxygen           Medication List on Discharge:     Medication List            Accurate as of August 15, 2024  6:33 PM. If you have any questions, ask your nurse or doctor.                CONTINUE taking these medications      * albuterol 90  mcg/actuation inhaler  Commonly known as: PROVENTIL/VENTOLIN HFA  Inhale 2 puffs into the lungs every 4 (four) hours as needed for Wheezing.     * albuterol 0.63 mg/3 mL Nebu  Commonly known as: ACCUNEB  USE ONE VIAL IN NEBULIZER EVERY 4 HOURS AS NEEDED     aspirin 81 MG EC tablet  Commonly known as: ECOTRIN  Take 1 tablet by mouth every morning.     azithromycin 500 MG tablet  Commonly known as: ZITHROMAX  Take 0.5 tablets (250 mg total) by mouth once daily.     buPROPion 100 MG tablet  Commonly known as: WELLBUTRIN  Take 1 tablet (100 mg total) by mouth 2 (two) times daily.     cetirizine 5 MG tablet  Commonly known as: ZYRTEC  Take 1 tablet (5 mg total) by mouth once daily.     cholecalciferol (vitamin D3) 10 mcg (400 unit) Tab  Commonly known as: VITAMIN D3  Take 2 tablets (800 Units total) by mouth once daily.     citalopram 40 MG tablet  Commonly known as: CeleXA  TAKE 1 TABLET EVERY DAY     dextromethorphan-guaiFENesin  mg/5 ml  mg/5 mL liquid  Commonly known as: ROBITUSSIN-DM     fluticasone propionate 50 mcg/actuation nasal spray  Commonly known as: FLONASE  2 sprays (100 mcg total) by Each Nostril route 2 (two) times daily.     fluticasone-salmeterol 500-50 mcg/dose 500-50 mcg/dose Dsdv diskus inhaler  Commonly known as: ADVAIR  Inhale into the lungs.     folic acid 800 MCG Tab  Commonly known as: FOLVITE  Take 800 mcg by mouth once daily.     guaiFENesin 600 mg 12 hr tablet  Commonly known as: MUCINEX  Take 2 tablets (1,200 mg total) by mouth 2 (two) times daily.     MUCINEX D ORAL  Take 1,200 mg by mouth.     OXYGEN-AIR DELIVERY SYSTEMS MISC  4 L/min by Misc.(Non-Drug; Combo Route) route.     roflumilast 500 mcg Tab  Commonly known as: DALIRESP  Take 1 tablet (500 mcg total) by mouth once daily.     sodium chloride 0.65 % nasal spray  Commonly known as: OCEAN     TRELEGY ELLIPTA 200-62.5-25 mcg inhaler  Generic drug: fluticasone-umeclidin-vilanter  SMARTSI Puff(s) Via Inhaler Every  Morning     umeclidinium-vilanteroL 62.5-25 mcg/actuation Dsdv  Commonly known as: ANORO ELLIPTA  Inhale 1 puff into the lungs once daily. Controller     VITAMIN C 1000 MG tablet  Generic drug: ascorbic acid (vitamin C)  Take 1,000 mg by mouth once daily.     zinc gluconate 50 mg tablet  Take 50 mg by mouth once daily.           * This list has 2 medication(s) that are the same as other medications prescribed for you. Read the directions carefully, and ask your doctor or other care provider to review them with you.                  Medication Reconciliation:  Were medications changed on discharge? Yes  Were medications in the home? Yes  Is the patient taking the medications as directed? Yes  Does the patient understand the medications and changes? Yes  Does updated med list accurately reflects meds patient is currently taking? Yes    ENVIRONMENT OF CARE      Family and/or Caregiver present at visit?  Yes  Name of Caregiver: family  History provided by: patient    Advance Care Planning   Advanced Care Planning Status:  Patient has had an ACP conversation  Living Will: No  Power of : No  [place]: No    Does Caregiver have HCPoA: No  Changes today: none  Is patient hospice appropriate: No  (If needed, use PPS <30 or FAST score >7)  Was referral to hospice placed: No       Impression upon entering the home:  Physical Dwelling: single family home   Appearance of home environment: cleaniness: clean, walking pathways: clear, lighting: adequate, and home structure: sound structure  Functional Status: independent  Mobility: ambulatory  Nutritional access: adequate intake and access  Home Health: No, and does not need it at this time   DME/Supplies: rolling walker and oxygen     Diagnostic tests reviewed/disposition: I have reviewed all completed as well as pending diagnostic tests at the time of discharge.  Disease/illness education: Take all medication as prescribed. Activity as tolerated. Keep all upcoming appts.    Establishment or re-establishment of referral orders for community resources: No other necessary community resources.   Discussion with other health care providers: No discussion with other health care providers necessary.   Does patient have a PCP at [facility]? Yes   Repatriation plan with PCP? follow-up with PCP within 90d   Does patient have an ostomy (ileostomy, colostomy, suprapubic catheter, nephrostomy tube, tracheostomy, PEG tube, pleurex catheter, cholecystostomy, etc)? No  Were BPAs reviewed? Yes    Social History     Socioeconomic History    Marital status: Single   Tobacco Use    Smoking status: Former     Current packs/day: 0.00     Average packs/day: 0.1 packs/day for 47.0 years (4.7 ttl pk-yrs)     Types: Vaping with nicotine, Cigarettes     Start date: 10/1971     Quit date: 10/2018     Years since quittin.8    Smokeless tobacco: Never   Substance and Sexual Activity    Alcohol use: Yes     Alcohol/week: 12.0 standard drinks of alcohol     Types: 12 Cans of beer per week    Drug use: No   Social History Narrative         Social Determinants of Health     Transportation Needs: No Transportation Needs (2024)    TRANSPORTATION NEEDS     Transportation : No       OBJECTIVE:     Vital Signs:  Vitals:    08/15/24 1042   BP: 114/70   Pulse: 71   Resp: 18       Review of Systems   Constitutional:  Positive for fatigue.   HENT: Negative.     Eyes: Negative.    Respiratory:  Positive for shortness of breath. Negative for chest tightness.    Cardiovascular: Negative.  Negative for leg swelling.   Gastrointestinal: Negative.    Endocrine: Negative.    Genitourinary: Negative.    Musculoskeletal: Negative.    Skin: Negative.    Allergic/Immunologic: Negative.    Neurological:  Positive for weakness.   Hematological: Negative.    Psychiatric/Behavioral: Negative.  Negative for agitation.    All other systems reviewed and are negative.      Physical Exam:  Physical Exam  Vitals reviewed.    Constitutional:       General: He is not in acute distress.     Appearance: Normal appearance. He is well-developed.   HENT:      Head: Normocephalic and atraumatic.      Nose: Nose normal.      Mouth/Throat:      Mouth: Mucous membranes are dry.      Pharynx: Oropharynx is clear.   Eyes:      Pupils: Pupils are equal, round, and reactive to light.   Cardiovascular:      Rate and Rhythm: Normal rate and regular rhythm.      Pulses: Normal pulses.      Heart sounds: Normal heart sounds.   Pulmonary:      Effort: Pulmonary effort is normal.      Breath sounds: Normal breath sounds.   Abdominal:      General: Bowel sounds are normal.      Palpations: Abdomen is soft.   Musculoskeletal:         General: Normal range of motion.      Cervical back: Normal range of motion and neck supple.   Skin:     General: Skin is warm and dry.   Neurological:      General: No focal deficit present.      Mental Status: He is alert and oriented to person, place, and time. Mental status is at baseline.      Motor: Weakness present.   Psychiatric:         Mood and Affect: Mood normal.         Behavior: Behavior normal.         Thought Content: Thought content normal.         Judgment: Judgment normal.         INSTRUCTIONS FOR PATIENT:   - Continue all medications, treatments and therapies as ordered.   - Follow all instructions, recommendations as discussed.  - Maintain Safety Precautions at all times.  - Attend all medical appointments as scheduled.  - For worsening symptoms: call Primary Care Physician or Nurse Practitioner.  - For emergencies, call 911 or immediately report to the nearest emergency room.   Scheduled Follow-up, Appts Reviewed with Modifications if Needed: Yes  No future appointments.    Signature: Bret Sparks NP    Transition of Care Visit:  I have reviewed and updated the history and problem list.  I have reconciled the medication list.  I have discussed the hospitalization and current medical issues, prognosis and  plans with the patient/family.

## 2024-08-27 ENCOUNTER — TELEPHONE (OUTPATIENT)
Dept: PULMONOLOGY | Facility: HOSPITAL | Age: 69
End: 2024-08-27
Payer: COMMERCIAL

## 2024-10-23 ENCOUNTER — PATIENT MESSAGE (OUTPATIENT)
Dept: RESEARCH | Facility: HOSPITAL | Age: 69
End: 2024-10-23
Payer: COMMERCIAL

## 2024-12-26 ENCOUNTER — HOSPITAL ENCOUNTER (OUTPATIENT)
Facility: HOSPITAL | Age: 69
Discharge: HOME | End: 2024-12-26
Attending: EMERGENCY MEDICINE | Admitting: EMERGENCY MEDICINE
Payer: COMMERCIAL

## 2024-12-26 VITALS
OXYGEN SATURATION: 96 % | SYSTOLIC BLOOD PRESSURE: 115 MMHG | BODY MASS INDEX: 22.41 KG/M2 | RESPIRATION RATE: 18 BRPM | TEMPERATURE: 98 F | HEART RATE: 79 BPM | HEIGHT: 70 IN | WEIGHT: 156.5 LBS | DIASTOLIC BLOOD PRESSURE: 64 MMHG

## 2024-12-26 DIAGNOSIS — R93.5 ABNORMAL CT OF THE ABDOMEN: ICD-10-CM

## 2024-12-26 DIAGNOSIS — J44.9 COPD, VERY SEVERE: ICD-10-CM

## 2024-12-26 DIAGNOSIS — R07.9 CHEST PAIN: ICD-10-CM

## 2024-12-26 DIAGNOSIS — I20.0 UNSTABLE ANGINA: Primary | ICD-10-CM

## 2024-12-26 DIAGNOSIS — J96.11 CHRONIC RESPIRATORY FAILURE WITH HYPOXIA, ON HOME O2 THERAPY: ICD-10-CM

## 2024-12-26 DIAGNOSIS — Z99.81 CHRONIC RESPIRATORY FAILURE WITH HYPOXIA, ON HOME O2 THERAPY: ICD-10-CM

## 2024-12-26 DIAGNOSIS — I10 CHRONIC HYPERTENSION: ICD-10-CM

## 2024-12-26 DIAGNOSIS — J44.1 ACUTE EXACERBATION OF CHRONIC OBSTRUCTIVE PULMONARY DISEASE (COPD): ICD-10-CM

## 2024-12-26 PROBLEM — R94.39 ABNORMAL STRESS TEST: Status: ACTIVE | Noted: 2024-12-26

## 2024-12-26 LAB
ALBUMIN SERPL BCP-MCNC: 4 G/DL (ref 3.5–5.2)
ALP SERPL-CCNC: 86 U/L (ref 40–150)
ALT SERPL W/O P-5'-P-CCNC: 11 U/L (ref 10–44)
ANION GAP SERPL CALC-SCNC: 9 MMOL/L (ref 8–16)
AORTIC ROOT ANNULUS: 3.47 CM
APTT PPP: 28.8 SEC (ref 21–32)
APTT PPP: 29.2 SEC (ref 21–32)
ASCENDING AORTA: 3.77 CM
AST SERPL-CCNC: 16 U/L (ref 10–40)
AV INDEX (PROSTH): 0.7
AV MEAN GRADIENT: 8.4 MMHG
AV PEAK GRADIENT: 11.6 MMHG
AV VALVE AREA BY VELOCITY RATIO: 2.9 CM²
AV VALVE AREA: 2.7 CM²
AV VELOCITY RATIO: 0.76
BASOPHILS # BLD AUTO: 0.05 K/UL (ref 0–0.2)
BASOPHILS NFR BLD: 0.5 % (ref 0–1.9)
BILIRUB SERPL-MCNC: 0.5 MG/DL (ref 0.1–1)
BNP SERPL-MCNC: <10 PG/ML (ref 0–99)
BSA FOR ECHO PROCEDURE: 1.88 M2
BUN SERPL-MCNC: 10 MG/DL (ref 8–23)
CALCIUM SERPL-MCNC: 9.7 MG/DL (ref 8.7–10.5)
CHLORIDE SERPL-SCNC: 99 MMOL/L (ref 95–110)
CO2 SERPL-SCNC: 31 MMOL/L (ref 23–29)
CREAT SERPL-MCNC: 0.8 MG/DL (ref 0.5–1.4)
CV ECHO LV RWT: 0.37 CM
DIFFERENTIAL METHOD BLD: ABNORMAL
DOP CALC AO PEAK VEL: 1.7 M/S
DOP CALC AO VTI: 33.5 CM
DOP CALC LVOT AREA: 3.8 CM2
DOP CALC LVOT DIAMETER: 2.2 CM
DOP CALC LVOT PEAK VEL: 1.3 M/S
DOP CALC LVOT STROKE VOLUME: 88.9 CM3
DOP CALC RVOT PEAK VEL: 0.91 M/S
DOP CALC RVOT VTI: 14.9 CM
DOP CALCLVOT PEAK VEL VTI: 23.4 CM
E WAVE DECELERATION TIME: 360.15 MSEC
E/A RATIO: 0.94
E/E' RATIO: 5.92 M/S
ECHO LV POSTERIOR WALL: 0.9 CM (ref 0.6–1.1)
EOSINOPHIL # BLD AUTO: 0.2 K/UL (ref 0–0.5)
EOSINOPHIL NFR BLD: 2.2 % (ref 0–8)
ERYTHROCYTE [DISTWIDTH] IN BLOOD BY AUTOMATED COUNT: 12.6 % (ref 11.5–14.5)
EST. GFR  (NO RACE VARIABLE): >60 ML/MIN/1.73 M^2
FRACTIONAL SHORTENING: 38.8 % (ref 28–44)
GLUCOSE SERPL-MCNC: 119 MG/DL (ref 70–110)
HCT VFR BLD AUTO: 46 % (ref 40–54)
HGB BLD-MCNC: 15.4 G/DL (ref 14–18)
IMM GRANULOCYTES # BLD AUTO: 0.03 K/UL (ref 0–0.04)
IMM GRANULOCYTES NFR BLD AUTO: 0.3 % (ref 0–0.5)
INR PPP: 1 (ref 0.8–1.2)
INTERVENTRICULAR SEPTUM: 0.8 CM (ref 0.6–1.1)
IVC DIAMETER: 1.46 CM
IVRT: 88.49 MSEC
LA MAJOR: 5.44 CM
LA MINOR: 4.9 CM
LA WIDTH: 2.4 CM
LEFT ATRIUM SIZE: 2.85 CM
LEFT ATRIUM VOLUME INDEX: 15.9 ML/M2
LEFT ATRIUM VOLUME: 29.98 CM3
LEFT INTERNAL DIMENSION IN SYSTOLE: 3 CM (ref 2.1–4)
LEFT VENTRICLE DIASTOLIC VOLUME INDEX: 59.74 ML/M2
LEFT VENTRICLE DIASTOLIC VOLUME: 112.91 ML
LEFT VENTRICLE MASS INDEX: 75.1 G/M2
LEFT VENTRICLE SYSTOLIC VOLUME INDEX: 18.6 ML/M2
LEFT VENTRICLE SYSTOLIC VOLUME: 35.13 ML
LEFT VENTRICULAR INTERNAL DIMENSION IN DIASTOLE: 4.9 CM (ref 3.5–6)
LEFT VENTRICULAR MASS: 141.9 G
LIPASE SERPL-CCNC: 19 U/L (ref 4–60)
LV LATERAL E/E' RATIO: 5.29 M/S
LV SEPTAL E/E' RATIO: 6.73 M/S
LVED V (TEICH): 112.91 ML
LVES V (TEICH): 35.13 ML
LVOT MG: 3.96 MMHG
LVOT MV: 0.92 CM/S
LYMPHOCYTES # BLD AUTO: 1.1 K/UL (ref 1–4.8)
LYMPHOCYTES NFR BLD: 10.8 % (ref 18–48)
MCH RBC QN AUTO: 31.9 PG (ref 27–31)
MCHC RBC AUTO-ENTMCNC: 33.5 G/DL (ref 32–36)
MCV RBC AUTO: 95 FL (ref 82–98)
MONOCYTES # BLD AUTO: 0.8 K/UL (ref 0.3–1)
MONOCYTES NFR BLD: 7.6 % (ref 4–15)
MV PEAK A VEL: 0.79 M/S
MV PEAK E VEL: 0.74 M/S
MV STENOSIS PRESSURE HALF TIME: 104.44 MS
MV VALVE AREA P 1/2 METHOD: 2.11 CM2
NEUTROPHILS # BLD AUTO: 7.8 K/UL (ref 1.8–7.7)
NEUTROPHILS NFR BLD: 78.6 % (ref 38–73)
NRBC BLD-RTO: 0 /100 WBC
OHS QRS DURATION: 126 MS
OHS QRS DURATION: 126 MS
OHS QTC CALCULATION: 449 MS
OHS QTC CALCULATION: 459 MS
PISA TR MAX VEL: 2.04 M/S
PLATELET # BLD AUTO: 238 K/UL (ref 150–450)
PMV BLD AUTO: 9 FL (ref 9.2–12.9)
POTASSIUM SERPL-SCNC: 4 MMOL/L (ref 3.5–5.1)
PROT SERPL-MCNC: 7.3 G/DL (ref 6–8.4)
PROTHROMBIN TIME: 11.2 SEC (ref 9–12.5)
PV MEAN GRADIENT: 2 MMHG
RA MAJOR: 4.2 CM
RA PRESSURE ESTIMATED: 3 MMHG
RA WIDTH: 2.9 CM
RBC # BLD AUTO: 4.83 M/UL (ref 4.6–6.2)
RV TB RVSP: 5 MMHG
SODIUM SERPL-SCNC: 139 MMOL/L (ref 136–145)
STJ: 3.61 CM
TDI LATERAL: 0.14 M/S
TDI SEPTAL: 0.11 M/S
TDI: 0.13 M/S
TR MAX PG: 17 MMHG
TROPONIN I SERPL DL<=0.01 NG/ML-MCNC: 0.01 NG/ML (ref 0–0.03)
TROPONIN I SERPL DL<=0.01 NG/ML-MCNC: 0.03 NG/ML (ref 0–0.03)
TV REST PULMONARY ARTERY PRESSURE: 20 MMHG
WBC # BLD AUTO: 9.98 K/UL (ref 3.9–12.7)
Z-SCORE OF LEFT VENTRICULAR DIMENSION IN END DIASTOLE: -0.77
Z-SCORE OF LEFT VENTRICULAR DIMENSION IN END SYSTOLE: -0.66

## 2024-12-26 PROCEDURE — 25000003 PHARM REV CODE 250: Performed by: NURSE PRACTITIONER

## 2024-12-26 PROCEDURE — 80053 COMPREHEN METABOLIC PANEL: CPT | Performed by: EMERGENCY MEDICINE

## 2024-12-26 PROCEDURE — 99291 CRITICAL CARE FIRST HOUR: CPT

## 2024-12-26 PROCEDURE — 25000242 PHARM REV CODE 250 ALT 637 W/ HCPCS: Performed by: EMERGENCY MEDICINE

## 2024-12-26 PROCEDURE — G0378 HOSPITAL OBSERVATION PER HR: HCPCS

## 2024-12-26 PROCEDURE — 93010 ELECTROCARDIOGRAM REPORT: CPT | Mod: ,,, | Performed by: INTERNAL MEDICINE

## 2024-12-26 PROCEDURE — 85730 THROMBOPLASTIN TIME PARTIAL: CPT | Performed by: EMERGENCY MEDICINE

## 2024-12-26 PROCEDURE — 85025 COMPLETE CBC W/AUTO DIFF WBC: CPT | Performed by: EMERGENCY MEDICINE

## 2024-12-26 PROCEDURE — 93005 ELECTROCARDIOGRAM TRACING: CPT

## 2024-12-26 PROCEDURE — 85610 PROTHROMBIN TIME: CPT | Performed by: EMERGENCY MEDICINE

## 2024-12-26 PROCEDURE — 84484 ASSAY OF TROPONIN QUANT: CPT | Performed by: EMERGENCY MEDICINE

## 2024-12-26 PROCEDURE — 25500020 PHARM REV CODE 255: Performed by: EMERGENCY MEDICINE

## 2024-12-26 PROCEDURE — 96376 TX/PRO/DX INJ SAME DRUG ADON: CPT

## 2024-12-26 PROCEDURE — 99204 OFFICE O/P NEW MOD 45 MIN: CPT | Mod: 25,,, | Performed by: INTERNAL MEDICINE

## 2024-12-26 PROCEDURE — 96375 TX/PRO/DX INJ NEW DRUG ADDON: CPT | Mod: 59

## 2024-12-26 PROCEDURE — 36415 COLL VENOUS BLD VENIPUNCTURE: CPT | Mod: XB | Performed by: EMERGENCY MEDICINE

## 2024-12-26 PROCEDURE — 63600175 PHARM REV CODE 636 W HCPCS: Performed by: EMERGENCY MEDICINE

## 2024-12-26 PROCEDURE — 83690 ASSAY OF LIPASE: CPT | Performed by: EMERGENCY MEDICINE

## 2024-12-26 PROCEDURE — 83880 ASSAY OF NATRIURETIC PEPTIDE: CPT | Performed by: EMERGENCY MEDICINE

## 2024-12-26 PROCEDURE — 96366 THER/PROPH/DIAG IV INF ADDON: CPT

## 2024-12-26 PROCEDURE — 96365 THER/PROPH/DIAG IV INF INIT: CPT

## 2024-12-26 PROCEDURE — 36415 COLL VENOUS BLD VENIPUNCTURE: CPT | Performed by: EMERGENCY MEDICINE

## 2024-12-26 PROCEDURE — 85730 THROMBOPLASTIN TIME PARTIAL: CPT | Mod: 91 | Performed by: EMERGENCY MEDICINE

## 2024-12-26 PROCEDURE — 25000003 PHARM REV CODE 250: Performed by: EMERGENCY MEDICINE

## 2024-12-26 RX ORDER — ONDANSETRON HYDROCHLORIDE 2 MG/ML
4 INJECTION, SOLUTION INTRAVENOUS EVERY 8 HOURS PRN
Status: DISCONTINUED | OUTPATIENT
Start: 2024-12-26 | End: 2024-12-26 | Stop reason: HOSPADM

## 2024-12-26 RX ORDER — SIMETHICONE 80 MG
1 TABLET,CHEWABLE ORAL 4 TIMES DAILY PRN
Status: DISCONTINUED | OUTPATIENT
Start: 2024-12-26 | End: 2024-12-26 | Stop reason: HOSPADM

## 2024-12-26 RX ORDER — ARFORMOTEROL TARTRATE 15 UG/2ML
15 SOLUTION RESPIRATORY (INHALATION) EVERY 12 HOURS
Status: DISCONTINUED | OUTPATIENT
Start: 2024-12-26 | End: 2024-12-26 | Stop reason: HOSPADM

## 2024-12-26 RX ORDER — ONDANSETRON HYDROCHLORIDE 2 MG/ML
4 INJECTION, SOLUTION INTRAVENOUS
Status: COMPLETED | OUTPATIENT
Start: 2024-12-26 | End: 2024-12-26

## 2024-12-26 RX ORDER — ROFLUMILAST 500 UG/1
500 TABLET ORAL DAILY
Status: DISCONTINUED | OUTPATIENT
Start: 2024-12-26 | End: 2024-12-26 | Stop reason: HOSPADM

## 2024-12-26 RX ORDER — ALUMINUM HYDROXIDE, MAGNESIUM HYDROXIDE, AND SIMETHICONE 1200; 120; 1200 MG/30ML; MG/30ML; MG/30ML
30 SUSPENSION ORAL 4 TIMES DAILY PRN
Status: DISCONTINUED | OUTPATIENT
Start: 2024-12-26 | End: 2024-12-26 | Stop reason: HOSPADM

## 2024-12-26 RX ORDER — SODIUM CHLORIDE FOR INHALATION 0.9 %
3 VIAL, NEBULIZER (ML) INHALATION DAILY PRN
COMMUNITY
Start: 2024-09-03

## 2024-12-26 RX ORDER — SUCRALFATE 1 G/1
1 TABLET ORAL
Qty: 28 TABLET | Refills: 0 | Status: ON HOLD | OUTPATIENT
Start: 2024-12-26 | End: 2024-12-27 | Stop reason: SDUPTHER

## 2024-12-26 RX ORDER — IBUPROFEN 200 MG
24 TABLET ORAL
Status: DISCONTINUED | OUTPATIENT
Start: 2024-12-26 | End: 2024-12-26 | Stop reason: HOSPADM

## 2024-12-26 RX ORDER — SUCRALFATE 1 G/10ML
1 SUSPENSION ORAL EVERY 6 HOURS
Status: DISCONTINUED | OUTPATIENT
Start: 2024-12-26 | End: 2024-12-26 | Stop reason: HOSPADM

## 2024-12-26 RX ORDER — ACETAMINOPHEN 325 MG/1
650 TABLET ORAL EVERY 4 HOURS PRN
Status: DISCONTINUED | OUTPATIENT
Start: 2024-12-26 | End: 2024-12-26 | Stop reason: HOSPADM

## 2024-12-26 RX ORDER — PANTOPRAZOLE SODIUM 40 MG/1
40 TABLET, DELAYED RELEASE ORAL 2 TIMES DAILY
Qty: 28 TABLET | Refills: 0 | Status: ON HOLD | OUTPATIENT
Start: 2024-12-26 | End: 2024-12-27

## 2024-12-26 RX ORDER — IPRATROPIUM BROMIDE AND ALBUTEROL SULFATE 2.5; .5 MG/3ML; MG/3ML
3 SOLUTION RESPIRATORY (INHALATION) EVERY 4 HOURS PRN
Status: DISCONTINUED | OUTPATIENT
Start: 2024-12-26 | End: 2024-12-26 | Stop reason: HOSPADM

## 2024-12-26 RX ORDER — MORPHINE SULFATE 4 MG/ML
4 INJECTION, SOLUTION INTRAMUSCULAR; INTRAVENOUS
Status: COMPLETED | OUTPATIENT
Start: 2024-12-26 | End: 2024-12-26

## 2024-12-26 RX ORDER — BUDESONIDE 0.5 MG/2ML
0.5 INHALANT ORAL EVERY 12 HOURS
Status: DISCONTINUED | OUTPATIENT
Start: 2024-12-26 | End: 2024-12-26 | Stop reason: HOSPADM

## 2024-12-26 RX ORDER — MULTIVIT WITH MINERALS/HERBS
1 TABLET ORAL DAILY
COMMUNITY

## 2024-12-26 RX ORDER — HEPARIN SODIUM,PORCINE/D5W 25000/250
0-40 INTRAVENOUS SOLUTION INTRAVENOUS CONTINUOUS
Status: DISCONTINUED | OUTPATIENT
Start: 2024-12-26 | End: 2024-12-26

## 2024-12-26 RX ORDER — METHYLPREDNISOLONE SOD SUCC 125 MG
125 VIAL (EA) INJECTION
Status: COMPLETED | OUTPATIENT
Start: 2024-12-26 | End: 2024-12-26

## 2024-12-26 RX ORDER — AMOXICILLIN 250 MG
1 CAPSULE ORAL 2 TIMES DAILY PRN
Status: DISCONTINUED | OUTPATIENT
Start: 2024-12-26 | End: 2024-12-26 | Stop reason: HOSPADM

## 2024-12-26 RX ORDER — GLUCAGON 1 MG
1 KIT INJECTION
Status: DISCONTINUED | OUTPATIENT
Start: 2024-12-26 | End: 2024-12-26 | Stop reason: HOSPADM

## 2024-12-26 RX ORDER — TALC
6 POWDER (GRAM) TOPICAL NIGHTLY PRN
Status: DISCONTINUED | OUTPATIENT
Start: 2024-12-26 | End: 2024-12-26 | Stop reason: HOSPADM

## 2024-12-26 RX ORDER — ASPIRIN 81 MG/1
81 TABLET ORAL EVERY MORNING
Status: DISCONTINUED | OUTPATIENT
Start: 2024-12-27 | End: 2024-12-26 | Stop reason: HOSPADM

## 2024-12-26 RX ORDER — MORPHINE SULFATE 4 MG/ML
2 INJECTION, SOLUTION INTRAMUSCULAR; INTRAVENOUS
Status: COMPLETED | OUTPATIENT
Start: 2024-12-26 | End: 2024-12-26

## 2024-12-26 RX ORDER — IPRATROPIUM BROMIDE AND ALBUTEROL SULFATE 2.5; .5 MG/3ML; MG/3ML
3 SOLUTION RESPIRATORY (INHALATION)
Status: COMPLETED | OUTPATIENT
Start: 2024-12-26 | End: 2024-12-26

## 2024-12-26 RX ORDER — IBUPROFEN 200 MG
16 TABLET ORAL
Status: DISCONTINUED | OUTPATIENT
Start: 2024-12-26 | End: 2024-12-26 | Stop reason: HOSPADM

## 2024-12-26 RX ORDER — NALOXONE HCL 0.4 MG/ML
0.02 VIAL (ML) INJECTION
Status: DISCONTINUED | OUTPATIENT
Start: 2024-12-26 | End: 2024-12-26 | Stop reason: HOSPADM

## 2024-12-26 RX ORDER — ASPIRIN 325 MG
325 TABLET ORAL
Status: DISCONTINUED | OUTPATIENT
Start: 2024-12-26 | End: 2024-12-26 | Stop reason: HOSPADM

## 2024-12-26 RX ORDER — PANTOPRAZOLE SODIUM 40 MG/1
40 TABLET, DELAYED RELEASE ORAL DAILY
Status: DISCONTINUED | OUTPATIENT
Start: 2024-12-26 | End: 2024-12-26 | Stop reason: HOSPADM

## 2024-12-26 RX ADMIN — ROFLUMILAST 500 MCG: 500 TABLET ORAL at 12:12

## 2024-12-26 RX ADMIN — PANTOPRAZOLE SODIUM 40 MG: 40 TABLET, DELAYED RELEASE ORAL at 12:12

## 2024-12-26 RX ADMIN — IPRATROPIUM BROMIDE AND ALBUTEROL SULFATE 3 ML: 2.5; .5 SOLUTION RESPIRATORY (INHALATION) at 08:12

## 2024-12-26 RX ADMIN — HEPARIN SODIUM 12 UNITS/KG/HR: 10000 INJECTION, SOLUTION INTRAVENOUS at 09:12

## 2024-12-26 RX ADMIN — NITROGLYCERIN 1 INCH: 20 OINTMENT TOPICAL at 08:12

## 2024-12-26 RX ADMIN — METHYLPREDNISOLONE SODIUM SUCCINATE 125 MG: 125 INJECTION, POWDER, FOR SOLUTION INTRAMUSCULAR; INTRAVENOUS at 08:12

## 2024-12-26 RX ADMIN — MORPHINE SULFATE 2 MG: 4 INJECTION INTRAVENOUS at 08:12

## 2024-12-26 RX ADMIN — IOHEXOL 100 ML: 350 INJECTION, SOLUTION INTRAVENOUS at 10:12

## 2024-12-26 RX ADMIN — ONDANSETRON 4 MG: 2 INJECTION INTRAMUSCULAR; INTRAVENOUS at 08:12

## 2024-12-26 RX ADMIN — SUCRALFATE ORAL 1 G: 1 SUSPENSION ORAL at 02:12

## 2024-12-26 RX ADMIN — MORPHINE SULFATE 4 MG: 4 INJECTION INTRAVENOUS at 09:12

## 2024-12-26 NOTE — ASSESSMENT & PLAN NOTE
aCT scan noted segmental thickening within the gastric antrum with submucosal edema that is concerning for gastritis but mass can not be excluded.  This was discussed with Dr. Mercer, GI, who recommends PPI and outpatient EGD.

## 2024-12-26 NOTE — PLAN OF CARE
Pt AAO, able to make needs known. Very hard of hearing. 4L/NC which is baseline. Heart monitor 8574. Oriented to room and call light. All active orders reviewed.

## 2024-12-26 NOTE — DISCHARGE SUMMARY
[facility][note 3] Medicine  Discharge Summary      Patient Name: Johnny Perales  MRN: [id number]  COMPA: [id number]  Patient Class: OP- Observation  Admission Date: 12/26/2024  Hospital Length of Stay: 0 days  Discharge Date and Time:  12/26/2024 5:55 PM  Attending Physician: No att. providers found   Discharging Provider: Jayde Brink NP  Primary Care Provider: John Yap MD    Primary Care Team: Networked reference to record PCT     HPI:   Johnny Perales is a 69 year old male with history of COPD, Tobacco abuse, chronic respirator failure requiring 4L nasal cannula, chronic RBBB who was is scheduled have an outpatient LHC in January after having abnormal stress test results. Patient is alert and oriented but extremely [place] so sister will be providing history. She tells me patient complained of generalized epigastric discomfort radiating to mid chest. He has never experienced these symptoms before so she brought him to the ED for further evaluation.  He admits to some dyspnea over the past few days with some associated nausea.  Patient denies diaphoresis, fever, chills and change in stool pattern.     In ED, EKG unchanged.  Chest x-ray unremarkable.  His initial troponin is 0.027.  Discussed with cardiology who recommends heparin infusion and CT abdomen/pelvis.  CT demonstrated gastric antrum segmental thickening with submucosal edema, raising the concern for gastritis versus gastroenteritis.  Case discussed with Dr. Mercer, GI, who recommended PPI and outpatient EGD.  Hospital medicine consulted for admission.      * No surgery found *      Hospital Course:   Patient presented to ED with complaints of upper abdominal pain (Under rib cage) radiating to midsternal chest pain. Patient had an abnormal stress test and is scheduled for LHC early 1/2025. Patient complained of pain also radiating to back so CT abdomen pelvis ordered and this was significant for mucosal thickening of gastric  antrum concerning for gastritis but gastric mass would be not excluded. Patient's initial troponin was marginally elevated, but repeat was negative. Echocardiogram showed normal EF without wall motion abnormalities. Discussed with GI, Dr. Mercer, who recommended outpatient EGD and this has been arranged for tomorrow morning. The patient's presenting symptoms were atypical and improved after PPI and Carafate. Cardiology does not recommended further intervention. Patient will discharge home with PPI and Carafate and will return tomorrow for EGD. He has remained vitally stable for discharge. He as asked to report to ED if symptoms worsen or if he develops fever.  Patient seen and examined on date of discharge and deemed suitable.      Goals of Care Treatment Preferences:  Code Status: Full Code         Consults:   Consults (From admission, onward)          Status Ordering Provider     Inpatient consult to Cardiology  Once        Provider:  Armand Mcgill MD    Completed MICHELLE CARABALLO            * Chest pain  Atypical?  However given abnormal stress test will place heparin infusion with serial troponins.  Cardiology will plan to see patient echocardiogram pending.  There is a consideration of symptoms being related to CT abdomen findings.  See plan below    Abnormal CT of the abdomen   aCT scan noted segmental thickening within the gastric antrum with submucosal edema that is concerning for gastritis but mass can not be excluded.  This was discussed with Dr. Mercer, GI, who recommends PPI and outpatient EGD.      Abnormal stress test  Noted to have abnormal stress test at a outside facility.  He has a left heart catheterization schedule early January      Chronic respiratory failure with hypoxia  Acute on chronic respiratory failure patient wears 4 L during the day and AVAPS at night      Final Active Diagnoses:    Diagnosis Date Noted POA    PRINCIPAL PROBLEM:  Chest pain [R07.9] 12/26/2024 Yes    Abnormal CT  of the abdomen [R93.5] 12/26/2024 Yes    Abnormal stress test [R94.39] 12/26/2024 Yes    Chronic respiratory failure with hypoxia [J96.11] 02/17/2017 Yes     Chronic      Problems Resolved During this Admission:       Discharged Condition: stable    Disposition: Home or Self Care    Follow Up:   Follow-up Information       Outpatient EGD 12/27/24 Follow up.                           Patient Instructions:   No discharge procedures on file.    Significant Diagnostic Studies: Labs: CMP   Recent Labs   Lab 12/26/24  0837      K 4.0   CL 99   CO2 31*   *   BUN 10   CREATININE 0.8   CALCIUM 9.7   PROT 7.3   ALBUMIN 4.0   BILITOT 0.5   ALKPHOS 86   AST 16   ALT 11   ANIONGAP 9    and CBC   Recent Labs   Lab 12/26/24  0837   WBC 9.98   HGB 15.4   HCT 46.0          Pending Diagnostic Studies:       None           Medications:  Reconciled Home Medications:      Medication List        START taking these medications      pantoprazole 40 MG tablet  Commonly known as: PROTONIX  Take 1 tablet (40 mg total) by mouth 2 (two) times daily. for 14 days     sucralfate 1 gram tablet  Commonly known as: CARAFATE  Take 1 tablet (1 g total) by mouth 4 (four) times daily before meals and nightly. for 7 days            CONTINUE taking these medications      * albuterol 90 mcg/actuation inhaler  Commonly known as: PROVENTIL/VENTOLIN HFA  Inhale 2 puffs into the lungs every 4 (four) hours as needed for Wheezing.     * albuterol 0.63 mg/3 mL Nebu  Commonly known as: ACCUNEB  USE ONE VIAL IN NEBULIZER EVERY 4 HOURS AS NEEDED     aspirin 81 MG EC tablet  Commonly known as: ECOTRIN  Take 1 tablet by mouth every morning.     b complex vitamins tablet  Take 1 tablet by mouth once daily.     cholecalciferol (vitamin D3) 10 mcg (400 unit) Tab  Commonly known as: VITAMIN D3  Take 2 tablets (800 Units total) by mouth once daily.     folic acid 800 MCG Tab  Commonly known as: FOLVITE  Take 800 mcg by mouth once daily.     MUCINEX D  ORAL  Take 1,200 mg by mouth once daily.     OXYGEN-AIR DELIVERY SYSTEMS MISC  4 L/min by Misc.(Non-Drug; Combo Route) route.     roflumilast 500 mcg Tab  Commonly known as: DALIRESP  Take 1 tablet (500 mcg total) by mouth once daily.     sodium chloride 0.65 % nasal spray  Commonly known as: OCEAN  1 spray by Nasal route as needed.     sodium chloride 0.9% 0.9 % nebulizer solution  Inhale 3 mLs into the lungs daily as needed.     TRELEGY ELLIPTA 200-62.5-25 mcg inhaler  Generic drug: fluticasone-umeclidin-vilanter  Inhale 1 puff into the lungs once daily.     VITAMIN C 1000 MG tablet  Generic drug: ascorbic acid (vitamin C)  Take 1,000 mg by mouth once daily.     zinc gluconate 50 mg tablet  Take 50 mg by mouth once daily.           * This list has 2 medication(s) that are the same as other medications prescribed for you. Read the directions carefully, and ask your doctor or other care provider to review them with you.                  Indwelling Lines/Drains at time of discharge:   Lines/Drains/Airways       None                   Time spent on the discharge of patient: >35 minutes         Jayde Brink NP  Department of Hospital Medicine  [facility]

## 2024-12-26 NOTE — HPI
69 y.o. male patient with a PMHx of COPD and hearing impairment who presents to the Emergency Department for evaluation of CP which began the day before yesterday. Symptoms periodically increase in severity, pressure and pain originating from the abdomen and radiating to the chest and back. Patient rates his pain a 7/10 at its worse. No mitigating or exacerbating factors reported. Associated sxs include nausea, diaphoresis, and SOB. Patient denies any vomiting. History of cigarette use since age 14; Pt no longer smoking. Prior Tx includes 4 doses of baby ASA given by wife PTA. Patient was not treated with NTG en route. Treated with 4.0 L of home O2 and receives albuterol breathing treatments every 4 hours. His wife reports an upcoming heart cath procedure with [facility]. No further complaints or concerns at this time.        WE WILL MY INTERVIEW OF THE PATIENT HIS PAIN WAS MIDEPIGASTRIC.  HE WAS TENDER AND REPRODUCIBLE TO EXAM.    HE STATES THIS GOES TO THE RIGHT ANTERIOR LEFT SIDE OF HIS ABDOMEN.    His pain is nonexertional.    The report of his nuclear stress test from outpatient shows basal ischemia anterior and inferior without further extension.  I do not have the available pictures of that.    We are requested a CT scan of his abdomen that showed mucosal thickening of his gastric wall  Troponin from very minimal to negative echocardiogram without wall motion abnormalities.

## 2024-12-26 NOTE — ED PROVIDER NOTES
SCRIBE #1 NOTE: IPam, am scribing for, and in the presence of, Randi Hirsch MD. I have scribed the entire note.       History     Chief Complaint   Patient presents with    Chest Pain     Episodic x 2 days, hx of COPD, home o2 at 4lpm, +cough     Review of patient's allergies indicates:  No Known Allergies      History of Present Illness     HPI    12/26/2024, 8:32 AM  History obtained from the patient, medical records, and wife      History of Present Illness: Johnny Perales is a 69 y.o. male patient with a PMHx of COPD and hearing impairment who presents to the Emergency Department for evaluation of CP which began the day before yesterday. Symptoms periodically increase in severity, pressure and pain originating from the abdomen and radiating to the chest and back. Patient rates his pain a 7/10 at its worse. No mitigating or exacerbating factors reported. Associated sxs include nausea, diaphoresis, and SOB. Patient denies any vomiting, constipation, fever, chills, dizziness, syncope. History of cigarette use since age 14; Pt no longer smoking. Prior Tx includes 4 doses of baby ASA given by wife PTA. Patient was not treated with NTG en route. Treated with 4.0 L of home O2 and receives albuterol breathing treatments every 4 hours. Currently on his baseline 4 liters NC. His wife reports an upcoming heart cath procedure with [facility]. No further complaints or concerns at this time.       Arrival mode: Ambulance Service    PCP: John Yap MD        Past Medical History:  Past Medical History:   Diagnosis Date    COPD (chronic obstructive pulmonary disease)     Hearing impairment        Past Surgical History:  Past Surgical History:   Procedure Laterality Date    COLONOSCOPY N/A 7/12/2017    Procedure: COLONOSCOPY;  Surgeon: Salvador Lambert MD;  Location: [facility];  Service: Endoscopy;  Laterality: N/A;    INNER EAR SURGERY Bilateral     LEG SURGERY  Right          Family History:  Family History   Problem Relation Name Age of Onset    Cancer Mother      Heart failure Mother      Cancer Father      Heart failure Father      Colon cancer Maternal Uncle         Social History:  Social History     Tobacco Use    Smoking status: Former     Current packs/day: 0.00     Average packs/day: 0.1 packs/day for 47.0 years (4.7 ttl pk-yrs)     Types: Vaping with nicotine, Cigarettes     Start date: 10/1971     Quit date: 10/2018     Years since quittin.2    Smokeless tobacco: Never   Substance and Sexual Activity    Alcohol use: Yes     Alcohol/week: 12.0 standard drinks of alcohol     Types: 12 Cans of beer per week    Drug use: No    Sexual activity: Not on file        Review of Systems     Review of Systems   Constitutional:  Positive for diaphoresis. Negative for fever.   HENT:  Negative for sore throat.    Respiratory:  Positive for shortness of breath.    Cardiovascular:  Positive for chest pain.   Gastrointestinal:  Positive for abdominal pain and nausea. Negative for vomiting.   Genitourinary:  Negative for dysuria.   Musculoskeletal:  Positive for back pain.   Skin:  Negative for rash.   Neurological:  Negative for weakness.   Hematological:  Does not bruise/bleed easily.   All other systems reviewed and are negative.     Physical Exam     Initial Vitals [24 0802]   BP Pulse Resp Temp SpO2   121/80 84 18 98.1 °F (36.7 °C) 98 %      MAP       --          Physical Exam  Nursing Notes and Vital Signs Reviewed.  Constitutional: Patient is in mild distress. Appears older than stated age.  Head: Atraumatic. Normocephalic.  Eyes: PERRL. EOM intact. Conjunctivae are not pale. No scleral icterus.  ENT: Mucous membranes are moist. Oropharynx is clear and symmetric.    Neck: Supple. Full ROM. No lymphadenopathy.  Cardiovascular: Regular rate. Regular rhythm. No murmurs, rubs, or gallops. Distal pulses are 2+ and symmetric.  Pulmonary/Chest: Mild respiratory distress  with cough. Inspiratory and expiratory wheezing. No rales.  Abdominal: Soft and non-distended.  There is no tenderness.  No rebound, guarding, or rigidity. Good bowel sounds.  Genitourinary: No CVA tenderness  Musculoskeletal: Moves all extremities. No obvious deformities. No edema. No calf tenderness.  Skin: Warm and dry.  Neurological:  Alert, awake, and appropriate.  Normal speech.  No acute focal neurological deficits are appreciated.  Psychiatric: Normal affect. Good eye contact. Appropriate in content.     ED Course   Critical Care    Date/Time: 12/26/2024 11:00 AM    Performed by: Randi Hirsch MD  Authorized by: Randi Hirsch MD  Direct patient critical care time: 35 minutes  Additional history critical care time: 5 minutes  Ordering / reviewing critical care time: 6 minutes  Documentation critical care time: 6 minutes  Consulting other physicians critical care time: 8 minutes  Total critical care time (exclusive of procedural time) : 60 minutes  Critical care was necessary to treat or prevent imminent or life-threatening deterioration of the following conditions: cardiac failure and respiratory failure.  Critical care was time spent personally by me on the following activities: blood draw for specimens, development of treatment plan with patient or surrogate, discussions with consultants, discussions with primary provider, evaluation of patient's response to treatment, interpretation of cardiac output measurements, obtaining history from patient or surrogate, examination of patient, review of old charts, re-evaluation of patient's condition, pulse oximetry, ordering and review of radiographic studies, ordering and review of laboratory studies and ordering and performing treatments and interventions.        ED Vital Signs:  Vitals:    12/26/24 0810 12/26/24 0815 12/26/24 0842 12/26/24 0843   BP:  131/79     Pulse: 80 80  74   Resp:  19 20 20   Temp:       TempSrc:       SpO2:  100%     Weight:      "  Height:        12/26/24 0846 12/26/24 0849 12/26/24 0900 12/26/24 0930   BP:   (!) 151/78 (!) 140/81   Pulse: 74 74 71 76   Resp: 20 20 18 17   Temp:       TempSrc:       SpO2:  100% 99% 99%   Weight:       Height:        12/26/24 0941 12/26/24 0945 12/26/24 1245 12/26/24 1330   BP:  122/70     Pulse:  80  75   Resp: (!) 22 19     Temp:       TempSrc:       SpO2:  98%     Weight:  71.7 kg (158 lb) 71 kg (156 lb 8.4 oz)    Height:  5' 10" (1.778 m)      12/26/24 1505 12/26/24 1517 12/26/24 1600   BP:   115/64   Pulse: 78 71 79   Resp:   18   Temp:   98.1 °F (36.7 °C)   TempSrc:   Oral   SpO2:   96%   Weight:      Height:          Abnormal Lab Results:  Labs Reviewed   CBC W/ AUTO DIFFERENTIAL - Abnormal       Result Value    WBC 9.98      RBC 4.83      Hemoglobin 15.4      Hematocrit 46.0      MCV 95      MCH 31.9 (*)     MCHC 33.5      RDW 12.6      Platelets 238      MPV 9.0 (*)     Immature Granulocytes 0.3      Gran # (ANC) 7.8 (*)     Immature Grans (Abs) 0.03      Lymph # 1.1      Mono # 0.8      Eos # 0.2      Baso # 0.05      nRBC 0      Gran % 78.6 (*)     Lymph % 10.8 (*)     Mono % 7.6      Eosinophil % 2.2      Basophil % 0.5      Differential Method Automated     COMPREHENSIVE METABOLIC PANEL - Abnormal    Sodium 139      Potassium 4.0      Chloride 99      CO2 31 (*)     Glucose 119 (*)     BUN 10      Creatinine 0.8      Calcium 9.7      Total Protein 7.3      Albumin 4.0      Total Bilirubin 0.5      Alkaline Phosphatase 86      AST 16      ALT 11      eGFR >60      Anion Gap 9     TROPONIN I - Abnormal    Troponin I 0.027 (*)    B-TYPE NATRIURETIC PEPTIDE    BNP <10     PROTIME-INR    Prothrombin Time 11.2      INR 1.0     APTT    aPTT 29.2     LIPASE   LIPASE    Lipase 19          All Lab Results:  Results for orders placed or performed during the hospital encounter of 12/26/24   EKG 12-lead    Collection Time: 12/26/24  8:12 AM   Result Value Ref Range    QRS Duration 126 ms    [facility] QTC " Calculation 449 ms   CBC auto differential    Collection Time: 12/26/24  8:37 AM   Result Value Ref Range    WBC 9.98 3.90 - 12.70 K/uL    RBC 4.83 4.60 - 6.20 M/uL    Hemoglobin 15.4 14.0 - 18.0 g/dL    Hematocrit 46.0 40.0 - 54.0 %    MCV 95 82 - 98 fL    MCH 31.9 (H) 27.0 - 31.0 pg    MCHC 33.5 32.0 - 36.0 g/dL    RDW 12.6 11.5 - 14.5 %    Platelets 238 150 - 450 K/uL    MPV 9.0 (L) 9.2 - 12.9 fL    Immature Granulocytes 0.3 0.0 - 0.5 %    Gran # (ANC) 7.8 (H) 1.8 - 7.7 K/uL    Immature Grans (Abs) 0.03 0.00 - 0.04 K/uL    Lymph # 1.1 1.0 - 4.8 K/uL    Mono # 0.8 0.3 - 1.0 K/uL    Eos # 0.2 0.0 - 0.5 K/uL    Baso # 0.05 0.00 - 0.20 K/uL    nRBC 0 0 /100 WBC    Gran % 78.6 (H) 38.0 - 73.0 %    Lymph % 10.8 (L) 18.0 - 48.0 %    Mono % 7.6 4.0 - 15.0 %    Eosinophil % 2.2 0.0 - 8.0 %    Basophil % 0.5 0.0 - 1.9 %    Differential Method Automated    Comprehensive metabolic panel    Collection Time: 12/26/24  8:37 AM   Result Value Ref Range    Sodium 139 136 - 145 mmol/L    Potassium 4.0 3.5 - 5.1 mmol/L    Chloride 99 95 - 110 mmol/L    CO2 31 (H) 23 - 29 mmol/L    Glucose 119 (H) 70 - 110 mg/dL    BUN 10 8 - 23 mg/dL    Creatinine 0.8 0.5 - 1.4 mg/dL    Calcium 9.7 8.7 - 10.5 mg/dL    Total Protein 7.3 6.0 - 8.4 g/dL    Albumin 4.0 3.5 - 5.2 g/dL    Total Bilirubin 0.5 0.1 - 1.0 mg/dL    Alkaline Phosphatase 86 40 - 150 U/L    AST 16 10 - 40 U/L    ALT 11 10 - 44 U/L    eGFR >60 >60 mL/min/1.73 m^2    Anion Gap 9 8 - 16 mmol/L   Troponin I #1    Collection Time: 12/26/24  8:37 AM   Result Value Ref Range    Troponin I 0.027 (H) 0.000 - 0.026 ng/mL   BNP    Collection Time: 12/26/24  8:37 AM   Result Value Ref Range    BNP <10 0 - 99 pg/mL   Protime-INR    Collection Time: 12/26/24  8:37 AM   Result Value Ref Range    Prothrombin Time 11.2 9.0 - 12.5 sec    INR 1.0 0.8 - 1.2   APTT    Collection Time: 12/26/24  8:37 AM   Result Value Ref Range    aPTT 29.2 21.0 - 32.0 sec   Lipase    Collection Time: 12/26/24  8:37  AM   Result Value Ref Range    Lipase 19 4 - 60 U/L   EKG 12-lead    Collection Time: 12/26/24  9:34 AM   Result Value Ref Range    QRS Duration 126 ms    [facility] QTC Calculation 459 ms   Troponin I #2    Collection Time: 12/26/24 11:19 AM   Result Value Ref Range    Troponin I 0.009 0.000 - 0.026 ng/mL   Echo    Collection Time: 12/26/24 12:24 PM   Result Value Ref Range    BSA 1.88 m2    LA WIDTH 2.4 cm    RA Width 2.9 cm    LVOT stroke volume 88.9 cm3    LVIDd 4.9 3.5 - 6.0 cm    LV Systolic Volume 35.13 mL    LV Systolic Volume Index 18.6 mL/m2    LVIDs 3.0 2.1 - 4.0 cm    LV Diastolic Volume 112.91 mL    LV Diastolic Volume Index 59.74 mL/m2    Left Ventricular End Systolic Volume by Teichholz Method 35.13 mL    Left Ventricular End Diastolic Volume by Teichholz Method 112.91 mL    IVS 0.8 0.6 - 1.1 cm    LVOT diameter 2.2 cm    LVOT area 3.8 cm2    FS 38.8 28 - 44 %    Left Ventricle Relative Wall Thickness 0.37 cm    PW 0.9 0.6 - 1.1 cm    LV mass 141.9 g    LV Mass Index 75.1 g/m2    MV Peak E Maury 0.74 m/s    TDI LATERAL 0.14 m/s    TDI SEPTAL 0.11 m/s    E/E' ratio 5.92 m/s    MV Peak A Maury 0.79 m/s    TR Max Maury 2.04 m/s    E/A ratio 0.94     IVRT 88.49 msec    E wave deceleration time 360.15 msec    LV SEPTAL E/E' RATIO 6.73 m/s    SHELLEY 15.9 mL/m2    LV LATERAL E/E' RATIO 5.29 m/s    LA Vol 29.98 cm3    LVOT peak maury 1.3 m/s    Left Ventricular Outflow Tract Mean Velocity 0.92 cm/s    Left Ventricular Outflow Tract Mean Gradient 3.96 mmHg    RVOT peak VTI 14.9 cm    LA size 2.85 cm    Left Atrium Minor Axis 4.90 cm    Left Atrium Major Axis 5.44 cm    RA Major Axis 4.20 cm    AV mean gradient 8.4 mmHg    AV peak gradient 11.6 mmHg    Ao peak maury 1.7 m/s    Ao VTI 33.5 cm    LVOT peak VTI 23.4 cm    AV valve area 2.7 cm²    AV Velocity Ratio 0.76     AV index (prosthetic) 0.70     TERRENCE by Velocity Ratio 2.9 cm²    MV stenosis pressure 1/2 time 104.44 ms    MV valve area p 1/2 method 2.11 cm2    Triscuspid Valve  Regurgitation Peak Gradient 17 mmHg    PV mean gradient 2 mmHg    RVOT peak collins 0.91 m/s    Ao root annulus 3.47 cm    STJ 3.61 cm    Ascending aorta 3.77 cm    IVC diameter 1.46 cm    Mean e' 0.13 m/s    ZLVIDS -0.66     ZLVIDD -0.77     TV resting pulmonary artery pressure 20 mmHg    RV TB RVSP 5 mmHg    Est. RA pres 3 mmHg   APTT    Collection Time: 12/26/24  3:46 PM   Result Value Ref Range    aPTT 28.8 21.0 - 32.0 sec       Imaging Results:  Imaging Results              CT Abdomen Pelvis With IV Contrast NO Oral Contrast (Final result)  Result time 12/26/24 11:06:07      Final result by Abbe Grimm MD (12/26/24 11:06:07)                   Impression:      Segmental thickening gastric antrum with submucosal edema and mucosal enhancement.  Question antral gastritis.  Follow-up after appropriate treatment is advised to document resolution and exclude gastric mass.    No acute findings elsewhere..    All CT scans at this facility are performed  using dose modulation techniques as appropriate to performed exam including the following:  automated exposure control; adjustment of mA and/or kV according to the patients size (this includes techniques or standardized protocols for targeted exams where dose is matched to indication/reason for exam: i.e. extremities or head);  iterative reconstruction technique.      Electronically signed by: Abbe Grimm MD  Date:    12/26/2024  Time:    11:06               Narrative:    EXAMINATION:  CT ABDOMEN PELVIS WITH IV CONTRAST    CLINICAL HISTORY:  Epigastric pain;    TECHNIQUE:  Axial CT images were obtained of the abdomen and pelvis following IV contrast administration and without oral contrast.  Sagittal and coronal reformats obtained.    COMPARISON:  None.    FINDINGS:  ABDOMEN:    - Lung bases: Lungs bases are clear.    - Liver: The liver appears normal.    - Gallbladder: No calcified gallstones.    - Bile Ducts: No evidence of intra or extra hepatic biliary ductal  dilation.    - Spleen: The spleen appears normal.    - Kidneys: Kidneys appear normal.    - Adrenals: Normal adrenals.    - Pancreas: Pancreas appears normal.    - Bowel: Nonobstructed.  There is segmental thickening of the gastric antrum with increased mucosal enhancement.  Submucosal low-density edema.  No surrounding inflammatory change.  Mild diverticulosis left colon    - Retroperitoneum:  Unremarkable.    - Vascular: No abdominal aortic aneurysm. Advanced calcified plaque throughout the vasculature.  Moderate stenosis proximal segment SMA.    - Abdominal wall:  Unremarkable.    PELVIS:    - Bladder: Normal.    - : Enlarged prostate.    BONES:  No acute osseous abnormality and no significant arthritic changes.                                       X-Ray Chest AP Portable (Final result)  Result time 12/26/24 08:41:31      Final result by Willie Hodge MD (12/26/24 08:41:31)                   Impression:      1.  Negative for acute process involving the chest.    2.  Stable findings as noted above.      Electronically signed by: Willie Hodge MD  Date:    12/26/2024  Time:    08:41               Narrative:    EXAMINATION:  XR CHEST AP PORTABLE    CLINICAL HISTORY:  Chest Pain;    COMPARISON:  Studies dating back to November 6, 2018    FINDINGS:  EKG leads overlie the chest, as does oxygen tubing.  The lungs are clear. The cardiac silhouette size is normal. The trachea is midline and the mediastinal width is normal. Negative for focal infiltrate, effusion or pneumothorax. Pulmonary vasculature is normal. Negative for osseous abnormalities. Convex right curvature of the lower thoracic spine.  Aortic arch calcifications.  Age-appropriate degenerative changes of the shoulder girdles.  Hyperinflation.                                       The EKGs were ordered, reviewed, and independently interpreted by the ED provider.  Interpretation time: 08:12  Rate: 78 BPM  Rhythm: normal sinus rhythm  Interpretation: Right  bundle branch block. No STEMI.    Interpretation time: 9:34  Rate: 76 BPM  Rhythm: normal sinus rhythm  Interpretation: Right bundle branch block. No STEMI.                 The Emergency Provider reviewed the vital signs and test results, which are outlined above.     ED Discussion     10:13 AM: Re-evaluated pt.  I have discussed test results, shared treatment plan, and the need for admission with patient/family at bedside. Pt/family express understanding at this time and agree with all information. All questions answered. Pt/family member(s) have no further questions or concerns at this time. Pt is ready for admit.       Medical Decision Making  DDX: 1. ACS 2. PE 3. Pancreatitis 4. Gastritis 5. COPD exacerbation    70 y/o male hx of copd, tobacco abuse, chronic resp failure, on 4 liters NC at home, known RBBB on ECG, had outpatient stress test done on 12/8 that was abnormal ( scanned copy put in chart) and is scheduled for angiogram in early January.  Patient presents with CP/Pressure, worsening dyspnea, fluctuating in intensity over past 2 days but worsening this morning.  Trop is mildly elevated .027, CXR unchanged from baseline, has received full dose aspirin, nitro paste, morphine, still having ongoing pain, heparin ordered, but feel he needs to goto cath lab, ECG appears unchanged with chronic RBBB. Dr. Mcgill consulted. ECG repeated with additional morphine admin for continued discomfort. Admission to obs. CT abd/pelvis ordered per Dr. Mcgill.     Amount and/or Complexity of Data Reviewed  Independent Historian: spouse  External Data Reviewed: ECG and notes.     Details: Reviewed previous outpatient stress test done on 12/8 that was abnormal. Copy of the physical report added to Pt's chart.   Labs: ordered. Decision-making details documented in ED Course.     Details: Wbc normal, cmp normal, bnp normal, trop mildly elevated  Radiology: ordered. Decision-making details documented in ED Course.     Details:  No acute findings  ECG/medicine tests: ordered and independent interpretation performed. Decision-making details documented in ED Course.     Details: No acute ischemic changes, chronic RBBB  Discussion of management or test interpretation with external provider(s): 9:38 AM: Discussed pt's case with Dr. Armand Mcgill MD (Cardiology) who is in agreement with repeating the ECG and administering more morphine for pain.     10:00 AM: Discussed case with Jayde Brink NP (Hospital Medicine) who agrees with current care and management of pt and accepts admission.   Admitting Service: Hospital Medicine  Admitting Physician: Dr. Chacon  Admit to: Obs/Tele    10:10 AM - Dr. Mcgill would like CT abd/pelvis completed and I have placed the order.        Risk  OTC drugs.  Prescription drug management.  Decision regarding hospitalization.  Diagnosis or treatment significantly limited by social determinants of health.                ED Medication(s):  Medications   nitroGLYCERIN 2% TD oint ointment 1 inch (1 inch Topical (Top) Given 12/26/24 0842)   ondansetron injection 4 mg (4 mg Intravenous Given 12/26/24 0842)   morphine injection 2 mg (2 mg Intravenous Given 12/26/24 0842)   albuterol-ipratropium 2.5 mg-0.5 mg/3 mL nebulizer solution 3 mL (3 mLs Nebulization Given 12/26/24 0849)   methylPREDNISolone sodium succinate injection 125 mg (125 mg Intravenous Given 12/26/24 0843)   heparin 25,000 units in dextrose 5% (100 units/ml) IV bolus from bag LOW INTENSITY nomogram - [facility] (4,000 Units Intravenous Bolus from Bag 12/26/24 0943)   morphine injection 4 mg (4 mg Intravenous Given 12/26/24 0941)   iohexoL (OMNIPAQUE 350) injection 100 mL (100 mLs Intravenous Given 12/26/24 1042)       Discharge Medication List as of 12/26/2024  4:22 PM        START taking these medications    Details   pantoprazole (PROTONIX) 40 MG tablet Take 1 tablet (40 mg total) by mouth 2 (two) times daily. for 14 days, Starting Thu 12/26/2024,  Until Thu 1/9/2025, Normal      sucralfate (CARAFATE) 1 gram tablet Take 1 tablet (1 g total) by mouth 4 (four) times daily before meals and nightly. for 7 days, Starting Thu 12/26/2024, Until Thu 1/2/2025, Normal              Follow-up Information       Outpatient EGD 12/27/24 Follow up.                                 Scribe Attestation:   Scribe #1: I performed the above scribed service and the documentation accurately describes the services I performed. I attest to the accuracy of the note.     Attending:   Physician Attestation Statement for Scribe #1: I, Randi Hirsch MD, personally performed the services described in this documentation, as scribed by Pam Edward, in my presence, and it is both accurate and complete.           Clinical Impression       ICD-10-CM ICD-9-CM   1. Unstable angina  I20.0 411.1   2. Chest pain  R07.9 786.50   3. Chronic respiratory failure with hypoxia, on home O2 therapy  J96.11 518.83    Z99.81 799.02     V46.2   4. COPD, very severe  J44.9 496   5. Chronic hypertension  I10 401.9   6. Acute exacerbation of chronic obstructive pulmonary disease (COPD)  J44.1 491.21   7. Abnormal CT of the abdomen  R93.5 793.6       Disposition:   Disposition: Placed in Observation  Condition: Serious       Randi Hirsch MD  12/26/24 1954

## 2024-12-26 NOTE — SUBJECTIVE & OBJECTIVE
Past Medical History:   Diagnosis Date    COPD (chronic obstructive pulmonary disease)     Hearing impairment        Past Surgical History:   Procedure Laterality Date    COLONOSCOPY N/A 7/12/2017    Procedure: COLONOSCOPY;  Surgeon: Salvador Lambert MD;  Location: [facility];  Service: Endoscopy;  Laterality: N/A;    INNER EAR SURGERY Bilateral     LEG SURGERY Right        Review of patient's allergies indicates:  No Known Allergies    No current facility-administered medications on file prior to encounter.     Current Outpatient Medications on File Prior to Encounter   Medication Sig    albuterol (ACCUNEB) 0.63 mg/3 mL Nebu USE ONE VIAL IN NEBULIZER EVERY 4 HOURS AS NEEDED    albuterol (PROVENTIL/VENTOLIN HFA) 90 mcg/actuation inhaler Inhale 2 puffs into the lungs every 4 (four) hours as needed for Wheezing.    ascorbic acid, vitamin C, (VITAMIN C) 1000 MG tablet Take 1,000 mg by mouth once daily.    aspirin (ECOTRIN) 81 MG EC tablet Take 1 tablet by mouth every morning.    b complex vitamins tablet Take 1 tablet by mouth once daily.    cholecalciferol, vitamin D3, (VITAMIN D3) 400 unit Tab Take 2 tablets (800 Units total) by mouth once daily.    folic acid (FOLVITE) 800 MCG Tab Take 800 mcg by mouth once daily.    guaifenesin/pseudoephedrne HCl (MUCINEX D ORAL) Take 1,200 mg by mouth once daily.    roflumilast (DALIRESP) 500 mcg Tab Take 1 tablet (500 mcg total) by mouth once daily.    sodium chloride for inhalation (SODIUM CHLORIDE 0.9%) 0.9 % nebulizer solution Inhale 3 mLs into the lungs daily as needed.    TRELEGY ELLIPTA 200-62.5-25 mcg inhaler Inhale 1 puff into the lungs once daily.    zinc gluconate 50 mg tablet Take 50 mg by mouth once daily.    OXYGEN-AIR DELIVERY SYSTEMS MISC 4 L/min by Misc.(Non-Drug; Combo Route) route.    sodium chloride (OCEAN) 0.65 % nasal spray 1 spray by Nasal route as needed.    [DISCONTINUED] azithromycin (ZITHROMAX) 500 MG tablet Take 0.5 tablets (250 mg total) by mouth  once daily.    [DISCONTINUED] buPROPion (WELLBUTRIN) 100 MG tablet Take 1 tablet (100 mg total) by mouth 2 (two) times daily.    [DISCONTINUED] cetirizine (ZYRTEC) 5 MG tablet Take 1 tablet (5 mg total) by mouth once daily.    [DISCONTINUED] citalopram (CELEXA) 40 MG tablet TAKE 1 TABLET EVERY DAY    [DISCONTINUED] dextromethorphan-guaifenesin  mg/5 ml (ROBITUSSIN-DM)  mg/5 mL liquid     [DISCONTINUED] fluticasone propionate (FLONASE) 50 mcg/actuation nasal spray 2 sprays (100 mcg total) by Each Nostril route 2 (two) times daily.    [DISCONTINUED] fluticasone-salmeterol diskus inhaler 500-50 mcg Inhale into the lungs.    [DISCONTINUED] guaiFENesin (MUCINEX) 600 mg 12 hr tablet Take 2 tablets (1,200 mg total) by mouth 2 (two) times daily.    [DISCONTINUED] umeclidinium-vilanterol (ANORO ELLIPTA) 62.5-25 mcg/actuation DsDv Inhale 1 puff into the lungs once daily. Controller     Family History       Problem Relation (Age of Onset)    Cancer Mother, Father    Colon cancer Maternal Uncle    Heart failure Mother, Father          Tobacco Use    Smoking status: Former     Current packs/day: 0.00     Average packs/day: 0.1 packs/day for 47.0 years (4.7 ttl pk-yrs)     Types: Vaping with nicotine, Cigarettes     Start date: 10/1971     Quit date: 10/2018     Years since quittin.2    Smokeless tobacco: Never   Substance and Sexual Activity    Alcohol use: Yes     Alcohol/week: 12.0 standard drinks of alcohol     Types: 12 Cans of beer per week    Drug use: No    Sexual activity: Not on file     Review of Systems   Cardiovascular:  Negative for dyspnea on exertion, palpitations and syncope.   Gastrointestinal:  Positive for abdominal pain.   Genitourinary: Negative.    Neurological: Negative.      Objective:     Vital Signs (Most Recent):  Temp: 98.1 °F (36.7 °C) (24 0802)  Pulse: 80 (24 0945)  Resp: 19 (24 0945)  BP: 122/70 (24 0945)  SpO2: 98 % (24 0945) Vital Signs (24h  "Range):  Temp:  [98.1 °F (36.7 °C)] 98.1 °F (36.7 °C)  Pulse:  [71-84] 80  Resp:  [17-22] 19  SpO2:  [98 %-100 %] 98 %  BP: (121-151)/(70-81) 122/70     Weight: 71.7 kg (158 lb)  Body mass index is 22.67 kg/m².    SpO2: 98 %       No intake or output data in the 24 hours ending 12/26/24 1236    Lines/Drains/Airways       Peripheral Intravenous Line  Duration                  Peripheral IV - Single Lumen 12/26/24 0836 20 G Anterior;Distal;Right Upper Arm <1 day         Peripheral IV - Single Lumen 12/26/24 0930 18 G Anterior;Distal;Left Upper Arm <1 day                     Physical Exam  Vitals reviewed.   Constitutional:       Appearance: He is well-developed.   Neck:      Vascular: No carotid bruit.   Cardiovascular:      Rate and Rhythm: Normal rate and regular rhythm.      Pulses: Intact distal pulses.      Heart sounds: Normal heart sounds. No murmur heard.  Pulmonary:      Breath sounds: Normal breath sounds.   Abdominal:      Tenderness: There is abdominal tenderness.   Neurological:      Mental Status: He is oriented to person, place, and time.          Significant Labs: Troponin No results for input(s): "TROPONINIHS" in the last 48 hours., All pertinent lab results from the last 24 hours have been reviewed., and   Recent Lab Results         12/26/24  1224   12/26/24  1119   12/26/24  0837        Albumin     4.0       ALP     86       ALT     11       Anion Gap     9       Ao root annulus 3.47           Ascending aorta 3.77           Ao peak collins 1.7           Ao VTI 33.5           PTT     29.2  Comment: Refer to local heparin nomogram for intensity/dose specific   therapeutic   range.         AST     16       AV valve area 2.7           TERRENCE by Velocity Ratio 2.9           AV mean gradient 8.4           AV index (prosthetic) 0.70           AV peak gradient 11.6           AV Velocity Ratio 0.76           Baso #     0.05       Basophil %     0.5       BILIRUBIN TOTAL     0.5  Comment: For infants and newborns, " interpretation of results should be based  on gestational age, weight and in agreement with clinical  observations.    Premature Infant recommended reference ranges:  Up to 24 hours.............<8.0 mg/dL  Up to 48 hours............<12.0 mg/dL  3-5 days..................<15.0 mg/dL  6-29 days.................<15.0 mg/dL         BNP     <10  Comment: Values of less than 100 pg/ml are consistent with non-CHF populations.       BSA 1.88           BUN     10       Calcium     9.7       Chloride     99       CO2     31       Creatinine     0.8       Left Ventricle Relative Wall Thickness 0.37           Differential Method     Automated       E/A ratio 0.94           E/E' ratio 5.92           eGFR     >60       Eos #     0.2       Eos %     2.2       E wave deceleration time 360.15           FS 38.8           Glucose     119       Gran # (ANC)     7.8       Gran %     78.6       Hematocrit     46.0       Hemoglobin     15.4       Immature Grans (Abs)     0.03  Comment: Mild elevation in immature granulocytes is non specific and   can be seen in a variety of conditions including stress response,   acute inflammation, trauma and pregnancy. Correlation with other   laboratory and clinical findings is essential.         Immature Granulocytes     0.3       INR     1.0  Comment: Coumadin Therapy:  2.0 - 3.0 for INR for all indicators except mechanical heart valves  and antiphospholipid syndromes which should use 2.5 - 3.5.         IVC diameter 1.46           IVRT 88.49           IVSd 0.8           LA WIDTH 2.4           Left Atrium Major Axis 5.44           Left Atrium Minor Axis 4.90           LA size 2.85           LA Vol 29.98           LA vol index 15.9           LVOT area 3.8           Lipase     19       LV LATERAL E/E' RATIO 5.29           LV SEPTAL E/E' RATIO 6.73           LV EDV .91           LV Diastolic Volume Index 59.74           Left Ventricular End Diastolic Volume by Teichholz Method 112.91           Left  Ventricular End Systolic Volume by Teichholz Method 35.13           LVIDd 4.9           LVIDs 3.0           LV mass 141.9           LV Mass Index 75.1           Left Ventricular Outflow Tract Mean Gradient 3.96           Left Ventricular Outflow Tract Mean Velocity 0.92           LVOT diameter 2.2           LVOT peak maury 1.3           LVOT stroke volume 88.9           LVOT peak VTI 23.4           LV ESV BP 35.13           LV Systolic Volume Index 18.6           Lymph #     1.1       Lymph %     10.8       MCH     31.9       MCHC     33.5       MCV     95       Mean e' 0.13           Mono #     0.8       Mono %     7.6       MPV     9.0       MV valve area p 1/2 method 2.11           MV Peak A Maury 0.79           MV Peak E Maury 0.74           MV stenosis pressure 1/2 time 104.44           nRBC     0       Platelet Count     238       Potassium     4.0       PROTEIN TOTAL     7.3       PT     11.2       PV mean gradient 2           PW 0.9           RA Major Axis 4.20           Est. RA pres 3           RA Width 2.9           RBC     4.83       RDW     12.6       RV TB RVSP 5           RVOT peak maury 0.91           RVOT peak VTI 14.9           Sodium     139       STJ 3.61           TDI SEPTAL 0.11           TDI LATERAL 0.14           Triscuspid Valve Regurgitation Peak Gradient 17           TR Max Maury 2.04           Troponin I   0.009  Comment: The reference interval for Troponin I represents the 99th percentile   cutoff   for our facility and is consistent with 3rd generation assay   performance.     0.027  Comment: The reference interval for Troponin I represents the 99th percentile   cutoff   for our facility and is consistent with 3rd generation assay   performance.         TV resting pulmonary artery pressure 20           WBC     9.98       ZLVIDD -0.77           ZLVIDS -0.66                   Significant Imaging: Echocardiogram: Transthoracic echo (TTE) complete (Cupid Only):   Results for orders placed or performed  during the hospital encounter of 12/26/24   Echo   Result Value Ref Range    BSA 1.88 m2    LA WIDTH 2.4 cm    RA Width 2.9 cm    LVOT stroke volume 88.9 cm3    LVIDd 4.9 3.5 - 6.0 cm    LV Systolic Volume 35.13 mL    LV Systolic Volume Index 18.6 mL/m2    LVIDs 3.0 2.1 - 4.0 cm    LV Diastolic Volume 112.91 mL    LV Diastolic Volume Index 59.74 mL/m2    Left Ventricular End Systolic Volume by Teichholz Method 35.13 mL    Left Ventricular End Diastolic Volume by Teichholz Method 112.91 mL    IVS 0.8 0.6 - 1.1 cm    LVOT diameter 2.2 cm    LVOT area 3.8 cm2    FS 38.8 28 - 44 %    Left Ventricle Relative Wall Thickness 0.37 cm    PW 0.9 0.6 - 1.1 cm    LV mass 141.9 g    LV Mass Index 75.1 g/m2    MV Peak E Maury 0.74 m/s    TDI LATERAL 0.14 m/s    TDI SEPTAL 0.11 m/s    E/E' ratio 5.92 m/s    MV Peak A Maury 0.79 m/s    TR Max Maury 2.04 m/s    E/A ratio 0.94     IVRT 88.49 msec    E wave deceleration time 360.15 msec    LV SEPTAL E/E' RATIO 6.73 m/s    SHELLEY 15.9 mL/m2    LV LATERAL E/E' RATIO 5.29 m/s    LA Vol 29.98 cm3    LVOT peak maury 1.3 m/s    Left Ventricular Outflow Tract Mean Velocity 0.92 cm/s    Left Ventricular Outflow Tract Mean Gradient 3.96 mmHg    RVOT peak VTI 14.9 cm    LA size 2.85 cm    Left Atrium Minor Axis 4.90 cm    Left Atrium Major Axis 5.44 cm    RA Major Axis 4.20 cm    AV mean gradient 8.4 mmHg    AV peak gradient 11.6 mmHg    Ao peak maury 1.7 m/s    Ao VTI 33.5 cm    LVOT peak VTI 23.4 cm    AV valve area 2.7 cm²    AV Velocity Ratio 0.76     AV index (prosthetic) 0.70     TERRENCE by Velocity Ratio 2.9 cm²    MV stenosis pressure 1/2 time 104.44 ms    MV valve area p 1/2 method 2.11 cm2    Triscuspid Valve Regurgitation Peak Gradient 17 mmHg    PV mean gradient 2 mmHg    RVOT peak maury 0.91 m/s    Ao root annulus 3.47 cm    STJ 3.61 cm    Ascending aorta 3.77 cm    IVC diameter 1.46 cm    Mean e' 0.13 m/s    ZLVIDS -0.66     ZLVIDD -0.77     TV resting pulmonary artery pressure 20 mmHg    RV TB  RVSP 5 mmHg    Est. RA pres 3 mmHg    Narrative      Left Ventricle: The left ventricle is normal in size. Normal wall   thickness. There is normal systolic function with a visually estimated   ejection fraction of 55 - 60%. There is normal diastolic function.    Right Ventricle: Normal right ventricular cavity size. Wall thickness   is normal. Systolic function is normal.    Tricuspid Valve: There is mild regurgitation.    Pulmonary Artery: The estimated pulmonary artery systolic pressure is   20 mmHg.    IVC/SVC: Normal venous pressure at 3 mmHg.      and

## 2024-12-26 NOTE — DISCHARGE INSTRUCTIONS
You will be discharged on protonix and Carafate for your stomach discomfort. You have an EGD scheduled tomorrow to further evaluation the stomach thickening.   If it is gastritis, it may take a few days to experience relief with medications.

## 2024-12-26 NOTE — ASSESSMENT & PLAN NOTE
Atypical?  However given abnormal stress test will place heparin infusion with serial troponins.  Cardiology will plan to see patient echocardiogram pending.  There is a consideration of symptoms being related to CT abdomen findings.  See plan below

## 2024-12-26 NOTE — HPI
Johnny Perales is a 69 year old male with history of COPD, Tobacco abuse, chronic respirator failure requiring 4L nasal cannula, chronic RBBB who was is scheduled have an outpatient C in January after having abnormal stress test results. Patient is alert and oriented but extremely [place] so sister will be providing history. She tells me patient complained of generalized epigastric discomfort radiating to mid chest. He has never experienced these symptoms before so she brought him to the ED for further evaluation.  He admits to some dyspnea over the past few days with some associated nausea.  Patient denies diaphoresis, fever, chills and change in stool pattern.     In ED, EKG unchanged.  Chest x-ray unremarkable.  His initial troponin is 0.027.  Discussed with cardiology who recommends heparin infusion and CT abdomen/pelvis.  CT demonstrated gastric antrum segmental thickening with submucosal edema, raising the concern for gastritis versus gastroenteritis.  Case discussed with Dr. Mercer, GI, who recommended PPI and outpatient EGD.  Hospital medicine consulted for admission.

## 2024-12-26 NOTE — H&P
[facility]  History & Physical    Patient Name: Johnny Perales  MRN: [id number]  Patient Class: OP- Observation  Admission Date: 12/26/2024  Attending Physician: Sujit Chacon MD   Primary Care Provider: John Yap MD         Patient information was obtained from patient and ER records.     Subjective:     Principal Problem:Chest pain    Chief Complaint:   Chief Complaint   Patient presents with    Chest Pain     Episodic x 2 days, hx of COPD, home o2 at 4lpm, +cough        HPI: Johnny Perales is a 69 year old male with history of COPD, Tobacco abuse, chronic respirator failure requiring 4L nasal cannula, chronic RBBB who was is scheduled have an outpatient [facility] in January after having abnormal stress test results. Patient is alert and oriented but extremely [place] so sister will be providing history. She tells me patient complained of generalized epigastric discomfort radiating to mid chest. He has never experienced these symptoms before so she brought him to the ED for further evaluation.  He admits to some dyspnea over the past few days with some associated nausea.  Patient denies diaphoresis, fever, chills and change in stool pattern.     In ED, EKG unchanged.  Chest x-ray unremarkable.  His initial troponin is 0.027.  Discussed with cardiology who recommends heparin infusion and CT abdomen/pelvis.  CT demonstrated gastric antrum segmental thickening with submucosal edema, raising the concern for gastritis versus gastroenteritis.  Case discussed with Dr. Mercer, GI, who recommended PPI and outpatient EGD.  Hospital medicine consulted for admission.      Past Medical History:   Diagnosis Date    COPD (chronic obstructive pulmonary disease)     Hearing impairment        Past Surgical History:   Procedure Laterality Date    COLONOSCOPY N/A 7/12/2017    Procedure: COLONOSCOPY;  Surgeon: Salvador Lambert MD;  Location: [facility];  Service: Endoscopy;  Laterality: N/A;     INNER EAR SURGERY Bilateral     LEG SURGERY Right        Review of patient's allergies indicates:  No Known Allergies    No current facility-administered medications on file prior to encounter.     Current Outpatient Medications on File Prior to Encounter   Medication Sig    albuterol (ACCUNEB) 0.63 mg/3 mL Nebu USE ONE VIAL IN NEBULIZER EVERY 4 HOURS AS NEEDED    albuterol (PROVENTIL/VENTOLIN HFA) 90 mcg/actuation inhaler Inhale 2 puffs into the lungs every 4 (four) hours as needed for Wheezing.    ascorbic acid, vitamin C, (VITAMIN C) 1000 MG tablet Take 1,000 mg by mouth once daily.    aspirin (ECOTRIN) 81 MG EC tablet Take 1 tablet by mouth every morning.    b complex vitamins tablet Take 1 tablet by mouth once daily.    cholecalciferol, vitamin D3, (VITAMIN D3) 400 unit Tab Take 2 tablets (800 Units total) by mouth once daily.    folic acid (FOLVITE) 800 MCG Tab Take 800 mcg by mouth once daily.    guaifenesin/pseudoephedrne HCl (MUCINEX D ORAL) Take 1,200 mg by mouth once daily.    roflumilast (DALIRESP) 500 mcg Tab Take 1 tablet (500 mcg total) by mouth once daily.    sodium chloride for inhalation (SODIUM CHLORIDE 0.9%) 0.9 % nebulizer solution Inhale 3 mLs into the lungs daily as needed.    TRELEGY ELLIPTA 200-62.5-25 mcg inhaler Inhale 1 puff into the lungs once daily.    zinc gluconate 50 mg tablet Take 50 mg by mouth once daily.    OXYGEN-AIR DELIVERY SYSTEMS MISC 4 L/min by Misc.(Non-Drug; Combo Route) route.    sodium chloride (OCEAN) 0.65 % nasal spray 1 spray by Nasal route as needed.    [DISCONTINUED] azithromycin (ZITHROMAX) 500 MG tablet Take 0.5 tablets (250 mg total) by mouth once daily.    [DISCONTINUED] buPROPion (WELLBUTRIN) 100 MG tablet Take 1 tablet (100 mg total) by mouth 2 (two) times daily.    [DISCONTINUED] cetirizine (ZYRTEC) 5 MG tablet Take 1 tablet (5 mg total) by mouth once daily.    [DISCONTINUED] citalopram (CELEXA) 40 MG tablet TAKE 1 TABLET EVERY DAY    [DISCONTINUED]  dextromethorphan-guaifenesin  mg/5 ml (ROBITUSSIN-DM)  mg/5 mL liquid     [DISCONTINUED] fluticasone propionate (FLONASE) 50 mcg/actuation nasal spray 2 sprays (100 mcg total) by Each Nostril route 2 (two) times daily.    [DISCONTINUED] fluticasone-salmeterol diskus inhaler 500-50 mcg Inhale into the lungs.    [DISCONTINUED] guaiFENesin (MUCINEX) 600 mg 12 hr tablet Take 2 tablets (1,200 mg total) by mouth 2 (two) times daily.    [DISCONTINUED] umeclidinium-vilanterol (ANORO ELLIPTA) 62.5-25 mcg/actuation DsDv Inhale 1 puff into the lungs once daily. Controller     Family History       Problem Relation (Age of Onset)    Cancer Mother, Father    Colon cancer Maternal Uncle    Heart failure Mother, Father          Tobacco Use    Smoking status: Former     Current packs/day: 0.00     Average packs/day: 0.1 packs/day for 47.0 years (4.7 ttl pk-yrs)     Types: Vaping with nicotine, Cigarettes     Start date: 10/1971     Quit date: 10/2018     Years since quittin.2    Smokeless tobacco: Never   Substance and Sexual Activity    Alcohol use: Yes     Alcohol/week: 12.0 standard drinks of alcohol     Types: 12 Cans of beer per week    Drug use: No    Sexual activity: Not on file     Review of Systems   Constitutional:  Negative for chills, diaphoresis, fatigue and fever.   HENT:  Negative for drooling, ear pain, rhinorrhea and sore throat.    Eyes: Negative.    Respiratory:  Negative for cough, shortness of breath (chronic) and wheezing.    Cardiovascular:  Positive for chest pain. Negative for palpitations and leg swelling.   Gastrointestinal:  Positive for abdominal pain. Negative for constipation, diarrhea and nausea.   Endocrine: Negative.    Genitourinary:  Negative for dysuria, hematuria and urgency.   Musculoskeletal: Negative.    Skin:  Negative for color change and wound.   Allergic/Immunologic: Negative.    Neurological:  Negative for dizziness, syncope and speech difficulty.   Hematological:  Negative.    Psychiatric/Behavioral: Negative.       Objective:     Vital Signs (Most Recent):  Temp: 98.1 °F (36.7 °C) (12/26/24 0802)  Pulse: 80 (12/26/24 0945)  Resp: 19 (12/26/24 0945)  BP: 122/70 (12/26/24 0945)  SpO2: 98 % (12/26/24 0945) Vital Signs (24h Range):  Temp:  [98.1 °F (36.7 °C)] 98.1 °F (36.7 °C)  Pulse:  [71-84] 80  Resp:  [17-22] 19  SpO2:  [98 %-100 %] 98 %  BP: (121-151)/(70-81) 122/70     Weight: 71 kg (156 lb 8.4 oz)  Body mass index is 22.46 kg/m².     Physical Exam  Vitals and nursing note reviewed.   Constitutional:       General: He is not in acute distress.     Appearance: He is well-developed.   HENT:      Head: Normocephalic and atraumatic.   Cardiovascular:      Rate and Rhythm: Normal rate and regular rhythm.      Heart sounds: Normal heart sounds.   Pulmonary:      Effort: Pulmonary effort is normal. No respiratory distress.      Breath sounds: Normal breath sounds.      Comments: Coarse breath sounds    Abdominal:      General: Bowel sounds are normal. There is no distension.      Palpations: Abdomen is soft.      Tenderness: There is no abdominal tenderness.   Musculoskeletal:         General: Normal range of motion.      Cervical back: Normal range of motion and neck supple. No edema.   Skin:     General: Skin is dry.   Neurological:      Mental Status: He is alert and oriented to person, place, and time.                Significant Labs: All pertinent labs within the past 24 hours have been reviewed.  CBC:   Recent Labs   Lab 12/26/24  0837   WBC 9.98   HGB 15.4   HCT 46.0        CMP:   Recent Labs   Lab 12/26/24  0837      K 4.0   CL 99   CO2 31*   *   BUN 10   CREATININE 0.8   CALCIUM 9.7   PROT 7.3   ALBUMIN 4.0   BILITOT 0.5   ALKPHOS 86   AST 16   ALT 11   ANIONGAP 9       Significant Imaging:   Imaging Results              CT Abdomen Pelvis With IV Contrast NO Oral Contrast (Final result)  Result time 12/26/24 11:06:07      Final result by Abbe Grimm  MD MERARY (12/26/24 11:06:07)                   Impression:      Segmental thickening gastric antrum with submucosal edema and mucosal enhancement.  Question antral gastritis.  Follow-up after appropriate treatment is advised to document resolution and exclude gastric mass.    No acute findings elsewhere..    All CT scans at this facility are performed  using dose modulation techniques as appropriate to performed exam including the following:  automated exposure control; adjustment of mA and/or kV according to the patients size (this includes techniques or standardized protocols for targeted exams where dose is matched to indication/reason for exam: i.e. extremities or head);  iterative reconstruction technique.      Electronically signed by: Abbe Grimm MD  Date:    12/26/2024  Time:    11:06               Narrative:    EXAMINATION:  CT ABDOMEN PELVIS WITH IV CONTRAST    CLINICAL HISTORY:  Epigastric pain;    TECHNIQUE:  Axial CT images were obtained of the abdomen and pelvis following IV contrast administration and without oral contrast.  Sagittal and coronal reformats obtained.    COMPARISON:  None.    FINDINGS:  ABDOMEN:    - Lung bases: Lungs bases are clear.    - Liver: The liver appears normal.    - Gallbladder: No calcified gallstones.    - Bile Ducts: No evidence of intra or extra hepatic biliary ductal dilation.    - Spleen: The spleen appears normal.    - Kidneys: Kidneys appear normal.    - Adrenals: Normal adrenals.    - Pancreas: Pancreas appears normal.    - Bowel: Nonobstructed.  There is segmental thickening of the gastric antrum with increased mucosal enhancement.  Submucosal low-density edema.  No surrounding inflammatory change.  Mild diverticulosis left colon    - Retroperitoneum:  Unremarkable.    - Vascular: No abdominal aortic aneurysm. Advanced calcified plaque throughout the vasculature.  Moderate stenosis proximal segment SMA.    - Abdominal wall:  Unremarkable.    PELVIS:    - Bladder:  Normal.    - : Enlarged prostate.    BONES:  No acute osseous abnormality and no significant arthritic changes.                                       X-Ray Chest AP Portable (Final result)  Result time 12/26/24 08:41:31      Final result by Willie Hodge MD (12/26/24 08:41:31)                   Impression:      1.  Negative for acute process involving the chest.    2.  Stable findings as noted above.      Electronically signed by: Willie Hodge MD  Date:    12/26/2024  Time:    08:41               Narrative:    EXAMINATION:  XR CHEST AP PORTABLE    CLINICAL HISTORY:  Chest Pain;    COMPARISON:  Studies dating back to November 6, 2018    FINDINGS:  EKG leads overlie the chest, as does oxygen tubing.  The lungs are clear. The cardiac silhouette size is normal. The trachea is midline and the mediastinal width is normal. Negative for focal infiltrate, effusion or pneumothorax. Pulmonary vasculature is normal. Negative for osseous abnormalities. Convex right curvature of the lower thoracic spine.  Aortic arch calcifications.  Age-appropriate degenerative changes of the shoulder girdles.  Hyperinflation.                                      Assessment/Plan:     * Chest pain  Atypical?  However given abnormal stress test will place heparin infusion with serial troponins.  Cardiology will plan to see patient echocardiogram pending.  There is a consideration of symptoms being related to CT abdomen findings.  See plan below    Abnormal CT of the abdomen   aCT scan noted segmental thickening within the gastric antrum with submucosal edema that is concerning for gastritis but mass can not be excluded.  This was discussed with Dr. Mercer, GI, who recommends PPI and outpatient EGD.      Abnormal stress test  Noted to have abnormal stress test at a outside facility.  He has a left heart catheterization schedule early January      Chronic respiratory failure with hypoxia  Acute on chronic respiratory failure patient wears 4 L  during the day and AVAPS at night      VTE Risk Mitigation (From admission, onward)           Ordered     IP VTE HIGH RISK PATIENT  Once         12/26/24 1157     Place sequential compression device  Until discontinued         12/26/24 1157                         On 12/26/2024, patient should be placed in hospital observation services under my care in collaboration with Dr. Chacon.      Jayde Brink NP  Department of Hospital Medicine  [facility]           no

## 2024-12-26 NOTE — HOSPITAL COURSE
Patient presented to ED with complaints of upper abdominal pain (Under rib cage) radiating to midsternal chest pain. Patient had an abnormal stress test and is scheduled for [facility] early 1/2025. Patient complained of pain also radiating to back so CT abdomen pelvis ordered and this was significant for mucosal thickening of gastric antrum concerning for gastritis but gastric mass would be not excluded. Patient's initial troponin was marginally elevated, but repeat was negative. Echocardiogram showed normal EF without wall motion abnormalities. Discussed with GI, Dr. Mercer, who recommended outpatient EGD and this has been arranged for tomorrow morning. The patient's presenting symptoms were atypical and improved after PPI and Carafate. Cardiology does not recommended further intervention. Patient will discharge home with PPI and Carafate and will return tomorrow for EGD. He has remained vitally stable for discharge. He as asked to report to ED if symptoms worsen or if he develops fever.  Patient seen and examined on date of discharge and deemed suitable.

## 2024-12-26 NOTE — PHARMACY MED REC
"Admission Medication History     The home medication history was taken by Tierney Jerry.    You may go to "Admission" then "Reconcile Home Medications" tabs to review and/or act upon these items.     The home medication list has been updated by the Pharmacy department.   Please read ALL comments highlighted in yellow.   Please address this information as you see fit.    Feel free to contact us if you have any questions or require assistance.      The medications listed below were removed from the home medication list. Please reorder if appropriate:  Patient reports no longer taking the following medication(s):  ZITHROMAX 500 MG  WELLBUTRIN 100 MG  ZYRTEC 5 MG  CELEXA 40 MG  ROBITUSSIN-DM  MG/5 ML  FLONASE 50 MCG  ADVAIR DISKUS INHALER 500-50 MCG  ANORO ELLIPTA 62.5-25 MCG    Medications listed below were obtained from: Patient/family and Analytic software- Lashawn Lagunas (patient's sister at bedside_)      LAST MED REC COMPLETED:     Tierney Jerry  [id number]      Current Outpatient Medications on File Prior to Encounter   Medication Sig Dispense Refill Last Dose/Taking    albuterol (ACCUNEB) 0.63 mg/3 mL Nebu USE ONE VIAL IN NEBULIZER EVERY 4 HOURS AS NEEDED 375 mL 11 12/25/2024    albuterol (PROVENTIL/VENTOLIN HFA) 90 mcg/actuation inhaler Inhale 2 puffs into the lungs every 4 (four) hours as needed for Wheezing. 18 g 11 12/25/2024    ascorbic acid, vitamin C, (VITAMIN C) 1000 MG tablet Take 1,000 mg by mouth once daily.   12/25/2024    aspirin (ECOTRIN) 81 MG EC tablet Take 1 tablet by mouth every morning.   12/25/2024    b complex vitamins tablet Take 1 tablet by mouth once daily.   12/25/2024    cholecalciferol, vitamin D3, (VITAMIN D3) 400 unit Tab Take 2 tablets (800 Units total) by mouth once daily. 60 tablet 5 12/25/2024    folic acid (FOLVITE) 800 MCG Tab Take 800 mcg by mouth once daily.   12/25/2024    guaifenesin/pseudoephedrne HCl (MUCINEX D ORAL) Take 1,200 mg by mouth once daily. "   12/25/2024    roflumilast (DALIRESP) 500 mcg Tab Take 1 tablet (500 mcg total) by mouth once daily. 90 tablet 3 12/25/2024    sodium chloride for inhalation (SODIUM CHLORIDE 0.9%) 0.9 % nebulizer solution Inhale 3 mLs into the lungs daily as needed.   12/25/2024    TRELEGY ELLIPTA 200-62.5-25 mcg inhaler Inhale 1 puff into the lungs once daily.   12/25/2024    zinc gluconate 50 mg tablet Take 50 mg by mouth once daily.   12/25/2024    OXYGEN-AIR DELIVERY SYSTEMS MISC 4 L/min by Misc.(Non-Drug; Combo Route) route.       sodium chloride (OCEAN) 0.65 % nasal spray 1 spray by Nasal route as needed.   Unknown                             .

## 2024-12-26 NOTE — ASSESSMENT & PLAN NOTE
Noncardiac abdominal pain.    CT scan showing signs of possible gastritis.    Low risk finding abnormal stress test not available to me however report only mentions basal findings without further distal extension?  Troponins negative.    EKG no dynamic ischemia.    GI workup  Echo and a little motion abnormalities.

## 2024-12-26 NOTE — SUBJECTIVE & OBJECTIVE
Past Medical History:   Diagnosis Date    COPD (chronic obstructive pulmonary disease)     Hearing impairment        Past Surgical History:   Procedure Laterality Date    COLONOSCOPY N/A 7/12/2017    Procedure: COLONOSCOPY;  Surgeon: Salvador Lambert MD;  Location: [facility];  Service: Endoscopy;  Laterality: N/A;    INNER EAR SURGERY Bilateral     LEG SURGERY Right        Review of patient's allergies indicates:  No Known Allergies    No current facility-administered medications on file prior to encounter.     Current Outpatient Medications on File Prior to Encounter   Medication Sig    albuterol (ACCUNEB) 0.63 mg/3 mL Nebu USE ONE VIAL IN NEBULIZER EVERY 4 HOURS AS NEEDED    albuterol (PROVENTIL/VENTOLIN HFA) 90 mcg/actuation inhaler Inhale 2 puffs into the lungs every 4 (four) hours as needed for Wheezing.    ascorbic acid, vitamin C, (VITAMIN C) 1000 MG tablet Take 1,000 mg by mouth once daily.    aspirin (ECOTRIN) 81 MG EC tablet Take 1 tablet by mouth every morning.    b complex vitamins tablet Take 1 tablet by mouth once daily.    cholecalciferol, vitamin D3, (VITAMIN D3) 400 unit Tab Take 2 tablets (800 Units total) by mouth once daily.    folic acid (FOLVITE) 800 MCG Tab Take 800 mcg by mouth once daily.    guaifenesin/pseudoephedrne HCl (MUCINEX D ORAL) Take 1,200 mg by mouth once daily.    roflumilast (DALIRESP) 500 mcg Tab Take 1 tablet (500 mcg total) by mouth once daily.    sodium chloride for inhalation (SODIUM CHLORIDE 0.9%) 0.9 % nebulizer solution Inhale 3 mLs into the lungs daily as needed.    TRELEGY ELLIPTA 200-62.5-25 mcg inhaler Inhale 1 puff into the lungs once daily.    zinc gluconate 50 mg tablet Take 50 mg by mouth once daily.    OXYGEN-AIR DELIVERY SYSTEMS MISC 4 L/min by Misc.(Non-Drug; Combo Route) route.    sodium chloride (OCEAN) 0.65 % nasal spray 1 spray by Nasal route as needed.    [DISCONTINUED] azithromycin (ZITHROMAX) 500 MG tablet Take 0.5 tablets (250 mg total) by mouth  once daily.    [DISCONTINUED] buPROPion (WELLBUTRIN) 100 MG tablet Take 1 tablet (100 mg total) by mouth 2 (two) times daily.    [DISCONTINUED] cetirizine (ZYRTEC) 5 MG tablet Take 1 tablet (5 mg total) by mouth once daily.    [DISCONTINUED] citalopram (CELEXA) 40 MG tablet TAKE 1 TABLET EVERY DAY    [DISCONTINUED] dextromethorphan-guaifenesin  mg/5 ml (ROBITUSSIN-DM)  mg/5 mL liquid     [DISCONTINUED] fluticasone propionate (FLONASE) 50 mcg/actuation nasal spray 2 sprays (100 mcg total) by Each Nostril route 2 (two) times daily.    [DISCONTINUED] fluticasone-salmeterol diskus inhaler 500-50 mcg Inhale into the lungs.    [DISCONTINUED] guaiFENesin (MUCINEX) 600 mg 12 hr tablet Take 2 tablets (1,200 mg total) by mouth 2 (two) times daily.    [DISCONTINUED] umeclidinium-vilanterol (ANORO ELLIPTA) 62.5-25 mcg/actuation DsDv Inhale 1 puff into the lungs once daily. Controller     Family History       Problem Relation (Age of Onset)    Cancer Mother, Father    Colon cancer Maternal Uncle    Heart failure Mother, Father          Tobacco Use    Smoking status: Former     Current packs/day: 0.00     Average packs/day: 0.1 packs/day for 47.0 years (4.7 ttl pk-yrs)     Types: Vaping with nicotine, Cigarettes     Start date: 10/1971     Quit date: 10/2018     Years since quittin.2    Smokeless tobacco: Never   Substance and Sexual Activity    Alcohol use: Yes     Alcohol/week: 12.0 standard drinks of alcohol     Types: 12 Cans of beer per week    Drug use: No    Sexual activity: Not on file     Review of Systems   Constitutional:  Negative for chills, diaphoresis, fatigue and fever.   HENT:  Negative for drooling, ear pain, rhinorrhea and sore throat.    Eyes: Negative.    Respiratory:  Negative for cough, shortness of breath (chronic) and wheezing.    Cardiovascular:  Positive for chest pain. Negative for palpitations and leg swelling.   Gastrointestinal:  Positive for abdominal pain. Negative for  constipation, diarrhea and nausea.   Endocrine: Negative.    Genitourinary:  Negative for dysuria, hematuria and urgency.   Musculoskeletal: Negative.    Skin:  Negative for color change and wound.   Allergic/Immunologic: Negative.    Neurological:  Negative for dizziness, syncope and speech difficulty.   Hematological: Negative.    Psychiatric/Behavioral: Negative.       Objective:     Vital Signs (Most Recent):  Temp: 98.1 °F (36.7 °C) (12/26/24 0802)  Pulse: 80 (12/26/24 0945)  Resp: 19 (12/26/24 0945)  BP: 122/70 (12/26/24 0945)  SpO2: 98 % (12/26/24 0945) Vital Signs (24h Range):  Temp:  [98.1 °F (36.7 °C)] 98.1 °F (36.7 °C)  Pulse:  [71-84] 80  Resp:  [17-22] 19  SpO2:  [98 %-100 %] 98 %  BP: (121-151)/(70-81) 122/70     Weight: 71 kg (156 lb 8.4 oz)  Body mass index is 22.46 kg/m².     Physical Exam  Vitals and nursing note reviewed.   Constitutional:       General: He is not in acute distress.     Appearance: He is well-developed.   HENT:      Head: Normocephalic and atraumatic.   Cardiovascular:      Rate and Rhythm: Normal rate and regular rhythm.      Heart sounds: Normal heart sounds.   Pulmonary:      Effort: Pulmonary effort is normal. No respiratory distress.      Breath sounds: Normal breath sounds.      Comments: Coarse breath sounds    Abdominal:      General: Bowel sounds are normal. There is no distension.      Palpations: Abdomen is soft.      Tenderness: There is no abdominal tenderness.   Musculoskeletal:         General: Normal range of motion.      Cervical back: Normal range of motion and neck supple. No edema.   Skin:     General: Skin is dry.   Neurological:      Mental Status: He is alert and oriented to person, place, and time.                Significant Labs: All pertinent labs within the past 24 hours have been reviewed.  CBC:   Recent Labs   Lab 12/26/24  0837   WBC 9.98   HGB 15.4   HCT 46.0        CMP:   Recent Labs   Lab 12/26/24  0837      K 4.0   CL 99   CO2 31*    *   BUN 10   CREATININE 0.8   CALCIUM 9.7   PROT 7.3   ALBUMIN 4.0   BILITOT 0.5   ALKPHOS 86   AST 16   ALT 11   ANIONGAP 9       Significant Imaging:   Imaging Results              CT Abdomen Pelvis With IV Contrast NO Oral Contrast (Final result)  Result time 12/26/24 11:06:07      Final result by Abbe Grimm MD (12/26/24 11:06:07)                   Impression:      Segmental thickening gastric antrum with submucosal edema and mucosal enhancement.  Question antral gastritis.  Follow-up after appropriate treatment is advised to document resolution and exclude gastric mass.    No acute findings elsewhere..    All CT scans at this facility are performed  using dose modulation techniques as appropriate to performed exam including the following:  automated exposure control; adjustment of mA and/or kV according to the patients size (this includes techniques or standardized protocols for targeted exams where dose is matched to indication/reason for exam: i.e. extremities or head);  iterative reconstruction technique.      Electronically signed by: Abbe Grimm MD  Date:    12/26/2024  Time:    11:06               Narrative:    EXAMINATION:  CT ABDOMEN PELVIS WITH IV CONTRAST    CLINICAL HISTORY:  Epigastric pain;    TECHNIQUE:  Axial CT images were obtained of the abdomen and pelvis following IV contrast administration and without oral contrast.  Sagittal and coronal reformats obtained.    COMPARISON:  None.    FINDINGS:  ABDOMEN:    - Lung bases: Lungs bases are clear.    - Liver: The liver appears normal.    - Gallbladder: No calcified gallstones.    - Bile Ducts: No evidence of intra or extra hepatic biliary ductal dilation.    - Spleen: The spleen appears normal.    - Kidneys: Kidneys appear normal.    - Adrenals: Normal adrenals.    - Pancreas: Pancreas appears normal.    - Bowel: Nonobstructed.  There is segmental thickening of the gastric antrum with increased mucosal enhancement.  Submucosal  low-density edema.  No surrounding inflammatory change.  Mild diverticulosis left colon    - Retroperitoneum:  Unremarkable.    - Vascular: No abdominal aortic aneurysm. Advanced calcified plaque throughout the vasculature.  Moderate stenosis proximal segment SMA.    - Abdominal wall:  Unremarkable.    PELVIS:    - Bladder: Normal.    - : Enlarged prostate.    BONES:  No acute osseous abnormality and no significant arthritic changes.                                       X-Ray Chest AP Portable (Final result)  Result time 12/26/24 08:41:31      Final result by Willie Hodge MD (12/26/24 08:41:31)                   Impression:      1.  Negative for acute process involving the chest.    2.  Stable findings as noted above.      Electronically signed by: Willie Hodge MD  Date:    12/26/2024  Time:    08:41               Narrative:    EXAMINATION:  XR CHEST AP PORTABLE    CLINICAL HISTORY:  Chest Pain;    COMPARISON:  Studies dating back to November 6, 2018    FINDINGS:  EKG leads overlie the chest, as does oxygen tubing.  The lungs are clear. The cardiac silhouette size is normal. The trachea is midline and the mediastinal width is normal. Negative for focal infiltrate, effusion or pneumothorax. Pulmonary vasculature is normal. Negative for osseous abnormalities. Convex right curvature of the lower thoracic spine.  Aortic arch calcifications.  Age-appropriate degenerative changes of the shoulder girdles.  Hyperinflation.

## 2024-12-26 NOTE — CONSULTS
[facility]  Cardiology  Consult Note    Patient Name: Johnny Perales  MRN: [id number]  Admission Date: 12/26/2024  Hospital Length of Stay: 0 days  Code Status: Full Code   Attending Provider: Sujit Chacon MD   Consulting Provider: Armand Mcgill MD  Primary Care Physician: John Yap MD  Principal Problem:Chest pain    Patient information was obtained from patient, past medical records, and ER records.     Inpatient consult to Cardiology  Consult performed by: Armand Mcgill MD  Consult ordered by: Randi Hirsch MD  Reason for consult: Chest pain        Subjective:     Chief Complaint:  Chest pain     HPI:    69 y.o. male patient with a PMHx of COPD and hearing impairment who presents to the Emergency Department for evaluation of CP which began the day before yesterday. Symptoms periodically increase in severity, pressure and pain originating from the abdomen and radiating to the chest and back. Patient rates his pain a 7/10 at its worse. No mitigating or exacerbating factors reported. Associated sxs include nausea, diaphoresis, and SOB. Patient denies any vomiting. History of cigarette use since age 14; Pt no longer smoking. Prior Tx includes 4 doses of baby ASA given by wife PTA. Patient was not treated with NTG en route. Treated with 4.0 L of home O2 and receives albuterol breathing treatments every 4 hours. His wife reports an upcoming heart cath procedure with [facility]. No further complaints or concerns at this time.        WE WILL MY INTERVIEW OF THE PATIENT HIS PAIN WAS MIDEPIGASTRIC.  HE WAS TENDER AND REPRODUCIBLE TO EXAM.    HE STATES THIS GOES TO THE RIGHT ANTERIOR LEFT SIDE OF HIS ABDOMEN.    His pain is nonexertional.    The report of his nuclear stress test from outpatient shows basal ischemia anterior and inferior without further extension.  I do not have the available pictures of that.    We are requested a CT scan of his abdomen that  showed mucosal thickening of his gastric wall  Troponin from very minimal to negative echocardiogram without wall motion abnormalities.    Past Medical History:   Diagnosis Date    COPD (chronic obstructive pulmonary disease)     Hearing impairment        Past Surgical History:   Procedure Laterality Date    COLONOSCOPY N/A 7/12/2017    Procedure: COLONOSCOPY;  Surgeon: Salvador Lambert MD;  Location: [facility];  Service: Endoscopy;  Laterality: N/A;    INNER EAR SURGERY Bilateral     LEG SURGERY Right        Review of patient's allergies indicates:  No Known Allergies    No current facility-administered medications on file prior to encounter.     Current Outpatient Medications on File Prior to Encounter   Medication Sig    albuterol (ACCUNEB) 0.63 mg/3 mL Nebu USE ONE VIAL IN NEBULIZER EVERY 4 HOURS AS NEEDED    albuterol (PROVENTIL/VENTOLIN HFA) 90 mcg/actuation inhaler Inhale 2 puffs into the lungs every 4 (four) hours as needed for Wheezing.    ascorbic acid, vitamin C, (VITAMIN C) 1000 MG tablet Take 1,000 mg by mouth once daily.    aspirin (ECOTRIN) 81 MG EC tablet Take 1 tablet by mouth every morning.    b complex vitamins tablet Take 1 tablet by mouth once daily.    cholecalciferol, vitamin D3, (VITAMIN D3) 400 unit Tab Take 2 tablets (800 Units total) by mouth once daily.    folic acid (FOLVITE) 800 MCG Tab Take 800 mcg by mouth once daily.    guaifenesin/pseudoephedrne HCl (MUCINEX D ORAL) Take 1,200 mg by mouth once daily.    roflumilast (DALIRESP) 500 mcg Tab Take 1 tablet (500 mcg total) by mouth once daily.    sodium chloride for inhalation (SODIUM CHLORIDE 0.9%) 0.9 % nebulizer solution Inhale 3 mLs into the lungs daily as needed.    TRELEGY ELLIPTA 200-62.5-25 mcg inhaler Inhale 1 puff into the lungs once daily.    zinc gluconate 50 mg tablet Take 50 mg by mouth once daily.    OXYGEN-AIR DELIVERY SYSTEMS MISC 4 L/min by Misc.(Non-Drug; Combo Route) route.    sodium chloride (OCEAN) 0.65 % nasal  spray 1 spray by Nasal route as needed.    [DISCONTINUED] azithromycin (ZITHROMAX) 500 MG tablet Take 0.5 tablets (250 mg total) by mouth once daily.    [DISCONTINUED] buPROPion (WELLBUTRIN) 100 MG tablet Take 1 tablet (100 mg total) by mouth 2 (two) times daily.    [DISCONTINUED] cetirizine (ZYRTEC) 5 MG tablet Take 1 tablet (5 mg total) by mouth once daily.    [DISCONTINUED] citalopram (CELEXA) 40 MG tablet TAKE 1 TABLET EVERY DAY    [DISCONTINUED] dextromethorphan-guaifenesin  mg/5 ml (ROBITUSSIN-DM)  mg/5 mL liquid     [DISCONTINUED] fluticasone propionate (FLONASE) 50 mcg/actuation nasal spray 2 sprays (100 mcg total) by Each Nostril route 2 (two) times daily.    [DISCONTINUED] fluticasone-salmeterol diskus inhaler 500-50 mcg Inhale into the lungs.    [DISCONTINUED] guaiFENesin (MUCINEX) 600 mg 12 hr tablet Take 2 tablets (1,200 mg total) by mouth 2 (two) times daily.    [DISCONTINUED] umeclidinium-vilanterol (ANORO ELLIPTA) 62.5-25 mcg/actuation DsDv Inhale 1 puff into the lungs once daily. Controller     Family History       Problem Relation (Age of Onset)    Cancer Mother, Father    Colon cancer Maternal Uncle    Heart failure Mother, Father          Tobacco Use    Smoking status: Former     Current packs/day: 0.00     Average packs/day: 0.1 packs/day for 47.0 years (4.7 ttl pk-yrs)     Types: Vaping with nicotine, Cigarettes     Start date: 10/1971     Quit date: 10/2018     Years since quittin.2    Smokeless tobacco: Never   Substance and Sexual Activity    Alcohol use: Yes     Alcohol/week: 12.0 standard drinks of alcohol     Types: 12 Cans of beer per week    Drug use: No    Sexual activity: Not on file     Review of Systems   Cardiovascular:  Negative for dyspnea on exertion, palpitations and syncope.   Gastrointestinal:  Positive for abdominal pain.   Genitourinary: Negative.    Neurological: Negative.      Objective:     Vital Signs (Most Recent):  Temp: 98.1 °F (36.7 °C) (24  "0802)  Pulse: 80 (12/26/24 0945)  Resp: 19 (12/26/24 0945)  BP: 122/70 (12/26/24 0945)  SpO2: 98 % (12/26/24 0945) Vital Signs (24h Range):  Temp:  [98.1 °F (36.7 °C)] 98.1 °F (36.7 °C)  Pulse:  [71-84] 80  Resp:  [17-22] 19  SpO2:  [98 %-100 %] 98 %  BP: (121-151)/(70-81) 122/70     Weight: 71.7 kg (158 lb)  Body mass index is 22.67 kg/m².    SpO2: 98 %       No intake or output data in the 24 hours ending 12/26/24 1236    Lines/Drains/Airways       Peripheral Intravenous Line  Duration                  Peripheral IV - Single Lumen 12/26/24 0836 20 G Anterior;Distal;Right Upper Arm <1 day         Peripheral IV - Single Lumen 12/26/24 0930 18 G Anterior;Distal;Left Upper Arm <1 day                     Physical Exam  Vitals reviewed.   Constitutional:       Appearance: He is well-developed.   Neck:      Vascular: No carotid bruit.   Cardiovascular:      Rate and Rhythm: Normal rate and regular rhythm.      Pulses: Intact distal pulses.      Heart sounds: Normal heart sounds. No murmur heard.  Pulmonary:      Breath sounds: Normal breath sounds.   Abdominal:      Tenderness: There is abdominal tenderness.   Neurological:      Mental Status: He is oriented to person, place, and time.          Significant Labs: Troponin No results for input(s): "TROPONINIHS" in the last 48 hours., All pertinent lab results from the last 24 hours have been reviewed., and   Recent Lab Results         12/26/24  1224   12/26/24  1119   12/26/24  0837        Albumin     4.0       ALP     86       ALT     11       Anion Gap     9       Ao root annulus 3.47           Ascending aorta 3.77           Ao peak collins 1.7           Ao VTI 33.5           PTT     29.2  Comment: Refer to local heparin nomogram for intensity/dose specific   therapeutic   range.         AST     16       AV valve area 2.7           TERRENCE by Velocity Ratio 2.9           AV mean gradient 8.4           AV index (prosthetic) 0.70           AV peak gradient 11.6           AV " Velocity Ratio 0.76           Baso #     0.05       Basophil %     0.5       BILIRUBIN TOTAL     0.5  Comment: For infants and newborns, interpretation of results should be based  on gestational age, weight and in agreement with clinical  observations.    Premature Infant recommended reference ranges:  Up to 24 hours.............<8.0 mg/dL  Up to 48 hours............<12.0 mg/dL  3-5 days..................<15.0 mg/dL  6-29 days.................<15.0 mg/dL         BNP     <10  Comment: Values of less than 100 pg/ml are consistent with non-CHF populations.       BSA 1.88           BUN     10       Calcium     9.7       Chloride     99       CO2     31       Creatinine     0.8       Left Ventricle Relative Wall Thickness 0.37           Differential Method     Automated       E/A ratio 0.94           E/E' ratio 5.92           eGFR     >60       Eos #     0.2       Eos %     2.2       E wave deceleration time 360.15           FS 38.8           Glucose     119       Gran # (ANC)     7.8       Gran %     78.6       Hematocrit     46.0       Hemoglobin     15.4       Immature Grans (Abs)     0.03  Comment: Mild elevation in immature granulocytes is non specific and   can be seen in a variety of conditions including stress response,   acute inflammation, trauma and pregnancy. Correlation with other   laboratory and clinical findings is essential.         Immature Granulocytes     0.3       INR     1.0  Comment: Coumadin Therapy:  2.0 - 3.0 for INR for all indicators except mechanical heart valves  and antiphospholipid syndromes which should use 2.5 - 3.5.         IVC diameter 1.46           IVRT 88.49           IVSd 0.8           LA WIDTH 2.4           Left Atrium Major Axis 5.44           Left Atrium Minor Axis 4.90           LA size 2.85           LA Vol 29.98           LA vol index 15.9           LVOT area 3.8           Lipase     19       LV LATERAL E/E' RATIO 5.29           LV SEPTAL E/E' RATIO 6.73           LV EDV BP  112.91           LV Diastolic Volume Index 59.74           Left Ventricular End Diastolic Volume by Teichholz Method 112.91           Left Ventricular End Systolic Volume by Teichholz Method 35.13           LVIDd 4.9           LVIDs 3.0           LV mass 141.9           LV Mass Index 75.1           Left Ventricular Outflow Tract Mean Gradient 3.96           Left Ventricular Outflow Tract Mean Velocity 0.92           LVOT diameter 2.2           LVOT peak maury 1.3           LVOT stroke volume 88.9           LVOT peak VTI 23.4           LV ESV BP 35.13           LV Systolic Volume Index 18.6           Lymph #     1.1       Lymph %     10.8       MCH     31.9       MCHC     33.5       MCV     95       Mean e' 0.13           Mono #     0.8       Mono %     7.6       MPV     9.0       MV valve area p 1/2 method 2.11           MV Peak A Maury 0.79           MV Peak E Maury 0.74           MV stenosis pressure 1/2 time 104.44           nRBC     0       Platelet Count     238       Potassium     4.0       PROTEIN TOTAL     7.3       PT     11.2       PV mean gradient 2           PW 0.9           RA Major Axis 4.20           Est. RA pres 3           RA Width 2.9           RBC     4.83       RDW     12.6       RV TB RVSP 5           RVOT peak maury 0.91           RVOT peak VTI 14.9           Sodium     139       STJ 3.61           TDI SEPTAL 0.11           TDI LATERAL 0.14           Triscuspid Valve Regurgitation Peak Gradient 17           TR Max Maury 2.04           Troponin I   0.009  Comment: The reference interval for Troponin I represents the 99th percentile   cutoff   for our facility and is consistent with 3rd generation assay   performance.     0.027  Comment: The reference interval for Troponin I represents the 99th percentile   cutoff   for our facility and is consistent with 3rd generation assay   performance.         TV resting pulmonary artery pressure 20           WBC     9.98       ZLVIDD -0.77           ZLVIDS -0.66                    Significant Imaging: Echocardiogram: Transthoracic echo (TTE) complete (Cupid Only):   Results for orders placed or performed during the hospital encounter of 12/26/24   Echo   Result Value Ref Range    BSA 1.88 m2    LA WIDTH 2.4 cm    RA Width 2.9 cm    LVOT stroke volume 88.9 cm3    LVIDd 4.9 3.5 - 6.0 cm    LV Systolic Volume 35.13 mL    LV Systolic Volume Index 18.6 mL/m2    LVIDs 3.0 2.1 - 4.0 cm    LV Diastolic Volume 112.91 mL    LV Diastolic Volume Index 59.74 mL/m2    Left Ventricular End Systolic Volume by Teichholz Method 35.13 mL    Left Ventricular End Diastolic Volume by Teichholz Method 112.91 mL    IVS 0.8 0.6 - 1.1 cm    LVOT diameter 2.2 cm    LVOT area 3.8 cm2    FS 38.8 28 - 44 %    Left Ventricle Relative Wall Thickness 0.37 cm    PW 0.9 0.6 - 1.1 cm    LV mass 141.9 g    LV Mass Index 75.1 g/m2    MV Peak E Maury 0.74 m/s    TDI LATERAL 0.14 m/s    TDI SEPTAL 0.11 m/s    E/E' ratio 5.92 m/s    MV Peak A Maury 0.79 m/s    TR Max Maury 2.04 m/s    E/A ratio 0.94     IVRT 88.49 msec    E wave deceleration time 360.15 msec    LV SEPTAL E/E' RATIO 6.73 m/s    SHELLEY 15.9 mL/m2    LV LATERAL E/E' RATIO 5.29 m/s    LA Vol 29.98 cm3    LVOT peak maury 1.3 m/s    Left Ventricular Outflow Tract Mean Velocity 0.92 cm/s    Left Ventricular Outflow Tract Mean Gradient 3.96 mmHg    RVOT peak VTI 14.9 cm    LA size 2.85 cm    Left Atrium Minor Axis 4.90 cm    Left Atrium Major Axis 5.44 cm    RA Major Axis 4.20 cm    AV mean gradient 8.4 mmHg    AV peak gradient 11.6 mmHg    Ao peak maury 1.7 m/s    Ao VTI 33.5 cm    LVOT peak VTI 23.4 cm    AV valve area 2.7 cm²    AV Velocity Ratio 0.76     AV index (prosthetic) 0.70     TERRENCE by Velocity Ratio 2.9 cm²    MV stenosis pressure 1/2 time 104.44 ms    MV valve area p 1/2 method 2.11 cm2    Triscuspid Valve Regurgitation Peak Gradient 17 mmHg    PV mean gradient 2 mmHg    RVOT peak maury 0.91 m/s    Ao root annulus 3.47 cm    STJ 3.61 cm    Ascending aorta 3.77 cm     IVC diameter 1.46 cm    Mean e' 0.13 m/s    ZLVIDS -0.66     ZLVIDD -0.77     TV resting pulmonary artery pressure 20 mmHg    RV TB RVSP 5 mmHg    Est. RA pres 3 mmHg    Narrative      Left Ventricle: The left ventricle is normal in size. Normal wall   thickness. There is normal systolic function with a visually estimated   ejection fraction of 55 - 60%. There is normal diastolic function.    Right Ventricle: Normal right ventricular cavity size. Wall thickness   is normal. Systolic function is normal.    Tricuspid Valve: There is mild regurgitation.    Pulmonary Artery: The estimated pulmonary artery systolic pressure is   20 mmHg.    IVC/SVC: Normal venous pressure at 3 mmHg.      and   Assessment and Plan:     * Chest pain  Noncardiac abdominal pain.    CT scan showing signs of possible gastritis.    Low risk finding abnormal stress test not available to me however report only mentions basal findings without further distal extension?  Troponins negative.    EKG no dynamic ischemia.    GI workup  Echo and a little motion abnormalities.        DC heparin  Cleared from cardiac standpoint to undergo his EGD.  Low risk procedure.  No ACS.  Okay to hold home aspirin.    On 4 L at home O2.    VTE Risk Mitigation (From admission, onward)           Ordered     IP VTE HIGH RISK PATIENT  Once         12/26/24 1157     Place sequential compression device  Until discontinued         12/26/24 1157     heparin 25,000 units in dextrose 5% (100 units/ml) IV bolus from bag LOW INTENSITY nomogram - [facility]  As needed (PRN)        Question:  Heparin Infusion Adjustment (DO NOT MODIFY ANSWER)  Answer:  [email or web address]\epic\Images\Pharmacy\HeparinInfusions\heparin LOW INTENSITY nomogram for [facility] [id number]    12/26/24 0928     heparin 25,000 units in dextrose 5% (100 units/ml) IV bolus from bag LOW INTENSITY nomogram - [facility]  As needed (PRN)        Question:  Heparin Infusion Adjustment (DO NOT MODIFY ANSWER)  Answer:   [email or web address]\epic\Images\Pharmacy\HeparinInfusions\heparin LOW INTENSITY nomogram for [facility] [id number]    12/26/24 0928     heparin 25,000 units in dextrose 5% 250 mL (100 units/mL) infusion LOW INTENSITY nomogram - [facility]  Continuous        Question:  Begin at (units/kg/hr)  Answer:  12 12/26/24 0928                    Thank you for your consult. I will sign off. Please contact us if you have any additional questions.    Armand Mcgill MD  Cardiology   [facility]

## 2024-12-27 ENCOUNTER — ANESTHESIA (OUTPATIENT)
Dept: ENDOSCOPY | Facility: HOSPITAL | Age: 69
End: 2024-12-27
Payer: COMMERCIAL

## 2024-12-27 ENCOUNTER — ANESTHESIA EVENT (OUTPATIENT)
Dept: ENDOSCOPY | Facility: HOSPITAL | Age: 69
End: 2024-12-27
Payer: COMMERCIAL

## 2024-12-27 ENCOUNTER — HOSPITAL ENCOUNTER (OUTPATIENT)
Facility: HOSPITAL | Age: 69
Discharge: HOME | End: 2024-12-27
Attending: INTERNAL MEDICINE | Admitting: INTERNAL MEDICINE
Payer: COMMERCIAL

## 2024-12-27 DIAGNOSIS — R10.13 EPIGASTRIC PAIN: ICD-10-CM

## 2024-12-27 DIAGNOSIS — R93.5 ABNORMAL CT OF THE ABDOMEN: ICD-10-CM

## 2024-12-27 DIAGNOSIS — K25.0 ACUTE GASTRIC ULCER WITH HEMORRHAGE: Primary | ICD-10-CM

## 2024-12-27 DIAGNOSIS — K25.9 GASTRIC ULCER, UNSPECIFIED CHRONICITY, UNSPECIFIED WHETHER GASTRIC ULCER HEMORRHAGE OR PERFORATION PRESENT: Primary | ICD-10-CM

## 2024-12-27 PROCEDURE — 63600175 PHARM REV CODE 636 W HCPCS: Performed by: NURSE ANESTHETIST, CERTIFIED REGISTERED

## 2024-12-27 PROCEDURE — 25000003 PHARM REV CODE 250: Performed by: INTERNAL MEDICINE

## 2024-12-27 PROCEDURE — 63600175 PHARM REV CODE 636 W HCPCS: Performed by: INTERNAL MEDICINE

## 2024-12-27 PROCEDURE — 43255 EGD CONTROL BLEEDING ANY: CPT | Mod: 59 | Performed by: INTERNAL MEDICINE

## 2024-12-27 PROCEDURE — 37000008 HC ANESTHESIA 1ST 15 MINUTES: Performed by: INTERNAL MEDICINE

## 2024-12-27 PROCEDURE — 43239 EGD BIOPSY SINGLE/MULTIPLE: CPT | Mod: ,,, | Performed by: INTERNAL MEDICINE

## 2024-12-27 PROCEDURE — 43239 EGD BIOPSY SINGLE/MULTIPLE: CPT | Performed by: INTERNAL MEDICINE

## 2024-12-27 PROCEDURE — 27201012 HC FORCEPS, HOT/COLD, DISP: Performed by: INTERNAL MEDICINE

## 2024-12-27 PROCEDURE — 27201028 HC NEEDLE, SCLERO: Performed by: INTERNAL MEDICINE

## 2024-12-27 PROCEDURE — 88342 IMHCHEM/IMCYTCHM 1ST ANTB: CPT | Performed by: PATHOLOGY

## 2024-12-27 PROCEDURE — 43255 EGD CONTROL BLEEDING ANY: CPT | Mod: 59,,, | Performed by: INTERNAL MEDICINE

## 2024-12-27 PROCEDURE — 88305 TISSUE EXAM BY PATHOLOGIST: CPT | Performed by: PATHOLOGY

## 2024-12-27 PROCEDURE — 27201038 HC PROBE, BI-POLAR: Performed by: INTERNAL MEDICINE

## 2024-12-27 PROCEDURE — 37000009 HC ANESTHESIA EA ADD 15 MINS: Performed by: INTERNAL MEDICINE

## 2024-12-27 RX ORDER — SUCRALFATE 1 G/1
1 TABLET ORAL 4 TIMES DAILY
Qty: 120 TABLET | Refills: 0 | Status: SHIPPED | OUTPATIENT
Start: 2024-12-27 | End: 2025-02-01

## 2024-12-27 RX ORDER — DEXTROMETHORPHAN/PSEUDOEPHED 2.5-7.5/.8
DROPS ORAL
Status: DISCONTINUED | OUTPATIENT
Start: 2024-12-27 | End: 2024-12-27 | Stop reason: HOSPADM

## 2024-12-27 RX ORDER — OXYCODONE AND ACETAMINOPHEN 7.5; 325 MG/1; MG/1
1 TABLET ORAL EVERY 8 HOURS PRN
Qty: 9 TABLET | Refills: 0 | Status: SHIPPED | OUTPATIENT
Start: 2024-12-27 | End: 2024-12-30

## 2024-12-27 RX ORDER — MORPHINE SULFATE 2 MG/ML
1 INJECTION, SOLUTION INTRAMUSCULAR; INTRAVENOUS ONCE
Status: COMPLETED | OUTPATIENT
Start: 2024-12-27 | End: 2024-12-27

## 2024-12-27 RX ORDER — PANTOPRAZOLE SODIUM 40 MG/1
40 TABLET, DELAYED RELEASE ORAL 2 TIMES DAILY
Qty: 180 TABLET | Refills: 0 | Status: SHIPPED | OUTPATIENT
Start: 2024-12-27 | End: 2025-04-02

## 2024-12-27 RX ORDER — EPINEPHRINE 1 MG/ML
INJECTION INTRAMUSCULAR; INTRAVENOUS; SUBCUTANEOUS
Status: COMPLETED | OUTPATIENT
Start: 2024-12-27 | End: 2024-12-27

## 2024-12-27 RX ORDER — PROPOFOL 10 MG/ML
VIAL (ML) INTRAVENOUS
Status: DISCONTINUED | OUTPATIENT
Start: 2024-12-27 | End: 2024-12-27

## 2024-12-27 RX ORDER — LIDOCAINE HYDROCHLORIDE 20 MG/ML
INJECTION INTRAVENOUS
Status: DISCONTINUED | OUTPATIENT
Start: 2024-12-27 | End: 2024-12-27

## 2024-12-27 RX ADMIN — PROPOFOL 20 MG: 10 INJECTION, EMULSION INTRAVENOUS at 07:12

## 2024-12-27 RX ADMIN — PROPOFOL 110 MG: 10 INJECTION, EMULSION INTRAVENOUS at 07:12

## 2024-12-27 RX ADMIN — LIDOCAINE HYDROCHLORIDE 100 MG: 20 INJECTION INTRAVENOUS at 07:12

## 2024-12-27 RX ADMIN — MORPHINE SULFATE 1 MG: 2 INJECTION, SOLUTION INTRAMUSCULAR; INTRAVENOUS at 08:12

## 2024-12-27 NOTE — PROVATION PATIENT INSTRUCTIONS
Discharge Summary/Instructions after an Endoscopic Procedure  Patient Name: Johnny Perales  Patient MRN: [id number]  Patient YOB: 1955 Friday, December 27, 2024 Cortney Kennedy MD  Dear patient,  As a result of recent federal legislation (The Federal Cures Act), you may   receive lab or pathology results from your procedure in your [facility]   account before your physician is able to contact you. Your physician or   their representative will relay the results to you with their   recommendations at their soonest availability.  Thank you,  RESTRICTIONS:  During your procedure today, you received medications for sedation.  These   medications may affect your judgment, balance and coordination.  Therefore,   for 24 hours, you have the following restrictions:   - DO NOT drive a car, operate machinery, make legal/financial decisions,   sign important papers or drink alcohol.    ACTIVITY:  Today: no heavy lifting, straining or running due to procedural   sedation/anesthesia.  The following day: return to full activity including work.  DIET:  Eat and drink normally unless instructed otherwise.     TREATMENT FOR COMMON SIDE EFFECTS:  - Mild abdominal pain, nausea, belching, bloating or excessive gas:  rest,   eat lightly and use a heating pad.  - Sore Throat: treat with throat lozenges and/or gargle with warm salt   water.  - Because air was used during the procedure, expelling large amounts of air   from your rectum or belching is normal.  - If a bowel prep was taken, you may not have a bowel movement for 1-3 days.    This is normal.  SYMPTOMS TO WATCH FOR AND REPORT TO YOUR PHYSICIAN:  1. Abdominal pain or bloating, other than gas cramps.  2. Chest pain.  3. Back pain.  4. Signs of infection such as: chills or fever occurring within 24 hours   after the procedure.  5. Rectal bleeding, which would show as bright red, maroon, or black stools.   (A tablespoon of blood from the rectum is not serious, especially  if   hemorrhoids are present.)  6. Vomiting.  7. Weakness or dizziness.  GO DIRECTLY TO THE NEAREST EMERGENCY ROOM IF YOU HAVE ANY OF THE FOLLOWING:      Difficulty breathing              Chills and/or fever over 101 F   Persistent vomiting and/or vomiting blood   Severe abdominal pain   Severe chest pain   Black, tarry stools   Bleeding- more than one tablespoon   Any other symptom or condition that you feel may need urgent attention  Your doctor recommends these additional instructions:  If any biopsies were taken, your doctors clinic will contact you in 1 to 2   weeks with any results.  - Discharge patient to home (with escort).   - Clear liquid diet today.   - Continue present medications.   - No ibuprofen, naproxen, or other non-steroidal anti-inflammatory drugs.   - Hold Aspirin for 5 days.   - Use Protonix (pantoprazole) 40 mg PO BID for 2 months. New prescription   sent to pharmacy today.  - Use sucralfate suspension 1 gram PO QID for 1 month.   - Await pathology results.   - Repeat upper endoscopy in 2 months to check healing. Will also need GE   junction biopsies at that time.   - Return to GI clinic with Dr. Gualberto Mercer of Gastroenterology. This   appointment will be arranged for you.  For questions, problems or results please call your physician Cortney Kennedy MD at Work:  [phone]  If you have any questions about the above instructions, call the GI   department at [phone] or call the endoscopy unit at [phone]   from 7am until 3 pm.  [facility] [place], EMERGENCY ROOM PHONE NUMBER:   [phone]  IF A COMPLICATION OR EMERGENCY SITUATION ARISES AND YOU ARE UNABLE TO REACH   YOUR PHYSICIAN - GO DIRECTLY TO THE EMERGENCY ROOM.  I have read or have had read to me these discharge instructions for my   procedure and have received a written copy.  I understand these   instructions and will follow-up with my physician if I have any questions.      __________________________________       _____________________________________  Nurse Signature                                          Patient/Designated   Responsible Party Signature  MD Cortney Gore MD  12/27/2024 7:52:18 AM  This report has been verified and signed electronically.  Dear patient,  As a result of recent federal legislation (The Federal Cures Act), you may   receive lab or pathology results from your procedure in your [facility]   account before your physician is able to contact you. Your physician or   their representative will relay the results to you with their   recommendations at their soonest availability.  Thank you,  PROVATION

## 2024-12-27 NOTE — ANESTHESIA PREPROCEDURE EVALUATION
12/27/2024  Johnny Perales is a 69 y.o., male.      Pre-op Assessment    I have reviewed the Patient Summary Reports.     I have reviewed the Nursing Notes. I have reviewed the NPO Status.   I have reviewed the Medications.     Review of Systems  Anesthesia Hx:  No problems with previous Anesthesia                Social:  Former Smoker, No Alcohol Use       Hematology/Oncology:    Oncology Normal                                   EENT/Dental:  EENT/Dental Normal           Pulmonary:   COPD (home 02), severe Asthma moderate                   Renal/:  Renal/ Normal                 Hepatic/GI:     GERD, poorly controlled                Musculoskeletal:  Musculoskeletal Normal                Neurological:    Neuromuscular Disease,                                   Endocrine:  Endocrine Normal            Dermatological:  Skin Normal    Psych:  Psychiatric History anxiety depression                Physical Exam  General: Well nourished    Airway:  Mallampati: II   Mouth Opening: Normal  TM Distance: Normal  Tongue: Normal  Neck ROM: Normal ROM    Dental:  Intact    Chest/Lungs:  Clear to auscultation    Heart:  Rate: Normal        Anesthesia Plan  Type of Anesthesia, risks & benefits discussed:    Anesthesia Type: MAC  Intra-op Monitoring Plan: Standard ASA Monitors  Induction:  IV  Informed Consent: Informed consent signed with the Patient and all parties understand the risks and agree with anesthesia plan.  All questions answered. Patient consented to blood products? Yes  ASA Score: 3    Ready For Surgery From Anesthesia Perspective.     .

## 2024-12-27 NOTE — H&P
PRE PROCEDURE H&P    Patient Name: Johnny Perales  MRN: [id number]  : 1955  Date of Procedure:  2024  Referring Physician: Cortney Kennedy MD  Primary Physician: Pam Lott, ARNALDO  Procedure Physician: Cortney Kennedy MD       Planned Procedure: EGD  Diagnosis:   Epigastric Abdominal pain    Chief Complaint: Same as above    HPI: Patient is an 69 y.o. male is here for the above. Discharged yesterday from hospital for epigastric/chest pain. Ruled out for ACS but CT showed thickened gastric antrum with submucosal edema and mucosal enhancement. It was suggested EGD be performed to exclude gastric mass. Epigastric pain started Monday but worsened over the week. Dr. Mercer was contacted who arranged outpatient EGD. Patient started on Protonix yesterday. States this has helped. No previous EGD. Extensive tobacco use history but quit 1/5 years ago. No etoh or NSAIDs. Takes ASA daily. Cleared by CV for today's EGD. No family at bedside.       Anticoagulation: ASA    Past Medical History:   Past Medical History:   Diagnosis Date    COPD (chronic obstructive pulmonary disease)     Hearing impairment         Past Surgical History:  Past Surgical History:   Procedure Laterality Date    COLONOSCOPY N/A 2017    Procedure: COLONOSCOPY;  Surgeon: Salvador Lambert MD;  Location: [facility];  Service: Endoscopy;  Laterality: N/A;    INNER EAR SURGERY Bilateral     LEG SURGERY Right         Home Medications:  Prior to Admission medications    Medication Sig Start Date End Date Taking? Authorizing Provider   albuterol (ACCUNEB) 0.63 mg/3 mL Nebu USE ONE VIAL IN NEBULIZER EVERY 4 HOURS AS NEEDED 10/22/19  Yes Prabhakar Rodriguez MD   albuterol (PROVENTIL/VENTOLIN HFA) 90 mcg/actuation inhaler Inhale 2 puffs into the lungs every 4 (four) hours as needed for Wheezing. 10/22/19  Yes Prabhakar Rodriguez MD   aspirin (ECOTRIN) 81 MG EC tablet Take 1 tablet by mouth every morning.   Yes Provider, Historical   folic  acid (FOLVITE) 800 MCG Tab Take 800 mcg by mouth once daily.   Yes Provider, Historical   guaifenesin/pseudoephedrne HCl (MUCINEX D ORAL) Take 600 mg by mouth 2 (two) times a day.   Yes Provider, Historical   OXYGEN-AIR DELIVERY SYSTEMS MISC 4 L/min by Misc.(Non-Drug; Combo Route) route.   Yes Provider, Historical   pantoprazole (PROTONIX) 40 MG tablet Take 1 tablet (40 mg total) by mouth 2 (two) times daily. for 14 days 12/26/24 1/9/25 Yes Jayde Brink NP   roflumilast (DALIRESP) 500 mcg Tab Take 1 tablet (500 mcg total) by mouth once daily. 10/22/19  Yes Prabhakar Rodriguez MD   sodium chloride (OCEAN) 0.65 % nasal spray 1 spray by Nasal route as needed. 10/3/19  Yes Provider, Historical   sucralfate (CARAFATE) 1 gram tablet Take 1 tablet (1 g total) by mouth 4 (four) times daily before meals and nightly. for 7 days 12/26/24 1/2/25 Yes Jayde Brink NP   TRELEGY ELLIPTA 200-62.5-25 mcg inhaler Inhale 1 puff into the lungs once daily.   Yes Provider, Historical   ascorbic acid, vitamin C, (VITAMIN C) 1000 MG tablet Take 1,000 mg by mouth once daily.    Provider, Historical   b complex vitamins tablet Take 1 tablet by mouth once daily.    Provider, Historical   cholecalciferol, vitamin D3, (VITAMIN D3) 400 unit Tab Take 2 tablets (800 Units total) by mouth once daily. 11/27/18   John Yap MD   sodium chloride for inhalation (SODIUM CHLORIDE 0.9%) 0.9 % nebulizer solution Inhale 3 mLs into the lungs daily as needed. 9/3/24   Provider, Historical   zinc gluconate 50 mg tablet Take 50 mg by mouth once daily.    Provider, Historical        Allergies:  Review of patient's allergies indicates:  No Known Allergies     Social History:   Social History     Socioeconomic History    Marital status: Single   Tobacco Use    Smoking status: Former     Current packs/day: 0.00     Average packs/day: 0.1 packs/day for 47.0 years (4.7 ttl pk-yrs)     Types: Vaping with nicotine, Cigarettes     Start date: 10/1971  "    Quit date: 10/2018     Years since quittin.2    Smokeless tobacco: Never   Substance and Sexual Activity    Alcohol use: Yes     Alcohol/week: 12.0 standard drinks of alcohol     Types: 12 Cans of beer per week    Drug use: No   Social History Narrative         Social Drivers of Health     Transportation Needs: No Transportation Needs (2024)    TRANSPORTATION NEEDS     Transportation : No       Family History:  Family History   Problem Relation Name Age of Onset    Cancer Mother      Heart failure Mother      Cancer Father      Heart failure Father      Colon cancer Maternal Uncle         ROS: No acute cardiac events, no acute respiratory complaints.     Physical Exam (all patients):    /69 (BP Location: Left arm, Patient Position: Lying)   Pulse 89   Temp 99 °F (37.2 °C) (Temporal)   Resp 17   Ht 5' 10.5" (1.791 m)   Wt 77.6 kg (171 lb)   SpO2 100% Comment: 4l/min via nasal cannula  BMI 24.19 kg/m²   Lungs: Clear to auscultation bilaterally, respirations unlabored  Heart: Regular rate and rhythm, S1 and S2 normal, no obvious murmurs  Abdomen:         Soft, non-tender, bowel sounds normal, no masses, no organomegaly    Lab Results   Component Value Date    WBC 9.98 2024    MCV 95 2024    RDW 12.6 2024     2024    INR 1.0 2024     (H) 2024    BUN 10 2024     2024    K 4.0 2024    CL 99 2024        SEDATION PLAN: per anesthesia      History reviewed, vital signs satisfactory, cardiopulmonary status satisfactory, sedation options, risks and plans have been discussed with the patient  All their questions were answered and the patient agrees to the sedation procedures as planned and the patient is deemed an appropriate candidate for the sedation as planned.    The risks, benefits and alternatives of the procedure were discussed with the patient in detail. This discussion was had in the presence of endoscopy " staff. The risks include, risks of adverse reaction to sedation requiring the use of reversal agents, bleeding requiring blood transfusion, perforation requiring surgical intervention and technical failure. Other risks include aspiration leading to respiratory distress and respiratory failure resulting in endotracheal intubation and mechanical ventilation including death. If anesthesia is being utilized for this procedure, it is up to the anesthesiologist to determine airway safety including elective endotracheal intubation. Questions were answered, they agree to proceed. There was no language barriers.      Procedure explained to patient, informed consent obtained and placed in chart.    Cortney Kennedy  12/27/2024  7:23 AM

## 2024-12-27 NOTE — ANESTHESIA POSTPROCEDURE EVALUATION
Anesthesia Post Evaluation    Patient: Johnny Perales    Procedure(s) Performed: Procedure(s) (LRB):  EGD (ESOPHAGOGASTRODUODENOSCOPY)-clearance in 12/26 note (N/A)    Final Anesthesia Type: MAC      Patient location during evaluation: PACU  Patient participation: Yes- Able to Participate  Level of consciousness: awake and alert  Post-procedure vital signs: reviewed and stable  Pain management: adequate  Airway patency: patent    PONV status at discharge: No PONV  Anesthetic complications: no      Cardiovascular status: blood pressure returned to baseline  Respiratory status: unassisted  Hydration status: euvolemic  Follow-up not needed.              Vitals Value Taken Time   /66 12/27/24 0745   Temp 98 12/27/24 0745   Pulse 66 12/27/24 0745   Resp 12 12/27/24 0745   SpO2 98 12/27/24 0745         No case tracking events are documented in the log.      Pain/Yulisa Score: Pain Rating Prior to Med Admin: 7 (12/26/2024  9:41 AM)

## 2024-12-27 NOTE — TRANSFER OF CARE
"Anesthesia Transfer of Care Note    Patient: Johnny Perales    Procedure(s) Performed: Procedure(s) (LRB):  EGD (ESOPHAGOGASTRODUODENOSCOPY)-clearance in 12/26 note (N/A)    Patient location: PACU    Anesthesia Type: MAC    Transport from OR: Transported from OR on room air with adequate spontaneous ventilation    Post pain: adequate analgesia    Post assessment: no apparent anesthetic complications    Post vital signs: stable    Level of consciousness: awake    Nausea/Vomiting: no nausea/vomiting    Complications: none    Transfer of care protocol was followed      Last vitals: Visit Vitals  /69 (BP Location: Left arm, Patient Position: Lying)   Pulse 89   Temp 37.2 °C (99 °F) (Temporal)   Resp 17   Ht 5' 10.5" (1.791 m)   Wt 77.6 kg (171 lb)   SpO2 100%   BMI 24.19 kg/m²     "

## 2024-12-30 VITALS
TEMPERATURE: 98 F | DIASTOLIC BLOOD PRESSURE: 86 MMHG | BODY MASS INDEX: 23.94 KG/M2 | HEART RATE: 75 BPM | RESPIRATION RATE: 100 BRPM | SYSTOLIC BLOOD PRESSURE: 156 MMHG | OXYGEN SATURATION: 100 % | HEIGHT: 71 IN | WEIGHT: 171 LBS

## 2024-12-31 LAB
FINAL PATHOLOGIC DIAGNOSIS: NORMAL
GROSS: NORMAL
Lab: NORMAL

## 2025-01-03 ENCOUNTER — PATIENT MESSAGE (OUTPATIENT)
Dept: GASTROENTEROLOGY | Facility: CLINIC | Age: 70
End: 2025-01-03
Payer: COMMERCIAL

## 2025-01-10 ENCOUNTER — PATIENT MESSAGE (OUTPATIENT)
Dept: GASTROENTEROLOGY | Facility: CLINIC | Age: 70
End: 2025-01-10
Payer: COMMERCIAL

## 2025-02-27 ENCOUNTER — HOSPITAL ENCOUNTER (OUTPATIENT)
Dept: ENDOSCOPY | Facility: HOSPITAL | Age: 70
Discharge: HOME | End: 2025-02-27
Attending: INTERNAL MEDICINE
Payer: COMMERCIAL